# Patient Record
Sex: FEMALE | Race: WHITE | NOT HISPANIC OR LATINO | Employment: OTHER | URBAN - METROPOLITAN AREA
[De-identification: names, ages, dates, MRNs, and addresses within clinical notes are randomized per-mention and may not be internally consistent; named-entity substitution may affect disease eponyms.]

---

## 2017-01-06 ENCOUNTER — HOSPITAL ENCOUNTER (OUTPATIENT)
Dept: CARDIOLOGY | Facility: HOSPITAL | Age: 72
Discharge: HOME | End: 2017-01-06
Attending: INTERNAL MEDICINE | Admitting: INTERNAL MEDICINE

## 2017-01-06 VITALS
DIASTOLIC BLOOD PRESSURE: 65 MMHG | BODY MASS INDEX: 33.13 KG/M2 | HEIGHT: 62 IN | WEIGHT: 180 LBS | SYSTOLIC BLOOD PRESSURE: 152 MMHG

## 2017-01-06 DIAGNOSIS — R01.1 CARDIAC MURMUR: ICD-10-CM

## 2017-01-06 PROCEDURE — 93306 TTE W/DOPPLER COMPLETE: CPT | Performed by: INTERNAL MEDICINE

## 2017-01-06 PROCEDURE — 93306 TTE W/DOPPLER COMPLETE: CPT

## 2017-01-09 LAB
BH CV ECHO MEAS - AI DEC SLOPE: 332 CM/SEC^2
BH CV ECHO MEAS - AI MAX PG: 72.6 MMHG
BH CV ECHO MEAS - AI MAX VEL: 426 CM/SEC
BH CV ECHO MEAS - AI P1/2T: 375.8 MSEC
BH CV ECHO MEAS - AO MAX PG (FULL): 9.7 MMHG
BH CV ECHO MEAS - AO MAX PG: 17 MMHG
BH CV ECHO MEAS - AO MEAN PG (FULL): 5 MMHG
BH CV ECHO MEAS - AO MEAN PG: 9 MMHG
BH CV ECHO MEAS - AO ROOT AREA (BSA CORRECTED): 1.8
BH CV ECHO MEAS - AO ROOT AREA: 8.6 CM^2
BH CV ECHO MEAS - AO ROOT DIAM: 3.3 CM
BH CV ECHO MEAS - AO V2 MAX: 206 CM/SEC
BH CV ECHO MEAS - AO V2 MEAN: 137 CM/SEC
BH CV ECHO MEAS - AO V2 VTI: 51.4 CM
BH CV ECHO MEAS - BSA(HAYCOCK): 1.9 M^2
BH CV ECHO MEAS - BSA: 1.8 M^2
BH CV ECHO MEAS - BZI_BMI: 32.9 KILOGRAMS/M^2
BH CV ECHO MEAS - BZI_METRIC_HEIGHT: 157.5 CM
BH CV ECHO MEAS - BZI_METRIC_WEIGHT: 81.6 KG
BH CV ECHO MEAS - CI(CUBED): 2.2 L/MIN/M^2
BH CV ECHO MEAS - CI(MOD-SP2): 1.8 L/MIN/M^2
BH CV ECHO MEAS - CI(MOD-SP4): 1.8 L/MIN/M^2
BH CV ECHO MEAS - CI(TEICH): 2 L/MIN/M^2
BH CV ECHO MEAS - CO(CUBED): 4 L/MIN
BH CV ECHO MEAS - CO(MOD-SP2): 3.3 L/MIN
BH CV ECHO MEAS - CO(MOD-SP4): 3.3 L/MIN
BH CV ECHO MEAS - CO(TEICH): 3.6 L/MIN
BH CV ECHO MEAS - CONTRAST EF (2CH): 68.8 ML/M^2
BH CV ECHO MEAS - CONTRAST EF 4CH: 57.1 ML/M^2
BH CV ECHO MEAS - EDV(CUBED): 87.7 ML
BH CV ECHO MEAS - EDV(MOD-SP2): 77 ML
BH CV ECHO MEAS - EDV(MOD-SP4): 91 ML
BH CV ECHO MEAS - EDV(TEICH): 89.7 ML
BH CV ECHO MEAS - EF(CUBED): 71.9 %
BH CV ECHO MEAS - EF(MOD-SP2): 68.8 %
BH CV ECHO MEAS - EF(MOD-SP4): 57.1 %
BH CV ECHO MEAS - EF(TEICH): 63.8 %
BH CV ECHO MEAS - ESV(CUBED): 24.6 ML
BH CV ECHO MEAS - ESV(MOD-SP2): 24 ML
BH CV ECHO MEAS - ESV(MOD-SP4): 39 ML
BH CV ECHO MEAS - ESV(TEICH): 32.5 ML
BH CV ECHO MEAS - FS: 34.5 %
BH CV ECHO MEAS - IVS/LVPW: 1.3
BH CV ECHO MEAS - IVSD: 1.2 CM
BH CV ECHO MEAS - LA DIMENSION: 4.6 CM
BH CV ECHO MEAS - LA/AO: 1.4
BH CV ECHO MEAS - LV DIASTOLIC VOL/BSA (35-75): 49.8 ML/M^2
BH CV ECHO MEAS - LV MASS(C)D: 158.2 GRAMS
BH CV ECHO MEAS - LV MASS(C)DI: 86.5 GRAMS/M^2
BH CV ECHO MEAS - LV MAX PG: 7.3 MMHG
BH CV ECHO MEAS - LV MEAN PG: 4 MMHG
BH CV ECHO MEAS - LV SYSTOLIC VOL/BSA (12-30): 21.3 ML/M^2
BH CV ECHO MEAS - LV V1 MAX: 135 CM/SEC
BH CV ECHO MEAS - LV V1 MEAN: 93.6 CM/SEC
BH CV ECHO MEAS - LV V1 VTI: 33.1 CM
BH CV ECHO MEAS - LVIDD: 4.4 CM
BH CV ECHO MEAS - LVIDS: 2.9 CM
BH CV ECHO MEAS - LVLD AP2: 7.3 CM
BH CV ECHO MEAS - LVLD AP4: 7.8 CM
BH CV ECHO MEAS - LVLS AP2: 6.4 CM
BH CV ECHO MEAS - LVLS AP4: 6.5 CM
BH CV ECHO MEAS - LVPWD: 0.92 CM
BH CV ECHO MEAS - MM HR: 63 BPM
BH CV ECHO MEAS - MM R-R INT: 0.95 SEC
BH CV ECHO MEAS - MV A MAX VEL: 106 CM/SEC
BH CV ECHO MEAS - MV DEC TIME: 0.17 SEC
BH CV ECHO MEAS - MV E MAX VEL: 121 CM/SEC
BH CV ECHO MEAS - MV E/A: 1.1
BH CV ECHO MEAS - PA ACC SLOPE: 1038 CM/SEC^2
BH CV ECHO MEAS - PA ACC TIME: 0.1 SEC
BH CV ECHO MEAS - PA MAX PG: 4.3 MMHG
BH CV ECHO MEAS - PA PR(ACCEL): 34 MMHG
BH CV ECHO MEAS - PA V2 MAX: 104 CM/SEC
BH CV ECHO MEAS - PULM DIAS VEL: 55 CM/SEC
BH CV ECHO MEAS - PULM S/D: 1.4
BH CV ECHO MEAS - PULM SYS VEL: 79.2 CM/SEC
BH CV ECHO MEAS - RVDD: 2.8 CM
BH CV ECHO MEAS - SI(AO): 240.5 ML/M^2
BH CV ECHO MEAS - SI(CUBED): 34.5 ML/M^2
BH CV ECHO MEAS - SI(MOD-SP2): 29 ML/M^2
BH CV ECHO MEAS - SI(MOD-SP4): 28.4 ML/M^2
BH CV ECHO MEAS - SI(TEICH): 31.3 ML/M^2
BH CV ECHO MEAS - SV(AO): 439.6 ML
BH CV ECHO MEAS - SV(CUBED): 63.1 ML
BH CV ECHO MEAS - SV(MOD-SP2): 53 ML
BH CV ECHO MEAS - SV(MOD-SP4): 52 ML
BH CV ECHO MEAS - SV(TEICH): 57.2 ML
BH CV ECHO MEAS - TAPSE (>1.6): 3.2 CM2
BH CV ECHO MEAS - TR MAX VEL: 175.5 CM/SEC
BH CV XLRA - RV BASE: 4.1 CM
BH CV XLRA - RV LENGTH: 6.5 CM
BH CV XLRA - RV MID: 2.8 CM
BH CV XLRA - TDI S': 13 CM/SEC
LEFT ATRIUM VOLUME INDEX: 53 ML/M2
LV EF 2D ECHO EST: 58 %

## 2017-02-03 ENCOUNTER — CONSULT (OUTPATIENT)
Dept: CARDIOLOGY | Facility: CLINIC | Age: 72
End: 2017-02-03

## 2017-02-03 VITALS — HEIGHT: 64 IN | BODY MASS INDEX: 32.3 KG/M2 | WEIGHT: 189.2 LBS

## 2017-02-03 DIAGNOSIS — Z95.0 PACEMAKER: ICD-10-CM

## 2017-02-03 DIAGNOSIS — R06.02 SHORTNESS OF BREATH: Primary | ICD-10-CM

## 2017-02-03 DIAGNOSIS — E78.2 MIXED HYPERLIPIDEMIA: ICD-10-CM

## 2017-02-03 DIAGNOSIS — I10 ESSENTIAL HYPERTENSION: ICD-10-CM

## 2017-02-03 PROCEDURE — 99204 OFFICE O/P NEW MOD 45 MIN: CPT | Performed by: INTERNAL MEDICINE

## 2017-02-03 PROCEDURE — 93288 INTERROG EVL PM/LDLS PM IP: CPT | Performed by: INTERNAL MEDICINE

## 2017-02-03 RX ORDER — ATORVASTATIN CALCIUM 20 MG/1
20 TABLET, FILM COATED ORAL DAILY
COMMUNITY
End: 2017-05-18 | Stop reason: SDUPTHER

## 2017-02-03 RX ORDER — INSULIN GLARGINE 100 [IU]/ML
16 INJECTION, SOLUTION SUBCUTANEOUS DAILY
COMMUNITY
End: 2017-05-18 | Stop reason: SDUPTHER

## 2017-02-03 RX ORDER — FERROUS SULFATE 325(65) MG
325 TABLET ORAL
COMMUNITY
End: 2017-12-20 | Stop reason: ALTCHOICE

## 2017-02-03 RX ORDER — LEVOTHYROXINE SODIUM 112 UG/1
112 TABLET ORAL DAILY
COMMUNITY
End: 2017-05-18 | Stop reason: SDUPTHER

## 2017-02-03 RX ORDER — ASPIRIN 81 MG/1
81 TABLET ORAL DAILY
COMMUNITY

## 2017-02-03 RX ORDER — ESCITALOPRAM OXALATE 20 MG/1
20 TABLET ORAL DAILY
COMMUNITY
End: 2017-05-18 | Stop reason: SDUPTHER

## 2017-02-03 RX ORDER — MELATONIN
1000 DAILY
COMMUNITY
End: 2020-05-08

## 2017-02-03 NOTE — PROGRESS NOTES
[facility] at [facility]  Consultation H&P  Faye Rod  1945  [phone]    VISIT DATE:  02/03/2017    PCP: Harry Martinez MD  [address] [address]    IDENTIFICATION: A 71 y.o. female     CC:  Chief Complaint   Patient presents with   • Consult     HTN/MURMUR       PROBLEM LIST:  1.  Diastolic Heart failure   A.  Echocardiogram, [place], 2014.  Impaired relaxation.  Mild AR, TR with RVSP 38 mmHg.  Trace MR   B.  Echocardiogram, [facility], 2017.  EF 50-55%, mild TR, moderate MR.    2.  Abnormal Heart rhythm   A.  [employer] [facility] PPM Model # [id number]  2.  Hypertension  3.  Hyperlipidemia  4.  Diabetes Mellitus  5.  CKD Stage III; followed by Dr Casarez  6. Dementia      Allergies  No Known Allergies    Current Medications    Current Outpatient Prescriptions:   •  Acetaminophen (TYLENOL ARTHRITIS PAIN PO), Take  by mouth., Disp: , Rfl:   •  amLODIPine (NORVASC) 2.5 MG tablet, Take 2.5 mg by mouth Daily., Disp: , Rfl:   •  aspirin 81 MG EC tablet, Take 81 mg by mouth Daily., Disp: , Rfl:   •  atorvastatin (LIPITOR) 20 MG tablet, Take 20 mg by mouth Daily., Disp: , Rfl:   •  cholecalciferol (VITAMIN D3) 1000 UNITS tablet, Take 1,000 Units by mouth Daily., Disp: , Rfl:   •  escitalopram (LEXAPRO) 20 MG tablet, Take 20 mg by mouth Daily., Disp: , Rfl:   •  ferrous sulfate 325 (65 FE) MG tablet, Take 325 mg by mouth Daily With Breakfast., Disp: , Rfl:   •  insulin glargine (LANTUS) 100 UNIT/ML injection, Inject 16 Units under the skin Daily., Disp: , Rfl:   •  levothyroxine (SYNTHROID, LEVOTHROID) 112 MCG tablet, Take 112 mcg by mouth Daily., Disp: , Rfl:   •  azelastine (ASTELIN) 0.1 % nasal spray, 1 spray into each nostril 2 (Two) Times a Day. Use in each nostril as directed, Disp: 1 each, Rfl: 0     History of Present Illness   HPI  Patient is a pleasant 71 year old  female with the above noted medical history who presents today to [facility] care.  She  "recently moved her from [place] where she was under the direct supervision of Dr Bhupinder Rivera, cardiology.  She has no known coronary disease.  She has a history of a pacemaker implant for \"abnormal rhythms\"; neither she or her daughter could elaborate.  She had been admitted in [place] for renal issues and was discharged to a nursing home facility.  It was determined that she was not capable of living alone any longer due to her memory issues and has thus moved in with her daughter.  She denies having any current chest pain, edema, fatigue, syncope.  She does admit to having some shortness of breath while climbing the stairs, but not at any other time.  She does not admit to 2 pillow orthopnea, PND or sleep apnea.  Her most recent echo indicates diastolic dysfunction and mild valvular heart disease.  From the cardiac standpoint, we feel that she is doing very well.      Pt denies any chest pain, dyspnea, dyspnea on exertion, orthopnea, PND, palpitations, lower extremity edema.      ROS  Review of Systems   Constitution: Positive for malaise/fatigue.   HENT: Negative.    Eyes: Negative.    Cardiovascular: Positive for dyspnea on exertion and leg swelling (occasional). Negative for chest pain, orthopnea, palpitations and syncope.   Respiratory: Positive for shortness of breath and snoring. Negative for cough and wheezing.    Endocrine: Negative.    Hematologic/Lymphatic: Negative.    Skin: Negative.    Musculoskeletal: Negative.    Gastrointestinal: Negative.    Genitourinary: Negative.    Neurological: Positive for difficulty with concentration.   Psychiatric/Behavioral: Negative.    Allergic/Immunologic: Negative.        SOCIAL HX  Social History     Social History   • Marital status:      Spouse name: N/A   • Number of children: N/A   • Years of education: N/A     Occupational History   • Not on file.     Social History Main Topics   • Smoking status: Never Smoker   • Smokeless tobacco: Not on file " "  • Alcohol use Yes   • Drug use: Defer   • Sexual activity: Defer     Other Topics Concern   • Not on file     Social History Narrative       FAMILY HX  History reviewed. No pertinent family history.    Vitals:    02/03/17 1411   Weight: 189 lb 3.2 oz (85.8 kg)   Height: 64\" (162.6 cm)       PHYSICAL EXAMINATION:  Physical Exam   Constitutional: She appears well-developed and well-nourished.   HENT:   Head: Normocephalic and atraumatic.   Eyes: Pupils are equal, round, and reactive to light. Right eye exhibits no discharge.   Neck: Normal range of motion. Neck supple. No JVD present.   Cardiovascular: Normal rate, regular rhythm and normal heart sounds.  Exam reveals no gallop and no friction rub.    No murmur heard.  Pulmonary/Chest: Effort normal and breath sounds normal. She has no wheezes.   Abdominal: Soft. Bowel sounds are normal. There is no tenderness.   Musculoskeletal: Normal range of motion. She exhibits no edema.   Neurological: She is alert.   Skin: Skin is warm and dry.   Psychiatric: She has a normal mood and affect. Her behavior is normal.       Diagnostic Data:  DEVICE INTERROGATION:  2/3/2017, [employer] [facility] PPM [id number]: RA pacing 71%, RV pacing 6.3%. P wave is 2.0 mV with a threshold of 0.5 V at 0.4 msec and an impedance of 460 ohms. R wave is 11.2 mV with a threshold of 0.75 V at 0.4 msec and an impedance of 540 ohms. Battery voltage is 7.5 years.  Procedures  No results found for: CHLPL, TRIG, HDL, LDLDIRECT  Lab Results   Component Value Date    GLUCOSE 92 04/16/2016    BUN 46 (H) 04/16/2016    CREATININE 2.2 (H) 04/16/2016     04/16/2016    K 4.4 04/16/2016    CL 99 04/16/2016    CO2 26 04/16/2016     No results found for: HGBA1C  Lab Results   Component Value Date    WBC 8.08 04/16/2016    HGB 10.3 (L) 04/16/2016    HCT 31.8 (L) 04/16/2016     (L) 04/16/2016       ASSESSMENT:   Diagnosis Plan   1. Shortness of breath     2. Pacemaker     3. Essential hypertension     4. Mixed " hyperlipidemia           PLAN:  1.  Continue current medications  2.  Follow up in 6 months or sooner if needed    Scribed for Dr Ciera MD by SHALONDA Prather on 02/03/2017 at 2:51 PM  I, Faisal Vang MD, personally performed the services described in this documentation as scribed by the above named individual in my presence, and it is both accurate and complete.  2/3/2017  4:19 PM         Faisal Vang MD, [facility]

## 2017-02-15 ENCOUNTER — OFFICE VISIT (OUTPATIENT)
Dept: INTERNAL MEDICINE | Facility: CLINIC | Age: 72
End: 2017-02-15

## 2017-02-15 VITALS
HEIGHT: 64 IN | HEART RATE: 68 BPM | WEIGHT: 189 LBS | BODY MASS INDEX: 32.27 KG/M2 | DIASTOLIC BLOOD PRESSURE: 62 MMHG | OXYGEN SATURATION: 98 % | SYSTOLIC BLOOD PRESSURE: 148 MMHG

## 2017-02-15 DIAGNOSIS — I10 ESSENTIAL HYPERTENSION: Primary | ICD-10-CM

## 2017-02-15 DIAGNOSIS — Z79.4 TYPE 2 DIABETES MELLITUS WITHOUT COMPLICATION, WITH LONG-TERM CURRENT USE OF INSULIN (HCC): ICD-10-CM

## 2017-02-15 DIAGNOSIS — E03.8 OTHER SPECIFIED HYPOTHYROIDISM: ICD-10-CM

## 2017-02-15 DIAGNOSIS — Z95.0 PACEMAKER: ICD-10-CM

## 2017-02-15 DIAGNOSIS — E03.9 HYPOTHYROIDISM, ADULT: ICD-10-CM

## 2017-02-15 DIAGNOSIS — E53.8 B12 DEFICIENCY: ICD-10-CM

## 2017-02-15 DIAGNOSIS — D50.8 OTHER IRON DEFICIENCY ANEMIA: ICD-10-CM

## 2017-02-15 DIAGNOSIS — F09 COGNITIVE DISORDER: ICD-10-CM

## 2017-02-15 DIAGNOSIS — E78.2 MIXED HYPERLIPIDEMIA: ICD-10-CM

## 2017-02-15 DIAGNOSIS — E11.9 TYPE 2 DIABETES MELLITUS WITHOUT COMPLICATION, WITH LONG-TERM CURRENT USE OF INSULIN (HCC): ICD-10-CM

## 2017-02-15 DIAGNOSIS — M19.042 PRIMARY OSTEOARTHRITIS OF BOTH HANDS: ICD-10-CM

## 2017-02-15 DIAGNOSIS — M19.041 PRIMARY OSTEOARTHRITIS OF BOTH HANDS: ICD-10-CM

## 2017-02-15 DIAGNOSIS — I34.0 NON-RHEUMATIC MITRAL REGURGITATION: ICD-10-CM

## 2017-02-15 PROBLEM — M19.049 PRIMARY OSTEOARTHRITIS OF HAND: Status: ACTIVE | Noted: 2017-02-15

## 2017-02-15 LAB
ALBUMIN SERPL-MCNC: 4.2 G/DL (ref 3.2–4.8)
ALBUMIN/GLOB SERPL: 1.7 G/DL (ref 1.5–2.5)
ALP SERPL-CCNC: 80 U/L (ref 25–100)
ALT SERPL W P-5'-P-CCNC: 17 U/L (ref 7–40)
ANION GAP SERPL CALCULATED.3IONS-SCNC: 6 MMOL/L (ref 3–11)
ARTICHOKE IGE QN: 71 MG/DL (ref 0–130)
AST SERPL-CCNC: 21 U/L (ref 0–33)
BASOPHILS # BLD AUTO: 0.02 10*3/MM3 (ref 0–0.2)
BASOPHILS NFR BLD AUTO: 0.3 % (ref 0–1)
BILIRUB SERPL-MCNC: 0.4 MG/DL (ref 0.3–1.2)
BUN BLD-MCNC: 33 MG/DL (ref 9–23)
BUN/CREAT SERPL: 23.6 (ref 7–25)
CALCIUM SPEC-SCNC: 10.1 MG/DL (ref 8.7–10.4)
CHLORIDE SERPL-SCNC: 101 MMOL/L (ref 99–109)
CHOLEST SERPL-MCNC: 138 MG/DL (ref 0–200)
CO2 SERPL-SCNC: 32 MMOL/L (ref 20–31)
CREAT BLD-MCNC: 1.4 MG/DL (ref 0.6–1.3)
DEPRECATED RDW RBC AUTO: 49.6 FL (ref 37–54)
EOSINOPHIL # BLD AUTO: 0.21 10*3/MM3 (ref 0.1–0.3)
EOSINOPHIL NFR BLD AUTO: 3.4 % (ref 0–3)
ERYTHROCYTE [DISTWIDTH] IN BLOOD BY AUTOMATED COUNT: 14 % (ref 11.3–14.5)
GFR SERPL CREATININE-BSD FRML MDRD: 37 ML/MIN/1.73
GLOBULIN UR ELPH-MCNC: 2.5 GM/DL
GLUCOSE BLD-MCNC: 118 MG/DL (ref 70–100)
GLUCOSE BLDC GLUCOMTR-MCNC: 139 MG/DL (ref 70–130)
HBA1C MFR BLD: 7.7 %
HCT VFR BLD AUTO: 36.8 % (ref 34.5–44)
HDLC SERPL-MCNC: 47 MG/DL (ref 40–60)
HGB BLD-MCNC: 11.6 G/DL (ref 11.5–15.5)
IMM GRANULOCYTES # BLD: 0.01 10*3/MM3 (ref 0–0.03)
IMM GRANULOCYTES NFR BLD: 0.2 % (ref 0–0.6)
LYMPHOCYTES # BLD AUTO: 2.1 10*3/MM3 (ref 0.6–4.8)
LYMPHOCYTES NFR BLD AUTO: 34.5 % (ref 24–44)
MCH RBC QN AUTO: 30.6 PG (ref 27–31)
MCHC RBC AUTO-ENTMCNC: 31.5 G/DL (ref 32–36)
MCV RBC AUTO: 97.1 FL (ref 80–99)
MONOCYTES # BLD AUTO: 0.47 10*3/MM3 (ref 0–1)
MONOCYTES NFR BLD AUTO: 7.7 % (ref 0–12)
NEUTROPHILS # BLD AUTO: 3.28 10*3/MM3 (ref 1.5–8.3)
NEUTROPHILS NFR BLD AUTO: 53.9 % (ref 41–71)
PLATELET # BLD AUTO: 147 10*3/MM3 (ref 150–450)
PMV BLD AUTO: 11.3 FL (ref 6–12)
POTASSIUM BLD-SCNC: 4.5 MMOL/L (ref 3.5–5.5)
PROT SERPL-MCNC: 6.7 G/DL (ref 5.7–8.2)
RBC # BLD AUTO: 3.79 10*6/MM3 (ref 3.89–5.14)
SODIUM BLD-SCNC: 139 MMOL/L (ref 132–146)
TRIGL SERPL-MCNC: 87 MG/DL (ref 0–150)
TSH SERPL DL<=0.05 MIU/L-ACNC: 0.7 MIU/ML (ref 0.35–5.35)
WBC NRBC COR # BLD: 6.09 10*3/MM3 (ref 3.5–10.8)

## 2017-02-15 PROCEDURE — 99204 OFFICE O/P NEW MOD 45 MIN: CPT | Performed by: HOSPITALIST

## 2017-02-15 PROCEDURE — 80061 LIPID PANEL: CPT | Performed by: HOSPITALIST

## 2017-02-15 PROCEDURE — 84443 ASSAY THYROID STIM HORMONE: CPT | Performed by: HOSPITALIST

## 2017-02-15 PROCEDURE — 83036 HEMOGLOBIN GLYCOSYLATED A1C: CPT | Performed by: HOSPITALIST

## 2017-02-15 PROCEDURE — 82947 ASSAY GLUCOSE BLOOD QUANT: CPT | Performed by: HOSPITALIST

## 2017-02-15 PROCEDURE — 85025 COMPLETE CBC W/AUTO DIFF WBC: CPT | Performed by: HOSPITALIST

## 2017-02-15 PROCEDURE — 80053 COMPREHEN METABOLIC PANEL: CPT | Performed by: HOSPITALIST

## 2017-02-15 NOTE — PROGRESS NOTES
Subjective   Faye Rod is a 71 y.o. female. Establish Care (new pt); Hypertension; Hyperlipidemia; Diabetes; and Hypothyroidism         HPI Comments: Here to establish. She has some difficulty walking associated with poor balance and h/o frequent falls. She had a recent admission in [place], September 2016 and has been to rehab. She is having some cognitive problems since she was admitted that seems to be associated with being in the nursing home. Her daughter reports difficulty following commands. Her BP is usually well controlled. Her BP may have been running low at one time and her BP meds were stopped. She has been back on them about 2 weeks now. She does home monitoring of her BP. She has CKD and is followed by [facility]. She had a recent echo in December 2016 showing moderate mitral regurgitation.  Her diabetes is fairly well controlled.       The following portions of the patient's history were reviewed and updated as appropriate: allergies, current medications, past family history, past medical history, past social history, past surgical history and problem list.    Review of Systems   Constitutional: Negative for activity change and appetite change.   Respiratory: Positive for shortness of breath.         Orthopnea   Cardiovascular: Negative for leg swelling.   Gastrointestinal: Negative for abdominal distention and abdominal pain.   Genitourinary: Negative for dysuria and enuresis.   Psychiatric/Behavioral: Negative for agitation and behavioral problems.       Objective   Vitals:    02/15/17 1023   BP: 148/62   Pulse: 68   SpO2: 98%       Physical Exam   Constitutional: She is oriented to person, place, and time. She appears well-developed and well-nourished.   HENT:   Head: Normocephalic and atraumatic.   Eyes: Conjunctivae and EOM are normal. Pupils are equal, round, and reactive to light.   Neck: Normal range of motion. Neck supple.   Cardiovascular: Normal rate, regular rhythm and  normal heart sounds.    Pulmonary/Chest: Effort normal and breath sounds normal.   Abdominal: Soft. Bowel sounds are normal.   Musculoskeletal: Normal range of motion.   Neurological: She is alert and oriented to person, place, and time. She has normal reflexes.   Skin: Skin is warm and dry.   Psychiatric: She has a normal mood and affect. Her behavior is normal. Judgment and thought content normal.       Assessment/Plan   Faye was seen today for establish care, hypertension, hyperlipidemia, diabetes and hypothyroidism.    Diagnoses and all orders for this visit:    Essential hypertension  -     Comprehensive metabolic panel    Type 2 diabetes mellitus without complication, with long-term current use of insulin  -     POC Glycosylated Hemoglobin (Hb A1C)  -     POCT Glucose    Other specified hypothyroidism  -     TSH    Non-rheumatic mitral regurgitation    Mixed hyperlipidemia  -     Lipid Panel    Primary osteoarthritis of both hands    Hypothyroidism, adult    Pacemaker    Other iron deficiency anemia  -     CBC & Differential  -     CBC Auto Differential    Cognitive disorder  -     Ambulatory Referral to Neurology    B12 deficiency  -     Vitamin B12      Results for orders placed or performed in visit on 02/15/17   POC Glycosylated Hemoglobin (Hb A1C)   Result Value Ref Range    Hemoglobin A1C 7.7 %   POCT Glucose   Result Value Ref Range    Glucose 139 (A) 70 - 130 mg/dL

## 2017-02-27 RX ORDER — AMLODIPINE BESYLATE 2.5 MG/1
2.5 TABLET ORAL DAILY
Qty: 90 TABLET | Refills: 1 | Status: SHIPPED | OUTPATIENT
Start: 2017-02-27 | End: 2017-06-27 | Stop reason: SDUPTHER

## 2017-03-17 ENCOUNTER — OFFICE VISIT (OUTPATIENT)
Dept: NEUROLOGY | Facility: CLINIC | Age: 72
End: 2017-03-17

## 2017-03-17 ENCOUNTER — APPOINTMENT (OUTPATIENT)
Dept: LAB | Facility: HOSPITAL | Age: 72
End: 2017-03-17

## 2017-03-17 VITALS
HEIGHT: 63 IN | SYSTOLIC BLOOD PRESSURE: 126 MMHG | WEIGHT: 186 LBS | DIASTOLIC BLOOD PRESSURE: 75 MMHG | BODY MASS INDEX: 32.96 KG/M2

## 2017-03-17 DIAGNOSIS — F03.91 DEMENTIA WITH BEHAVIORAL DISTURBANCE, UNSPECIFIED DEMENTIA TYPE: Primary | ICD-10-CM

## 2017-03-17 PROBLEM — F03.918 DEMENTIA WITH BEHAVIORAL DISTURBANCE (HCC): Status: ACTIVE | Noted: 2017-03-17

## 2017-03-17 LAB
FOLATE SERPL-MCNC: >24 NG/ML (ref 3.2–20)
VIT B12 BLD-MCNC: >2000 PG/ML (ref 211–911)

## 2017-03-17 PROCEDURE — 82607 VITAMIN B-12: CPT | Performed by: PSYCHIATRY & NEUROLOGY

## 2017-03-17 PROCEDURE — 36415 COLL VENOUS BLD VENIPUNCTURE: CPT | Performed by: PSYCHIATRY & NEUROLOGY

## 2017-03-17 PROCEDURE — 82746 ASSAY OF FOLIC ACID SERUM: CPT | Performed by: PSYCHIATRY & NEUROLOGY

## 2017-03-17 PROCEDURE — 99205 OFFICE O/P NEW HI 60 MIN: CPT | Performed by: PSYCHIATRY & NEUROLOGY

## 2017-03-17 RX ORDER — DONEPEZIL HYDROCHLORIDE 5 MG/1
5 TABLET, FILM COATED ORAL DAILY
Qty: 30 TABLET | Refills: 11 | Status: SHIPPED | OUTPATIENT
Start: 2017-03-17 | End: 2017-04-21

## 2017-03-31 ENCOUNTER — HOSPITAL ENCOUNTER (OUTPATIENT)
Dept: CT IMAGING | Facility: HOSPITAL | Age: 72
Discharge: HOME | End: 2017-03-31
Attending: PSYCHIATRY & NEUROLOGY | Admitting: PSYCHIATRY & NEUROLOGY

## 2017-03-31 PROCEDURE — 70450 CT HEAD/BRAIN W/O DYE: CPT

## 2017-04-21 ENCOUNTER — OFFICE VISIT (OUTPATIENT)
Dept: NEUROLOGY | Facility: CLINIC | Age: 72
End: 2017-04-21

## 2017-04-21 VITALS
HEIGHT: 62 IN | SYSTOLIC BLOOD PRESSURE: 140 MMHG | BODY MASS INDEX: 34.04 KG/M2 | DIASTOLIC BLOOD PRESSURE: 72 MMHG | WEIGHT: 185 LBS

## 2017-04-21 DIAGNOSIS — F03.91 DEMENTIA WITH BEHAVIORAL DISTURBANCE, UNSPECIFIED DEMENTIA TYPE: Primary | ICD-10-CM

## 2017-04-21 PROCEDURE — 99214 OFFICE O/P EST MOD 30 MIN: CPT | Performed by: PHYSICIAN ASSISTANT

## 2017-04-21 RX ORDER — MEMANTINE HYDROCHLORIDE 5 MG-10 MG
KIT ORAL
Qty: 1 PACKAGE | Refills: 0 | Status: SHIPPED | OUTPATIENT
Start: 2017-04-21 | End: 2017-09-22

## 2017-04-21 RX ORDER — DONEPEZIL HYDROCHLORIDE 10 MG/1
10 TABLET, FILM COATED ORAL DAILY
Qty: 30 TABLET | Refills: 11 | Status: SHIPPED | OUTPATIENT
Start: 2017-04-21 | End: 2018-04-21

## 2017-04-21 RX ORDER — MEMANTINE HYDROCHLORIDE 10 MG/1
10 TABLET ORAL 2 TIMES DAILY
Qty: 60 TABLET | Refills: 11 | Status: SHIPPED | OUTPATIENT
Start: 2017-04-21 | End: 2018-04-21

## 2017-04-21 NOTE — PROGRESS NOTES
"Subjective     History of Present Illness   Faye Rod is a 71 y.o. female who returns to clinic today for evaluation of cognitive impairment. Her family  has noted symptoms since approximately 2015 marked initially by forgetfulness. This has varied over time. Additional associated symptoms have included impairments in essentially all spheres of cognition. She has had associated  symptoms of hallucinations. No modifying factors or circumstances have been identified. She is currently residing at home with her daughter in [place].      Prior evaluation has included screening blood work and a head CT within the last year which were notable for only mild increases in BUN and creatinine. She is currently taking donepezil 5mg daily.     Since her last visit in 3/17, she feels essentially unchanged and her family agrees. Her family notes that her hallucinations have resolved.      The following portions of the patient's history were reviewed and updated as appropriate: allergies, current medications, past family history, past medical history, past social history, past surgical history and problem list.    Review of Systems   Constitutional: Negative.    HENT: Negative.    Eyes: Negative.    Respiratory: Negative.    Cardiovascular: Negative.    Gastrointestinal: Negative.    Endocrine: Negative.    Genitourinary: Negative.    Musculoskeletal: Negative.    Skin: Negative.    Allergic/Immunologic: Negative.    Neurological:        As noted in HPI   Hematological: Negative.    Psychiatric/Behavioral:        As noted in HPI       Objective     /72  Ht 62\" (157.5 cm)  Wt 185 lb (83.9 kg)  BMI 33.84 kg/m2    General appearance today is normal.         Physical Exam   Neurological: She has normal strength. She has a normal Finger-Nose-Finger Test.   Psychiatric: Her speech is normal.        Neurologic Exam     Mental Status   Oriented to person.   (Oriented to state only  )  Disoriented to date. Oriented to " year, month, day and season.   Registration: recalls 3 of 3 objects. Recall of objects at 5 minutes: 0/3 recall. Follows 3 step commands.   Attention: 4/5 recall.   Speech: speech is normal   Level of consciousness: alert  Able to name object. Able to read. Able to repeat. Able to write. Normal comprehension.     Cranial Nerves   Cranial nerves II through XII intact.     Motor Exam   Muscle bulk: normal  Overall muscle tone: normal    Strength   Strength 5/5 throughout.     Sensory Exam   Light touch normal.     Gait, Coordination, and Reflexes     Coordination   Finger to nose coordination: normal    Tremor   Resting tremor: absent        Results  MMSE=20      Assessment/Plan   Faye was seen today for dementia.    Diagnoses and all orders for this visit:    Dementia with behavioral disturbance, unspecified dementia type    Other orders  -     donepezil (ARICEPT) 10 MG tablet; Take 1 tablet by mouth Daily.  -     memantine (NAMENDA TITRATION GERRI) 5 (28)-10 (21) MG tablet pack; Follow package directions.  -     memantine (NAMENDA) 10 MG tablet; Take 1 tablet by mouth 2 (Two) Times a Day.          Discussion/Summary   Faye Rod returns to clinic today for evaluation of cognitive impairment. Her history and examination, including bedside cognitive testing are most consistent with a mild-moderate dementia, though the underlying etiology is not yet clear, which was discussed. AD exacerbated by delirium during her hospitalization with renal insufficiency in 9/16 vs DLB are considerations. After discussing potential treatment options, it was elected to increase  donepezil to 10mg daily and add memantine. I also recommended home health, which she will consider in the future. She will then follow up in 3 months, or sooner if needed.       I spent 18 minutes out of 25 minutes face to face with the patient and family and discussing diagnosis, prognosis, diagnostic testing, evaluation, current status, treatment  options and management.    As part of this visit I reviewed prior lab results, reviewed radiology results and obtained additional history from the family which is incorporated in the HPI.      Sarai Mohan PA-C

## 2017-04-24 ENCOUNTER — TELEPHONE (OUTPATIENT)
Dept: NEUROLOGY | Facility: CLINIC | Age: 72
End: 2017-04-24

## 2017-04-24 NOTE — TELEPHONE ENCOUNTER
Called [facility] [phone] and filled a script for Memantine 10 mg titration with the instructions to take for the 1st week 1/2 tablet, 2nd week take one whole tablet, 3rd week take 1 1/2 tablet and the 4th week take 2 10 mg tablets and continue with the 2 10 mg tablets there after.

## 2017-05-18 ENCOUNTER — OFFICE VISIT (OUTPATIENT)
Dept: INTERNAL MEDICINE | Facility: CLINIC | Age: 72
End: 2017-05-18

## 2017-05-18 VITALS
HEART RATE: 62 BPM | BODY MASS INDEX: 35.77 KG/M2 | WEIGHT: 194.4 LBS | OXYGEN SATURATION: 97 % | SYSTOLIC BLOOD PRESSURE: 126 MMHG | DIASTOLIC BLOOD PRESSURE: 84 MMHG | HEIGHT: 62 IN

## 2017-05-18 DIAGNOSIS — N18.9 CHRONIC KIDNEY DISEASE, UNSPECIFIED STAGE: ICD-10-CM

## 2017-05-18 DIAGNOSIS — M85.80 OSTEOPENIA: ICD-10-CM

## 2017-05-18 DIAGNOSIS — N18.30 CHRONIC KIDNEY DISEASE, STAGE III (MODERATE) (HCC): ICD-10-CM

## 2017-05-18 DIAGNOSIS — Z78.0 POST-MENOPAUSAL: ICD-10-CM

## 2017-05-18 DIAGNOSIS — E11.00 TYPE 2 DIABETES MELLITUS WITH HYPEROSMOLARITY WITHOUT COMA, WITHOUT LONG-TERM CURRENT USE OF INSULIN (HCC): Primary | ICD-10-CM

## 2017-05-18 LAB
ALBUMIN SERPL-MCNC: 4.3 G/DL (ref 3.2–4.8)
ALBUMIN/GLOB SERPL: 2 G/DL (ref 1.5–2.5)
ALP SERPL-CCNC: 91 U/L (ref 25–100)
ALT SERPL W P-5'-P-CCNC: 18 U/L (ref 7–40)
ANION GAP SERPL CALCULATED.3IONS-SCNC: 9 MMOL/L (ref 3–11)
AST SERPL-CCNC: 16 U/L (ref 0–33)
BASOPHILS # BLD AUTO: 0.03 10*3/MM3 (ref 0–0.2)
BASOPHILS NFR BLD AUTO: 0.4 % (ref 0–1)
BILIRUB SERPL-MCNC: 0.3 MG/DL (ref 0.3–1.2)
BUN BLD-MCNC: 29 MG/DL (ref 9–23)
BUN/CREAT SERPL: 20.7 (ref 7–25)
CALCIUM SPEC-SCNC: 9.9 MG/DL (ref 8.7–10.4)
CHLORIDE SERPL-SCNC: 102 MMOL/L (ref 99–109)
CO2 SERPL-SCNC: 30 MMOL/L (ref 20–31)
CREAT BLD-MCNC: 1.4 MG/DL (ref 0.6–1.3)
DEPRECATED RDW RBC AUTO: 44.7 FL (ref 37–54)
EOSINOPHIL # BLD AUTO: 0.13 10*3/MM3 (ref 0.1–0.3)
EOSINOPHIL NFR BLD AUTO: 1.8 % (ref 0–3)
ERYTHROCYTE [DISTWIDTH] IN BLOOD BY AUTOMATED COUNT: 12.5 % (ref 11.3–14.5)
FERRITIN SERPL-MCNC: 69 NG/ML (ref 10–291)
GFR SERPL CREATININE-BSD FRML MDRD: 37 ML/MIN/1.73
GLOBULIN UR ELPH-MCNC: 2.2 GM/DL
GLUCOSE BLD-MCNC: 276 MG/DL (ref 70–100)
GLUCOSE BLDC GLUCOMTR-MCNC: 420 MG/DL (ref 70–130)
HBA1C MFR BLD: 11.2 %
HCT VFR BLD AUTO: 38.2 % (ref 34.5–44)
HGB BLD-MCNC: 12.1 G/DL (ref 11.5–15.5)
IMM GRANULOCYTES # BLD: 0.01 10*3/MM3 (ref 0–0.03)
IMM GRANULOCYTES NFR BLD: 0.1 % (ref 0–0.6)
IRON 24H UR-MRATE: 58 MCG/DL (ref 50–175)
IRON SATN MFR SERPL: 19 % (ref 15–50)
LYMPHOCYTES # BLD AUTO: 2.19 10*3/MM3 (ref 0.6–4.8)
LYMPHOCYTES NFR BLD AUTO: 31.1 % (ref 24–44)
MCH RBC QN AUTO: 31 PG (ref 27–31)
MCHC RBC AUTO-ENTMCNC: 31.7 G/DL (ref 32–36)
MCV RBC AUTO: 97.9 FL (ref 80–99)
MONOCYTES # BLD AUTO: 0.49 10*3/MM3 (ref 0–1)
MONOCYTES NFR BLD AUTO: 7 % (ref 0–12)
NEUTROPHILS # BLD AUTO: 4.2 10*3/MM3 (ref 1.5–8.3)
NEUTROPHILS NFR BLD AUTO: 59.6 % (ref 41–71)
PLATELET # BLD AUTO: 143 10*3/MM3 (ref 150–450)
PMV BLD AUTO: 11.4 FL (ref 6–12)
POTASSIUM BLD-SCNC: 4.7 MMOL/L (ref 3.5–5.5)
PROT SERPL-MCNC: 6.5 G/DL (ref 5.7–8.2)
RBC # BLD AUTO: 3.9 10*6/MM3 (ref 3.89–5.14)
SODIUM BLD-SCNC: 141 MMOL/L (ref 132–146)
TIBC SERPL-MCNC: 302 MCG/DL (ref 250–450)
WBC NRBC COR # BLD: 7.05 10*3/MM3 (ref 3.5–10.8)

## 2017-05-18 PROCEDURE — G0439 PPPS, SUBSEQ VISIT: HCPCS | Performed by: HOSPITALIST

## 2017-05-18 PROCEDURE — 80053 COMPREHEN METABOLIC PANEL: CPT | Performed by: HOSPITALIST

## 2017-05-18 PROCEDURE — 83036 HEMOGLOBIN GLYCOSYLATED A1C: CPT | Performed by: HOSPITALIST

## 2017-05-18 PROCEDURE — 84155 ASSAY OF PROTEIN SERUM: CPT | Performed by: HOSPITALIST

## 2017-05-18 PROCEDURE — 84156 ASSAY OF PROTEIN URINE: CPT | Performed by: HOSPITALIST

## 2017-05-18 PROCEDURE — 82947 ASSAY GLUCOSE BLOOD QUANT: CPT | Performed by: HOSPITALIST

## 2017-05-18 PROCEDURE — 84165 PROTEIN E-PHORESIS SERUM: CPT | Performed by: HOSPITALIST

## 2017-05-18 PROCEDURE — G0444 DEPRESSION SCREEN ANNUAL: HCPCS | Performed by: HOSPITALIST

## 2017-05-18 PROCEDURE — 84166 PROTEIN E-PHORESIS/URINE/CSF: CPT | Performed by: HOSPITALIST

## 2017-05-18 PROCEDURE — 83550 IRON BINDING TEST: CPT | Performed by: HOSPITALIST

## 2017-05-18 PROCEDURE — 99213 OFFICE O/P EST LOW 20 MIN: CPT | Performed by: HOSPITALIST

## 2017-05-18 PROCEDURE — 82728 ASSAY OF FERRITIN: CPT | Performed by: HOSPITALIST

## 2017-05-18 PROCEDURE — 83540 ASSAY OF IRON: CPT | Performed by: HOSPITALIST

## 2017-05-18 PROCEDURE — 85025 COMPLETE CBC W/AUTO DIFF WBC: CPT | Performed by: HOSPITALIST

## 2017-05-18 RX ORDER — SENNOSIDES 8.6 MG
650 CAPSULE ORAL 2 TIMES DAILY PRN
COMMUNITY
End: 2019-09-27

## 2017-05-18 RX ORDER — MULTIVIT-MIN/IRON/FOLIC ACID/K 18-600-40
1000 CAPSULE ORAL
COMMUNITY
End: 2017-07-21

## 2017-05-18 RX ORDER — IBANDRONATE SODIUM 150 MG/1
150 TABLET, FILM COATED ORAL
Qty: 3 TABLET | Refills: 3 | Status: SHIPPED | OUTPATIENT
Start: 2017-05-18 | End: 2018-07-20 | Stop reason: SDUPTHER

## 2017-05-18 RX ORDER — ESCITALOPRAM OXALATE 20 MG/1
20 TABLET ORAL DAILY
COMMUNITY
End: 2017-10-20 | Stop reason: SDUPTHER

## 2017-05-18 RX ORDER — ASPIRIN 81 MG/1
81 TABLET, CHEWABLE ORAL DAILY
COMMUNITY
End: 2017-09-22

## 2017-05-18 RX ORDER — INSULIN GLARGINE 100 [IU]/ML
20 INJECTION, SOLUTION SUBCUTANEOUS DAILY
Qty: 90 EACH | Refills: 3 | Status: SHIPPED | OUTPATIENT
Start: 2017-05-18 | End: 2017-09-08 | Stop reason: ALTCHOICE

## 2017-05-18 RX ORDER — ESCITALOPRAM OXALATE 20 MG/1
20 TABLET ORAL DAILY
Qty: 90 TABLET | Refills: 0 | Status: SHIPPED | OUTPATIENT
Start: 2017-05-18 | End: 2017-07-06 | Stop reason: SDUPTHER

## 2017-05-18 RX ORDER — LEVOTHYROXINE SODIUM 112 UG/1
112 TABLET ORAL DAILY
COMMUNITY
End: 2017-10-20 | Stop reason: SDUPTHER

## 2017-05-18 RX ORDER — ATORVASTATIN CALCIUM 20 MG/1
20 TABLET, FILM COATED ORAL DAILY
Qty: 90 TABLET | Refills: 0 | Status: SHIPPED | OUTPATIENT
Start: 2017-05-18 | End: 2017-07-06 | Stop reason: SDUPTHER

## 2017-05-18 RX ORDER — LEVOTHYROXINE SODIUM 112 UG/1
112 TABLET ORAL DAILY
Qty: 90 TABLET | Refills: 0 | Status: SHIPPED | OUTPATIENT
Start: 2017-05-18 | End: 2017-07-06 | Stop reason: SDUPTHER

## 2017-05-22 LAB
ALBUMIN SERPL-MCNC: 3.6 G/DL (ref 2.9–4.4)
ALBUMIN/GLOB SERPL: 1.2 {RATIO} (ref 0.7–1.7)
ALPHA1 GLOB FLD ELPH-MCNC: 0.3 G/DL (ref 0–0.4)
ALPHA2 GLOB SERPL ELPH-MCNC: 1 G/DL (ref 0.4–1)
B-GLOBULIN SERPL ELPH-MCNC: 1 G/DL (ref 0.7–1.3)
GAMMA GLOB SERPL ELPH-MCNC: 0.7 G/DL (ref 0.4–1.8)
GLOBULIN SER CALC-MCNC: 3 G/DL (ref 2.2–3.9)
Lab: NORMAL
M-SPIKE: NORMAL G/DL
PROT SERPL-MCNC: 6.6 G/DL (ref 6–8.5)

## 2017-05-23 ENCOUNTER — TRANSCRIBE ORDERS (OUTPATIENT)
Dept: ADMINISTRATIVE | Facility: HOSPITAL | Age: 72
End: 2017-05-23

## 2017-05-23 DIAGNOSIS — Z12.31 VISIT FOR SCREENING MAMMOGRAM: Primary | ICD-10-CM

## 2017-06-28 RX ORDER — AMLODIPINE BESYLATE 2.5 MG/1
TABLET ORAL
Qty: 90 TABLET | Refills: 0 | Status: SHIPPED | OUTPATIENT
Start: 2017-06-28 | End: 2017-10-20 | Stop reason: SDUPTHER

## 2017-07-06 ENCOUNTER — HOSPITAL ENCOUNTER (OUTPATIENT)
Dept: BONE DENSITY | Facility: HOSPITAL | Age: 72
Discharge: HOME | End: 2017-07-06
Attending: HOSPITALIST | Admitting: HOSPITALIST

## 2017-07-06 ENCOUNTER — HOSPITAL ENCOUNTER (OUTPATIENT)
Dept: MAMMOGRAPHY | Facility: HOSPITAL | Age: 72
Discharge: HOME | End: 2017-07-06
Attending: HOSPITALIST

## 2017-07-06 DIAGNOSIS — Z78.0 POST-MENOPAUSAL: ICD-10-CM

## 2017-07-06 DIAGNOSIS — Z12.31 VISIT FOR SCREENING MAMMOGRAM: ICD-10-CM

## 2017-07-06 DIAGNOSIS — N18.30 CHRONIC KIDNEY DISEASE, STAGE III (MODERATE) (HCC): ICD-10-CM

## 2017-07-06 DIAGNOSIS — N18.9 CHRONIC KIDNEY DISEASE, UNSPECIFIED STAGE: ICD-10-CM

## 2017-07-06 DIAGNOSIS — M85.80 OSTEOPENIA: ICD-10-CM

## 2017-07-06 DIAGNOSIS — E11.00 TYPE 2 DIABETES MELLITUS WITH HYPEROSMOLARITY WITHOUT COMA, WITHOUT LONG-TERM CURRENT USE OF INSULIN (HCC): ICD-10-CM

## 2017-07-06 PROCEDURE — 77063 BREAST TOMOSYNTHESIS BI: CPT | Performed by: RADIOLOGY

## 2017-07-06 PROCEDURE — 77080 DXA BONE DENSITY AXIAL: CPT

## 2017-07-06 PROCEDURE — 77080 DXA BONE DENSITY AXIAL: CPT | Performed by: RADIOLOGY

## 2017-07-06 PROCEDURE — G0202 SCR MAMMO BI INCL CAD: HCPCS | Performed by: RADIOLOGY

## 2017-07-06 PROCEDURE — G0202 SCR MAMMO BI INCL CAD: HCPCS

## 2017-07-06 PROCEDURE — 77063 BREAST TOMOSYNTHESIS BI: CPT

## 2017-07-06 RX ORDER — INSULIN GLARGINE 100 [IU]/ML
INJECTION, SOLUTION SUBCUTANEOUS
Qty: 30 ML | Refills: 0 | Status: SHIPPED | OUTPATIENT
Start: 2017-07-06 | End: 2017-09-08 | Stop reason: SDUPTHER

## 2017-07-06 RX ORDER — ESCITALOPRAM OXALATE 20 MG/1
TABLET ORAL
Qty: 90 TABLET | Refills: 0 | Status: SHIPPED | OUTPATIENT
Start: 2017-07-06 | End: 2017-07-21

## 2017-07-06 RX ORDER — LEVOTHYROXINE SODIUM 112 UG/1
TABLET ORAL
Qty: 90 TABLET | Refills: 0 | Status: SHIPPED | OUTPATIENT
Start: 2017-07-06 | End: 2017-07-21

## 2017-07-06 RX ORDER — ATORVASTATIN CALCIUM 20 MG/1
TABLET, FILM COATED ORAL
Qty: 90 TABLET | Refills: 0 | Status: SHIPPED | OUTPATIENT
Start: 2017-07-06 | End: 2017-10-20 | Stop reason: SDUPTHER

## 2017-07-07 ENCOUNTER — TELEPHONE (OUTPATIENT)
Dept: INTERNAL MEDICINE | Facility: CLINIC | Age: 72
End: 2017-07-07

## 2017-07-07 NOTE — TELEPHONE ENCOUNTER
PATIENT WILL NEED AN ORDER FOR TEST STRIPS FOR CONTOUR METER. PATIENT IS AWARE THAT DR BENNETT IS NO LONGER WITH T HIS OFFICE BUT WILL NEED TEST STRIPS AT THIS TIE. IF POSSIBLE, PLEASE SEND TO [facility]

## 2017-07-12 NOTE — TELEPHONE ENCOUNTER
BA,    MS BLAKE'S DAUGHTER CALLED TO FOLLOW UP ON THIS REFILL REQUEST. SHE STATES THAT SHE HAS CHECKED WITH [facility] AND THEY HAVE NOT RECEIVED THIS REQUEST. THE PATIENT IS NOW OUT OF TEST STRIPS AND HAS NOT BEEN TESTING. PLEASE CALL SHILO (DAUGHTER) BACK TO FOLLOW UP WITH HER.    CALL BACK #[phone]

## 2017-07-18 ENCOUNTER — TELEPHONE (OUTPATIENT)
Dept: ENDOCRINOLOGY | Facility: CLINIC | Age: 72
End: 2017-07-18

## 2017-07-18 NOTE — TELEPHONE ENCOUNTER
MADDY TEST STRIPS NEED TO BE SENT IN TO [facility]. PLEASE SEND ASAP. [facility] WILL NOT COVER

## 2017-07-21 ENCOUNTER — OFFICE VISIT (OUTPATIENT)
Dept: INTERNAL MEDICINE | Facility: CLINIC | Age: 72
End: 2017-07-21

## 2017-07-21 ENCOUNTER — OFFICE VISIT (OUTPATIENT)
Dept: NEUROLOGY | Facility: CLINIC | Age: 72
End: 2017-07-21

## 2017-07-21 VITALS
OXYGEN SATURATION: 90 % | SYSTOLIC BLOOD PRESSURE: 128 MMHG | BODY MASS INDEX: 36.55 KG/M2 | DIASTOLIC BLOOD PRESSURE: 74 MMHG | HEIGHT: 62 IN | HEART RATE: 60 BPM | WEIGHT: 198.6 LBS

## 2017-07-21 VITALS
SYSTOLIC BLOOD PRESSURE: 141 MMHG | BODY MASS INDEX: 36.71 KG/M2 | OXYGEN SATURATION: 95 % | HEART RATE: 60 BPM | WEIGHT: 199.5 LBS | DIASTOLIC BLOOD PRESSURE: 60 MMHG | HEIGHT: 62 IN

## 2017-07-21 DIAGNOSIS — E11.00 TYPE 2 DIABETES MELLITUS WITH HYPEROSMOLARITY WITHOUT COMA, WITHOUT LONG-TERM CURRENT USE OF INSULIN (HCC): Primary | ICD-10-CM

## 2017-07-21 DIAGNOSIS — F03.91 DEMENTIA WITH BEHAVIORAL DISTURBANCE, UNSPECIFIED DEMENTIA TYPE: Primary | ICD-10-CM

## 2017-07-21 PROCEDURE — 99214 OFFICE O/P EST MOD 30 MIN: CPT | Performed by: PSYCHIATRY & NEUROLOGY

## 2017-07-21 PROCEDURE — 99213 OFFICE O/P EST LOW 20 MIN: CPT | Performed by: NURSE PRACTITIONER

## 2017-07-21 NOTE — PROGRESS NOTES
"Subjective      CC: memory loss    History of Present Illness   Faye Rod is a 71 y.o. female who returns to clinic today with a history of Dementia . Her family  has noted symptoms since approximately 2015 marked initially by forgetfulness. This has varied over time, with significant worsening noted in 9/16 when hospitalized with renal insufficiency. Additional associated symptoms have included impairments in essentially all spheres of cognition. She has had associated  symptoms of hallucinations previously, though now only occasional delusions. No modifying factors or circumstances have been identified. She is currently residing at home with her daughter in [place].      Prior evaluation has included screening blood work and a head CT within the last year which were notable for only mild increases in BUN and creatinine. She is currently taking donepezil and memantine.    Since her last visit on 4/21/17, she feels essentially unchanged and her family has noted significant improvements in her cognition. No other changes have been noted in her interval history.      The following portions of the patient's history were reviewed and updated as appropriate: allergies, current medications, past family history, past medical history, past social history, past surgical history and problem list.    Review of Systems   Constitutional: Negative.        Objective     /60  Pulse 60  Ht 62\" (157.5 cm)  Wt 199 lb 8 oz (90.5 kg)  SpO2 95%  BMI 36.49 kg/m2    General appearance today is normal.         Physical Exam   Constitutional: She is oriented to person, place, and time.   Neurological: She is oriented to person, place, and time. She has normal strength.   Psychiatric: Her speech is normal.        Neurologic Exam     Mental Status   Oriented to person, place, and time.   Registration: recalls 3 of 3 objects. Recall at 5 minutes: recalls 1 of 3 objects. Follows 3 step commands.   Attention: normal. "   Speech: speech is normal   Level of consciousness: alert  Able to name object. Able to read. Able to repeat. Able to write. Normal comprehension.     Cranial Nerves   Cranial nerves II through XII intact.     Motor Exam   Muscle bulk: normal  Overall muscle tone: normal    Strength   Strength 5/5 throughout.     Sensory Exam   Light touch normal.         Results  MMSE=27      Assessment/Plan   Faye was seen today for follow-up.    Diagnoses and all orders for this visit:    Dementia with behavioral disturbance, unspecified dementia type        Discussion/Summary   Faye Rod returns to clinic today with a history of Dementia . Her history and examination, including bedside cognitive testing are most consistent with a mild dementia, though the underlying etiology is still not entirely clear, which was discussed. AD exacerbated by delirium during her hospitalization with renal insufficiency in 9/16 remains a consideration. After discussing potential treatment options, it was elected to continue on  donepezil and memantine unchanged. She will then follow up in 6 months, or sooner if needed.       I spent 15 minutes out of 25 minutes face to face with the patient and family and discussing diagnosis, prognosis, diagnostic testing, evaluation, current status, treatment options and management.    As part of this visit I reviewed prior lab results, reviewed radiology results and obtained additional history from the family which is incorporated in the HPI.      Joaquín Hamm MD

## 2017-07-21 NOTE — PROGRESS NOTES
"  Chief Complaint   Patient presents with   • Med Refill     ashley contour test strips [facility] on [address]       HPI  Faye Rod is a 71 y.o. female who presents for refill on test strip, Ashley Contour.  She has been out for 2 weeks, prior to that her daughter Shila reports that her fasting sugars have been pretty good.  She saw neurologist this afternoon as well for her dementia        PMSFH  The following portions of the patient's history were reviewed and updated as appropriate: allergies, current medications, past family history, past medical history, past social history, past surgical history and problem list.    Past Medical History:   Diagnosis Date   • Anemia    • Arthritis    • Colon polyp    • Diabetes mellitus    • Disease of thyroid gland    • Heart disease    • Hyperlipidemia    • Hypertension    • Hypothyroidism    • Pacemaker    • Shortness of breath 2/3/2017     Past Surgical History:   Procedure Laterality Date   • CATARACT EXTRACTION Bilateral    • CHOLECYSTECTOMY     • PACEMAKER IMPLANTATION       Patient Active Problem List    Diagnosis   • Dementia with behavioral disturbance [F03.91]   • Type 2 diabetes mellitus [E11.9]   • Primary osteoarthritis of hand [M19.049]   • Non-rheumatic mitral regurgitation [I34.0]   • Hypothyroidism, adult [E03.9]   • Shortness of breath [R06.02]   • Mixed hyperlipidemia [E78.2]   • Essential hypertension [I10]   • Pacemaker [Z95.0]         Review of Systems   Constitutional: Negative.    Respiratory: Negative.    Cardiovascular: Negative.    Endocrine: Negative.            Objective   Vitals:    07/21/17 1547   BP: 128/74   Pulse: 60   SpO2: 90%   Weight: 198 lb 9.6 oz (90.1 kg)   Height: 62\" (157.5 cm)         Physical Exam   Constitutional: She is oriented to person, place, and time. She appears well-developed and well-nourished. No distress.   Cardiovascular: Normal rate and regular rhythm.    Murmur heard.  Pulmonary/Chest: Effort normal and breath " sounds normal.   Musculoskeletal: She exhibits no edema.   Neurological: She is alert and oriented to person, place, and time.   Skin: Skin is warm and dry.   Psychiatric: She has a normal mood and affect.             ASSESSMENT/PLAN  Assessment/Plan     1. Type 2 diabetes mellitus with hyperosmolarity without coma, without long-term current use of insulin    - glucose blood (MADDY CONTOUR TEST) test strip; 1-2 times daily Use as instructed  Dispense: 50 each; Refill: 12          FOLLOW-UP 6 weeks, will check labs at that time        Plan of care reviewed with patient at the conclusion of today's visit. Education was provided in regards to diagnosis, symptom management and any prescribed or recommended OTC medications.  Patient verbalizes Understanding of and agreement with management plan.    Electronically signed by:  SHALONDA Patino  07/21/2017

## 2017-09-08 ENCOUNTER — OFFICE VISIT (OUTPATIENT)
Dept: INTERNAL MEDICINE | Facility: CLINIC | Age: 72
End: 2017-09-08

## 2017-09-08 VITALS
WEIGHT: 206.7 LBS | HEART RATE: 77 BPM | OXYGEN SATURATION: 95 % | SYSTOLIC BLOOD PRESSURE: 130 MMHG | BODY MASS INDEX: 37.81 KG/M2 | DIASTOLIC BLOOD PRESSURE: 70 MMHG

## 2017-09-08 DIAGNOSIS — IMO0001 UNCONTROLLED TYPE 2 DIABETES MELLITUS WITHOUT COMPLICATION, WITH LONG-TERM CURRENT USE OF INSULIN: Primary | ICD-10-CM

## 2017-09-08 DIAGNOSIS — Z23 FLU VACCINE NEED: ICD-10-CM

## 2017-09-08 DIAGNOSIS — N18.30 CHRONIC KIDNEY DISEASE, STAGE III (MODERATE) (HCC): ICD-10-CM

## 2017-09-08 DIAGNOSIS — M19.041 PRIMARY OSTEOARTHRITIS OF BOTH HANDS: ICD-10-CM

## 2017-09-08 DIAGNOSIS — I10 ESSENTIAL HYPERTENSION: ICD-10-CM

## 2017-09-08 DIAGNOSIS — M19.042 PRIMARY OSTEOARTHRITIS OF BOTH HANDS: ICD-10-CM

## 2017-09-08 LAB
GLUCOSE BLDC GLUCOMTR-MCNC: 142 MG/DL (ref 70–130)
HBA1C MFR BLD: 8.1 %

## 2017-09-08 PROCEDURE — 83036 HEMOGLOBIN GLYCOSYLATED A1C: CPT | Performed by: NURSE PRACTITIONER

## 2017-09-08 PROCEDURE — 90662 IIV NO PRSV INCREASED AG IM: CPT | Performed by: NURSE PRACTITIONER

## 2017-09-08 PROCEDURE — 82947 ASSAY GLUCOSE BLOOD QUANT: CPT | Performed by: NURSE PRACTITIONER

## 2017-09-08 PROCEDURE — G0008 ADMIN INFLUENZA VIRUS VAC: HCPCS | Performed by: NURSE PRACTITIONER

## 2017-09-08 PROCEDURE — 99214 OFFICE O/P EST MOD 30 MIN: CPT | Performed by: NURSE PRACTITIONER

## 2017-09-08 NOTE — PROGRESS NOTES
Chief Complaint   Patient presents with   • Diabetes   • Hypertension   • Arthritis       HPI  Faye Rod is a 71 y.o. female who presents for follow up on diabetes, hypertension, arthritis.  Her fasting blood sugars have ranged from 95-150s, does not seem to be affected by diet.  She stays alone during day while daughter works and is able to care for self, does not do any regular exercise, arthritis in feet aggravated with much walking.  No CP, or breathing problems  Has recently seen nephrologist and Creatinine was down.  Sees neurologist for management of dementia (stable)    [facility]  The following portions of the patient's history were reviewed and updated as appropriate: allergies, current medications, past family history, past medical history, past social history, past surgical history and problem list.    Past Medical History:   Diagnosis Date   • Anemia    • Arthritis    • Colon polyp    • Diabetes mellitus    • Disease of thyroid gland    • Heart disease    • Hyperlipidemia    • Hypertension    • Hypothyroidism    • Pacemaker    • Shortness of breath 2/3/2017     Past Surgical History:   Procedure Laterality Date   • CATARACT EXTRACTION Bilateral    • CHOLECYSTECTOMY     • PACEMAKER IMPLANTATION       Patient Active Problem List    Diagnosis   • Dementia with behavioral disturbance [F03.91]   • Type 2 diabetes mellitus [E11.9]   • Primary osteoarthritis of hand [M19.049]   • Non-rheumatic mitral regurgitation [I34.0]   • Hypothyroidism, adult [E03.9]   • Shortness of breath [R06.02]   • Mixed hyperlipidemia [E78.2]   • Essential hypertension [I10]   • Pacemaker [Z95.0]     Social History   Substance Use Topics   • Smoking status: Never Smoker   • Smokeless tobacco: Never Used   • Alcohol use No         Review of Systems   Constitutional: Negative.    Respiratory: Negative.    Cardiovascular: Negative.    Endocrine: Negative.    Genitourinary: Negative.    Musculoskeletal: Positive for arthralgias.    Neurological: Negative for dizziness.           Objective   Blood pressure 130/70, pulse 77, weight 206 lb 11.2 oz (93.8 kg), SpO2 95 %.  Body mass index is 37.81 kg/(m^2).      Physical Exam   Constitutional: She is oriented to person, place, and time. She appears well-developed and well-nourished. No distress.   Cardiovascular: Normal rate and regular rhythm.    Murmur heard.  Pulmonary/Chest: Effort normal and breath sounds normal.   Musculoskeletal: She exhibits deformity (dip & pip joints). She exhibits no edema.   Neurological: She is alert and oriented to person, place, and time.   Skin: Skin is warm and dry.   Psychiatric: She has a normal mood and affect.         Results for orders placed or performed in visit on 09/08/17   POC Glucose Fingerstick   Result Value Ref Range    Glucose 142 (A) 70 - 130 mg/dL   POC Glycosylated Hemoglobin (Hb A1C)   Result Value Ref Range    Hemoglobin A1C 8.1 %         ASSESSMENT/PLAN  1. Uncontrolled type 2 diabetes mellitus without complication, with long-term current use of insulin    - POC Glucose Fingerstick  - POC Glycosylated Hemoglobin (Hb A1C)  Increase lantus to 28units daily  Check post prandial glucose 3-4 times/week    2. Flu vaccine need    - Flu Vaccine High Dose PF 65YR+ (FLUZONE [id number])    3. Chronic kidney disease, stage III (moderate)  Slight improvement, managed by nephrology      4. Essential hypertension  stable    5. Primary osteoarthritis of both hands  Continue tylenol    Challenged to explore opportunities for increasing activities, has hand weights    Plan of care reviewed with patient at the conclusion of today's visit. Education was provided in regards to diagnosis, management and any prescribed or recommended OTC medications.  Patient verbalizes Understanding of and agreement with management plan.      FOLLOW-UP  Return in about 3 months (around 12/8/2017).        Future Appointments  Date Time Provider Department Center   12/15/2017 3:00 PM  SHALONDA Patino [facility] None   2/2/2018 3:00 PM MICHAEL Parada N CT [facility] None         Electronically signed by:  SHALONDA Patino  09/08/2017

## 2017-09-10 PROBLEM — B02.29 POST HERPETIC NEURALGIA: Status: ACTIVE | Noted: 2017-09-10

## 2017-09-22 ENCOUNTER — OFFICE VISIT (OUTPATIENT)
Dept: CARDIOLOGY | Facility: CLINIC | Age: 72
End: 2017-09-22

## 2017-09-22 VITALS
HEIGHT: 64 IN | OXYGEN SATURATION: 94 % | BODY MASS INDEX: 34.76 KG/M2 | DIASTOLIC BLOOD PRESSURE: 62 MMHG | WEIGHT: 203.6 LBS | HEART RATE: 60 BPM | SYSTOLIC BLOOD PRESSURE: 140 MMHG

## 2017-09-22 DIAGNOSIS — Z95.0 PACEMAKER: Primary | ICD-10-CM

## 2017-09-22 DIAGNOSIS — I34.0 NON-RHEUMATIC MITRAL REGURGITATION: ICD-10-CM

## 2017-09-22 DIAGNOSIS — E78.2 MIXED HYPERLIPIDEMIA: ICD-10-CM

## 2017-09-22 DIAGNOSIS — I10 ESSENTIAL HYPERTENSION: ICD-10-CM

## 2017-09-22 PROCEDURE — 99214 OFFICE O/P EST MOD 30 MIN: CPT | Performed by: INTERNAL MEDICINE

## 2017-09-22 RX ORDER — ASPIRIN 81 MG/1
81 TABLET ORAL DAILY
COMMUNITY
End: 2017-12-07 | Stop reason: SDUPTHER

## 2017-09-22 NOTE — PROGRESS NOTES
[facility] at [facility]  Office Progress Note  Faye Rod  1945  [phone]      Visit Date: 09/22/2017    PCP: Ely Jones, APRN  [address] [address]    IDENTIFICATION: A 72 y.o. female     Chief Complaint   Patient presents with   • Diastolic heart failure   • Hypertension       PROBLEM LIST:   1. Diastolic Heart failure  1. Echocardiogram, [place], 2014.  Impaired relaxation.  Mild AI, TR with RVSP 38 mmHg.  Trace MR  2. Echocardiogram, [facility], 2017.  EF 50-55%, mild TR, moderate MR.    2. Abnormal Heart rhythm ?  1. [facility] PPM Model # [id number]  3. A flutter  1. Documented July 21, 2017 for 21 minutes, no other episodes  4. Hypertension  5. Hyperlipidemia  6. Diabetes Mellitus  1. 9/8/17 A1c 8.1  7. CKD Stage III; followed by Dr Casarez  8. Dementia    Allergies  No Known Allergies    Current Medications    Current Outpatient Prescriptions:   •  Acetaminophen (TYLENOL ARTHRITIS PAIN PO), Take  by mouth Daily., Disp: , Rfl:   •  acetaminophen (TYLENOL) 650 MG 8 hr tablet, Take 650 mg by mouth 2 (Two) Times a Day As Needed., Disp: , Rfl:   •  amLODIPine (NORVASC) 2.5 MG tablet, Take 1 tablet by mouth  daily, Disp: 90 tablet, Rfl: 0  •  aspirin 81 MG EC tablet, Take 81 mg by mouth Daily., Disp: , Rfl:   •  aspirin 81 MG EC tablet, Take 81 mg by mouth Daily., Disp: , Rfl:   •  atorvastatin (LIPITOR) 20 MG tablet, Take 1 tablet by mouth  daily, Disp: 90 tablet, Rfl: 0  •  cholecalciferol (VITAMIN D3) 1000 UNITS tablet, Take 1,000 Units by mouth Daily., Disp: , Rfl:   •  donepezil (ARICEPT) 10 MG tablet, Take 1 tablet by mouth Daily., Disp: 30 tablet, Rfl: 11  •  escitalopram (LEXAPRO) 20 MG tablet, Take 20 mg by mouth Daily., Disp: , Rfl:   •  ferrous sulfate 325 (65 FE) MG tablet, Take 325 mg by mouth Daily With Breakfast., Disp: , Rfl:   •  glucose blood (MADDY CONTOUR TEST) test strip, 1-2 times daily Use as instructed, Disp: 50 each, Rfl: 12  •   "ibandronate (BONIVA) 150 MG tablet, Take 1 tablet by mouth Every 30 (Thirty) Days., Disp: 3 tablet, Rfl: 3  •  Insulin Glargine (LANTUS SOLOSTAR) 100 UNIT/ML injection pen, Inject 28 Units under the skin Every Night., Disp: 30 mL, Rfl: 0  •  Insulin Pen Needle (PX MINI PEN NEEDLES) 31G X 5 MM misc, Use with Lantus Insulin, Disp: 90 each, Rfl: 3  •  levothyroxine (SYNTHROID, LEVOTHROID) 112 MCG tablet, Take 112 mcg by mouth Daily., Disp: , Rfl:   •  memantine (NAMENDA) 10 MG tablet, Take 1 tablet by mouth 2 (Two) Times a Day., Disp: 60 tablet, Rfl: 11      History of Present Illness   Pt here for follow up.  15 pound weight gain from last winter.  No new complaints.  Pt denies any chest pain, dyspnea, dyspnea on exertion, orthopnea, PND, palpitations, lower extremity edema, or claudication.    ROS:  All systems have been reviewed and are negative with the exception of those mentioned in the HPI.    OBJECTIVE:  Vitals:    09/22/17 1044   BP: 140/62   BP Location: Left arm   Patient Position: Sitting   Pulse: 60   SpO2: 94%   Weight: 203 lb 9.6 oz (92.4 kg)   Height: 63.5\" (161.3 cm)     Physical Exam   Constitutional: She is oriented to person, place, and time. She appears well-developed and well-nourished. No distress.   obese   Cardiovascular: Normal rate, regular rhythm and intact distal pulses.    Murmur heard.  High-pitched blowing holosystolic murmur is present  at the apex  Pulses:       Radial pulses are 2+ on the right side, and 2+ on the left side.        Dorsalis pedis pulses are 2+ on the right side, and 2+ on the left side.        Posterior tibial pulses are 2+ on the right side, and 2+ on the left side.   Pulmonary/Chest: Effort normal and breath sounds normal. She has no wheezes. She has no rales.   Abdominal: Soft. Bowel sounds are normal. There is no tenderness. There is no guarding.   Musculoskeletal: She exhibits no edema or tenderness.   Neurological: She is alert and oriented to person, place, and " time.   Skin: Skin is warm and dry. No rash noted.   Psychiatric: She has a normal mood and affect.   Nursing note and vitals reviewed.      Diagnostic Data:  Procedures  Device Interrogation:  [facility] PPM  P-wave is 1 mV with threshold of 0.5 V at 0.4 ms and impedance at 460, R-wave is greater than 12 mV with threshold of 0.6 V at 0.4 ms and impedance 560.  Battery voltage is 2.92 volts.  One episode of a flutter for 20 minutes on July 21.    ASSESSMENT:   Diagnosis Plan   1. Pacemaker     2. Essential hypertension     3. Mixed hyperlipidemia     4. Non-rheumatic mitral regurgitation  Adult Transthoracic Echo Complete W/ Cont if Necessary Per Protocol       PLAN:  1. Acceptable device interrogation today, patient will follow-up in 6 months for recheck with [facility]   2. Slightly elevated today with better readings at home, continue antihypertensives   3. Acceptable lipid panel, on statin therapy for cardiac risk reduction in a diabetic  4. Moderate MR upon echocardiogram February 2017, we'll plan for repeat echo next year prior to office visit    SHALONDA Patino, thank you for referring Ms. Rod for evaluation.  I have forwarded my electronically generated recommendations to you for review.  Please do not hesitate to call with any questions.    Scribed for Faisal Vang MD by Marjorie Martinez PA-C. 9/22/2017  11:18 AM  I, Faisal Vang MD, personally performed the services described in this documentation as scribed by the above named individual in my presence, and it is both accurate and complete.  9/22/2017  11:27 AM    Faisal Vang MD, [facility]

## 2017-10-20 DIAGNOSIS — N18.30 CHRONIC KIDNEY DISEASE, STAGE III (MODERATE) (HCC): ICD-10-CM

## 2017-10-20 DIAGNOSIS — Z78.0 POST-MENOPAUSAL: ICD-10-CM

## 2017-10-20 DIAGNOSIS — E11.00 TYPE 2 DIABETES MELLITUS WITH HYPEROSMOLARITY WITHOUT COMA, WITHOUT LONG-TERM CURRENT USE OF INSULIN (HCC): ICD-10-CM

## 2017-10-20 RX ORDER — AMLODIPINE BESYLATE 2.5 MG/1
TABLET ORAL
Qty: 90 TABLET | Refills: 0 | Status: SHIPPED | OUTPATIENT
Start: 2017-10-20 | End: 2017-10-20 | Stop reason: SDUPTHER

## 2017-10-20 RX ORDER — ESCITALOPRAM OXALATE 20 MG/1
20 TABLET ORAL DAILY
Qty: 90 TABLET | Refills: 0 | Status: SHIPPED | OUTPATIENT
Start: 2017-10-20 | End: 2017-10-20 | Stop reason: SDUPTHER

## 2017-10-20 RX ORDER — LEVOTHYROXINE SODIUM 112 UG/1
112 TABLET ORAL DAILY
Qty: 90 TABLET | Refills: 0 | Status: SHIPPED | OUTPATIENT
Start: 2017-10-20 | End: 2017-12-19 | Stop reason: SDUPTHER

## 2017-10-20 RX ORDER — ATORVASTATIN CALCIUM 20 MG/1
TABLET, FILM COATED ORAL
Qty: 90 TABLET | Refills: 0 | Status: SHIPPED | OUTPATIENT
Start: 2017-10-20 | End: 2017-10-20 | Stop reason: SDUPTHER

## 2017-10-20 RX ORDER — AMLODIPINE BESYLATE 2.5 MG/1
TABLET ORAL
Qty: 90 TABLET | Refills: 0 | Status: SHIPPED | OUTPATIENT
Start: 2017-10-20 | End: 2017-12-19 | Stop reason: SDUPTHER

## 2017-10-20 RX ORDER — ATORVASTATIN CALCIUM 20 MG/1
TABLET, FILM COATED ORAL
Qty: 90 TABLET | Refills: 0 | Status: SHIPPED | OUTPATIENT
Start: 2017-10-20 | End: 2017-12-19 | Stop reason: SDUPTHER

## 2017-10-20 RX ORDER — LEVOTHYROXINE SODIUM 112 UG/1
112 TABLET ORAL DAILY
Qty: 90 TABLET | Refills: 0 | Status: SHIPPED | OUTPATIENT
Start: 2017-10-20 | End: 2017-10-20 | Stop reason: SDUPTHER

## 2017-10-20 RX ORDER — ESCITALOPRAM OXALATE 20 MG/1
20 TABLET ORAL DAILY
Qty: 90 TABLET | Refills: 0 | Status: SHIPPED | OUTPATIENT
Start: 2017-10-20 | End: 2017-12-30 | Stop reason: SDUPTHER

## 2017-10-20 NOTE — TELEPHONE ENCOUNTER
Per pt's request sent in med refills into [facility] mail service pharmacy, [facility]

## 2017-12-07 ENCOUNTER — OFFICE VISIT (OUTPATIENT)
Dept: INTERNAL MEDICINE | Facility: CLINIC | Age: 72
End: 2017-12-07

## 2017-12-07 VITALS
DIASTOLIC BLOOD PRESSURE: 76 MMHG | BODY MASS INDEX: 35.22 KG/M2 | SYSTOLIC BLOOD PRESSURE: 132 MMHG | WEIGHT: 202 LBS | TEMPERATURE: 98 F

## 2017-12-07 DIAGNOSIS — L03.012 CELLULITIS OF FINGER OF LEFT HAND: ICD-10-CM

## 2017-12-07 DIAGNOSIS — L03.012 PARONYCHIA OF LEFT MIDDLE FINGER: Primary | ICD-10-CM

## 2017-12-07 PROCEDURE — 99213 OFFICE O/P EST LOW 20 MIN: CPT | Performed by: NURSE PRACTITIONER

## 2017-12-07 NOTE — PROGRESS NOTES
Chief Complaint   Patient presents with   • ER follow up     Cellulitis of left middle finger        HPI  Faye Rod is a 72 y.o. female who presents for follow up of cellulitis left middle finger. Was seen at [facility] ER last night where it was incised & drained, culture obtained and she was started on keflex and Augmentin & mupirocin topically. Area of redness was outlined (extending to wrist)and seems to have receded some today.   Symptoms had begun about 4-5 days prior, thinks maybe from clipping fingernail too short.      [facility]  The following portions of the patient's history were reviewed and updated as appropriate: allergies, current medications, past family history, past medical history, past social history, past surgical history and problem list.    Past Medical History:   Diagnosis Date   • Anemia    • Arthritis    • Colon polyp    • Diabetes mellitus    • Disease of thyroid gland    • Heart disease    • Hyperlipidemia    • Hypertension    • Hypothyroidism    • Pacemaker    • Shortness of breath 2/3/2017     Past Surgical History:   Procedure Laterality Date   • CATARACT EXTRACTION Bilateral    • CHOLECYSTECTOMY     • PACEMAKER IMPLANTATION       Patient Active Problem List    Diagnosis   • Post herpetic neuralgia [B02.29]   • Dementia with behavioral disturbance [F03.91]   • Type 2 diabetes mellitus [E11.9]   • Primary osteoarthritis of hand [M19.049]   • Non-rheumatic mitral regurgitation [I34.0]   • Hypothyroidism, adult [E03.9]   • Shortness of breath [R06.02]   • Mixed hyperlipidemia [E78.2]   • Essential hypertension [I10]   • Pacemaker [Z95.0]     Social History   Substance Use Topics   • Smoking status: Never Smoker   • Smokeless tobacco: Never Used   • Alcohol use No     Current Outpatient Prescriptions on File Prior to Visit   Medication Sig Dispense Refill   • Acetaminophen (TYLENOL ARTHRITIS PAIN PO) Take  by mouth Daily.     • acetaminophen (TYLENOL) 650 MG 8 hr tablet Take  650 mg by mouth 2 (Two) Times a Day As Needed.     • amLODIPine (NORVASC) 2.5 MG tablet Take 1 tablet by mouth daily. 90 tablet 0   • aspirin 81 MG EC tablet Take 81 mg by mouth Daily.     • atorvastatin (LIPITOR) 20 MG tablet Take 1 tablet by mouth daily. 90 tablet 0   • cholecalciferol (VITAMIN D3) 1000 UNITS tablet Take 1,000 Units by mouth Daily.     • donepezil (ARICEPT) 10 MG tablet Take 1 tablet by mouth Daily. 30 tablet 11   • escitalopram (LEXAPRO) 20 MG tablet Take 1 tablet by mouth Daily. 90 tablet 0   • ferrous sulfate 325 (65 FE) MG tablet Take 325 mg by mouth Daily With Breakfast.     • glucose blood (MADDY CONTOUR TEST) test strip 1-2 times daily Use as instructed 50 each 12   • ibandronate (BONIVA) 150 MG tablet Take 1 tablet by mouth Every 30 (Thirty) Days. 3 tablet 3   • Insulin Glargine (LANTUS SOLOSTAR) 100 UNIT/ML injection pen Inject 28 Units under the skin Every Night. 30 mL 0   • Insulin Pen Needle (PX MINI PEN NEEDLES) 31G X 5 MM misc Use with Lantus Insulin 90 each 3   • levothyroxine (SYNTHROID, LEVOTHROID) 112 MCG tablet Take 1 tablet by mouth Daily. 90 tablet 0   • memantine (NAMENDA) 10 MG tablet Take 1 tablet by mouth 2 (Two) Times a Day. 60 tablet 11   • [DISCONTINUED] aspirin 81 MG EC tablet Take 81 mg by mouth Daily.       No current facility-administered medications on file prior to visit.          Review of Systems   Constitutional: Negative for fever.   Skin: Positive for wound.           Objective   Blood pressure 132/76, temperature 98 °F (36.7 °C), weight 91.6 kg (202 lb).  Body mass index is 35.22 kg/(m^2).      Physical Exam   Constitutional: She is oriented to person, place, and time. She appears well-developed and well-nourished.   Cardiovascular: Normal rate, regular rhythm and normal heart sounds.    Pulmonary/Chest: Effort normal and breath sounds normal.   Neurological: She is alert and oriented to person, place, and time.   Skin:   Left middle finger is swollen, there  is redness and some warmth. Erythema extends up dorsal surface of hand, No further drainage from wound that is just proximal of nail/cuticle     Psychiatric: She has a normal mood and affect. Her behavior is normal.             ASSESSMENT/PLAN  1. Paronychia of left middle finger      2. Cellulitis of finger of left hand    Dressing changed, Splint applied for comfort.   Continue prescribed antibiotics.  Soak in warm water with epsom salts 2-3 times daily prior to applying mupirocin.  If area of infection worsen, has fever or confusion, return to ER.        Plan of care reviewed with patient at the conclusion of today's visit. Education was provided in regards to diagnosis, management and any prescribed or recommended OTC medications.  Patient verbalizes Understanding of and agreement with management plan.      FOLLOW-UP  As scheduled or prn    Future Appointments  Date Time Provider Department Center   12/15/2017 3:00 PM SHALONDA Patino PC [facility] None   2/2/2018 3:00 PM MICHAEL Parada N CT [facility] None   3/23/2018 2:30 PM [facility] ECHO/VASC CART RM1 [facility] NON [facility]   3/23/2018 3:45 PM MD COLE Luther C [facility] None         Electronically signed by:  SHALONDA Patino  12/07/2017

## 2017-12-15 ENCOUNTER — OFFICE VISIT (OUTPATIENT)
Dept: INTERNAL MEDICINE | Facility: CLINIC | Age: 72
End: 2017-12-15

## 2017-12-15 VITALS
WEIGHT: 205 LBS | HEIGHT: 63 IN | DIASTOLIC BLOOD PRESSURE: 82 MMHG | BODY MASS INDEX: 36.32 KG/M2 | HEART RATE: 87 BPM | SYSTOLIC BLOOD PRESSURE: 128 MMHG | RESPIRATION RATE: 16 BRPM | OXYGEN SATURATION: 91 %

## 2017-12-15 DIAGNOSIS — L03.012 CELLULITIS OF FINGER OF LEFT HAND: ICD-10-CM

## 2017-12-15 DIAGNOSIS — R29.6 RECURRENT FALLS: ICD-10-CM

## 2017-12-15 DIAGNOSIS — M85.89 OSTEOPENIA OF MULTIPLE SITES: ICD-10-CM

## 2017-12-15 DIAGNOSIS — L84 CORNS/CALLOSITIES: ICD-10-CM

## 2017-12-15 DIAGNOSIS — F09 COGNITIVE DISORDER: ICD-10-CM

## 2017-12-15 DIAGNOSIS — Z79.4 TYPE 2 DIABETES MELLITUS WITH STAGE 4 CHRONIC KIDNEY DISEASE, WITH LONG-TERM CURRENT USE OF INSULIN (HCC): ICD-10-CM

## 2017-12-15 DIAGNOSIS — E78.2 MIXED HYPERLIPIDEMIA: ICD-10-CM

## 2017-12-15 DIAGNOSIS — E11.22 TYPE 2 DIABETES MELLITUS WITH STAGE 4 CHRONIC KIDNEY DISEASE, WITH LONG-TERM CURRENT USE OF INSULIN (HCC): ICD-10-CM

## 2017-12-15 DIAGNOSIS — E11.00 TYPE 2 DIABETES MELLITUS WITH HYPEROSMOLARITY WITHOUT COMA, WITHOUT LONG-TERM CURRENT USE OF INSULIN (HCC): Primary | ICD-10-CM

## 2017-12-15 DIAGNOSIS — N18.4 TYPE 2 DIABETES MELLITUS WITH STAGE 4 CHRONIC KIDNEY DISEASE, WITH LONG-TERM CURRENT USE OF INSULIN (HCC): ICD-10-CM

## 2017-12-15 LAB
25(OH)D3 SERPL-MCNC: 32.1 NG/ML
ALBUMIN SERPL-MCNC: 4.2 G/DL (ref 3.2–4.8)
ALBUMIN/GLOB SERPL: 2 G/DL (ref 1.5–2.5)
ALP SERPL-CCNC: 76 U/L (ref 25–100)
ALT SERPL W P-5'-P-CCNC: 34 U/L (ref 7–40)
ANION GAP SERPL CALCULATED.3IONS-SCNC: 6 MMOL/L (ref 3–11)
AST SERPL-CCNC: 33 U/L (ref 0–33)
BASOPHILS # BLD AUTO: 0.03 10*3/MM3 (ref 0–0.2)
BASOPHILS NFR BLD AUTO: 0.4 % (ref 0–1)
BILIRUB SERPL-MCNC: 0.2 MG/DL (ref 0.3–1.2)
BUN BLD-MCNC: 34 MG/DL (ref 9–23)
BUN/CREAT SERPL: 17 (ref 7–25)
CALCIUM SPEC-SCNC: 9.3 MG/DL (ref 8.7–10.4)
CHLORIDE SERPL-SCNC: 107 MMOL/L (ref 99–109)
CO2 SERPL-SCNC: 24 MMOL/L (ref 20–31)
CREAT BLD-MCNC: 2 MG/DL (ref 0.6–1.3)
DEPRECATED RDW RBC AUTO: 48.7 FL (ref 37–54)
EOSINOPHIL # BLD AUTO: 0.07 10*3/MM3 (ref 0–0.3)
EOSINOPHIL NFR BLD AUTO: 0.9 % (ref 0–3)
ERYTHROCYTE [DISTWIDTH] IN BLOOD BY AUTOMATED COUNT: 13.3 % (ref 11.3–14.5)
GFR SERPL CREATININE-BSD FRML MDRD: 24 ML/MIN/1.73
GLOBULIN UR ELPH-MCNC: 2.1 GM/DL
GLUCOSE BLD-MCNC: 157 MG/DL (ref 70–100)
HBA1C MFR BLD: 7.9 %
HCT VFR BLD AUTO: 36.6 % (ref 34.5–44)
HGB BLD-MCNC: 11.3 G/DL (ref 11.5–15.5)
IMM GRANULOCYTES # BLD: 0.02 10*3/MM3 (ref 0–0.03)
IMM GRANULOCYTES NFR BLD: 0.3 % (ref 0–0.6)
LYMPHOCYTES # BLD AUTO: 2.02 10*3/MM3 (ref 0.6–4.8)
LYMPHOCYTES NFR BLD AUTO: 26.4 % (ref 24–44)
MCH RBC QN AUTO: 31 PG (ref 27–31)
MCHC RBC AUTO-ENTMCNC: 30.9 G/DL (ref 32–36)
MCV RBC AUTO: 100.3 FL (ref 80–99)
MONOCYTES # BLD AUTO: 0.78 10*3/MM3 (ref 0–1)
MONOCYTES NFR BLD AUTO: 10.2 % (ref 0–12)
NEUTROPHILS # BLD AUTO: 4.74 10*3/MM3 (ref 1.5–8.3)
NEUTROPHILS NFR BLD AUTO: 61.8 % (ref 41–71)
PLATELET # BLD AUTO: 143 10*3/MM3 (ref 150–450)
PMV BLD AUTO: 10.5 FL (ref 6–12)
POTASSIUM BLD-SCNC: 5.8 MMOL/L (ref 3.5–5.5)
PROT SERPL-MCNC: 6.3 G/DL (ref 5.7–8.2)
RBC # BLD AUTO: 3.65 10*6/MM3 (ref 3.89–5.14)
SODIUM BLD-SCNC: 137 MMOL/L (ref 132–146)
WBC NRBC COR # BLD: 7.66 10*3/MM3 (ref 3.5–10.8)

## 2017-12-15 PROCEDURE — 80053 COMPREHEN METABOLIC PANEL: CPT | Performed by: NURSE PRACTITIONER

## 2017-12-15 PROCEDURE — 82306 VITAMIN D 25 HYDROXY: CPT | Performed by: NURSE PRACTITIONER

## 2017-12-15 PROCEDURE — 83036 HEMOGLOBIN GLYCOSYLATED A1C: CPT | Performed by: NURSE PRACTITIONER

## 2017-12-15 PROCEDURE — 85025 COMPLETE CBC W/AUTO DIFF WBC: CPT | Performed by: NURSE PRACTITIONER

## 2017-12-15 PROCEDURE — 99214 OFFICE O/P EST MOD 30 MIN: CPT | Performed by: NURSE PRACTITIONER

## 2017-12-15 NOTE — PROGRESS NOTES
Chief Complaint   Patient presents with   • Diabetes     3 month follow up       HPI  Faye Rod is a 72 y.o. female who presents for follow up on diabetes and paronychia.  fell this morning, went to ER, staples in scalp. She got tripped up. Did not loose consciousness, able to recall event.  Her infected finger is improving, looks as if nail is lifting from nail bed.    [facility]  The following portions of the patient's history were reviewed and updated as appropriate: allergies, current medications, past family history, past medical history, past social history, past surgical history and problem list.    Past Medical History:   Diagnosis Date   • Anemia    • Arthritis    • Colon polyp    • Diabetes mellitus    • Disease of thyroid gland    • Heart disease    • Hyperlipidemia    • Hypertension    • Hypothyroidism    • Pacemaker    • Shortness of breath 2/3/2017     Past Surgical History:   Procedure Laterality Date   • CATARACT EXTRACTION Bilateral    • CHOLECYSTECTOMY     • PACEMAKER IMPLANTATION       Patient Active Problem List    Diagnosis   • Post herpetic neuralgia [B02.29]   • Dementia with behavioral disturbance [F03.91]   • Type 2 diabetes mellitus [E11.9]   • Primary osteoarthritis of hand [M19.049]   • Non-rheumatic mitral regurgitation [I34.0]   • Hypothyroidism, adult [E03.9]   • Shortness of breath [R06.02]   • Mixed hyperlipidemia [E78.2]   • Essential hypertension [I10]   • Pacemaker [Z95.0]     Social History   Substance Use Topics   • Smoking status: Never Smoker   • Smokeless tobacco: Never Used   • Alcohol use No     Current Outpatient Prescriptions on File Prior to Visit   Medication Sig Dispense Refill   • Acetaminophen (TYLENOL ARTHRITIS PAIN PO) Take  by mouth Daily.     • acetaminophen (TYLENOL) 650 MG 8 hr tablet Take 650 mg by mouth 2 (Two) Times a Day As Needed.     • amLODIPine (NORVASC) 2.5 MG tablet Take 1 tablet by mouth daily. 90 tablet 0   • aspirin 81 MG EC tablet Take 81 mg  "by mouth Daily.     • atorvastatin (LIPITOR) 20 MG tablet Take 1 tablet by mouth daily. 90 tablet 0   • cholecalciferol (VITAMIN D3) 1000 UNITS tablet Take 1,000 Units by mouth Daily.     • donepezil (ARICEPT) 10 MG tablet Take 1 tablet by mouth Daily. 30 tablet 11   • escitalopram (LEXAPRO) 20 MG tablet Take 1 tablet by mouth Daily. 90 tablet 0   • ferrous sulfate 325 (65 FE) MG tablet Take 325 mg by mouth Daily With Breakfast.     • glucose blood (MADDY CONTOUR TEST) test strip 1-2 times daily Use as instructed 50 each 12   • ibandronate (BONIVA) 150 MG tablet Take 1 tablet by mouth Every 30 (Thirty) Days. 3 tablet 3   • Insulin Glargine (LANTUS SOLOSTAR) 100 UNIT/ML injection pen Inject 28 Units under the skin Every Night. 30 mL 0   • Insulin Pen Needle (PX MINI PEN NEEDLES) 31G X 5 MM misc Use with Lantus Insulin 90 each 3   • levothyroxine (SYNTHROID, LEVOTHROID) 112 MCG tablet Take 1 tablet by mouth Daily. 90 tablet 0   • memantine (NAMENDA) 10 MG tablet Take 1 tablet by mouth 2 (Two) Times a Day. 60 tablet 11     No current facility-administered medications on file prior to visit.          Review of Systems   Constitutional: Negative for activity change, fever and unexpected weight change.   Eyes: Negative for visual disturbance.   Respiratory: Negative.    Cardiovascular: Negative.    Genitourinary: Negative.    Musculoskeletal: Positive for arthralgias.   Skin: Positive for wound.   Neurological: Negative for headaches.   Psychiatric/Behavioral: Negative for confusion.           Objective   Blood pressure 128/82, pulse 87, resp. rate 16, height 160 cm (63\"), weight 93 kg (205 lb), SpO2 91 %.  Body mass index is 36.31 kg/(m^2).      Physical Exam   Constitutional: She is oriented to person, place, and time. She appears well-developed and well-nourished. No distress.   Cardiovascular: Normal rate and regular rhythm.    Murmur heard.  Pulmonary/Chest: Effort normal and breath sounds normal.   Musculoskeletal: " She exhibits deformity (dip & pip joints). She exhibits no edema.      During the foot exam she had a monofilament test not performed.   Skin Integrity  -  Right foot skin intact: Callous medial fore foot/great toe.     Left foot skin intact: corn on 4th toe. .  Neurological: She is alert and oriented to person, place, and time.   Skin: Skin is warm and dry.   Well approximated stapled laceration right cephalad.  Nail of left middle finger is cloudy and loose, there is still erythema around nail bed, but is greatly improved from last week.     Psychiatric: She has a normal mood and affect. Her behavior is normal.         Results for orders placed or performed in visit on 12/15/17   Comprehensive Metabolic Panel   Result Value Ref Range    Glucose 157 (H) 70 - 100 mg/dL    BUN 34 (H) 9 - 23 mg/dL    Creatinine 2.00 (H) 0.60 - 1.30 mg/dL    Sodium 137 132 - 146 mmol/L    Potassium 5.8 (H) 3.5 - 5.5 mmol/L    Chloride 107 99 - 109 mmol/L    CO2 24.0 20.0 - 31.0 mmol/L    Calcium 9.3 8.7 - 10.4 mg/dL    Total Protein 6.3 5.7 - 8.2 g/dL    Albumin 4.20 3.20 - 4.80 g/dL    ALT (SGPT) 34 7 - 40 U/L    AST (SGOT) 33 0 - 33 U/L    Alkaline Phosphatase 76 25 - 100 U/L    Total Bilirubin 0.2 (L) 0.3 - 1.2 mg/dL    eGFR Non African Amer 24 (L) >60 mL/min/1.73    Globulin 2.1 gm/dL    A/G Ratio 2.0 1.5 - 2.5 g/dL    BUN/Creatinine Ratio 17.0 7.0 - 25.0    Anion Gap 6.0 3.0 - 11.0 mmol/L   Vitamin D 25 Hydroxy   Result Value Ref Range    25 Hydroxy, Vitamin D 32.1 ng/ml   CBC Auto Differential   Result Value Ref Range    WBC 7.66 3.50 - 10.80 10*3/mm3    RBC 3.65 (L) 3.89 - 5.14 10*6/mm3    Hemoglobin 11.3 (L) 11.5 - 15.5 g/dL    Hematocrit 36.6 34.5 - 44.0 %    .3 (H) 80.0 - 99.0 fL    MCH 31.0 27.0 - 31.0 pg    MCHC 30.9 (L) 32.0 - 36.0 g/dL    RDW 13.3 11.3 - 14.5 %    RDW-SD 48.7 37.0 - 54.0 fl    MPV 10.5 6.0 - 12.0 fL    Platelets 143 (L) 150 - 450 10*3/mm3    Neutrophil % 61.8 41.0 - 71.0 %    Lymphocyte % 26.4 24.0  - 44.0 %    Monocyte % 10.2 0.0 - 12.0 %    Eosinophil % 0.9 0.0 - 3.0 %    Basophil % 0.4 0.0 - 1.0 %    Immature Grans % 0.3 0.0 - 0.6 %    Neutrophils, Absolute 4.74 1.50 - 8.30 10*3/mm3    Lymphocytes, Absolute 2.02 0.60 - 4.80 10*3/mm3    Monocytes, Absolute 0.78 0.00 - 1.00 10*3/mm3    Eosinophils, Absolute 0.07 0.00 - 0.30 10*3/mm3    Basophils, Absolute 0.03 0.00 - 0.20 10*3/mm3    Immature Grans, Absolute 0.02 0.00 - 0.03 10*3/mm3   POC Glycosylated Hemoglobin (Hb A1C)   Result Value Ref Range    Hemoglobin A1C 7.9 %           ASSESSMENT/PLAN  1. Type 2 diabetes mellitus with hyperosmolarity without coma, without long-term current use of insulin    - POC Glycosylated Hemoglobin (Hb A1C)  - Comprehensive Metabolic Panel  - CBC & Differential  - Ambulatory Referral to Podiatry  - CBC Auto Differential  - Ambulatory Referral to Home Health  Increase Lantus to 30 u q day. Okay with A1C goal <8.    2. Type 2 diabetes mellitus with stage 4 chronic kidney disease, with long-term current use of insulin  Results to Dr Howe    3. Mixed hyperlipidemia  Continue atorvastatin    4. Corns/callosities    - Ambulatory Referral to Podiatry    5. Osteopenia of multiple sites    - Vitamin D 25 Hydroxy    6. Cellulitis of finger of left hand  Improving, continue soaks and mupirocin.    7. Cognitive disorder    - Ambulatory Referral to Home Health    8. Recurrent falls    - Ambulatory Referral to Home Health  Is home alone during day and is anxious about having home health nurse in home. Encouraged to have one visit when daughter is home and see how it goes. She would benefit from home falls safety assessment and exercise recommendations as well as long range planning.        Plan of care reviewed with patient at the conclusion of today's visit. Education was provided in regards to diagnosis, management and any prescribed or recommended OTC medications.  Patient verbalizes Understanding of and agreement with management  plan.      FOLLOW-UP  Return in about 11 days (around 12/26/2017) for staple removal.      Future Appointments  Date Time Provider Department Center   12/26/2017 2:00 PM SHALONDA Patino PC [facility] None   2/2/2018 3:00 PM MICHAEL Parada N CT [facility] None   3/23/2018 2:30 PM [facility] ECHO/VASC CART RM1 [facility] NON [facility]   3/23/2018 3:45 PM MD IRAJ LutherE C [facility] None         Electronically signed by:  SHALONDA Patino  12/15/2017

## 2017-12-19 ENCOUNTER — TELEPHONE (OUTPATIENT)
Dept: INTERNAL MEDICINE | Facility: CLINIC | Age: 72
End: 2017-12-19

## 2017-12-19 DIAGNOSIS — N18.30 CHRONIC KIDNEY DISEASE, STAGE III (MODERATE) (HCC): ICD-10-CM

## 2017-12-19 DIAGNOSIS — Z78.0 POST-MENOPAUSAL: ICD-10-CM

## 2017-12-19 DIAGNOSIS — E11.00 TYPE 2 DIABETES MELLITUS WITH HYPEROSMOLARITY WITHOUT COMA, WITHOUT LONG-TERM CURRENT USE OF INSULIN (HCC): ICD-10-CM

## 2017-12-19 RX ORDER — ATORVASTATIN CALCIUM 20 MG/1
TABLET, FILM COATED ORAL
Qty: 90 TABLET | Refills: 1 | Status: SHIPPED | OUTPATIENT
Start: 2017-12-19 | End: 2018-07-20 | Stop reason: SDUPTHER

## 2017-12-19 RX ORDER — LEVOTHYROXINE SODIUM 112 UG/1
TABLET ORAL
Qty: 90 TABLET | Refills: 1 | Status: SHIPPED | OUTPATIENT
Start: 2017-12-19 | End: 2018-07-20 | Stop reason: SDUPTHER

## 2017-12-19 RX ORDER — AMLODIPINE BESYLATE 2.5 MG/1
TABLET ORAL
Qty: 90 TABLET | Refills: 1 | Status: SHIPPED | OUTPATIENT
Start: 2017-12-19 | End: 2018-07-20 | Stop reason: SDUPTHER

## 2017-12-19 NOTE — TELEPHONE ENCOUNTER
PT WAS REFERRED TO HOME HEALTH-     JASVIR CALLED TO TRY AND SET UP APPT WITH PT, PATIENTS DAUGHTER SAID SHE WAS UNAVAILABLE FOR ANOTHER FEW WEEKS AND JASVIR TOLD DAUGHTER TO HAVE PRAVEEN SEND IN NEW REFERRAL WHEN PT IS READY.     IF ANY QUESTIONS PLEASE CALL JASVIR [phone].

## 2017-12-20 ENCOUNTER — RESULTS ENCOUNTER (OUTPATIENT)
Dept: INTERNAL MEDICINE | Facility: CLINIC | Age: 72
End: 2017-12-20

## 2017-12-20 DIAGNOSIS — D63.1 ANEMIA IN STAGE 4 CHRONIC KIDNEY DISEASE (HCC): ICD-10-CM

## 2017-12-20 DIAGNOSIS — D63.1 ANEMIA IN STAGE 4 CHRONIC KIDNEY DISEASE (HCC): Primary | ICD-10-CM

## 2017-12-20 DIAGNOSIS — N18.4 ANEMIA IN STAGE 4 CHRONIC KIDNEY DISEASE (HCC): Primary | ICD-10-CM

## 2017-12-20 DIAGNOSIS — Z12.11 COLON CANCER SCREENING: ICD-10-CM

## 2017-12-20 DIAGNOSIS — N18.4 ANEMIA IN STAGE 4 CHRONIC KIDNEY DISEASE (HCC): ICD-10-CM

## 2017-12-20 RX ORDER — IRON FUM,PS CMP/VIT C/NIACIN 125-40-3MG
1 CAPSULE ORAL DAILY
Qty: 30 CAPSULE | Refills: 5 | Status: SHIPPED | OUTPATIENT
Start: 2017-12-20 | End: 2018-07-20 | Stop reason: SDUPTHER

## 2017-12-26 ENCOUNTER — OFFICE VISIT (OUTPATIENT)
Dept: INTERNAL MEDICINE | Facility: CLINIC | Age: 72
End: 2017-12-26

## 2017-12-26 VITALS
HEART RATE: 60 BPM | WEIGHT: 210.1 LBS | DIASTOLIC BLOOD PRESSURE: 60 MMHG | HEIGHT: 63 IN | TEMPERATURE: 98.1 F | OXYGEN SATURATION: 99 % | BODY MASS INDEX: 37.23 KG/M2 | SYSTOLIC BLOOD PRESSURE: 132 MMHG

## 2017-12-26 DIAGNOSIS — Z48.02 ENCOUNTER FOR STAPLE REMOVAL: Primary | ICD-10-CM

## 2017-12-26 PROCEDURE — 99212 OFFICE O/P EST SF 10 MIN: CPT | Performed by: NURSE PRACTITIONER

## 2017-12-26 NOTE — PROGRESS NOTES
Chief Complaint   Patient presents with   • Suture / Staple Removal     Top of head        HPI  Faye Rod is a 72 y.o. female who presents for staple removal from scalp.       [facility]  The following portions of the patient's history were reviewed and updated as appropriate: allergies, current medications, past family history, past medical history, past social history, past surgical history and problem list.    Past Medical History:   Diagnosis Date   • Anemia    • Arthritis    • Colon polyp    • Diabetes mellitus    • Disease of thyroid gland    • Heart disease    • Hyperlipidemia    • Hypertension    • Hypothyroidism    • Pacemaker    • Shortness of breath 2/3/2017     Past Surgical History:   Procedure Laterality Date   • CATARACT EXTRACTION Bilateral    • CHOLECYSTECTOMY     • PACEMAKER IMPLANTATION       Patient Active Problem List    Diagnosis   • Post herpetic neuralgia [B02.29]   • Dementia with behavioral disturbance [F03.91]   • Type 2 diabetes mellitus [E11.9]   • Primary osteoarthritis of hand [M19.049]   • Non-rheumatic mitral regurgitation [I34.0]   • Hypothyroidism, adult [E03.9]   • Shortness of breath [R06.02]   • Mixed hyperlipidemia [E78.2]   • Essential hypertension [I10]   • Pacemaker [Z95.0]     Social History   Substance Use Topics   • Smoking status: Never Smoker   • Smokeless tobacco: Never Used   • Alcohol use No     Current Outpatient Prescriptions on File Prior to Visit   Medication Sig Dispense Refill   • Acetaminophen (TYLENOL ARTHRITIS PAIN PO) Take  by mouth Daily.     • acetaminophen (TYLENOL) 650 MG 8 hr tablet Take 650 mg by mouth 2 (Two) Times a Day As Needed.     • amLODIPine (NORVASC) 2.5 MG tablet TAKE 1 TABLET BY MOUTH  DAILY 90 tablet 1   • aspirin 81 MG EC tablet Take 81 mg by mouth Daily.     • atorvastatin (LIPITOR) 20 MG tablet TAKE 1 TABLET BY MOUTH  DAILY 90 tablet 1   • cholecalciferol (VITAMIN D3) 1000 UNITS tablet Take 1,000 Units by mouth Daily.     •  "donepezil (ARICEPT) 10 MG tablet Take 1 tablet by mouth Daily. 30 tablet 11   • escitalopram (LEXAPRO) 20 MG tablet Take 1 tablet by mouth Daily. 90 tablet 0   • Fe Fum-FePoly-Vit C-Vit B3 (INTEGRA) 62.5-62.5-40-3 MG capsule Take 1 capsule by mouth Daily. 30 capsule 5   • glucose blood (MADDY CONTOUR TEST) test strip 1-2 times daily Use as instructed 50 each 12   • ibandronate (BONIVA) 150 MG tablet Take 1 tablet by mouth Every 30 (Thirty) Days. 3 tablet 3   • Insulin Glargine (LANTUS SOLOSTAR) 100 UNIT/ML injection pen Inject 28 Units under the skin Every Night. 30 mL 0   • Insulin Pen Needle (PX MINI PEN NEEDLES) 31G X 5 MM misc Use with Lantus Insulin 90 each 3   • levothyroxine (SYNTHROID, LEVOTHROID) 112 MCG tablet TAKE 1 TABLET BY MOUTH  DAILY 90 tablet 1   • memantine (NAMENDA) 10 MG tablet Take 1 tablet by mouth 2 (Two) Times a Day. 60 tablet 11     No current facility-administered medications on file prior to visit.          Review of Systems   Constitutional: Negative for fever.   Neurological: Negative for headaches.           Objective   Blood pressure 132/60, pulse 60, temperature 98.1 °F (36.7 °C), height 160 cm (62.99\"), weight 95.3 kg (210 lb 1.6 oz), SpO2 99 %.  Body mass index is 37.23 kg/(m^2).      Physical Exam    Suture Removal  Date/Time: 12/26/2017 7:00 PM  Performed by: DENI KAMINSKI  Authorized by: DENI KAMINSKI   Consent: Verbal consent obtained.  Consent given by: patient  Patient understanding: patient states understanding of the procedure being performed  Body area: head/neck  Location details: scalp  Wound Appearance: clean  Staples Removed: 8  Patient tolerance: Patient tolerated the procedure well with no immediate complications                ASSESSMENT/PLAN  1. Encounter for staple removal    Staples removed        Plan of care reviewed with patient at the conclusion of today's visit. Education was provided in regards to diagnosis, management and any prescribed or recommended " OTC medications.  Patient verbalizes Understanding of and agreement with management plan.      FOLLOW-UP  No Follow-up on file.      Future Appointments  Date Time Provider Department Center   2/2/2018 3:00 PM Sarai Mohan PA-C [facility] None   3/23/2018 2:30 PM [facility] ECHO/VASC CART RM1 [facility]   3/23/2018 3:45 PM Faisal Vang MD [facility] None         Electronically signed by:  SHALONDA Patino  12/26/2017

## 2017-12-30 ENCOUNTER — TELEPHONE (OUTPATIENT)
Dept: INTERNAL MEDICINE | Facility: CLINIC | Age: 72
End: 2017-12-30

## 2017-12-30 DIAGNOSIS — N18.30 CHRONIC KIDNEY DISEASE, STAGE III (MODERATE) (HCC): ICD-10-CM

## 2017-12-30 DIAGNOSIS — E11.00 TYPE 2 DIABETES MELLITUS WITH HYPEROSMOLARITY WITHOUT COMA, WITHOUT LONG-TERM CURRENT USE OF INSULIN (HCC): ICD-10-CM

## 2017-12-30 DIAGNOSIS — Z78.0 POST-MENOPAUSAL: ICD-10-CM

## 2017-12-30 DIAGNOSIS — Z12.11 SCREEN FOR COLON CANCER: ICD-10-CM

## 2017-12-30 DIAGNOSIS — D50.0 IRON DEFICIENCY ANEMIA DUE TO CHRONIC BLOOD LOSS: Primary | ICD-10-CM

## 2017-12-30 RX ORDER — ESCITALOPRAM OXALATE 20 MG/1
TABLET ORAL
Qty: 90 TABLET | Refills: 0 | Status: SHIPPED | OUTPATIENT
Start: 2017-12-30 | End: 2018-03-16 | Stop reason: SDUPTHER

## 2017-12-30 NOTE — TELEPHONE ENCOUNTER
Notified daughter that the Cologuard test is +.  Will refer to colorectal surgery for consult to determine best course of further testing or  treatment

## 2018-01-10 ENCOUNTER — CLINICAL SUPPORT NO REQUIREMENTS (OUTPATIENT)
Dept: CARDIOLOGY | Facility: CLINIC | Age: 73
End: 2018-01-10

## 2018-01-10 DIAGNOSIS — Z95.0 PACEMAKER: Primary | ICD-10-CM

## 2018-01-10 PROCEDURE — 93296 REM INTERROG EVL PM/IDS: CPT | Performed by: INTERNAL MEDICINE

## 2018-01-10 PROCEDURE — 93294 REM INTERROG EVL PM/LDLS PM: CPT | Performed by: INTERNAL MEDICINE

## 2018-02-02 ENCOUNTER — OFFICE VISIT (OUTPATIENT)
Dept: NEUROLOGY | Facility: CLINIC | Age: 73
End: 2018-02-02

## 2018-02-02 VITALS — HEIGHT: 63 IN | WEIGHT: 210 LBS | BODY MASS INDEX: 37.21 KG/M2

## 2018-02-02 DIAGNOSIS — R26.89 IMPAIRMENT OF BALANCE: ICD-10-CM

## 2018-02-02 DIAGNOSIS — F03.91 DEMENTIA WITH BEHAVIORAL DISTURBANCE, UNSPECIFIED DEMENTIA TYPE: Primary | ICD-10-CM

## 2018-02-02 PROCEDURE — 99214 OFFICE O/P EST MOD 30 MIN: CPT | Performed by: PHYSICIAN ASSISTANT

## 2018-02-02 NOTE — PROGRESS NOTES
"Subjective     Chief Complaint: memory loss      History of Present Illness   Faye Rod is a 72 y.o. female who returns to clinic today with a history of Dementia . Her family  has noted symptoms since approximately 2015 marked initially by forgetfulness. This has varied over time, with significant worsening noted in 9/16 when hospitalized with renal insufficiency. Additional associated symptoms have included impairments in essentially all spheres of cognition. She has had associated  symptoms of hallucinations previously, though now only occasional delusions. No modifying factors or circumstances have been identified. She is currently residing at home with her daughter in [place].       She has also noted a resting tremor in her left hand since 2016. This has somewhat worsened over time. She notes associated balance impairment and a shuffling gait as well as bradykinesia.     Prior evaluation has included screening blood work and a head CT within the last year which were notable for only mild increases in BUN and creatinine. She is currently taking donepezil and memantine.    Since her last visit in 7/17, she feels essentially unchanged and her family agrees.       I have reviewed and confirmed the past family, social and medical history as accurate on 7/17.     Review of Systems   Constitutional: Negative.    HENT: Negative.    Eyes: Negative.    Respiratory: Negative.    Cardiovascular: Negative.    Gastrointestinal: Negative.    Endocrine: Negative.    Genitourinary: Negative.    Musculoskeletal: Negative.    Skin: Negative.    Allergic/Immunologic: Negative.    Neurological:        As noted in HPI   Hematological: Negative.    Psychiatric/Behavioral:        As noted in HPI       Objective     Ht 160 cm (63\")  Wt 95.3 kg (210 lb)  BMI 37.2 kg/m2    General appearance today is normal.     Physical Exam   Constitutional: She is oriented to person, place, and time.   Neurological: She is oriented to " person, place, and time. She has normal strength. She has a normal Finger-Nose-Finger Test.   Psychiatric: Her speech is normal.        Neurologic Exam     Mental Status   Oriented to person, place, and time.   Disoriented to year. Oriented to month, date, day and season.   Registration: recalls 3 of 3 objects. Recall at 5 minutes: recalls 1 of 3 objects. Follows 3 step commands.   Attention: decreased (2/5 sequencing).   Speech: speech is normal   Level of consciousness: alert  Able to name object. Able to read. Able to repeat. Unable to write. Normal comprehension.     Cranial Nerves   Cranial nerves II through XII intact.        Mild hypomimia      Motor Exam   Muscle bulk: normal  Right arm tone: normal  Left arm tone: decreased    Strength   Strength 5/5 throughout.     Sensory Exam   Light touch normal.     Gait, Coordination, and Reflexes     Gait  Gait: shuffling ((mild))    Coordination   Finger to nose coordination: normal    Tremor   Resting tremor: present in left hand.        Results  MMSE=22 (27 in 7/17)       Assessment/Plan   Faye was seen today for dementia.    Diagnoses and all orders for this visit:    Dementia with behavioral disturbance, unspecified dementia type    Impairment of balance          Discussion/Summary   Faye Rod returns to clinic today for evaluation of Dementia . Her history and examination, including bedside cognitive testing are most consistent with a mild dementia, though the underlying etiology is still not entirely clear, which was discussed. I again reviewed her current status and treatment options. After discussing potential treatment options, it was elected to continue on  donepezil and memantine unchanged. We discussed potential treatment options for her tremor, including adding amantadine, though this was very reasonably declined. I have also made a referral to PT for her balance impairment. She will then follow up in 6 months , or sooner if needed.       I  spent 15 minutes out of 25 minutes face to face with the patient and family and discussing evaluation, current status, treatment options and management.    As part of this visit I obtained additional history from the family which is incorporated in the HPI.      Sarai Mohan PA-C

## 2018-02-26 ENCOUNTER — CLINICAL SUPPORT NO REQUIREMENTS (OUTPATIENT)
Dept: CARDIOLOGY | Facility: CLINIC | Age: 73
End: 2018-02-26

## 2018-02-26 DIAGNOSIS — I48.0 PAROXYSMAL ATRIAL FIBRILLATION (HCC): Primary | ICD-10-CM

## 2018-03-16 ENCOUNTER — OFFICE VISIT (OUTPATIENT)
Dept: INTERNAL MEDICINE | Facility: CLINIC | Age: 73
End: 2018-03-16

## 2018-03-16 VITALS
BODY MASS INDEX: 37.25 KG/M2 | HEART RATE: 62 BPM | WEIGHT: 210.2 LBS | SYSTOLIC BLOOD PRESSURE: 140 MMHG | TEMPERATURE: 98.8 F | OXYGEN SATURATION: 94 % | HEIGHT: 63 IN | DIASTOLIC BLOOD PRESSURE: 78 MMHG

## 2018-03-16 DIAGNOSIS — E03.9 HYPOTHYROIDISM, ADULT: ICD-10-CM

## 2018-03-16 DIAGNOSIS — Z79.4 TYPE 2 DIABETES MELLITUS WITH CHRONIC KIDNEY DISEASE, WITH LONG-TERM CURRENT USE OF INSULIN, UNSPECIFIED CKD STAGE (HCC): ICD-10-CM

## 2018-03-16 DIAGNOSIS — Z78.0 POST-MENOPAUSAL: ICD-10-CM

## 2018-03-16 DIAGNOSIS — F41.9 ANXIETY: ICD-10-CM

## 2018-03-16 DIAGNOSIS — N18.30 CHRONIC KIDNEY DISEASE, STAGE III (MODERATE) (HCC): ICD-10-CM

## 2018-03-16 DIAGNOSIS — F03.91 DEMENTIA WITH BEHAVIORAL DISTURBANCE, UNSPECIFIED DEMENTIA TYPE: ICD-10-CM

## 2018-03-16 DIAGNOSIS — I34.0 NON-RHEUMATIC MITRAL REGURGITATION: ICD-10-CM

## 2018-03-16 DIAGNOSIS — Z95.0 PACEMAKER: ICD-10-CM

## 2018-03-16 DIAGNOSIS — E11.22 TYPE 2 DIABETES MELLITUS WITH CHRONIC KIDNEY DISEASE, WITH LONG-TERM CURRENT USE OF INSULIN, UNSPECIFIED CKD STAGE (HCC): ICD-10-CM

## 2018-03-16 DIAGNOSIS — I10 ESSENTIAL HYPERTENSION: ICD-10-CM

## 2018-03-16 DIAGNOSIS — H93.90 EAR LESION: Primary | ICD-10-CM

## 2018-03-16 LAB
EXPIRATION DATE: NORMAL
HBA1C MFR BLD: 7.3 %
Lab: NORMAL

## 2018-03-16 PROCEDURE — 83036 HEMOGLOBIN GLYCOSYLATED A1C: CPT | Performed by: NURSE PRACTITIONER

## 2018-03-16 PROCEDURE — 99214 OFFICE O/P EST MOD 30 MIN: CPT | Performed by: NURSE PRACTITIONER

## 2018-03-16 RX ORDER — LORATADINE 10 MG/1
1 CAPSULE, LIQUID FILLED ORAL AS NEEDED
COMMUNITY

## 2018-03-16 RX ORDER — ESCITALOPRAM OXALATE 20 MG/1
20 TABLET ORAL DAILY
Qty: 90 TABLET | Refills: 3 | Status: SHIPPED | OUTPATIENT
Start: 2018-03-16 | End: 2019-03-07 | Stop reason: SDUPTHER

## 2018-03-16 NOTE — PROGRESS NOTES
Subjective:    Faye Rod is a 72 y.o. female.     Chief Complaint   Patient presents with   • Establish Care     Policies discussed, needs Diabetic shoes approval   • Med Refill       History of Present Illness   Patient present with her daughter.  Patient/daughter states primary care provider moved away and needs to establish new primary provider.    Patient complains of type 2 diabetes with long-term  insulin use since approximately age 39.  Glucose checked daily and ranges from 70 to 130s.  Diabetes worsened with obesity.  And inactivity.    Patient complains of hyperlipidemia years in duration.  Worsened by chronic medical conditions and obesity and inactivity.    Patient complains of hypertension years in duration.  Worsened by chronic medical conditions and obesity and inactivity.  Worsened by chronic kidney disease.    Patient complains of history of chronic kidney disease stage unknown since 2016.  Worsened by multiple medical conditions.  Patient sees nephrologist every 6 months for creatinine check.  She saw nephrologist on February 16, 2018.    Patient complains of hypothyroidism years of duration.  Controlled per daughter.    Daughter reports patient has history of dementia diagnosed in 2016.  Managed with current medications.  Dementia worsens at evening and nighttime.  Daughter reports patient had hallucinations before beginning medication with no complaints of those at this time  Daughter reports patient had positive cologuard test with no desire to follow up with gastroenterology referral and expresses understanding that this could rule out colon cancer.    Patient complains of lesion on left ear for approximately 2 years.  Associated with pain and bleeding intermittently. Patient has not had evaluation by dermatologist thus far.    Current Outpatient Prescriptions:   •  acetaminophen (TYLENOL) 650 MG 8 hr tablet, Take 650 mg by mouth 2 (Two) Times a Day As Needed., Disp: , Rfl:   •   amLODIPine (NORVASC) 2.5 MG tablet, TAKE 1 TABLET BY MOUTH  DAILY, Disp: 90 tablet, Rfl: 1  •  aspirin 81 MG EC tablet, Take 81 mg by mouth Daily., Disp: , Rfl:   •  atorvastatin (LIPITOR) 20 MG tablet, TAKE 1 TABLET BY MOUTH  DAILY, Disp: 90 tablet, Rfl: 1  •  cholecalciferol (VITAMIN D3) 1000 UNITS tablet, Take 1,000 Units by mouth Daily., Disp: , Rfl:   •  donepezil (ARICEPT) 10 MG tablet, Take 1 tablet by mouth Daily., Disp: 30 tablet, Rfl: 11  •  escitalopram (LEXAPRO) 20 MG tablet, Take 1 tablet by mouth Daily., Disp: 90 tablet, Rfl: 3  •  Fe Fum-FePoly-Vit C-Vit B3 (INTEGRA) 62.5-62.5-40-3 MG capsule, Take 1 capsule by mouth Daily., Disp: 30 capsule, Rfl: 5  •  glucose blood (MADDY CONTOUR TEST) test strip, 1-2 times daily Use as instructed, Disp: 50 each, Rfl: 12  •  ibandronate (BONIVA) 150 MG tablet, Take 1 tablet by mouth Every 30 (Thirty) Days., Disp: 3 tablet, Rfl: 3  •  Insulin Glargine (LANTUS SOLOSTAR) 100 UNIT/ML injection pen, 30 units nightly, Disp: 3 mL, Rfl: 3  •  Insulin Pen Needle (PX MINI PEN NEEDLES) 31G X 5 MM misc, Use with Lantus Insulin, Disp: 90 each, Rfl: 3  •  levothyroxine (SYNTHROID, LEVOTHROID) 112 MCG tablet, TAKE 1 TABLET BY MOUTH  DAILY, Disp: 90 tablet, Rfl: 1  •  Loratadine 10 MG capsule, Take  by mouth Daily., Disp: , Rfl:   •  memantine (NAMENDA) 10 MG tablet, Take 1 tablet by mouth 2 (Two) Times a Day., Disp: 60 tablet, Rfl: 11     The following portions of the patient's history were reviewed and updated as appropriate: allergies, current medications, past family history, past medical history, past social history, past surgical history and problem list.    Review of Systems   Constitutional: Negative for chills, fatigue and fever.   HENT: Negative for congestion, dental problem, mouth sores, nosebleeds, postnasal drip, rhinorrhea, sinus pain, sinus pressure, sneezing and sore throat.         Ear lesion.   Eyes: Negative for pain, discharge, redness and itching.  "  Respiratory: Negative for cough, shortness of breath and wheezing.    Cardiovascular: Negative for chest pain, palpitations and leg swelling.        No KRUEGER, orthopnea, PND, or claudication.   Gastrointestinal: Negative for abdominal distention, abdominal pain, blood in stool, diarrhea, nausea and vomiting.        Positive colo guard test.   Endocrine: Negative for cold intolerance, heat intolerance, polydipsia and polyuria.        T2DM with insulin   Genitourinary: Negative for difficulty urinating, dysuria, frequency, hematuria and urgency.        CKD   Musculoskeletal: Negative for arthralgias, gait problem, joint swelling and myalgias.   Skin: Positive for color change. Negative for rash and wound.        Left ear lesion.   Allergic/Immunologic: Negative.    Neurological: Negative for dizziness, syncope, weakness, light-headedness, numbness and headaches.   Hematological: Negative for adenopathy. Does not bruise/bleed easily.   Psychiatric/Behavioral: Positive for behavioral problems.        Dementia       Objective:    /78 (BP Location: Left arm, Patient Position: Sitting, Cuff Size: Adult)   Pulse 62   Temp 98.8 °F (37.1 °C) (Temporal Artery )   Ht 160 cm (62.99\")   Wt 95.3 kg (210 lb 3.2 oz)   SpO2 94%   BMI 37.24 kg/m²     Physical Exam   Constitutional: She appears well-developed and well-nourished. She is cooperative. She is easily aroused.  Non-toxic appearance. She does not have a sickly appearance. She does not appear ill. No distress.   Obese.   HENT:   Head: Normocephalic and atraumatic. Head is without abrasion. Hair is normal.   Right Ear: Hearing, tympanic membrane, external ear and ear canal normal. No foreign bodies. Tympanic membrane is not perforated and not erythematous.   Left Ear: Hearing, tympanic membrane and ear canal normal. No foreign bodies. Tympanic membrane is not perforated and not erythematous.   Ears:    Nose: Nose normal. No mucosal edema, rhinorrhea or septal " deviation. No epistaxis.  No foreign bodies.   Mouth/Throat: Uvula is midline, oropharynx is clear and moist and mucous membranes are normal. No oral lesions. Normal dentition.   Crusted lesion on left tragus with dried blood.   Eyes: Conjunctivae, EOM and lids are normal. Pupils are equal, round, and reactive to light. Right eye exhibits no discharge. Left eye exhibits no discharge. Right conjunctiva is not injected. Left conjunctiva is not injected. No scleral icterus.   Neck: Normal range of motion and full passive range of motion without pain. Neck supple. No edema and normal range of motion present. No thyroid mass and no thyromegaly present.   Cardiovascular: Normal rate and regular rhythm.  Exam reveals no gallop and no friction rub.    Murmur heard.  Pulmonary/Chest: Effort normal and breath sounds normal. No accessory muscle usage. She has no rhonchi. She has no rales. She exhibits no tenderness.   Abdominal: Soft. Bowel sounds are normal. She exhibits no distension. There is no hepatosplenomegaly or hepatomegaly. There is no tenderness. There is no CVA tenderness.   Musculoskeletal: Normal range of motion. She exhibits no edema, tenderness or deformity.   Lymphadenopathy:     She has no cervical adenopathy.   Neurological: She is alert and easily aroused. She is disoriented. No cranial nerve deficit. Coordination normal.   Oriented to person, place and disoriented to time.   Skin: Skin is warm, dry and intact. Lesion noted. No abrasion and no rash noted. She is not diaphoretic. No cyanosis or erythema. Nails show no clubbing.   Lesion left ear   Psychiatric: She has a normal mood and affect. Her speech is normal and behavior is normal. Cognition and memory are impaired. She exhibits abnormal recent memory.   Disoriented to season.   Nursing note and vitals reviewed.    Procedures  Assessment/Plan:  Diabetic foot exam:   Left: Filament test present   Pulses Dorsalis Pedis:  present   Reflexes 2+     Vibratory sensation normal   Proprioception normal   Sharp/dull discrimination normal       Right: Filament test absent at heel and sole otherwise present   Pulses Dorsalis Pedis:  present   Reflexes 2+    Vibratory sensation normal   Proprioception normal   Sharp/dull discrimination normal    Faye was seen today for establish care and med refill.    Diagnoses and all orders for this visit:    Ear lesion  -     Ambulatory Referral to Dermatology    Type 2 diabetes mellitus with chronic kidney disease, with long-term current use of insulin, unspecified CKD stage  -     POC Glycosylated Hemoglobin (Hb A1C)  -     Comprehensive Metabolic Panel; Future  -     Lipid Panel; Future  Discussed A1C results.  Chronic kidney disease, stage III (moderate)  Continue nephrology appointments.  Anxiety  -     escitalopram (LEXAPRO) 20 MG tablet; Take 1 tablet by mouth Daily.  Essential hypertension  -     Comprehensive Metabolic Panel; Future  -     Lipid Panel; Future  -     CBC & Differential; Future  Continue medications. Attempt to decrease weight.  Hypothyroidism, adult  -     TSH; Future  Maintain medication.  Dementia with behavioral disturbance, unspecified dementia type  Maintain medication and supervision from daughter.      Return in about 3 months (around 6/16/2018), or if symptoms worsen or fail to improve.

## 2018-03-23 ENCOUNTER — OFFICE VISIT (OUTPATIENT)
Dept: CARDIOLOGY | Facility: CLINIC | Age: 73
End: 2018-03-23

## 2018-03-23 ENCOUNTER — TELEPHONE (OUTPATIENT)
Dept: INTERNAL MEDICINE | Facility: CLINIC | Age: 73
End: 2018-03-23

## 2018-03-23 ENCOUNTER — HOSPITAL ENCOUNTER (OUTPATIENT)
Dept: CARDIOLOGY | Facility: HOSPITAL | Age: 73
Discharge: HOME | End: 2018-03-23
Admitting: PHYSICIAN ASSISTANT

## 2018-03-23 VITALS
BODY MASS INDEX: 35.85 KG/M2 | SYSTOLIC BLOOD PRESSURE: 114 MMHG | DIASTOLIC BLOOD PRESSURE: 54 MMHG | HEIGHT: 64 IN | WEIGHT: 210 LBS | HEART RATE: 60 BPM

## 2018-03-23 VITALS
HEIGHT: 62 IN | BODY MASS INDEX: 38.64 KG/M2 | WEIGHT: 210 LBS | DIASTOLIC BLOOD PRESSURE: 78 MMHG | SYSTOLIC BLOOD PRESSURE: 140 MMHG

## 2018-03-23 DIAGNOSIS — I10 ESSENTIAL HYPERTENSION: ICD-10-CM

## 2018-03-23 DIAGNOSIS — E78.2 MIXED HYPERLIPIDEMIA: ICD-10-CM

## 2018-03-23 DIAGNOSIS — Z95.0 PACEMAKER: Primary | ICD-10-CM

## 2018-03-23 DIAGNOSIS — I34.0 NON-RHEUMATIC MITRAL REGURGITATION: ICD-10-CM

## 2018-03-23 DIAGNOSIS — N18.30 CHRONIC KIDNEY DISEASE, STAGE III (MODERATE) (HCC): ICD-10-CM

## 2018-03-23 LAB
BH CV ECHO MEAS - AI DEC SLOPE: 268.2 CM/SEC^2
BH CV ECHO MEAS - AI MAX PG: 61.4 MMHG
BH CV ECHO MEAS - AI MAX VEL: 391.6 CM/SEC
BH CV ECHO MEAS - AI P1/2T: 427.7 MSEC
BH CV ECHO MEAS - AO MAX PG (FULL): 13.9 MMHG
BH CV ECHO MEAS - AO MAX PG: 18 MMHG
BH CV ECHO MEAS - AO MEAN PG (FULL): 7.6 MMHG
BH CV ECHO MEAS - AO MEAN PG: 10 MMHG
BH CV ECHO MEAS - AO V2 MAX: 213.4 CM/SEC
BH CV ECHO MEAS - AO V2 MEAN: 147.2 CM/SEC
BH CV ECHO MEAS - AO V2 VTI: 47.9 CM
BH CV ECHO MEAS - AVA(I,A): 1.1 CM^2
BH CV ECHO MEAS - AVA(I,D): 1.1 CM^2
BH CV ECHO MEAS - AVA(V,A): 1.2 CM^2
BH CV ECHO MEAS - AVA(V,D): 1.2 CM^2
BH CV ECHO MEAS - BSA(HAYCOCK): 2.1 M^2
BH CV ECHO MEAS - BSA: 2 M^2
BH CV ECHO MEAS - BZI_BMI: 38.4 KILOGRAMS/M^2
BH CV ECHO MEAS - BZI_METRIC_HEIGHT: 157.5 CM
BH CV ECHO MEAS - BZI_METRIC_WEIGHT: 95.3 KG
BH CV ECHO MEAS - EDV(CUBED): 82.6 ML
BH CV ECHO MEAS - EDV(TEICH): 85.6 ML
BH CV ECHO MEAS - EF(CUBED): 70.8 %
BH CV ECHO MEAS - EF(TEICH): 62.7 %
BH CV ECHO MEAS - ESV(CUBED): 24.1 ML
BH CV ECHO MEAS - ESV(TEICH): 31.9 ML
BH CV ECHO MEAS - FS: 33.6 %
BH CV ECHO MEAS - IVS/LVPW: 1.1
BH CV ECHO MEAS - IVSD: 1.4 CM
BH CV ECHO MEAS - LA DIMENSION: 4.1 CM
BH CV ECHO MEAS - LAT PEAK E' VEL: 9.2 CM/SEC
BH CV ECHO MEAS - LV MASS(C)D: 208.7 GRAMS
BH CV ECHO MEAS - LV MASS(C)DI: 106.9 GRAMS/M^2
BH CV ECHO MEAS - LV MAX PG: 4.1 MMHG
BH CV ECHO MEAS - LV MEAN PG: 2.4 MMHG
BH CV ECHO MEAS - LV V1 MAX: 101.8 CM/SEC
BH CV ECHO MEAS - LV V1 MEAN: 71 CM/SEC
BH CV ECHO MEAS - LV V1 VTI: 21.8 CM
BH CV ECHO MEAS - LVIDD: 4.4 CM
BH CV ECHO MEAS - LVIDS: 2.9 CM
BH CV ECHO MEAS - LVOT AREA (M): 2.5 CM^2
BH CV ECHO MEAS - LVOT AREA: 2.5 CM^2
BH CV ECHO MEAS - LVOT DIAM: 1.8 CM
BH CV ECHO MEAS - LVPWD: 1.2 CM
BH CV ECHO MEAS - MED PEAK E' VEL: 8.63 CM/SEC
BH CV ECHO MEAS - MV A MAX VEL: 118 CM/SEC
BH CV ECHO MEAS - MV E MAX VEL: 111.1 CM/SEC
BH CV ECHO MEAS - MV E/A: 0.94
BH CV ECHO MEAS - PA ACC SLOPE: 901.2 CM/SEC^2
BH CV ECHO MEAS - PA ACC TIME: 0.12 SEC
BH CV ECHO MEAS - PA MAX PG: 9.1 MMHG
BH CV ECHO MEAS - PA PR(ACCEL): 23.5 MMHG
BH CV ECHO MEAS - PA V2 MAX: 150.9 CM/SEC
BH CV ECHO MEAS - RAP SYSTOLE: 3 MMHG
BH CV ECHO MEAS - RVSP: 28 MMHG
BH CV ECHO MEAS - SI(CUBED): 29.9 ML/M^2
BH CV ECHO MEAS - SI(LVOT): 28.2 ML/M^2
BH CV ECHO MEAS - SI(TEICH): 27.5 ML/M^2
BH CV ECHO MEAS - SV(CUBED): 58.4 ML
BH CV ECHO MEAS - SV(LVOT): 55 ML
BH CV ECHO MEAS - SV(TEICH): 53.6 ML
BH CV ECHO MEAS - TAPSE (>1.6): 3.4 CM2
BH CV ECHO MEAS - TR MAX VEL: 247.3 CM/SEC
BH CV VAS BP RIGHT ARM: NORMAL MMHG
BH CV XLRA - RV BASE: 3.5 CM
BH CV XLRA - RV LENGTH: 7 CM
BH CV XLRA - RV MID: 3.2 CM
BH CV XLRA - TDI S': 17 CM/SEC
E/E' RATIO: 11.9
LEFT ATRIUM VOLUME INDEX: 32.8 ML/M2
LV EF 2D ECHO EST: 60 %

## 2018-03-23 PROCEDURE — 93306 TTE W/DOPPLER COMPLETE: CPT | Performed by: INTERNAL MEDICINE

## 2018-03-23 PROCEDURE — 93306 TTE W/DOPPLER COMPLETE: CPT

## 2018-03-23 PROCEDURE — 99214 OFFICE O/P EST MOD 30 MIN: CPT | Performed by: INTERNAL MEDICINE

## 2018-03-23 NOTE — TELEPHONE ENCOUNTER
----- Message from Xenia Holman V sent at 3/23/2018 10:09 AM EDT -----  TEVIN MENDEZ, DAUGHTER, CALLED REGARDING PTS Insulin Glargine (LANTUS SOLOSTAR) 100 UNIT/ML injection pen.     NEW RX WAS SUPPOSE TO BE SENT TO PHARM BUT NEVER RECEIVED. PT IS ALMOST OUT AND NEEDS REFILL     DAUGHTER CAN BE REACHED [phone]    [employer] [facility] - [address] [phone] [facility] [phone]

## 2018-03-23 NOTE — PROGRESS NOTES
[facility]  Office Progress Note  Faye Rod  1945  [phone]      Visit Date: 3/23/2018    PCP: Telly Contreras, APRN  [address] [facility] [address]  [place] [id number]    IDENTIFICATION: A 72 y.o. female lives with daughter    Chief Complaint   Patient presents with   • Diastolic Heart Failure   • Hypertension       PROBLEM LIST:   1. Diastolic Heart failure  1. Echocardiogram, [place], 2014.  Impaired relaxation.  Mild AI, TR with RVSP 38 mmHg.  Trace MR  2. Echocardiogram, [facility], 2018.  EF 50-55%, mild TR, moderate AI.    2. Arrhythmia ?  1. [facility] PPM Model # [id number]  3. A flutter  1. Documented July 21, 2017 for 21 minutes, no other episodes  4. Hypertension  5. Hyperlipidemia  6. Diabetes Mellitus  1. 9/8/17 A1c 8.1  7. CKD Stage III; followed by Dr Casarez  8. Dementia    Allergies  No Known Allergies    Current Medications    Current Outpatient Prescriptions:   •  acetaminophen (TYLENOL) 650 MG 8 hr tablet, Take 650 mg by mouth 2 (Two) Times a Day As Needed., Disp: , Rfl:   •  amLODIPine (NORVASC) 2.5 MG tablet, TAKE 1 TABLET BY MOUTH  DAILY, Disp: 90 tablet, Rfl: 1  •  aspirin 81 MG EC tablet, Take 81 mg by mouth Daily., Disp: , Rfl:   •  atorvastatin (LIPITOR) 20 MG tablet, TAKE 1 TABLET BY MOUTH  DAILY, Disp: 90 tablet, Rfl: 1  •  cholecalciferol (VITAMIN D3) 1000 UNITS tablet, Take 1,000 Units by mouth Daily., Disp: , Rfl:   •  donepezil (ARICEPT) 10 MG tablet, Take 1 tablet by mouth Daily., Disp: 30 tablet, Rfl: 11  •  escitalopram (LEXAPRO) 20 MG tablet, Take 1 tablet by mouth Daily., Disp: 90 tablet, Rfl: 3  •  Fe Fum-FePoly-Vit C-Vit B3 (INTEGRA) 62.5-62.5-40-3 MG capsule, Take 1 capsule by mouth Daily., Disp: 30 capsule, Rfl: 5  •  glucose blood (MADDY CONTOUR TEST) test strip, 1-2 times daily Use as instructed, Disp: 50 each, Rfl: 12  •  ibandronate (BONIVA) 150 MG tablet, Take 1 tablet by mouth Every 30 (Thirty) Days., Disp: 3 tablet, Rfl: 3  •   "Insulin Glargine (LANTUS SOLOSTAR) 100 UNIT/ML injection pen, 30 units nightly, Disp: 3 mL, Rfl: 3  •  Insulin Pen Needle (PX MINI PEN NEEDLES) 31G X 5 MM misc, Use with Lantus Insulin, Disp: 90 each, Rfl: 3  •  levothyroxine (SYNTHROID, LEVOTHROID) 112 MCG tablet, TAKE 1 TABLET BY MOUTH  DAILY, Disp: 90 tablet, Rfl: 1  •  Loratadine 10 MG capsule, Take  by mouth Daily., Disp: , Rfl:   •  memantine (NAMENDA) 10 MG tablet, Take 1 tablet by mouth 2 (Two) Times a Day., Disp: 60 tablet, Rfl: 11      History of Present Illness   Pt here for follow up.    No new complaints, however comments her quality of life is poor.  She has chronic pain and poor dietary compliance.    Pt denies any chest pain, dyspnea, dyspnea on exertion, orthopnea, PND, palpitations, lower extremity edema, or claudication.    ROS:  All systems have been reviewed and are negative with the exception of those mentioned in the HPI.    OBJECTIVE:  Vitals:    03/23/18 1552   BP: 114/54   BP Location: Right arm   Patient Position: Sitting   Pulse: 60   Weight: 95.3 kg (210 lb)   Height: 162.6 cm (64\")     Physical Exam   Constitutional: She is oriented to person, place, and time. She appears well-developed and well-nourished. No distress.   obese   Cardiovascular: Normal rate, regular rhythm and intact distal pulses.    Murmur heard.  High-pitched blowing holosystolic murmur is present  at the apex  Pulses:       Radial pulses are 2+ on the right side, and 2+ on the left side.        Dorsalis pedis pulses are 2+ on the right side, and 2+ on the left side.        Posterior tibial pulses are 2+ on the right side, and 2+ on the left side.   Pulmonary/Chest: Effort normal and breath sounds normal. She has no wheezes. She has no rales.   Abdominal: Soft. Bowel sounds are normal. There is no tenderness. There is no guarding.   Musculoskeletal: She exhibits no edema or tenderness.   Neurological: She is alert and oriented to person, place, and time.   Skin: Skin " is warm and dry. No rash noted.   Psychiatric: She has a normal mood and affect.   Nursing note and vitals reviewed.      Diagnostic Data:  Procedures  Device Interrogation:  [facility] PPM  P-wave is 1 mV with threshold of 0.5 V at 0.4 ms and impedance at 460, R-wave is greater than 12 mV with threshold of 0.6 V at 0.4 ms and impedance 560.  Battery voltage is 2.92 volts.    MS X 2 longest 1.5 hrs    ASSESSMENT:   Diagnosis Plan   1. Pacemaker     2. Essential hypertension     3. Mixed hyperlipidemia     4. Non-rheumatic mitral regurgitation         PLAN:  1. Acceptable device interrogation today, patient will follow-up in 6 months for recheck with [facility]   2. Acceptable today following readings at home, continue antihypertensives   3. Acceptable lipid panel, on statin therapy for cardiac risk reduction in a diabetic  4.  Moderate AI- NL LVEF .  BP control with yearly echo required.     SHALONDA Terry, thank you for referring Ms. Rod for evaluation.  I have forwarded my electronically generated recommendations to you for review.  Please do not hesitate to call with any questions.    Scribed for Faisal Vang MD by Marjorie Martinez PA-C. 3/23/2018  3:59 PM  I, Faisal Vang MD, personally performed the services described in this documentation as scribed by the above named individual in my presence, and it is both accurate and complete.  3/23/2018  3:59 PM    Faisal Vang MD, [facility]

## 2018-03-27 NOTE — TELEPHONE ENCOUNTER
Called in Lantus Solostar 3mL  30 units nightly Refills: 3    Called in to [facility] due to it not going through twice electronically.    Called daughter Shila ([phone]) and informed her.

## 2018-04-26 ENCOUNTER — TELEPHONE (OUTPATIENT)
Dept: INTERNAL MEDICINE | Facility: CLINIC | Age: 73
End: 2018-04-26

## 2018-04-26 NOTE — TELEPHONE ENCOUNTER
4-26-18  S/w patients daughter which is her power of  I informed her that her mother needed FASTING labs and lab hours were given .  She gave verbal understanding.

## 2018-05-10 ENCOUNTER — LAB (OUTPATIENT)
Dept: INTERNAL MEDICINE | Facility: CLINIC | Age: 73
End: 2018-05-10

## 2018-05-10 DIAGNOSIS — E03.9 HYPOTHYROIDISM, ADULT: ICD-10-CM

## 2018-05-10 DIAGNOSIS — I10 ESSENTIAL HYPERTENSION: ICD-10-CM

## 2018-05-10 DIAGNOSIS — Z79.4 TYPE 2 DIABETES MELLITUS WITH CHRONIC KIDNEY DISEASE, WITH LONG-TERM CURRENT USE OF INSULIN, UNSPECIFIED CKD STAGE (HCC): ICD-10-CM

## 2018-05-10 DIAGNOSIS — N18.30 CHRONIC KIDNEY DISEASE, STAGE III (MODERATE) (HCC): ICD-10-CM

## 2018-05-10 DIAGNOSIS — E11.22 TYPE 2 DIABETES MELLITUS WITH CHRONIC KIDNEY DISEASE, WITH LONG-TERM CURRENT USE OF INSULIN, UNSPECIFIED CKD STAGE (HCC): ICD-10-CM

## 2018-05-10 LAB
ALBUMIN SERPL-MCNC: 4.2 G/DL (ref 3.2–4.8)
ALBUMIN/GLOB SERPL: 1.8 G/DL (ref 1.5–2.5)
ALP SERPL-CCNC: 77 U/L (ref 25–100)
ALT SERPL W P-5'-P-CCNC: 18 U/L (ref 7–40)
ANION GAP SERPL CALCULATED.3IONS-SCNC: 8 MMOL/L (ref 3–11)
ARTICHOKE IGE QN: 56 MG/DL (ref 0–130)
AST SERPL-CCNC: 21 U/L (ref 0–33)
BASOPHILS # BLD AUTO: 0.03 10*3/MM3 (ref 0–0.2)
BASOPHILS NFR BLD AUTO: 0.4 % (ref 0–1)
BILIRUB SERPL-MCNC: 0.5 MG/DL (ref 0.3–1.2)
BUN BLD-MCNC: 25 MG/DL (ref 9–23)
BUN/CREAT SERPL: 20.8 (ref 7–25)
CALCIUM SPEC-SCNC: 9.2 MG/DL (ref 8.7–10.4)
CHLORIDE SERPL-SCNC: 108 MMOL/L (ref 99–109)
CHOLEST SERPL-MCNC: 116 MG/DL (ref 0–200)
CO2 SERPL-SCNC: 27 MMOL/L (ref 20–31)
CREAT BLD-MCNC: 1.2 MG/DL (ref 0.6–1.3)
DEPRECATED RDW RBC AUTO: 48.7 FL (ref 37–54)
EOSINOPHIL # BLD AUTO: 0.18 10*3/MM3 (ref 0–0.3)
EOSINOPHIL NFR BLD AUTO: 2.3 % (ref 0–3)
ERYTHROCYTE [DISTWIDTH] IN BLOOD BY AUTOMATED COUNT: 13.8 % (ref 11.3–14.5)
GFR SERPL CREATININE-BSD FRML MDRD: 44 ML/MIN/1.73
GLOBULIN UR ELPH-MCNC: 2.3 GM/DL
GLUCOSE BLD-MCNC: 103 MG/DL (ref 70–100)
HCT VFR BLD AUTO: 37.8 % (ref 34.5–44)
HDLC SERPL-MCNC: 40 MG/DL (ref 40–60)
HGB BLD-MCNC: 12 G/DL (ref 11.5–15.5)
IMM GRANULOCYTES # BLD: 0.02 10*3/MM3 (ref 0–0.03)
IMM GRANULOCYTES NFR BLD: 0.3 % (ref 0–0.6)
LYMPHOCYTES # BLD AUTO: 2.19 10*3/MM3 (ref 0.6–4.8)
LYMPHOCYTES NFR BLD AUTO: 27.9 % (ref 24–44)
MCH RBC QN AUTO: 30.8 PG (ref 27–31)
MCHC RBC AUTO-ENTMCNC: 31.7 G/DL (ref 32–36)
MCV RBC AUTO: 97.2 FL (ref 80–99)
MONOCYTES # BLD AUTO: 0.68 10*3/MM3 (ref 0–1)
MONOCYTES NFR BLD AUTO: 8.7 % (ref 0–12)
NEUTROPHILS # BLD AUTO: 4.77 10*3/MM3 (ref 1.5–8.3)
NEUTROPHILS NFR BLD AUTO: 60.7 % (ref 41–71)
PLATELET # BLD AUTO: 155 10*3/MM3 (ref 150–450)
PMV BLD AUTO: 11.2 FL (ref 6–12)
POTASSIUM BLD-SCNC: 4.3 MMOL/L (ref 3.5–5.5)
PROT SERPL-MCNC: 6.5 G/DL (ref 5.7–8.2)
RBC # BLD AUTO: 3.89 10*6/MM3 (ref 3.89–5.14)
SODIUM BLD-SCNC: 143 MMOL/L (ref 132–146)
TRIGL SERPL-MCNC: 107 MG/DL (ref 0–150)
TSH SERPL DL<=0.05 MIU/L-ACNC: 0.22 MIU/ML (ref 0.35–5.35)
WBC NRBC COR # BLD: 7.85 10*3/MM3 (ref 3.5–10.8)

## 2018-05-10 PROCEDURE — 80061 LIPID PANEL: CPT | Performed by: NURSE PRACTITIONER

## 2018-05-10 PROCEDURE — 36415 COLL VENOUS BLD VENIPUNCTURE: CPT | Performed by: NURSE PRACTITIONER

## 2018-05-10 PROCEDURE — 80053 COMPREHEN METABOLIC PANEL: CPT | Performed by: NURSE PRACTITIONER

## 2018-05-10 PROCEDURE — 85025 COMPLETE CBC W/AUTO DIFF WBC: CPT | Performed by: NURSE PRACTITIONER

## 2018-05-10 PROCEDURE — 84443 ASSAY THYROID STIM HORMONE: CPT | Performed by: NURSE PRACTITIONER

## 2018-05-10 PROCEDURE — 82043 UR ALBUMIN QUANTITATIVE: CPT | Performed by: NURSE PRACTITIONER

## 2018-05-11 ENCOUNTER — TELEPHONE (OUTPATIENT)
Dept: INTERNAL MEDICINE | Facility: CLINIC | Age: 73
End: 2018-05-11

## 2018-05-11 DIAGNOSIS — E11.00 TYPE 2 DIABETES MELLITUS WITH HYPEROSMOLARITY WITHOUT COMA, WITHOUT LONG-TERM CURRENT USE OF INSULIN (HCC): ICD-10-CM

## 2018-05-11 DIAGNOSIS — E03.9 HYPOTHYROIDISM, UNSPECIFIED TYPE: Primary | ICD-10-CM

## 2018-05-11 LAB — MICROALBUMIN UR-MCNC: 12.5 UG/ML

## 2018-05-11 NOTE — TELEPHONE ENCOUNTER
Pt's daughter Shila notified that test strips sent to pharmacy. She verbalized understanding, thanked our office and we ended the call.

## 2018-05-11 NOTE — TELEPHONE ENCOUNTER
----- Message from Xenia Holman V sent at 5/11/2018  9:35 AM EDT -----  TEVIN MENDEZ, DAUGHTER, CALLED NEED A REFILL ON CONTOR TEST STRIPS    SHE CAN BE REACHED [phone]    [address] [phone]  - [phone]

## 2018-05-22 ENCOUNTER — TRANSCRIBE ORDERS (OUTPATIENT)
Dept: ADMINISTRATIVE | Facility: HOSPITAL | Age: 73
End: 2018-05-22

## 2018-05-22 DIAGNOSIS — Z12.31 VISIT FOR SCREENING MAMMOGRAM: Primary | ICD-10-CM

## 2018-05-25 ENCOUNTER — TELEPHONE (OUTPATIENT)
Dept: INTERNAL MEDICINE | Facility: CLINIC | Age: 73
End: 2018-05-25

## 2018-05-25 DIAGNOSIS — Z78.0 POST-MENOPAUSAL: ICD-10-CM

## 2018-05-25 DIAGNOSIS — N18.30 CHRONIC KIDNEY DISEASE, STAGE III (MODERATE) (HCC): ICD-10-CM

## 2018-05-25 DIAGNOSIS — E11.00 TYPE 2 DIABETES MELLITUS WITH HYPEROSMOLARITY WITHOUT COMA, WITHOUT LONG-TERM CURRENT USE OF INSULIN (HCC): ICD-10-CM

## 2018-05-25 NOTE — TELEPHONE ENCOUNTER
----- Message from Meghann Arias sent at 5/25/2018 10:24 AM EDT -----  Contact: TEVIN - DAUGHTER  TEVIN WILKERSON CALLING FOR HER MOTHER JAI BLAKE WHO NEEDS A REFILL FOR HER MINI PEN NEEDLES 31 GX 5MM FOR HER LANTUS. SHE USES THE [facility]. TEVIN CAN BE REACHED [phone]

## 2018-05-29 RX ORDER — MEMANTINE HYDROCHLORIDE 10 MG/1
TABLET ORAL
Qty: 180 TABLET | Refills: 0 | Status: SHIPPED | OUTPATIENT
Start: 2018-05-29 | End: 2018-08-07 | Stop reason: SDUPTHER

## 2018-05-29 RX ORDER — DONEPEZIL HYDROCHLORIDE 10 MG/1
TABLET, FILM COATED ORAL
Qty: 90 TABLET | Refills: 0 | Status: SHIPPED | OUTPATIENT
Start: 2018-05-29 | End: 2018-07-17 | Stop reason: SDUPTHER

## 2018-06-27 ENCOUNTER — CLINICAL SUPPORT NO REQUIREMENTS (OUTPATIENT)
Dept: CARDIOLOGY | Facility: CLINIC | Age: 73
End: 2018-06-27

## 2018-06-27 DIAGNOSIS — R00.1 BRADYCARDIA: ICD-10-CM

## 2018-06-27 PROCEDURE — 93296 REM INTERROG EVL PM/IDS: CPT | Performed by: INTERNAL MEDICINE

## 2018-06-27 PROCEDURE — 93294 REM INTERROG EVL PM/LDLS PM: CPT | Performed by: INTERNAL MEDICINE

## 2018-06-29 ENCOUNTER — LAB (OUTPATIENT)
Dept: INTERNAL MEDICINE | Facility: CLINIC | Age: 73
End: 2018-06-29

## 2018-06-29 DIAGNOSIS — E03.9 HYPOTHYROIDISM, UNSPECIFIED TYPE: ICD-10-CM

## 2018-06-29 LAB
T4 FREE SERPL-MCNC: 1.16 NG/DL (ref 0.89–1.76)
TSH SERPL DL<=0.05 MIU/L-ACNC: 0.33 MIU/ML (ref 0.35–5.35)

## 2018-06-29 PROCEDURE — 84443 ASSAY THYROID STIM HORMONE: CPT | Performed by: NURSE PRACTITIONER

## 2018-06-29 PROCEDURE — 84439 ASSAY OF FREE THYROXINE: CPT | Performed by: NURSE PRACTITIONER

## 2018-06-29 PROCEDURE — 36415 COLL VENOUS BLD VENIPUNCTURE: CPT | Performed by: NURSE PRACTITIONER

## 2018-07-13 ENCOUNTER — APPOINTMENT (OUTPATIENT)
Dept: MAMMOGRAPHY | Facility: HOSPITAL | Age: 73
End: 2018-07-13

## 2018-07-16 DIAGNOSIS — E11.00 TYPE 2 DIABETES MELLITUS WITH HYPEROSMOLARITY WITHOUT COMA, WITHOUT LONG-TERM CURRENT USE OF INSULIN (HCC): ICD-10-CM

## 2018-07-17 RX ORDER — DONEPEZIL HYDROCHLORIDE 10 MG/1
TABLET, FILM COATED ORAL
Qty: 90 TABLET | Refills: 0 | Status: SHIPPED | OUTPATIENT
Start: 2018-07-17 | End: 2018-09-26 | Stop reason: SDUPTHER

## 2018-07-20 ENCOUNTER — OFFICE VISIT (OUTPATIENT)
Dept: INTERNAL MEDICINE | Facility: CLINIC | Age: 73
End: 2018-07-20

## 2018-07-20 VITALS
HEIGHT: 64 IN | OXYGEN SATURATION: 94 % | BODY MASS INDEX: 35.68 KG/M2 | WEIGHT: 209 LBS | SYSTOLIC BLOOD PRESSURE: 126 MMHG | HEART RATE: 64 BPM | DIASTOLIC BLOOD PRESSURE: 60 MMHG

## 2018-07-20 DIAGNOSIS — E03.9 HYPOTHYROIDISM, ADULT: ICD-10-CM

## 2018-07-20 DIAGNOSIS — R39.15 URINARY URGENCY: ICD-10-CM

## 2018-07-20 DIAGNOSIS — E03.9 HYPOTHYROIDISM, UNSPECIFIED TYPE: Primary | ICD-10-CM

## 2018-07-20 DIAGNOSIS — N18.4 ANEMIA IN STAGE 4 CHRONIC KIDNEY DISEASE (HCC): ICD-10-CM

## 2018-07-20 DIAGNOSIS — E78.2 MIXED HYPERLIPIDEMIA: ICD-10-CM

## 2018-07-20 DIAGNOSIS — M19.90 ARTHRITIS: ICD-10-CM

## 2018-07-20 DIAGNOSIS — R32 URINARY INCONTINENCE, UNSPECIFIED TYPE: ICD-10-CM

## 2018-07-20 DIAGNOSIS — D63.1 ANEMIA IN STAGE 4 CHRONIC KIDNEY DISEASE (HCC): ICD-10-CM

## 2018-07-20 DIAGNOSIS — F03.90 DEMENTIA WITHOUT BEHAVIORAL DISTURBANCE, UNSPECIFIED DEMENTIA TYPE: ICD-10-CM

## 2018-07-20 DIAGNOSIS — M85.80 OSTEOPENIA, UNSPECIFIED LOCATION: ICD-10-CM

## 2018-07-20 DIAGNOSIS — Z95.0 PACEMAKER: ICD-10-CM

## 2018-07-20 DIAGNOSIS — I10 ESSENTIAL HYPERTENSION: ICD-10-CM

## 2018-07-20 DIAGNOSIS — N18.30 CHRONIC KIDNEY DISEASE, STAGE III (MODERATE) (HCC): ICD-10-CM

## 2018-07-20 DIAGNOSIS — Z79.4 TYPE 2 DIABETES MELLITUS WITH HYPEROSMOLARITY WITHOUT COMA, WITH LONG-TERM CURRENT USE OF INSULIN (HCC): ICD-10-CM

## 2018-07-20 DIAGNOSIS — E11.00 TYPE 2 DIABETES MELLITUS WITH HYPEROSMOLARITY WITHOUT COMA, WITH LONG-TERM CURRENT USE OF INSULIN (HCC): ICD-10-CM

## 2018-07-20 DIAGNOSIS — Z78.0 POST-MENOPAUSAL: ICD-10-CM

## 2018-07-20 PROCEDURE — 99214 OFFICE O/P EST MOD 30 MIN: CPT | Performed by: NURSE PRACTITIONER

## 2018-07-20 RX ORDER — IRON FUM,PS CMP/VIT C/NIACIN 125-40-3MG
1 CAPSULE ORAL DAILY
Qty: 30 CAPSULE | Refills: 5 | Status: SHIPPED | OUTPATIENT
Start: 2018-07-20 | End: 2018-07-20 | Stop reason: SDUPTHER

## 2018-07-20 RX ORDER — AMLODIPINE BESYLATE 2.5 MG/1
2.5 TABLET ORAL DAILY
Qty: 90 TABLET | Refills: 1 | Status: SHIPPED | OUTPATIENT
Start: 2018-07-20 | End: 2018-10-26 | Stop reason: SDUPTHER

## 2018-07-20 RX ORDER — IRON FUM,PS CMP/VIT C/NIACIN 125-40-3MG
1 CAPSULE ORAL DAILY
Qty: 30 CAPSULE | Refills: 5 | Status: SHIPPED | OUTPATIENT
Start: 2018-07-20 | End: 2018-08-03

## 2018-07-20 RX ORDER — IBANDRONATE SODIUM 150 MG/1
150 TABLET, FILM COATED ORAL
Qty: 3 TABLET | Refills: 3 | Status: SHIPPED | OUTPATIENT
Start: 2018-07-20 | End: 2019-09-27 | Stop reason: SDUPTHER

## 2018-07-20 RX ORDER — LEVOTHYROXINE SODIUM 112 UG/1
TABLET ORAL
Qty: 90 TABLET | Refills: 1 | Status: SHIPPED | OUTPATIENT
Start: 2018-07-20 | End: 2018-11-27 | Stop reason: SDUPTHER

## 2018-07-20 RX ORDER — ATORVASTATIN CALCIUM 20 MG/1
20 TABLET, FILM COATED ORAL DAILY
Qty: 90 TABLET | Refills: 1 | Status: SHIPPED | OUTPATIENT
Start: 2018-07-20 | End: 2018-10-26 | Stop reason: SDUPTHER

## 2018-07-20 NOTE — PROGRESS NOTES
"Subjective:    Faye Rod is a 72 y.o. female.     Chief Complaint   Patient presents with   •    • Diabetes   • Hypertension   • Dementia   • Chronic Kidney Disease       History of Present Illness     Patient present with daughter. Patient has had dementia since 2016 and daughter reports stable at this time without behavior issue identified. Currently managed with Namenda and Aricept. Patient has neurology appointment scheduled 8/3/2018.     Patient has Chronic Kidney Disease-Stage 3B. Nephrology appointment in the next few weeks and sees nephrology every 6 months.     Patient complains of bilateral arm pain/arthritis. It is preventing full range of motion. Pain occurs about every day, but \"comes and goes\". Treated with Tylenol and unable to take NSAID due to CKD. Use/movement worsens pain.    Patient has chronic hypothyroidism. Most recent TSH on 6/29/2018 and 5/10/2018 low and patient taking 112 mcg 5 days/week.     Patient has chronic hypertension and history of diastolic heart failure with pacemaker and non-rheumatic mitral regurgitation.. Managed well with current medication. Cardiology appointment scheduled 10/5/2018.    Patient has had urinary urgency and incontinent episodes for about one year. Urinary issues limit her ability to leave home.    Patient has Type 2 DM. Daughter reports glucose checks have been controlled. Diabetic eye exam scheduled in August. A1C in 3/16/2018 7.3.     Patient has chronic hyperlipidemia. Managed well with current medication. Most recent lipid profile 5/10/2018 within normal limits.      Current Outpatient Prescriptions:   •  acetaminophen (TYLENOL) 650 MG 8 hr tablet, Take 650 mg by mouth 2 (Two) Times a Day As Needed., Disp: , Rfl:   •  amLODIPine (NORVASC) 2.5 MG tablet, Take 1 tablet by mouth Daily., Disp: 90 tablet, Rfl: 1  •  aspirin 81 MG EC tablet, Take 81 mg by mouth Daily., Disp: , Rfl:   •  atorvastatin (LIPITOR) 20 MG tablet, Take 1 tablet by mouth Daily., " Disp: 90 tablet, Rfl: 1  •  cholecalciferol (VITAMIN D3) 1000 UNITS tablet, Take 1,000 Units by mouth Daily., Disp: , Rfl:   •  donepezil (ARICEPT) 10 MG tablet, TAKE 1 TABLET BY MOUTH  DAILY, Disp: 90 tablet, Rfl: 0  •  escitalopram (LEXAPRO) 20 MG tablet, Take 1 tablet by mouth Daily., Disp: 90 tablet, Rfl: 3  •  Fe Fum-FePoly-Vit C-Vit B3 (INTEGRA) 62.5-62.5-40-3 MG capsule, Take 1 capsule by mouth Daily., Disp: 30 capsule, Rfl: 5  •  glucose blood (MADDY CONTOUR TEST) test strip, 1-2 times daily Use as instructed, Disp: 50 each, Rfl: 12  •  ibandronate (BONIVA) 150 MG tablet, Take 1 tablet by mouth Every 30 (Thirty) Days., Disp: 3 tablet, Rfl: 3  •  Insulin Glargine (LANTUS SOLOSTAR) 100 UNIT/ML injection pen, 30 units nightly, Disp: 3 mL, Rfl: 3  •  Insulin Pen Needle (PX MINI PEN NEEDLES) 31G X 5 MM misc, Use with Lantus Insulin, Disp: 90 each, Rfl: 3  •  levothyroxine (SYNTHROID, LEVOTHROID) 112 MCG tablet, Take one tablet 5 days per week, Disp: 90 tablet, Rfl: 1  •  Loratadine 10 MG capsule, Take  by mouth Daily., Disp: , Rfl:   •  memantine (NAMENDA) 10 MG tablet, TAKE 1 TABLET BY MOUTH TWO  TIMES DAILY, Disp: 180 tablet, Rfl: 0  •  Mirabegron ER (MYRBETRIQ) 25 MG tablet sustained-release 24 hour 24 hr tablet, Take 1 tablet by mouth Daily., Disp: 30 tablet, Rfl: 1     The following portions of the patient's history were reviewed and updated as appropriate: allergies, current medications, past family history, past medical history, past social history, past surgical history and problem list.    Review of Systems   Constitutional: Negative for chills, fatigue and fever.   HENT: Negative for congestion, dental problem, mouth sores, nosebleeds, postnasal drip, rhinorrhea, sinus pain, sinus pressure, sneezing and sore throat.    Eyes: Negative for pain, discharge, redness and itching.   Respiratory: Negative for cough, shortness of breath and wheezing.    Cardiovascular: Negative for chest pain, palpitations and  "leg swelling.        HTN, Hyperlipidemia, Pacemaker. No KRUEGER, orthopnea, PND, or claudication.   Gastrointestinal: Negative for abdominal distention, abdominal pain, blood in stool, diarrhea, nausea and vomiting.   Endocrine: Negative for cold intolerance, heat intolerance, polydipsia and polyuria.        T2DM, hypothyroidism   Genitourinary: Positive for urgency. Negative for difficulty urinating, dysuria, frequency and hematuria.        CKD, Incontinence   Musculoskeletal: Positive for arthralgias. Negative for gait problem, joint swelling and myalgias.        Bilateral arm pain   Skin: Negative for color change, rash and wound.   Neurological: Negative for dizziness, syncope, weakness, light-headedness, numbness and headaches.   Hematological: Negative for adenopathy. Does not bruise/bleed easily.   Psychiatric/Behavioral:        Dementia       Objective:    /60   Pulse 64   Ht 162.6 cm (64.02\")   Wt 94.8 kg (209 lb)   SpO2 94%   BMI 35.86 kg/m²     Physical Exam   Constitutional: She appears well-developed and well-nourished. She is cooperative. She is easily aroused.  Non-toxic appearance. She does not have a sickly appearance. She does not appear ill. No distress.   HENT:   Head: Normocephalic and atraumatic. Head is without abrasion. Hair is normal.   Right Ear: Tympanic membrane, external ear and ear canal normal. No foreign bodies. Tympanic membrane is not perforated and not erythematous.   Left Ear: Tympanic membrane, external ear and ear canal normal. No foreign bodies. Tympanic membrane is not perforated and not erythematous.   Nose: Nose normal. No mucosal edema, rhinorrhea or septal deviation. No epistaxis.  No foreign bodies.   Mouth/Throat: Oropharynx is clear and moist. No oral lesions. Normal dentition.   Eyes: Pupils are equal, round, and reactive to light. Lids are normal. Right eye exhibits no discharge. Left eye exhibits no discharge. Right conjunctiva is not injected. Left " conjunctiva is not injected. No scleral icterus.   Neck: Normal range of motion and full passive range of motion without pain. Neck supple. No edema and normal range of motion present. No thyroid mass and no thyromegaly present.   Cardiovascular: Normal rate and regular rhythm.    Murmur heard.  Pulmonary/Chest: Effort normal and breath sounds normal. No accessory muscle usage. She has no rhonchi. She has no rales. She exhibits no tenderness.   Abdominal: Soft. Bowel sounds are normal. She exhibits no distension. There is no hepatosplenomegaly or hepatomegaly. There is no tenderness. There is no CVA tenderness.   Musculoskeletal: She exhibits no edema, tenderness or deformity.   Decreased range of motion in bilateral arms   Lymphadenopathy:     She has no cervical adenopathy.   Neurological: She is alert and easily aroused. Coordination normal.   Oriented to self    Skin: Skin is warm, dry and intact. No abrasion and no rash noted. She is not diaphoretic. No cyanosis or erythema. Nails show no clubbing.   Psychiatric: She has a normal mood and affect. Her speech is normal and behavior is normal.   Conversant/pleasant today   Nursing note and vitals reviewed.      Assessment/Plan:    Faye was seen today for diabetes, hypertension, alzheimer's disease and chronic kidney disease.    Diagnoses and all orders for this visit:  Dementia  Continue Namenda and Aricept. Maintain neurology appointment. Continue management/care by daughter.  Hypothyroidism, unspecified type  -     T4, Free; Future  -     TSH; Future  Continue levothyroxine  Labs to be performed approximately 6 weeks from last labs. Daughter wishes to have labs performed externally along with nephrology labs and results be faxed to this clinic.  Chronic kidney disease, stage III (moderate)  -     Insulin Glargine (LANTUS SOLOSTAR) 100 UNIT/ML injection pen; 30 units nightly  Type 2 diabetes mellitus with hyperosmolarity without coma, with long-term current  use of insulin (CMS/[facility])  -     Insulin Glargine (LANTUS SOLOSTAR) 100 UNIT/ML injection pen; 30 units nightly  Gave diabetic eye exam form  Repeat A1C at follow up appointment  Osteopenia, unspecified location  -     ibandronate (BONIVA) 150 MG tablet; Take 1 tablet by mouth Every 30 (Thirty) Days.    Anemia in stage 3 chronic kidney disease (CMS/[facility])  -     Fe Fum-FePoly-Vit C-Vit B3 (INTEGRA) 62.5-62.5-40-3 MG capsule; Take 1 capsule by mouth Daily.    Hypertension  -     amLODIPine (NORVASC) 2.5 MG tablet; Take 1 tablet by mouth Daily.  Urinary urgency/incontinence  -     Mirabegron ER (MYRBETRIQ) 25 MG tablet sustained-release 24 hour 24 hr tablet; Take 1 tablet by mouth Daily.    Hyperlipidemia  Continue current medication    Arthritis  Continue Tylenol as needed. Attempt to maintain current range of motion and activity  Discussed to update Shingrix vaccine at pharmacy  Return in about 3 months (around 10/20/2018), or if symptoms worsen or fail to improve, schedule wellness .

## 2018-07-21 PROBLEM — F03.90 DEMENTIA WITHOUT BEHAVIORAL DISTURBANCE (HCC): Status: ACTIVE | Noted: 2017-03-17

## 2018-08-03 ENCOUNTER — OFFICE VISIT (OUTPATIENT)
Dept: NEUROLOGY | Facility: CLINIC | Age: 73
End: 2018-08-03

## 2018-08-03 VITALS
HEIGHT: 62 IN | BODY MASS INDEX: 38.64 KG/M2 | SYSTOLIC BLOOD PRESSURE: 132 MMHG | DIASTOLIC BLOOD PRESSURE: 80 MMHG | WEIGHT: 210 LBS

## 2018-08-03 DIAGNOSIS — F03.90 DEMENTIA WITHOUT BEHAVIORAL DISTURBANCE, UNSPECIFIED DEMENTIA TYPE: Primary | ICD-10-CM

## 2018-08-03 PROCEDURE — 99213 OFFICE O/P EST LOW 20 MIN: CPT | Performed by: PSYCHIATRY & NEUROLOGY

## 2018-08-03 NOTE — PROGRESS NOTES
"Subjective     Chief Complaint: memory loss      History of Present Illness   Faye Rod is a 72 y.o. female who returns to clinic today with a history of Dementia . Her family  has noted symptoms since approximately 2015 marked initially by forgetfulness. This has varied over time, with significant worsening noted in 9/16 when hospitalized with renal insufficiency. Additional associated symptoms have included impairments in essentially all spheres of cognition. She has had associated  symptoms of hallucinations previously, though now only occasional delusions. No modifying factors or circumstances have been identified. She is currently residing at home with her daughter in [place].       She has also noted a resting tremor in her left hand since 2016. This has somewhat worsened over time. She notes associated balance impairment and a shuffling gait as well as bradykinesia.     Prior evaluation has included screening blood work and a head CT within the last year which were notable for only mild increases in BUN and creatinine. She is currently taking donepezil and memantine.    Since her last visit on 2/2/18, she and her family deny any significant changes in her interval history.       I have reviewed and confirmed the past family, social and medical history as accurate on 8/3/18.     Review of Systems   Constitutional: Negative.        Objective     /80   Ht 157.5 cm (62\")   Wt 95.3 kg (210 lb)   BMI 38.41 kg/m²     General appearance today is normal.     Physical Exam   Neurological: She has normal strength.   Psychiatric: Her speech is normal.        Neurologic Exam     Mental Status   Oriented to person.   Disoriented to place.   Disoriented to time.   Registration: recalls 3 of 3 objects. Recall at 5 minutes: recalls 1 of 3 objects. Follows 3 step commands.   Attention: normal.   Speech: speech is normal   Level of consciousness: alert  Able to name object. Able to read. Able to repeat. " Able to write. Normal comprehension.     Cranial Nerves   Cranial nerves II through XII intact.     Motor Exam   Muscle bulk: normal  Overall muscle tone: normal    Strength   Strength 5/5 throughout.     Sensory Exam   Light touch normal.         Results  MMSE=25       Assessment/Plan   Faye was seen today for dementia with behavioral disturbance, unspecified dementia t.    Diagnoses and all orders for this visit:    Dementia without behavioral disturbance, unspecified dementia type          Discussion/Summary   Faye Rod returns to clinic today with a history of Dementia . Her history and examination, including bedside cognitive testing remain consistent with a mild dementia. I again reviewed her current status and treatment options. After discussing potential treatment options, it was elected to continue on  donepezil and memantine unchanged. She will then follow up in 6 months, or sooner if needed.       I spent 10 minutes out of 15 minutes face to face with the patient and family and discussing current status, treatment options and management.    As part of this visit I obtained additional history from the family which is incorporated in the HPI.      Joaquín Hamm MD

## 2018-08-07 RX ORDER — MEMANTINE HYDROCHLORIDE 10 MG/1
TABLET ORAL
Qty: 180 TABLET | Refills: 1 | Status: SHIPPED | OUTPATIENT
Start: 2018-08-07 | End: 2019-03-31 | Stop reason: SDUPTHER

## 2018-08-10 ENCOUNTER — APPOINTMENT (OUTPATIENT)
Dept: MAMMOGRAPHY | Facility: HOSPITAL | Age: 73
End: 2018-08-10

## 2018-08-11 LAB
T4 FREE SERPL-MCNC: 1.22 NG/DL (ref 0.82–1.77)
TSH SERPL DL<=0.005 MIU/L-ACNC: 1.09 UIU/ML (ref 0.45–4.5)

## 2018-08-13 ENCOUNTER — TELEPHONE (OUTPATIENT)
Dept: INTERNAL MEDICINE | Facility: CLINIC | Age: 73
End: 2018-08-13

## 2018-08-13 NOTE — TELEPHONE ENCOUNTER
----- Message from SHALONDA Terry sent at 8/13/2018 11:15 AM EDT -----  TSH and T4 normal-continue current dosage.

## 2018-09-17 PROBLEM — H33.313 RETINAL TEAR OF BOTH EYES: Status: ACTIVE | Noted: 2018-09-17

## 2018-09-17 PROBLEM — E11.3293 MILD NONPROLIFERATIVE DIABETIC RETINOPATHY OF BOTH EYES WITHOUT MACULAR EDEMA ASSOCIATED WITH TYPE 2 DIABETES MELLITUS (HCC): Status: ACTIVE | Noted: 2018-09-17

## 2018-09-26 RX ORDER — DONEPEZIL HYDROCHLORIDE 10 MG/1
TABLET, FILM COATED ORAL
Qty: 90 TABLET | Refills: 1 | Status: SHIPPED | OUTPATIENT
Start: 2018-09-26 | End: 2019-02-08 | Stop reason: SDUPTHER

## 2018-10-05 ENCOUNTER — OFFICE VISIT (OUTPATIENT)
Dept: CARDIOLOGY | Facility: CLINIC | Age: 73
End: 2018-10-05

## 2018-10-05 VITALS
BODY MASS INDEX: 36.36 KG/M2 | DIASTOLIC BLOOD PRESSURE: 78 MMHG | WEIGHT: 205.2 LBS | HEIGHT: 63 IN | SYSTOLIC BLOOD PRESSURE: 128 MMHG | HEART RATE: 68 BPM

## 2018-10-05 DIAGNOSIS — E78.2 MIXED HYPERLIPIDEMIA: ICD-10-CM

## 2018-10-05 DIAGNOSIS — I44.30 AV BLOCK: Primary | ICD-10-CM

## 2018-10-05 DIAGNOSIS — I10 ESSENTIAL HYPERTENSION: ICD-10-CM

## 2018-10-05 PROCEDURE — 99213 OFFICE O/P EST LOW 20 MIN: CPT | Performed by: INTERNAL MEDICINE

## 2018-10-05 PROCEDURE — 93280 PM DEVICE PROGR EVAL DUAL: CPT | Performed by: INTERNAL MEDICINE

## 2018-10-05 RX ORDER — FERROUS FUMARATE AND POLYSACCHRIDE IRON COMPLEX AND FOLIC ACID 191.2; 135.9; 1; 40; 3 MG/1; MG/1; MG/1; MG/1; MG/1
CAPSULE ORAL DAILY
COMMUNITY
End: 2019-08-23 | Stop reason: SDUPTHER

## 2018-10-05 NOTE — PROGRESS NOTES
[facility]  Office Progress Note  Faye Rod  1945  [phone]      Visit Date: 10/5/2018    PCP: Telly oCntreras APRN  [address] [facility] [address]  [place] [id number]    IDENTIFICATION: A 73 y.o. female lives with daughter    Chief Complaint   Patient presents with   • Mixed hyperlipidemia       PROBLEM LIST:   1. Diastolic Heart failure  1. Echocardiogram, [place], 2014.  Impaired relaxation.  Mild AI, TR with RVSP 38 mmHg.  Trace MR  2. Echocardiogram, [facility], 2018.  EF 50-55%, mild TR, moderate AI.    2. Arrhythmia ?  1. [facility] PPM Model # [id number]  3. A flutter  1. Documented July 21, 2017 for 21 minutes, no other episodes  4. Hypertension  5. Hyperlipidemia  1. 5/18 116/107/40/56  6. Diabetes Mellitus  1. 9/8/17 A1c 8.1  2. 3/18 7.3  7. CKD Stage III; followed by Dr Casarez  8. Dementia    Allergies  No Known Allergies    Current Medications    Current Outpatient Prescriptions:   •  acetaminophen (TYLENOL) 650 MG 8 hr tablet, Take 650 mg by mouth 2 (Two) Times a Day As Needed., Disp: , Rfl:   •  amLODIPine (NORVASC) 2.5 MG tablet, Take 1 tablet by mouth Daily., Disp: 90 tablet, Rfl: 1  •  aspirin 81 MG EC tablet, Take 81 mg by mouth Daily., Disp: , Rfl:   •  atorvastatin (LIPITOR) 20 MG tablet, Take 1 tablet by mouth Daily., Disp: 90 tablet, Rfl: 1  •  cholecalciferol (VITAMIN D3) 1000 UNITS tablet, Take 1,000 Units by mouth Daily., Disp: , Rfl:   •  donepezil (ARICEPT) 10 MG tablet, TAKE 1 TABLET BY MOUTH  DAILY, Disp: 90 tablet, Rfl: 1  •  escitalopram (LEXAPRO) 20 MG tablet, Take 1 tablet by mouth Daily., Disp: 90 tablet, Rfl: 3  •  Fe Fum-FePoly-FA-Vit C-Vit B3 (INTEGRA F) 125-1 MG capsule, Take  by mouth Daily., Disp: , Rfl:   •  glucose blood (MADDY CONTOUR TEST) test strip, 1-2 times daily Use as instructed, Disp: 50 each, Rfl: 12  •  ibandronate (BONIVA) 150 MG tablet, Take 1 tablet by mouth Every 30 (Thirty) Days., Disp: 3 tablet, Rfl: 3  •  Insulin  "Glargine (LANTUS SOLOSTAR) 100 UNIT/ML injection pen, 30 units nightly, Disp: 3 mL, Rfl: 3  •  Insulin Pen Needle (PX MINI PEN NEEDLES) 31G X 5 MM misc, Use with Lantus Insulin, Disp: 90 each, Rfl: 3  •  levothyroxine (SYNTHROID, LEVOTHROID) 112 MCG tablet, Take one tablet 5 days per week, Disp: 90 tablet, Rfl: 1  •  Loratadine 10 MG capsule, Take  by mouth Daily., Disp: , Rfl:   •  memantine (NAMENDA) 10 MG tablet, TAKE 1 TABLET BY MOUTH TWO  TIMES DAILY, Disp: 180 tablet, Rfl: 1  •  Mirabegron ER (MYRBETRIQ) 25 MG tablet sustained-release 24 hour 24 hr tablet, Take 1 tablet by mouth Daily., Disp: 30 tablet, Rfl: 1      History of Present Illness   Pt here for follow up.    No new complaints, however comments her quality of life is poor.  She has chronic pain and poor dietary compliance.    Pt denies any new chest pain, dyspnea, dyspnea on exertion, orthopnea, PND, palpitations, lower extremity edema, or claudication.    ROS:  All systems have been reviewed and are negative with the exception of those mentioned in the HPI.    OBJECTIVE:  Vitals:    10/05/18 1456   BP: 128/78   BP Location: Right arm   Patient Position: Sitting   Pulse: 68   Weight: 93.1 kg (205 lb 3.2 oz)   Height: 158.8 cm (62.5\")     Physical Exam   Constitutional: She is oriented to person, place, and time. She appears well-developed and well-nourished. No distress.   obese   Cardiovascular: Normal rate, regular rhythm and intact distal pulses.    Murmur heard.  High-pitched blowing holosystolic murmur is present  at the apex  Pulses:       Radial pulses are 2+ on the right side, and 2+ on the left side.        Dorsalis pedis pulses are 2+ on the right side, and 2+ on the left side.        Posterior tibial pulses are 2+ on the right side, and 2+ on the left side.   Pulmonary/Chest: Effort normal and breath sounds normal. She has no wheezes. She has no rales.   Abdominal: Soft. Bowel sounds are normal. There is no tenderness. There is no " guarding.   Musculoskeletal: She exhibits no edema or tenderness.   Neurological: She is alert and oriented to person, place, and time.   Skin: Skin is warm and dry. No rash noted.   Psychiatric: She has a normal mood and affect.   Nursing note and vitals reviewed.      Diagnostic Data:  Procedures  Device Interrogation:    [facility] PPM  P-wave is 1 mV with threshold of 0.5 V at 0.4 ms and impedance at 460, R-wave is greater than 12 mV with threshold of 0.6 V at 0.4 ms and impedance 560.  Battery voltage is 2.92 volts.    MS X 2 longest 1.5 hrs    ASSESSMENT:   Diagnosis Plan   1. AV block     2. Essential hypertension     3. Mixed hyperlipidemia         PLAN:  1. Acceptable device interrogation today, patient will follow-up in 6 months for recheck with [facility]   2. Acceptable today following readings at home, continue antihypertensives   3. Acceptable lipid panel, on statin therapy for cardiac risk reduction in a diabetic  4.  Moderate AI- NL LVEF . Not candidate for valve surgery     Telly Contreras APRN, thank you for referring Ms. Rod for evaluation.  I have forwarded my electronically generated recommendations to you for review.  Please do not hesitate to call with any questions.    Scribed for Faisal Vang MD by Marjorie Martinez PA-C. 10/5/2018  4:04 PM   I, Faisal Vang MD, personally performed the services described in this documentation as scribed by the above named individual in my presence, and it is both accurate and complete.  10/5/2018  4:04 PM    Faisal Vang MD, [facility]

## 2018-10-26 ENCOUNTER — OFFICE VISIT (OUTPATIENT)
Dept: INTERNAL MEDICINE | Facility: CLINIC | Age: 73
End: 2018-10-26

## 2018-10-26 VITALS
BODY MASS INDEX: 36.39 KG/M2 | HEIGHT: 63 IN | WEIGHT: 205.4 LBS | TEMPERATURE: 98.9 F | DIASTOLIC BLOOD PRESSURE: 68 MMHG | HEART RATE: 71 BPM | RESPIRATION RATE: 18 BRPM | SYSTOLIC BLOOD PRESSURE: 120 MMHG

## 2018-10-26 DIAGNOSIS — N18.30 CHRONIC KIDNEY DISEASE, STAGE III (MODERATE) (HCC): ICD-10-CM

## 2018-10-26 DIAGNOSIS — E66.01 MORBIDLY OBESE (HCC): ICD-10-CM

## 2018-10-26 DIAGNOSIS — E11.00 TYPE 2 DIABETES MELLITUS WITH HYPEROSMOLARITY WITHOUT COMA, WITH LONG-TERM CURRENT USE OF INSULIN (HCC): ICD-10-CM

## 2018-10-26 DIAGNOSIS — Z78.0 POST-MENOPAUSAL: ICD-10-CM

## 2018-10-26 DIAGNOSIS — Z79.4 TYPE 2 DIABETES MELLITUS WITH HYPEROSMOLARITY WITHOUT COMA, WITH LONG-TERM CURRENT USE OF INSULIN (HCC): Primary | ICD-10-CM

## 2018-10-26 DIAGNOSIS — Z79.4 TYPE 2 DIABETES MELLITUS WITH HYPEROSMOLARITY WITHOUT COMA, WITH LONG-TERM CURRENT USE OF INSULIN (HCC): ICD-10-CM

## 2018-10-26 DIAGNOSIS — Z23 IMMUNIZATION DUE: ICD-10-CM

## 2018-10-26 DIAGNOSIS — F03.90 DEMENTIA WITHOUT BEHAVIORAL DISTURBANCE, UNSPECIFIED DEMENTIA TYPE: ICD-10-CM

## 2018-10-26 DIAGNOSIS — I10 ESSENTIAL HYPERTENSION: ICD-10-CM

## 2018-10-26 DIAGNOSIS — E11.00 TYPE 2 DIABETES MELLITUS WITH HYPEROSMOLARITY WITHOUT COMA, WITH LONG-TERM CURRENT USE OF INSULIN (HCC): Primary | ICD-10-CM

## 2018-10-26 DIAGNOSIS — E03.9 HYPOTHYROIDISM, ADULT: ICD-10-CM

## 2018-10-26 DIAGNOSIS — E78.2 MIXED HYPERLIPIDEMIA: ICD-10-CM

## 2018-10-26 LAB
BILIRUB BLD-MCNC: ABNORMAL MG/DL
CLARITY, POC: CLEAR
COLOR UR: YELLOW
EXPIRATION DATE: ABNORMAL
EXPIRATION DATE: NORMAL
GLUCOSE UR STRIP-MCNC: NEGATIVE MG/DL
HBA1C MFR BLD: 7.5 %
KETONES UR QL: NEGATIVE
LEUKOCYTE EST, POC: NEGATIVE
Lab: ABNORMAL
Lab: NORMAL
NITRITE UR-MCNC: NEGATIVE MG/ML
PH UR: 5 [PH] (ref 5–8)
PROT UR STRIP-MCNC: NEGATIVE MG/DL
RBC # UR STRIP: NEGATIVE /UL
SP GR UR: 1.02 (ref 1–1.03)
T4 FREE SERPL-MCNC: 1.2 NG/DL (ref 0.89–1.76)
TSH SERPL DL<=0.05 MIU/L-ACNC: 1.46 MIU/ML (ref 0.35–5.35)
UROBILINOGEN UR QL: NORMAL

## 2018-10-26 PROCEDURE — 90662 IIV NO PRSV INCREASED AG IM: CPT | Performed by: NURSE PRACTITIONER

## 2018-10-26 PROCEDURE — 36415 COLL VENOUS BLD VENIPUNCTURE: CPT | Performed by: NURSE PRACTITIONER

## 2018-10-26 PROCEDURE — 83036 HEMOGLOBIN GLYCOSYLATED A1C: CPT | Performed by: NURSE PRACTITIONER

## 2018-10-26 PROCEDURE — 84443 ASSAY THYROID STIM HORMONE: CPT | Performed by: NURSE PRACTITIONER

## 2018-10-26 PROCEDURE — 84439 ASSAY OF FREE THYROXINE: CPT | Performed by: NURSE PRACTITIONER

## 2018-10-26 PROCEDURE — 81003 URINALYSIS AUTO W/O SCOPE: CPT | Performed by: NURSE PRACTITIONER

## 2018-10-26 PROCEDURE — 99214 OFFICE O/P EST MOD 30 MIN: CPT | Performed by: NURSE PRACTITIONER

## 2018-10-26 PROCEDURE — G0008 ADMIN INFLUENZA VIRUS VAC: HCPCS | Performed by: NURSE PRACTITIONER

## 2018-10-26 NOTE — PROGRESS NOTES
"Subjective:    Faye Rod is a 73 y.o. female.     Chief Complaint   Patient presents with   • Follow-up     3M follow up       History of Present Illness   Patient present with daughter.    Patient and daughter report patient had fall 8/2018. She slipped getting out of shower. No injury identified. Daughter placed grab bars in shower and reinforces with patient to not step on bare tile floor and use emergency call system.    Patient has had dementia since 2016 and daughter reports stable at this time without behavior issue identified. Currently managed with Namenda and Aricept. Patient has neurology follow.      Patient has Chronic Kidney Disease-Stage 3. Stable per daughter. Nephrology follow every 6 months.      Patient complains of bilateral arm pain/arthritis. It is preventing full range of motion. Pain occurs about every day, but \"comes and goes\". Treated with Tylenol and unable to take NSAID due to CKD. Use/movement worsens pain.     Patient has chronic hypothyroidism for years of duration. Patient taking 112 mcg 5 days/week. Will obtain labs today.     Patient has chronic hypertension and history of diastolic heart failure with pacemaker and non-rheumatic mitral regurgitation.. Managed well with current medication. Routine cardiology follow. No chest pain or shortness of air.     Patient has urinary urgency and incontinent episodes for over one year. Urinary issues limit her ability to leave home. Managed with Myrbetriq     Patient has Type 2 DM for years of duration. Daughter reports glucose checks every 2-3 days with ranges of . No hypoglycemia. Worsened with morbid obesity and inactivity.    Patient has chronic hyperlipidemia. Managed well with current medication-Lipitor. Tolerating without issue. Most recent lipid profile 5/10/2018 within normal limits.    Current Outpatient Prescriptions:   •  acetaminophen (TYLENOL) 650 MG 8 hr tablet, Take 650 mg by mouth 2 (Two) Times a Day As Needed., " Disp: , Rfl:   •  amLODIPine (NORVASC) 2.5 MG tablet, Take 1 tablet by mouth Daily., Disp: 90 tablet, Rfl: 1  •  aspirin 81 MG EC tablet, Take 81 mg by mouth Daily., Disp: , Rfl:   •  atorvastatin (LIPITOR) 20 MG tablet, Take 1 tablet by mouth Daily., Disp: 90 tablet, Rfl: 1  •  cholecalciferol (VITAMIN D3) 1000 UNITS tablet, Take 1,000 Units by mouth Daily., Disp: , Rfl:   •  donepezil (ARICEPT) 10 MG tablet, TAKE 1 TABLET BY MOUTH  DAILY, Disp: 90 tablet, Rfl: 1  •  escitalopram (LEXAPRO) 20 MG tablet, Take 1 tablet by mouth Daily., Disp: 90 tablet, Rfl: 3  •  Fe Fum-FePoly-FA-Vit C-Vit B3 (INTEGRA F) 125-1 MG capsule, Take  by mouth Daily., Disp: , Rfl:   •  glucose blood (MADDY CONTOUR TEST) test strip, 1-2 times daily Use as instructed, Disp: 50 each, Rfl: 12  •  ibandronate (BONIVA) 150 MG tablet, Take 1 tablet by mouth Every 30 (Thirty) Days., Disp: 3 tablet, Rfl: 3  •  Insulin Glargine (LANTUS SOLOSTAR) 100 UNIT/ML injection pen, 30 units nightly, Disp: 3 mL, Rfl: 3  •  Insulin Pen Needle (PX MINI PEN NEEDLES) 31G X 5 MM misc, Use with Lantus Insulin, Disp: 90 each, Rfl: 3  •  levothyroxine (SYNTHROID, LEVOTHROID) 112 MCG tablet, Take one tablet 5 days per week, Disp: 90 tablet, Rfl: 1  •  Loratadine 10 MG capsule, Take  by mouth Daily., Disp: , Rfl:   •  memantine (NAMENDA) 10 MG tablet, TAKE 1 TABLET BY MOUTH TWO  TIMES DAILY, Disp: 180 tablet, Rfl: 1  •  Mirabegron ER (MYRBETRIQ) 25 MG tablet sustained-release 24 hour 24 hr tablet, Take 1 tablet by mouth Daily., Disp: 30 tablet, Rfl: 1     The following portions of the patient's history were reviewed and updated as appropriate: allergies, current medications, past family history, past medical history, past social history, past surgical history and problem list.    Review of Systems   Constitutional: Negative for chills, fatigue and fever.        Morbid obesity   HENT: Negative for congestion, dental problem, mouth sores, nosebleeds, postnasal drip,  "rhinorrhea, sinus pain, sinus pressure, sneezing and sore throat.    Eyes: Negative for pain, discharge, redness and itching.   Respiratory: Negative for cough, shortness of breath and wheezing.    Cardiovascular: Negative for chest pain, palpitations and leg swelling.        HTN. Hyperlipidemia. diastolic heart failure with pacemaker and non-rheumatic mitral regurgitation..   Gastrointestinal: Negative for abdominal distention, abdominal pain, blood in stool, diarrhea, nausea and vomiting.   Endocrine: Negative for cold intolerance, heat intolerance, polydipsia and polyuria.        T2DM. Hypothyroidism.   Genitourinary: Positive for urgency. Negative for difficulty urinating, dysuria, frequency and hematuria.        Incontinence    Musculoskeletal: Positive for arthralgias. Negative for gait problem, joint swelling and myalgias.   Skin: Negative for color change, rash and wound.   Neurological: Negative for dizziness, syncope, weakness, light-headedness, numbness and headaches.   Hematological: Negative for adenopathy. Does not bruise/bleed easily.   Psychiatric/Behavioral:        Dementia       Objective:    /68 (BP Location: Right arm, Patient Position: Sitting, Cuff Size: Adult)   Pulse 71   Temp 98.9 °F (37.2 °C)   Resp 18   Ht 158.8 cm (62.5\")   Wt 93.2 kg (205 lb 6.4 oz)   BMI 36.97 kg/m²     Physical Exam   Constitutional: She appears well-developed and well-nourished. She is cooperative. She is easily aroused.  Non-toxic appearance. She does not have a sickly appearance. She does not appear ill. No distress.   Morbid obesity   HENT:   Head: Normocephalic and atraumatic. Head is without abrasion. Hair is normal.   Right Ear: Hearing, tympanic membrane, external ear and ear canal normal. No foreign bodies. Tympanic membrane is not perforated and not erythematous.   Left Ear: Hearing, tympanic membrane, external ear and ear canal normal. No foreign bodies. Tympanic membrane is not perforated and " not erythematous.   Nose: Nose normal. No mucosal edema, rhinorrhea or septal deviation. No epistaxis.  No foreign bodies.   Mouth/Throat: Oropharynx is clear and moist. No oral lesions. Normal dentition.   Eyes: Pupils are equal, round, and reactive to light. Conjunctivae and lids are normal. Right eye exhibits no discharge. Left eye exhibits no discharge. Right conjunctiva is not injected. Left conjunctiva is not injected. No scleral icterus.   Neck: Normal range of motion and full passive range of motion without pain. Neck supple. No edema and normal range of motion present. No thyroid mass and no thyromegaly present.   Cardiovascular: Normal rate and regular rhythm.    Murmur heard.  Pulmonary/Chest: Effort normal and breath sounds normal. No accessory muscle usage. She has no rhonchi. She has no rales. She exhibits no tenderness.   Abdominal: Soft. Bowel sounds are normal. She exhibits no distension. There is no hepatosplenomegaly or hepatomegaly. There is no tenderness. There is no CVA tenderness.   Musculoskeletal: She exhibits no edema, tenderness or deformity.   Generalized decreased ROM with increased effort with change from sitting to standing position.   Lymphadenopathy:     She has no cervical adenopathy.   Neurological: She is alert and easily aroused. Coordination normal.   Oriented to person and place only. Alert and talkative today.   Skin: Skin is warm, dry and intact. No abrasion and no rash noted. She is not diaphoretic. No cyanosis or erythema. Nails show no clubbing.   Psychiatric: She has a normal mood and affect.   Nursing note and vitals reviewed.      Assessment/Plan:    Faye was seen today for follow-up.    Diagnoses and all orders for this visit:    Type 2 diabetes mellitus with hyperosmolarity without coma, with long-term current use of insulin (CMS/[facility])  -     POC Glycosylated Hemoglobin (Hb A1C)  Continue current plan  Essential hypertension  Continue current plan  Mixed  hyperlipidemia  Continue current plan  Hypothyroidism, adult  -     T4, Free  -     TSH  Continue current plan  Dementia without behavioral disturbance, unspecified dementia type  -     POC Urinalysis Dipstick, Automated  Continue current plan  Immunization due  -     Flu Vaccine High Dose PF 65YR+ ([id number])        Return in about 3 months (around 1/26/2019).

## 2018-10-28 PROBLEM — E66.01 MORBIDLY OBESE (HCC): Status: ACTIVE | Noted: 2018-10-28

## 2018-10-29 DIAGNOSIS — N18.30 CHRONIC KIDNEY DISEASE, STAGE III (MODERATE) (HCC): ICD-10-CM

## 2018-10-29 RX ORDER — AMLODIPINE BESYLATE 2.5 MG/1
2.5 TABLET ORAL DAILY
Qty: 90 TABLET | Refills: 3 | Status: SHIPPED | OUTPATIENT
Start: 2018-10-29 | End: 2020-05-08 | Stop reason: SDUPTHER

## 2018-10-29 RX ORDER — MIRABEGRON 25 MG/1
25 TABLET, FILM COATED, EXTENDED RELEASE ORAL DAILY
Qty: 60 TABLET | Refills: 3 | Status: SHIPPED | OUTPATIENT
Start: 2018-10-29 | End: 2019-03-07 | Stop reason: SDUPTHER

## 2018-10-29 RX ORDER — ATORVASTATIN CALCIUM 20 MG/1
20 TABLET, FILM COATED ORAL DAILY
Qty: 90 TABLET | Refills: 3 | Status: SHIPPED | OUTPATIENT
Start: 2018-10-29 | End: 2020-03-16 | Stop reason: SDUPTHER

## 2018-11-27 DIAGNOSIS — Z78.0 POST-MENOPAUSAL: ICD-10-CM

## 2018-11-27 DIAGNOSIS — N18.30 CHRONIC KIDNEY DISEASE, STAGE III (MODERATE) (HCC): ICD-10-CM

## 2018-11-27 DIAGNOSIS — E11.00 TYPE 2 DIABETES MELLITUS WITH HYPEROSMOLARITY WITHOUT COMA, WITH LONG-TERM CURRENT USE OF INSULIN (HCC): ICD-10-CM

## 2018-11-27 DIAGNOSIS — Z79.4 TYPE 2 DIABETES MELLITUS WITH HYPEROSMOLARITY WITHOUT COMA, WITH LONG-TERM CURRENT USE OF INSULIN (HCC): ICD-10-CM

## 2018-11-27 RX ORDER — LEVOTHYROXINE SODIUM 112 UG/1
TABLET ORAL
Qty: 65 TABLET | Refills: 5 | Status: SHIPPED | OUTPATIENT
Start: 2018-11-27 | End: 2020-03-08

## 2018-12-12 ENCOUNTER — CLINICAL SUPPORT NO REQUIREMENTS (OUTPATIENT)
Dept: CARDIOLOGY | Facility: CLINIC | Age: 73
End: 2018-12-12

## 2018-12-12 DIAGNOSIS — I44.30 AV BLOCK: Primary | ICD-10-CM

## 2018-12-12 DIAGNOSIS — Z95.0 PACEMAKER: ICD-10-CM

## 2018-12-12 PROCEDURE — 93294 REM INTERROG EVL PM/LDLS PM: CPT | Performed by: INTERNAL MEDICINE

## 2018-12-12 PROCEDURE — 93296 REM INTERROG EVL PM/IDS: CPT | Performed by: INTERNAL MEDICINE

## 2019-01-24 NOTE — PROGRESS NOTES
Subjective:    Faye Rod is a 73 y.o. female.     Chief Complaint   Patient presents with   • Follow-up     3M       History of Present Illness   Patient present with daughter.  Patient has had dementia since 2016. Patient continues to live with daughter who is caregiver. Patient  is alone during the day. She had a recent fall last week in bathroom. She doesn't remember what happened and did not use alarm system. Daughter found her when she came in from work. No injuries. Daughter states she has been unsteady since fall. Daughter feels like she is declining and that dementia is worsening. Daughter states she is considering adding caregiver during day, but patient is resisting this. She sees neurology in 2 weeks. Patient has resting tremors in bilateral hands, bradykinesia, and a shuffling gait. Patient taking Namenda and Aricept.    Patient continues to follow with nephrology for Chronic Kidney Disease. She sees nephrology every 6 months- last 8/17/2018. Daughter states her urine output has gone down. She continues to have incontinence and daughter thinks she has decreased amount of intake to avoid incontinence. Patient confirms this.    Patient has history of Sick Sinus Syndrome with implanted pacemaker since 7/29/2012. History of diastolic heart failure and non-rheumatic mitral regurgitation..  She was seen by cardiology on 10/05/2018.  Pt denies any new chest pain, dyspnea, dyspnea on exertion, orthopnea, PND, palpitations, lower extremity edema, or claudication.    Patient has chronic hypothyroidism for years of duration. Patient taking 112 mcg 5 days/week. Will obtain labs today.    Patient has chronic hypertension and history of diastolic heart failure with pacemaker and non-rheumatic mitral regurgitation.. Managed well with current medication. Routine cardiology follow. No chest pain or shortness of air.    Patient has Type 2 DM for years of duration. Daughter reports glucose checks  fasting. No  hypoglycemia. Worsened with morbid obesity and inactivity. Weight has increased since last visit.     Patient has chronic hyperlipidemia. Managed well with current medication-Lipitor. Tolerating without issue. Most recent lipid profile 5/10/2018 within normal limits.    Current Outpatient Medications:   •  acetaminophen (TYLENOL) 650 MG 8 hr tablet, Take 650 mg by mouth 2 (Two) Times a Day As Needed., Disp: , Rfl:   •  amLODIPine (NORVASC) 2.5 MG tablet, TAKE 1 TABLET BY MOUTH  DAILY, Disp: 90 tablet, Rfl: 3  •  aspirin 81 MG EC tablet, Take 81 mg by mouth Daily., Disp: , Rfl:   •  atorvastatin (LIPITOR) 20 MG tablet, TAKE 1 TABLET BY MOUTH  DAILY, Disp: 90 tablet, Rfl: 3  •  cholecalciferol (VITAMIN D3) 1000 UNITS tablet, Take 1,000 Units by mouth Daily., Disp: , Rfl:   •  donepezil (ARICEPT) 10 MG tablet, TAKE 1 TABLET BY MOUTH  DAILY, Disp: 90 tablet, Rfl: 1  •  escitalopram (LEXAPRO) 20 MG tablet, Take 1 tablet by mouth Daily., Disp: 90 tablet, Rfl: 3  •  Fe Fum-FePoly-FA-Vit C-Vit B3 (INTEGRA F) 125-1 MG capsule, Take  by mouth Daily., Disp: , Rfl:   •  glucose blood (MADDY CONTOUR TEST) test strip, 1-2 times daily Use as instructed, Disp: 50 each, Rfl: 12  •  ibandronate (BONIVA) 150 MG tablet, Take 1 tablet by mouth Every 30 (Thirty) Days., Disp: 3 tablet, Rfl: 3  •  Insulin Glargine (LANTUS SOLOSTAR) 100 UNIT/ML injection pen, 32 units nightly, Disp: 3 mL, Rfl: 3  •  Insulin Pen Needle (PX MINI PEN NEEDLES) 31G X 5 MM misc, Use with Lantus Insulin, Disp: 90 each, Rfl: 3  •  levothyroxine (SYNTHROID, LEVOTHROID) 112 MCG tablet, TAKE 1 TABLET BY MOUTH 5  DAYS PER WEEK, Disp: 65 tablet, Rfl: 5  •  Loratadine 10 MG capsule, Take  by mouth Daily., Disp: , Rfl:   •  memantine (NAMENDA) 10 MG tablet, TAKE 1 TABLET BY MOUTH TWO  TIMES DAILY, Disp: 180 tablet, Rfl: 1  •  MYRBETRIQ 25 MG tablet sustained-release 24 hour 24 hr tablet, TAKE 1 TABLET BY MOUTH  DAILY, Disp: 60 tablet, Rfl: 3     The following portions of  "the patient's history were reviewed and updated as appropriate: allergies, current medications, past family history, past medical history, past social history, past surgical history and problem list.    Review of Systems   Constitutional: Negative for chills, fatigue and fever.   HENT: Negative for congestion, dental problem, mouth sores, nosebleeds, postnasal drip, rhinorrhea, sinus pressure, sinus pain, sneezing and sore throat.    Eyes: Negative for pain, discharge, redness and itching.   Respiratory: Negative for cough, shortness of breath and wheezing.    Cardiovascular: Negative for chest pain, palpitations and leg swelling.        HTN. Hyperlipidemia. Pacemaker/SSS, history of diastolic heart failure/non-rheumatic mitral regurgitation    Gastrointestinal: Negative for abdominal distention, abdominal pain, blood in stool, diarrhea, nausea and vomiting.   Endocrine: Negative for cold intolerance, heat intolerance, polydipsia and polyuria.        Hypothyroidism. T2DM   Genitourinary: Negative for difficulty urinating, dysuria, frequency, hematuria and urgency.        Incontinence. CKD.   Musculoskeletal: Positive for arthralgias and gait problem. Negative for joint swelling and myalgias.        Falls. Osteoporosis   Skin: Negative for color change, rash and wound.   Allergic/Immunologic: Positive for environmental allergies.   Neurological: Positive for tremors and weakness. Negative for dizziness, syncope, light-headedness, numbness and headaches.        Dementia   Hematological: Negative for adenopathy. Does not bruise/bleed easily.   Psychiatric/Behavioral:        Dementia       Objective:    /62   Pulse 68   Temp 98.5 °F (36.9 °C)   Resp 18   Ht 166.4 cm (65.5\")   Wt 93.8 kg (206 lb 12.8 oz)   BMI 33.89 kg/m²     Physical Exam   Constitutional: She appears well-developed and well-nourished. She is cooperative. She is easily aroused.  Non-toxic appearance. No distress.   HENT:   Head: " Normocephalic and atraumatic. Head is without abrasion. Hair is normal.   Right Ear: Tympanic membrane, external ear and ear canal normal. No foreign bodies. Tympanic membrane is not perforated and not erythematous.   Left Ear: Tympanic membrane, external ear and ear canal normal. No foreign bodies. Tympanic membrane is not perforated and not erythematous.   Nose: Nose normal. No mucosal edema, rhinorrhea or septal deviation. No epistaxis.  No foreign bodies.   Mouth/Throat: Oropharynx is clear and moist. No oral lesions. Normal dentition.   Eyes: Conjunctivae and lids are normal. Pupils are equal, round, and reactive to light. Right eye exhibits no discharge. Left eye exhibits no discharge. Right conjunctiva is not injected. Left conjunctiva is not injected. No scleral icterus.   Neck: No edema present. No thyroid mass and no thyromegaly present.   Cardiovascular: Normal rate and regular rhythm. Exam reveals no gallop and no friction rub.   Murmur heard.  Pulmonary/Chest: Effort normal and breath sounds normal. No accessory muscle usage. She has no rhonchi. She has no rales. She exhibits no tenderness.   Abdominal: Soft. Bowel sounds are normal. She exhibits no distension. There is no hepatosplenomegaly or hepatomegaly. There is no tenderness. There is no CVA tenderness.   Musculoskeletal: She exhibits no edema, tenderness or deformity.   Generalized decreased range of motion, bradykinesia   Lymphadenopathy:     She has no cervical adenopathy.   Neurological: She is alert and easily aroused. She displays tremor. She exhibits abnormal muscle tone.   Skin: Skin is warm, dry and intact. No abrasion and no rash noted. She is not diaphoretic. No cyanosis or erythema. Nails show no clubbing.   Psychiatric: Her affect is blunt.   Responds when addressed, otherwise does not initiate interaction   Nursing note and vitals reviewed.      Assessment/Plan:    Faye was seen today for follow-up.    Diagnoses and all orders for  this visit:    Altered mental status, unspecified altered mental status type  -     POC Urinalysis Dipstick, Automated    Dementia without behavioral disturbance, unspecified dementia type  -     Comprehensive Metabolic Panel  Continue Namenda and Aricept and neurology follow  Hypothyroidism, adult  -     T4, Free  -     TSH  Continue current plan and current levothyroxine dosage    Type 2 diabetes mellitus with hyperosmolarity without coma, with long-term current use of insulin (CMS/[facility])  -     Basic Metabolic Panel  -     POC Glycosylated Hemoglobin (Hb A1C)  Continue current plan  Sick sinus syndrome (CMS/[facility])  Continue cardiology follow with pacemaker check  Morbidly obese (CMS/[facility])  Counseling of diet in regards to appropriate amounts of fats, carbs, sodium and well -balanced diet.  Avoid skipping meals, appropriate amounts of h20 intake, sleep, and regular cardio activity 30 mintues 5d weekly.  The daughter v/u.  Mixed hyperlipidemia  Continue Lipitor  CKD (chronic kidney disease) stage 3, GFR 30-59 ml/min (CMS/[facility])    Continue current plan and nephrology follow.    Discussed patient supervision needs and need for change with current management.  Return in about 4 months (around 5/25/2019), or if symptoms worsen or fail to improve, schedule wellness.

## 2019-01-25 ENCOUNTER — OFFICE VISIT (OUTPATIENT)
Dept: INTERNAL MEDICINE | Facility: CLINIC | Age: 74
End: 2019-01-25

## 2019-01-25 VITALS
HEIGHT: 66 IN | DIASTOLIC BLOOD PRESSURE: 62 MMHG | RESPIRATION RATE: 18 BRPM | SYSTOLIC BLOOD PRESSURE: 115 MMHG | HEART RATE: 68 BPM | BODY MASS INDEX: 33.23 KG/M2 | TEMPERATURE: 98.5 F | WEIGHT: 206.8 LBS

## 2019-01-25 DIAGNOSIS — E11.00 TYPE 2 DIABETES MELLITUS WITH HYPEROSMOLARITY WITHOUT COMA, WITH LONG-TERM CURRENT USE OF INSULIN (HCC): ICD-10-CM

## 2019-01-25 DIAGNOSIS — I49.5 SICK SINUS SYNDROME (HCC): ICD-10-CM

## 2019-01-25 DIAGNOSIS — Z79.4 TYPE 2 DIABETES MELLITUS WITH HYPEROSMOLARITY WITHOUT COMA, WITH LONG-TERM CURRENT USE OF INSULIN (HCC): ICD-10-CM

## 2019-01-25 DIAGNOSIS — Z91.81 HISTORY OF RECENT FALL: ICD-10-CM

## 2019-01-25 DIAGNOSIS — E66.01 MORBIDLY OBESE (HCC): ICD-10-CM

## 2019-01-25 DIAGNOSIS — N18.30 CKD (CHRONIC KIDNEY DISEASE) STAGE 3, GFR 30-59 ML/MIN (HCC): ICD-10-CM

## 2019-01-25 DIAGNOSIS — E78.2 MIXED HYPERLIPIDEMIA: ICD-10-CM

## 2019-01-25 DIAGNOSIS — E03.9 HYPOTHYROIDISM, ADULT: ICD-10-CM

## 2019-01-25 DIAGNOSIS — R41.82 ALTERED MENTAL STATUS, UNSPECIFIED ALTERED MENTAL STATUS TYPE: Primary | ICD-10-CM

## 2019-01-25 DIAGNOSIS — F03.90 DEMENTIA WITHOUT BEHAVIORAL DISTURBANCE, UNSPECIFIED DEMENTIA TYPE: ICD-10-CM

## 2019-01-25 LAB
ALBUMIN SERPL-MCNC: 4.17 G/DL (ref 3.2–4.8)
ALBUMIN/GLOB SERPL: 2.3 G/DL (ref 1.5–2.5)
ALP SERPL-CCNC: 68 U/L (ref 25–100)
ALT SERPL W P-5'-P-CCNC: 23 U/L (ref 7–40)
ANION GAP SERPL CALCULATED.3IONS-SCNC: 7 MMOL/L (ref 3–11)
AST SERPL-CCNC: 25 U/L (ref 0–33)
BILIRUB BLD-MCNC: NEGATIVE MG/DL
BILIRUB SERPL-MCNC: 0.5 MG/DL (ref 0.3–1.2)
BUN BLD-MCNC: 27 MG/DL (ref 9–23)
BUN/CREAT SERPL: 19.4 (ref 7–25)
CALCIUM SPEC-SCNC: 9.3 MG/DL (ref 8.7–10.4)
CHLORIDE SERPL-SCNC: 107 MMOL/L (ref 99–109)
CLARITY, POC: CLEAR
CO2 SERPL-SCNC: 29 MMOL/L (ref 20–31)
COLOR UR: YELLOW
CREAT BLD-MCNC: 1.39 MG/DL (ref 0.6–1.3)
EXPIRATION DATE: NORMAL
EXPIRATION DATE: NORMAL
GFR SERPL CREATININE-BSD FRML MDRD: 37 ML/MIN/1.73
GLOBULIN UR ELPH-MCNC: 1.8 GM/DL
GLUCOSE BLD-MCNC: 111 MG/DL (ref 70–100)
GLUCOSE UR STRIP-MCNC: NEGATIVE MG/DL
HBA1C MFR BLD: 7.2 %
KETONES UR QL: NEGATIVE
LEUKOCYTE EST, POC: NEGATIVE
Lab: NORMAL
Lab: NORMAL
NITRITE UR-MCNC: NEGATIVE MG/ML
PH UR: 5 [PH] (ref 5–8)
POTASSIUM BLD-SCNC: 4.3 MMOL/L (ref 3.5–5.5)
PROT SERPL-MCNC: 6 G/DL (ref 5.7–8.2)
PROT UR STRIP-MCNC: NEGATIVE MG/DL
RBC # UR STRIP: NEGATIVE /UL
SODIUM BLD-SCNC: 143 MMOL/L (ref 132–146)
SP GR UR: 1.01 (ref 1–1.03)
T4 FREE SERPL-MCNC: 1.29 NG/DL (ref 0.89–1.76)
TSH SERPL DL<=0.05 MIU/L-ACNC: 1.08 MIU/ML (ref 0.35–5.35)
UROBILINOGEN UR QL: NORMAL

## 2019-01-25 PROCEDURE — 99214 OFFICE O/P EST MOD 30 MIN: CPT | Performed by: NURSE PRACTITIONER

## 2019-01-25 PROCEDURE — 81003 URINALYSIS AUTO W/O SCOPE: CPT | Performed by: NURSE PRACTITIONER

## 2019-01-25 PROCEDURE — 84443 ASSAY THYROID STIM HORMONE: CPT | Performed by: NURSE PRACTITIONER

## 2019-01-25 PROCEDURE — 84439 ASSAY OF FREE THYROXINE: CPT | Performed by: NURSE PRACTITIONER

## 2019-01-25 PROCEDURE — 83036 HEMOGLOBIN GLYCOSYLATED A1C: CPT | Performed by: NURSE PRACTITIONER

## 2019-01-25 PROCEDURE — 80053 COMPREHEN METABOLIC PANEL: CPT | Performed by: NURSE PRACTITIONER

## 2019-01-25 PROCEDURE — 36415 COLL VENOUS BLD VENIPUNCTURE: CPT | Performed by: NURSE PRACTITIONER

## 2019-01-26 PROBLEM — I49.5 SICK SINUS SYNDROME (HCC): Status: ACTIVE | Noted: 2019-01-26

## 2019-01-26 PROBLEM — N18.30 CKD (CHRONIC KIDNEY DISEASE) STAGE 3, GFR 30-59 ML/MIN (HCC): Status: ACTIVE | Noted: 2019-01-26

## 2019-01-26 PROBLEM — R06.02 SHORTNESS OF BREATH: Status: RESOLVED | Noted: 2017-02-03 | Resolved: 2019-01-26

## 2019-01-28 ENCOUNTER — TELEPHONE (OUTPATIENT)
Dept: INTERNAL MEDICINE | Facility: CLINIC | Age: 74
End: 2019-01-28

## 2019-01-28 NOTE — TELEPHONE ENCOUNTER
----- Message from SHALONDA Terry sent at 1/27/2019  3:38 PM EST -----  Please inform patient's daughter A1C improved to 7.2. Thyroid labs normal-continue current dose of levothyroxine. Glucose mildly elevated. BUN and creatinine elevated and eGFR decreased. Inquire number for nephrology office to fax labs to and remind them that wellness needs to be scheduled(try to schedule).

## 2019-01-28 NOTE — TELEPHONE ENCOUNTER
PT'S DAUGHTER CALLED IN. INFORMED OF LABS, PT'S NEPHROLOGIST IS NEPHROLOGIST [facility] [phone]  [phone]    TEVIN   [phone]

## 2019-02-08 ENCOUNTER — OFFICE VISIT (OUTPATIENT)
Dept: NEUROLOGY | Facility: CLINIC | Age: 74
End: 2019-02-08

## 2019-02-08 VITALS
WEIGHT: 206 LBS | BODY MASS INDEX: 33.11 KG/M2 | DIASTOLIC BLOOD PRESSURE: 64 MMHG | SYSTOLIC BLOOD PRESSURE: 116 MMHG | HEIGHT: 66 IN

## 2019-02-08 DIAGNOSIS — F03.90 DEMENTIA WITHOUT BEHAVIORAL DISTURBANCE, UNSPECIFIED DEMENTIA TYPE: Primary | ICD-10-CM

## 2019-02-08 PROCEDURE — 99214 OFFICE O/P EST MOD 30 MIN: CPT | Performed by: PSYCHIATRY & NEUROLOGY

## 2019-02-08 RX ORDER — DONEPEZIL HYDROCHLORIDE 10 MG/1
10 TABLET, FILM COATED ORAL 2 TIMES DAILY
Qty: 180 TABLET | Refills: 3 | Status: SHIPPED | OUTPATIENT
Start: 2019-02-08 | End: 2020-02-21 | Stop reason: SDUPTHER

## 2019-02-08 NOTE — PROGRESS NOTES
"Subjective     Chief Complaint: memory loss      History of Present Illness   Faye Rod is a 73 y.o. female who returns to clinic today with a history of Dementia . Her family  has noted symptoms since approximately 2015 marked initially by forgetfulness. This has varied over time, with significant worsening noted in 9/16 when hospitalized with renal insufficiency. Additional associated symptoms have included impairments in essentially all spheres of cognition. She has had associated  symptoms of hallucinations previously, though now only occasional delusions. No modifying factors or circumstances have been identified. She is currently residing at home with her daughter in [place].       She has also noted a resting tremor in her left hand since 2016. This has somewhat worsened over time. She notes associated balance impairment and a shuffling gait as well as bradykinesia.     Prior evaluation has included screening blood work and a head CT within the last year which were notable for only mild increases in BUN and creatinine. She is currently taking donepezil and memantine.    Since her last visit on 8/3/18, her tremor and cognition have both worsened. She notes occasional hallucinations, but this has not worsened.    I have reviewed and confirmed the past family, social and medical history as accurate on 2/8/19.     Review of Systems   Constitutional: Negative.        Objective     /64   Ht 166.4 cm (65.51\")   Wt 93.4 kg (206 lb)   BMI 33.75 kg/m²       Physical Exam   Neurological: She has normal strength.   Psychiatric: Her speech is normal.        Neurologic Exam     Mental Status   Oriented to person.   Disoriented to place.   Oriented to time.   Registration: recalls 3 of 3 objects. Recall at 5 minutes: recalls 1 of 3 objects. Follows 3 step commands.   Attention: normal.   Speech: speech is normal   Level of consciousness: alert  Able to name object. Able to read. Able to repeat. Able " to write. Normal comprehension.     Cranial Nerves   Cranial nerves II through XII intact.     Motor Exam   Muscle bulk: normal  Right arm tone: increased  Left arm tone: increased    Strength   Strength 5/5 throughout.     Gait, Coordination, and Reflexes     Tremor   Resting tremor: absent        Results  MMSE=20      Assessment/Plan   Faye was seen today for dementia.    Diagnoses and all orders for this visit:    Dementia without behavioral disturbance, unspecified dementia type          Discussion/Summary   Faye Rod returns to clinic today with a history of Dementia (possible DLB). I reviewed this diagnosis, along with treatment options including a trial of Sinemet, increasing donepezil to 10 mg bid gradually, or a trial of Seroquel for hallucinations (along with black box warning). After discussion it was elected to increase donepezil to 10 mg bid.  She will then follow up in 6 months, or sooner if needed.     I spent 25 minutes face to face with the patient and family. I spent 15 minutes counseling and discussing diagnosis, prognosis, current status, treatment options and management.    As part of this visit I obtained additional history from the family which is incorporated in the HPI.      Joaquín Hamm MD

## 2019-03-07 DIAGNOSIS — F41.9 ANXIETY: ICD-10-CM

## 2019-03-07 RX ORDER — ESCITALOPRAM OXALATE 20 MG/1
20 TABLET ORAL DAILY
Qty: 90 TABLET | Refills: 3 | Status: SHIPPED | OUTPATIENT
Start: 2019-03-07 | End: 2020-05-08 | Stop reason: SDUPTHER

## 2019-03-07 RX ORDER — MIRABEGRON 25 MG/1
25 TABLET, FILM COATED, EXTENDED RELEASE ORAL DAILY
Qty: 90 TABLET | Refills: 1 | Status: SHIPPED | OUTPATIENT
Start: 2019-03-07 | End: 2019-09-27 | Stop reason: SDUPTHER

## 2019-03-13 ENCOUNTER — CLINICAL SUPPORT NO REQUIREMENTS (OUTPATIENT)
Dept: CARDIOLOGY | Facility: CLINIC | Age: 74
End: 2019-03-13

## 2019-03-13 DIAGNOSIS — I49.5 SICK SINUS SYNDROME (HCC): ICD-10-CM

## 2019-03-13 PROCEDURE — 93296 REM INTERROG EVL PM/IDS: CPT | Performed by: INTERNAL MEDICINE

## 2019-03-13 PROCEDURE — 93294 REM INTERROG EVL PM/LDLS PM: CPT | Performed by: INTERNAL MEDICINE

## 2019-04-01 RX ORDER — MEMANTINE HYDROCHLORIDE 10 MG/1
TABLET ORAL
Qty: 180 TABLET | Refills: 1 | Status: SHIPPED | OUTPATIENT
Start: 2019-04-01 | End: 2019-11-02 | Stop reason: SDUPTHER

## 2019-04-07 DIAGNOSIS — N18.4 ANEMIA IN STAGE 4 CHRONIC KIDNEY DISEASE (HCC): ICD-10-CM

## 2019-04-07 DIAGNOSIS — D63.1 ANEMIA IN STAGE 4 CHRONIC KIDNEY DISEASE (HCC): ICD-10-CM

## 2019-04-09 RX ORDER — IRON FUM,PS CMP/VIT C/NIACIN 125-40-3MG
CAPSULE ORAL
Qty: 30 CAPSULE | Refills: 4 | OUTPATIENT
Start: 2019-04-09

## 2019-04-15 DIAGNOSIS — N18.4 ANEMIA IN STAGE 4 CHRONIC KIDNEY DISEASE (HCC): ICD-10-CM

## 2019-04-15 DIAGNOSIS — D63.1 ANEMIA IN STAGE 4 CHRONIC KIDNEY DISEASE (HCC): ICD-10-CM

## 2019-04-15 RX ORDER — IRON FUM,PS CMP/VIT C/NIACIN 125-40-3MG
CAPSULE ORAL
Qty: 30 CAPSULE | Refills: 4 | Status: SHIPPED | OUTPATIENT
Start: 2019-04-15 | End: 2019-12-20 | Stop reason: SDUPTHER

## 2019-05-24 ENCOUNTER — OFFICE VISIT (OUTPATIENT)
Dept: INTERNAL MEDICINE | Facility: CLINIC | Age: 74
End: 2019-05-24

## 2019-05-24 VITALS
DIASTOLIC BLOOD PRESSURE: 60 MMHG | BODY MASS INDEX: 32.76 KG/M2 | RESPIRATION RATE: 20 BRPM | SYSTOLIC BLOOD PRESSURE: 126 MMHG | HEART RATE: 76 BPM | WEIGHT: 200 LBS

## 2019-05-24 DIAGNOSIS — E03.9 HYPOTHYROIDISM IN ADULT: ICD-10-CM

## 2019-05-24 DIAGNOSIS — R31.9 HEMATURIA, UNSPECIFIED TYPE: ICD-10-CM

## 2019-05-24 DIAGNOSIS — N18.3 TYPE 2 DIABETES MELLITUS WITH STAGE 3 CHRONIC KIDNEY DISEASE, UNSPECIFIED WHETHER LONG TERM INSULIN USE: ICD-10-CM

## 2019-05-24 DIAGNOSIS — R82.998 LEUKOCYTES IN URINE: ICD-10-CM

## 2019-05-24 DIAGNOSIS — F03.90 DEMENTIA WITHOUT BEHAVIORAL DISTURBANCE, UNSPECIFIED DEMENTIA TYPE: ICD-10-CM
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
## 2019-05-24 DIAGNOSIS — I10 ESSENTIAL HYPERTENSION: ICD-10-CM

## 2019-05-24 DIAGNOSIS — N18.30 CKD (CHRONIC KIDNEY DISEASE) STAGE 3, GFR 30-59 ML/MIN (HCC): Primary | ICD-10-CM

## 2019-05-24 DIAGNOSIS — E11.22 TYPE 2 DIABETES MELLITUS WITH STAGE 3 CHRONIC KIDNEY DISEASE, UNSPECIFIED WHETHER LONG TERM INSULIN USE: ICD-10-CM

## 2019-05-24 DIAGNOSIS — R32 URINARY INCONTINENCE, UNSPECIFIED TYPE: ICD-10-CM

## 2019-05-24 DIAGNOSIS — M15.9 PRIMARY OSTEOARTHRITIS INVOLVING MULTIPLE JOINTS: ICD-10-CM

## 2019-05-24 PROBLEM — H33.313 RETINAL TEAR OF BOTH EYES: Status: RESOLVED | Noted: 2018-09-17 | Resolved: 2019-05-24

## 2019-05-24 LAB
BACTERIA UR QL AUTO: NORMAL /HPF
BILIRUB BLD-MCNC: ABNORMAL MG/DL
CLARITY, POC: CLEAR
COLOR UR: YELLOW
EXPIRATION DATE: ABNORMAL
EXPIRATION DATE: NORMAL
GLUCOSE UR STRIP-MCNC: NEGATIVE MG/DL
HBA1C MFR BLD: 6.5 %
HYALINE CASTS UR QL AUTO: NORMAL /LPF
KETONES UR QL: NEGATIVE
LEUKOCYTE EST, POC: ABNORMAL
Lab: ABNORMAL
Lab: NORMAL
NITRITE UR-MCNC: NEGATIVE MG/ML
PH UR: 5 [PH] (ref 5–8)
PROT UR STRIP-MCNC: NEGATIVE MG/DL
RBC # UR STRIP: ABNORMAL /UL
RBC # UR: NORMAL /HPF
REF LAB TEST METHOD: NORMAL
SP GR UR: 1.02 (ref 1–1.03)
SQUAMOUS #/AREA URNS HPF: NORMAL /HPF
T4 FREE SERPL-MCNC: 1.11 NG/DL (ref 0.93–1.7)
TSH SERPL DL<=0.05 MIU/L-ACNC: 2.21 MIU/ML (ref 0.27–4.2)
UROBILINOGEN UR QL: NORMAL
WBC UR QL AUTO: NORMAL /HPF

## 2019-05-24 PROCEDURE — 83036 HEMOGLOBIN GLYCOSYLATED A1C: CPT | Performed by: NURSE PRACTITIONER

## 2019-05-24 PROCEDURE — 36415 COLL VENOUS BLD VENIPUNCTURE: CPT | Performed by: NURSE PRACTITIONER

## 2019-05-24 PROCEDURE — 84439 ASSAY OF FREE THYROXINE: CPT | Performed by: NURSE PRACTITIONER

## 2019-05-24 PROCEDURE — 87086 URINE CULTURE/COLONY COUNT: CPT | Performed by: NURSE PRACTITIONER

## 2019-05-24 PROCEDURE — 99214 OFFICE O/P EST MOD 30 MIN: CPT | Performed by: NURSE PRACTITIONER

## 2019-05-24 PROCEDURE — 84443 ASSAY THYROID STIM HORMONE: CPT | Performed by: NURSE PRACTITIONER

## 2019-05-24 PROCEDURE — 81015 MICROSCOPIC EXAM OF URINE: CPT | Performed by: NURSE PRACTITIONER

## 2019-05-24 PROCEDURE — 81003 URINALYSIS AUTO W/O SCOPE: CPT | Performed by: NURSE PRACTITIONER

## 2019-05-24 RX ORDER — SENNOSIDES 8.6 MG
650 CAPSULE ORAL 2 TIMES DAILY PRN
COMMUNITY
End: 2021-01-01 | Stop reason: HOSPADM

## 2019-05-24 NOTE — PROGRESS NOTES
"Subjective:    Faye Rod is a 73 y.o. female.     Chief Complaint   Patient presents with   • Alzheimer's Disease       History of Present Illness   Patient and daughter report patient had fall 4/2018. Daughter was assisting her with putting pants on and she lost balance. She has a few abrasions on knees and left wrist. EMS came and assessed and assisted with lifting her back to chair. Patient is leaning to side more than before. Patient and daughter decline offer for physical therapy. She continues to have increasingly worse leaning posture.     Patient has had dementia since 2016 and daughter reports stable at this time without behavior issue identified. Currently managed with Namenda and Aricept. Patient has neurology follow.      Patient has Chronic Kidney Disease-Stage 3. Stable per daughter. Nephrology follow every 6 months. Creatinine was 1.2 on 4/5/2019 which is improved from previous labs.     Patient complains of bilateral arm pain/arthritis. It is preventing full range of motion. Pain occurs about every day, but \"comes and goes\". Treated with Tylenol and unable to take NSAID due to CKD. Use/movement worsens pain.     Patient has chronic hypothyroidism for years of duration. Patient taking 112 mcg 5 days/week. Will obtain labs today. No constipation, dry skin, new weight gain or cold sensitivity.      Patient has chronic hypertension and history of diastolic heart failure with pacemaker and non-rheumatic mitral regurgitation.. Managed well with current medication. Routine cardiology follow. No chest pain, edema  or shortness of air.     Patient has urinary urgency and incontinent episodes for over one year. Urinary issues limit her ability to leave home. Managed with Myrbetriq. Incontinence has worsened in past few weeks. Will check urine today.      Patient has Type 2 DM for years of duration. Daughter reports glucose checks every 2-3 days with average of 80 fasting. No hypoglycemia. Worsened with " morbid obesity and inactivity. A1C improved to 6.5 today from 7.2.           Current Outpatient Medications:   •  acetaminophen (TYLENOL) 650 MG 8 hr tablet, Take 650 mg by mouth 2 (Two) Times a Day As Needed., Disp: , Rfl:   •  amLODIPine (NORVASC) 2.5 MG tablet, TAKE 1 TABLET BY MOUTH  DAILY, Disp: 90 tablet, Rfl: 3  •  aspirin 81 MG EC tablet, Take 81 mg by mouth Daily., Disp: , Rfl:   •  atorvastatin (LIPITOR) 20 MG tablet, TAKE 1 TABLET BY MOUTH  DAILY, Disp: 90 tablet, Rfl: 3  •  cholecalciferol (VITAMIN D3) 1000 UNITS tablet, Take 1,000 Units by mouth Daily., Disp: , Rfl:   •  donepezil (ARICEPT) 10 MG tablet, Take 1 tablet by mouth 2 (Two) Times a Day., Disp: 180 tablet, Rfl: 3  •  escitalopram (LEXAPRO) 20 MG tablet, TAKE 1 TABLET BY MOUTH  DAILY, Disp: 90 tablet, Rfl: 3  •  Fe Fum-FePoly-FA-Vit C-Vit B3 (INTEGRA F) 125-1 MG capsule, Take  by mouth Daily., Disp: , Rfl:   •  Fe Fum-FePoly-Vit C-Vit B3 (INTEGRA) 62.5-62.5-40-3 MG capsule, TAKE ONE CAPSULE BY MOUTH DAILY, Disp: 30 capsule, Rfl: 4  •  glucose blood (MADDY CONTOUR TEST) test strip, 1-2 times daily Use as instructed, Disp: 50 each, Rfl: 12  •  ibandronate (BONIVA) 150 MG tablet, Take 1 tablet by mouth Every 30 (Thirty) Days., Disp: 3 tablet, Rfl: 3  •  Insulin Glargine (LANTUS SOLOSTAR) 100 UNIT/ML injection pen, 32 units nightly, Disp: 3 mL, Rfl: 3  •  Insulin Pen Needle (PX MINI PEN NEEDLES) 31G X 5 MM misc, Use with Lantus Insulin, Disp: 90 each, Rfl: 3  •  levothyroxine (SYNTHROID, LEVOTHROID) 112 MCG tablet, TAKE 1 TABLET BY MOUTH 5  DAYS PER WEEK, Disp: 65 tablet, Rfl: 5  •  Loratadine 10 MG capsule, Take  by mouth Daily., Disp: , Rfl:   •  memantine (NAMENDA) 10 MG tablet, TAKE 1 TABLET BY MOUTH TWO  TIMES DAILY, Disp: 180 tablet, Rfl: 1  •  MYRBETRIQ 25 MG tablet sustained-release 24 hour 24 hr tablet, TAKE 1 TABLET BY MOUTH  DAILY, Disp: 90 tablet, Rfl: 1     The following portions of the patient's history were reviewed and updated as  appropriate: allergies, current medications, past family history, past medical history, past social history, past surgical history and problem list.    Review of Systems   Constitutional: Negative for chills, fatigue and fever.   HENT: Negative for congestion, dental problem, mouth sores, nosebleeds, postnasal drip, rhinorrhea, sinus pressure, sinus pain, sneezing and sore throat.    Eyes: Negative for pain, discharge, redness and itching.   Respiratory: Negative for cough, shortness of breath and wheezing.    Cardiovascular: Negative for chest pain, palpitations and leg swelling.        No KRUEGER, orthopnea, PND, or claudication.   Gastrointestinal: Negative for abdominal distention, abdominal pain, blood in stool, diarrhea, nausea and vomiting.   Endocrine: Negative for cold intolerance, heat intolerance, polydipsia and polyuria.   Genitourinary: Negative for difficulty urinating, dysuria, frequency, hematuria and urgency.        Incontinence worsening   Musculoskeletal: Positive for arthralgias. Negative for gait problem, joint swelling and myalgias.        Fall, postural instability   Skin: Negative for color change, rash and wound.        abrasions   Neurological: Negative for dizziness, syncope, weakness, light-headedness, numbness and headaches.   Hematological: Negative for adenopathy. Does not bruise/bleed easily.       Objective:    /60   Pulse 76   Resp 20   Wt 90.7 kg (200 lb)   BMI 32.76 kg/m²     Physical Exam   Constitutional: She appears well-developed and well-nourished. She is cooperative.  Non-toxic appearance. No distress.   Conversant today   HENT:   Head: Normocephalic and atraumatic.   Right Ear: External ear normal.   Left Ear: External ear normal.   Nose: Nose normal.   Mouth/Throat: Uvula is midline, oropharynx is clear and moist and mucous membranes are normal.   Eyes: Conjunctivae and lids are normal.   Neck: Normal range of motion. Neck supple. Carotid bruit is not present. No  thyromegaly present.   Cardiovascular: Normal rate, regular rhythm and normal heart sounds. Exam reveals no gallop and no friction rub.   No murmur heard.  No peripheral edema.   Pulmonary/Chest: Effort normal and breath sounds normal.   Abdominal: Soft. Bowel sounds are normal. She exhibits no distension, no abdominal bruit and no mass. There is no hepatosplenomegaly. There is no tenderness.   Musculoskeletal:   Postural instability. Difficulty with changing position-needs assistance to change position.   Neurological: She is alert.   Oriented x 2   Skin: Skin is warm and dry. Abrasion noted.   Abrasion left wrist and right knee.   Psychiatric: She has a normal mood and affect.   Nursing note and vitals reviewed.      Assessment/Plan:    Faye was seen today for alzheimer's disease.    Diagnoses and all orders for this visit:    CKD (chronic kidney disease) stage 3, GFR 30-59 ml/min (CMS/[facility])  Continue nephrology follow  Type 2 diabetes mellitus with stage 3 chronic kidney disease, unspecified whether long term insulin use (CMS/[facility])  -     POC Glycosylated Hemoglobin (Hb A1C)-discussed improvement  Continue Lantus and glucose monitoring  Hypothyroidism in adult  -     T4, Free  -     TSH  Continue levothyroxine  Urinary incontinence, unspecified type  -     POC Urinalysis Dipstick, Automated  Continue Myrbetriq  Hematuria, unspecified type  -     Urinalysis, Microscopic Only - Urine, Clean Catch  Leukocytes in urine  -     Urine Culture - Urine, Urine, Clean Catch  Hypertension  Continue amlodipine and cardiology follow  Fall-continue to monitor closely and report any injuries or call EMS for assistance. If therapy desired, contact provider for order or tests if needed.  Dementia-continue Namenda and Aricept. Continue neurology follow.   Osteoarthritis-continue Tylenol as needed  Return in about 4 months (around 9/24/2019), or if symptoms worsen or fail to improve, schedule wellness.

## 2019-05-25 LAB — BACTERIA SPEC AEROBE CULT: NORMAL

## 2019-05-28 DIAGNOSIS — E11.00 TYPE 2 DIABETES MELLITUS WITH HYPEROSMOLARITY WITHOUT COMA, WITHOUT LONG-TERM CURRENT USE OF INSULIN (HCC): ICD-10-CM

## 2019-05-28 RX ORDER — PERPHENAZINE 16 MG/1
TABLET, FILM COATED ORAL
Qty: 100 EACH | Refills: 11 | Status: SHIPPED | OUTPATIENT
Start: 2019-05-28 | End: 2019-06-04 | Stop reason: SDUPTHER

## 2019-05-29 ENCOUNTER — TELEPHONE (OUTPATIENT)
Dept: INTERNAL MEDICINE | Facility: CLINIC | Age: 74
End: 2019-05-29

## 2019-05-29 NOTE — TELEPHONE ENCOUNTER
----- Message from Livia Balderas sent at 5/29/2019  2:12 PM EDT -----  Contact: TEVIN MENDEZ-DAUGHTER  PT'S DAUGHTER RETURNING CALL.  SHE WASN'T SURE IF IT WOULD HAVE BEEN ABOUT THE REFILL THAT SHE CALLED ABOUT YESTERDAY, OR IF IT WAS ABOUT HER RESULTS FROM THE URINALYSIS THAT SHE HAD DONE A FEW DAYS AGO.    TEVIN WORKS IN [employer], HER EXTENSION IS [phone]

## 2019-06-04 DIAGNOSIS — E11.00 TYPE 2 DIABETES MELLITUS WITH HYPEROSMOLARITY WITHOUT COMA, WITHOUT LONG-TERM CURRENT USE OF INSULIN (HCC): ICD-10-CM

## 2019-06-04 DIAGNOSIS — Z78.0 POST-MENOPAUSAL: ICD-10-CM

## 2019-06-04 DIAGNOSIS — N18.30 CHRONIC KIDNEY DISEASE, STAGE III (MODERATE) (HCC): ICD-10-CM

## 2019-06-12 ENCOUNTER — CLINICAL SUPPORT NO REQUIREMENTS (OUTPATIENT)
Dept: CARDIOLOGY | Facility: CLINIC | Age: 74
End: 2019-06-12

## 2019-06-12 DIAGNOSIS — I49.5 SICK SINUS SYNDROME (HCC): ICD-10-CM

## 2019-06-12 PROCEDURE — 93296 REM INTERROG EVL PM/IDS: CPT | Performed by: INTERNAL MEDICINE

## 2019-06-12 PROCEDURE — 93294 REM INTERROG EVL PM/LDLS PM: CPT | Performed by: INTERNAL MEDICINE

## 2019-06-14 DIAGNOSIS — N18.30 CHRONIC KIDNEY DISEASE, STAGE III (MODERATE) (HCC): ICD-10-CM

## 2019-06-28 ENCOUNTER — TELEPHONE (OUTPATIENT)
Dept: INTERNAL MEDICINE | Facility: CLINIC | Age: 74
End: 2019-06-28

## 2019-06-28 ENCOUNTER — OFFICE VISIT (OUTPATIENT)
Dept: INTERNAL MEDICINE | Facility: CLINIC | Age: 74
End: 2019-06-28

## 2019-06-28 VITALS
BODY MASS INDEX: 31.5 KG/M2 | WEIGHT: 196 LBS | HEART RATE: 68 BPM | RESPIRATION RATE: 20 BRPM | HEIGHT: 66 IN | DIASTOLIC BLOOD PRESSURE: 62 MMHG | SYSTOLIC BLOOD PRESSURE: 136 MMHG

## 2019-06-28 DIAGNOSIS — N18.3 TYPE 2 DIABETES MELLITUS WITH STAGE 3 CHRONIC KIDNEY DISEASE, UNSPECIFIED WHETHER LONG TERM INSULIN USE: ICD-10-CM

## 2019-06-28 DIAGNOSIS — E11.22 TYPE 2 DIABETES MELLITUS WITH STAGE 3 CHRONIC KIDNEY DISEASE, UNSPECIFIED WHETHER LONG TERM INSULIN USE: ICD-10-CM

## 2019-06-28 DIAGNOSIS — E78.2 MIXED HYPERLIPIDEMIA: ICD-10-CM

## 2019-06-28 DIAGNOSIS — Z00.00 MEDICARE ANNUAL WELLNESS VISIT, INITIAL: Primary | ICD-10-CM

## 2019-06-28 LAB
POC CREATININE URINE: 300
POC MICROALBUMIN URINE: 30

## 2019-06-28 PROCEDURE — G0438 PPPS, INITIAL VISIT: HCPCS | Performed by: NURSE PRACTITIONER

## 2019-06-28 PROCEDURE — 82044 UR ALBUMIN SEMIQUANTITATIVE: CPT | Performed by: NURSE PRACTITIONER

## 2019-06-28 PROCEDURE — 80061 LIPID PANEL: CPT | Performed by: NURSE PRACTITIONER

## 2019-06-28 NOTE — PROGRESS NOTES
QUICK REFERENCE INFORMATION:  The ABCs of the Annual Wellness Visit    Initial Medicare Wellness Visit  (Daughter states she does not remember this type of visit being done before.)  HEALTH RISK ASSESSMENT    : 1945    Recent Hospitalizations:  No hospitalization(s) within the last year..  ccc      Current Medical Providers:  Patient Care Team:  Telly Contreras APRN as PCP - General (Nurse Practitioner)  Telly Contreras APRN as PCP - Claims Attributed  Faisal Vang MD as Consulting Physician (Cardiology)  Neuro-Dr Hamm  Nephrology-[facility]  Podiatry-[facility]  Ophthalmology-Dr Nawaf Echols/Dr Fisher    Smoking Status:  Social History     Tobacco Use   Smoking Status Never Smoker   Smokeless Tobacco Never Used       Alcohol Consumption:  Social History     Substance and Sexual Activity   Alcohol Use No       Depression Screen:   PHQ-2/PHQ-9 Depression Screening 2019   Little interest or pleasure in doing things 0   Feeling down, depressed, or hopeless 1   Trouble falling or staying asleep, or sleeping too much -   Feeling tired or having little energy -   Poor appetite or overeating -   Feeling bad about yourself - or that you are a failure or have let yourself or your family down -   Trouble concentrating on things, such as reading the newspaper or watching television -   Moving or speaking so slowly that other people could have noticed. Or the opposite - being so fidgety or restless that you have been moving around a lot more than usual -   Thoughts that you would be better off dead, or of hurting yourself in some way -   Total Score 1   If you checked off any problems, how difficult have these problems made it for you to do your work, take care of things at home, or get along with other people? -       Health Habits and Functional and Cognitive Screening:  Functional & Cognitive Status 2019   Do you have difficulty preparing food and eating? No   Do you  have difficulty bathing yourself, getting dressed or grooming yourself? Yes   Do you have difficulty using the toilet? Yes   Do you have difficulty moving around from place to place? No   Do you have trouble with steps or getting out of a bed or a chair? Yes   In the past year have you fallen or experienced a near fall? Yes   Current Diet Unhealthy Diet   Dental Exam Up to date   Eye Exam Up to date   Exercise (times per week) 0 times per week   Current Exercise Activities Include None   Do you need help using the phone?  No   Are you deaf or do you have serious difficulty hearing?  No   Do you need help with transportation? Yes   Do you need help shopping? Yes   Do you need help preparing meals?  Yes   Do you need help with housework?  Yes   Do you need help with laundry? Yes   Do you need help taking your medications? Yes   Do you need help managing money? Yes   Do you ever drive or ride in a car without wearing a seat belt? No   Have you felt unusual stress, anger or loneliness in the last month? No   Who do you live with? Child   If you need help, do you have trouble finding someone available to you? Yes   Have you been bothered in the last four weeks by sexual problems? No   Do you have difficulty concentrating, remembering or making decisions? Yes           Does the patient have evidence of cognitive impairment? Yes-dementia being managed with neurology.    Asiprin use counseling: Taking ASA appropriately as indicated      Recent Lab Results:       Lab Results   Component Value Date    HGBA1C 6.5 05/24/2019     Lab Results   Component Value Date    CHOL 116 05/10/2018    TRIG 107 05/10/2018    HDL 40 05/10/2018           Age-appropriate Screening Schedule:  Refer to the list below for future screening recommendations based on patient's age, sex and/or medical conditions. Orders for these recommended tests are listed in the plan section. The patient has been provided with a written plan.    Health Maintenance    Topic Date Due   • DIABETIC FOOT EXAM  07/26/2019 (Originally 3/16/2019)   • ZOSTER VACCINE (1 of 2) 07/06/2020 (Originally 9/12/1994)   • MAMMOGRAM  07/06/2019   • DXA SCAN  07/06/2019   • INFLUENZA VACCINE  08/01/2019   • DIABETIC EYE EXAM  11/09/2019   • HEMOGLOBIN A1C  11/24/2019   • LIPID PANEL  06/28/2020   • URINE MICROALBUMIN  06/28/2020   • TDAP/TD VACCINES (2 - Td) 12/15/2027   • PNEUMOCOCCAL VACCINES (65+ LOW/MEDIUM RISK)  Completed   • COLONOSCOPY  Discontinued        Subjective   History of Present Illness    Faye Rod is a 73 y.o. female who presents for an Annual Wellness Visit.    The following portions of the patient's history were reviewed and updated as appropriate: allergies, current medications, past family history, past medical history, past social history, past surgical history and problem list.    Outpatient Medications Prior to Visit   Medication Sig Dispense Refill   • acetaminophen (TYLENOL) 650 MG 8 hr tablet Take 650 mg by mouth 2 (Two) Times a Day As Needed.     • acetaminophen (TYLENOL) 650 MG 8 hr tablet Take 650 mg by mouth 2 (Two) Times a Day As Needed.     • amLODIPine (NORVASC) 2.5 MG tablet TAKE 1 TABLET BY MOUTH  DAILY 90 tablet 3   • aspirin 81 MG EC tablet Take 81 mg by mouth Daily.     • atorvastatin (LIPITOR) 20 MG tablet TAKE 1 TABLET BY MOUTH  DAILY 90 tablet 3   • cholecalciferol (VITAMIN D3) 1000 UNITS tablet Take 1,000 Units by mouth Daily.     • donepezil (ARICEPT) 10 MG tablet Take 1 tablet by mouth 2 (Two) Times a Day. 180 tablet 3   • escitalopram (LEXAPRO) 20 MG tablet TAKE 1 TABLET BY MOUTH  DAILY 90 tablet 3   • Fe Fum-FePoly-FA-Vit C-Vit B3 (INTEGRA F) 125-1 MG capsule Take  by mouth Daily.     • Fe Fum-FePoly-Vit C-Vit B3 (INTEGRA) 62.5-62.5-40-3 MG capsule TAKE ONE CAPSULE BY MOUTH DAILY 30 capsule 4   • glucose blood (MADDY CONTOUR TEST) test strip 1-2 times daily Use as instructed 50 each 12   • glucose blood (CONTOUR NEXT TEST) test strip Use to  test once or twice a day as directed 100 each 11   • ibandronate (BONIVA) 150 MG tablet Take 1 tablet by mouth Every 30 (Thirty) Days. 3 tablet 3   • Insulin Glargine (LANTUS SOLOSTAR) 100 UNIT/ML injection pen 32 units nightly 3 mL 3   • Insulin Pen Needle (B-D UF III MINI PEN NEEDLES) 31G X 5 MM misc USE WITH LANTUS INSULIN 90 each 3   • levothyroxine (SYNTHROID, LEVOTHROID) 112 MCG tablet TAKE 1 TABLET BY MOUTH 5  DAYS PER WEEK 65 tablet 5   • Loratadine 10 MG capsule Take  by mouth Daily.     • memantine (NAMENDA) 10 MG tablet TAKE 1 TABLET BY MOUTH TWO  TIMES DAILY 180 tablet 1   • MYRBETRIQ 25 MG tablet sustained-release 24 hour 24 hr tablet TAKE 1 TABLET BY MOUTH  DAILY 90 tablet 1     No facility-administered medications prior to visit.        Patient Active Problem List   Diagnosis   • Mixed hyperlipidemia   • Essential hypertension   • Pacemaker   • Type 2 diabetes mellitus (CMS/HCC)   • Primary osteoarthritis involving multiple joints   • Non-rheumatic mitral regurgitation   • Hypothyroidism, adult   • Dementia without behavioral disturbance   • Post herpetic neuralgia   • Mild nonproliferative diabetic retinopathy of both eyes without macular edema associated with type 2 diabetes mellitus (CMS/HCC)   • Morbidly obese (CMS/[facility])   • Sick sinus syndrome (CMS/[facility])   • CKD (chronic kidney disease) stage 3, GFR 30-59 ml/min (CMS/[facility])       Advance Care Planning:  Patient has an advance directive - a copy has not been provided. Have asked the patient to send this to us to add to record    Identification of Risk Factors:  Risk factors include: weight , unhealthy diet, cardiovascular risk, inactivity, increased fall risk, cognitive impairment, incontinence and polypharmacy.    Review of Systems    Compared to one year ago, the patient feels her physical health is worse-overall health declining.  Compared to one year ago, the patient feels her mental health is worse-dementia worsening.    Objective     Physical  "Exam    Vitals:    06/28/19 1548   BP: 136/62   Pulse: 68   Resp: 20   Weight: 88.9 kg (196 lb)   Height: 166.4 cm (65.5\")   PainSc:   7   PainLoc: Generalized       Patient's Body mass index is 32.12 kg/m². BMI is above normal parameters. Recommendations include: educational material.      Assessment/Plan   Patient Self-Management and Personalized Health Advice  The patient has been provided with information about: diet, exercise, weight management, prevention of cardiac or vascular disease, the relationship between weight and GERD and fall prevention and preventive services including:   · Fall Risk assessment done, Urinary Incontinence assessment done, Zostavax vaccine (Herpes Zoster).    Visit Diagnoses:    ICD-10-CM ICD-9-CM   1. Medicare annual wellness visit, initial Z00.00 V70.0   2. Mixed hyperlipidemia E78.2 272.2   3. Type 2 diabetes mellitus with stage 3 chronic kidney disease, unspecified whether long term insulin use (CMS/[facility]) E11.22 250.40    N18.3 585.3     Bladder Leakage Questionnaire    Are you currently experiencing issues with bladder leakage?   Yes   Have you had children? Yes   On a scale of 1-10, with 1 being light bladder leakage and 10 being issues where you cannot control your bladder and are running desperately to find a restroom, where do you fit on the scale?       9   Are you currently using bladder incontinence products? Yes     Orders Placed This Encounter   Procedures   • Lipid Panel   • POC Microalbumin       Outpatient Encounter Medications as of 6/28/2019   Medication Sig Dispense Refill   • acetaminophen (TYLENOL) 650 MG 8 hr tablet Take 650 mg by mouth 2 (Two) Times a Day As Needed.     • acetaminophen (TYLENOL) 650 MG 8 hr tablet Take 650 mg by mouth 2 (Two) Times a Day As Needed.     • amLODIPine (NORVASC) 2.5 MG tablet TAKE 1 TABLET BY MOUTH  DAILY 90 tablet 3   • aspirin 81 MG EC tablet Take 81 mg by mouth Daily.     • atorvastatin (LIPITOR) 20 MG tablet TAKE 1 TABLET BY " MOUTH  DAILY 90 tablet 3   • cholecalciferol (VITAMIN D3) 1000 UNITS tablet Take 1,000 Units by mouth Daily.     • donepezil (ARICEPT) 10 MG tablet Take 1 tablet by mouth 2 (Two) Times a Day. 180 tablet 3   • escitalopram (LEXAPRO) 20 MG tablet TAKE 1 TABLET BY MOUTH  DAILY 90 tablet 3   • Fe Fum-FePoly-FA-Vit C-Vit B3 (INTEGRA F) 125-1 MG capsule Take  by mouth Daily.     • Fe Fum-FePoly-Vit C-Vit B3 (INTEGRA) 62.5-62.5-40-3 MG capsule TAKE ONE CAPSULE BY MOUTH DAILY 30 capsule 4   • glucose blood (MADDY CONTOUR TEST) test strip 1-2 times daily Use as instructed 50 each 12   • glucose blood (CONTOUR NEXT TEST) test strip Use to test once or twice a day as directed 100 each 11   • ibandronate (BONIVA) 150 MG tablet Take 1 tablet by mouth Every 30 (Thirty) Days. 3 tablet 3   • Insulin Glargine (LANTUS SOLOSTAR) 100 UNIT/ML injection pen 32 units nightly 3 mL 3   • Insulin Pen Needle (B-D UF III MINI PEN NEEDLES) 31G X 5 MM misc USE WITH LANTUS INSULIN 90 each 3   • levothyroxine (SYNTHROID, LEVOTHROID) 112 MCG tablet TAKE 1 TABLET BY MOUTH 5  DAYS PER WEEK 65 tablet 5   • Loratadine 10 MG capsule Take  by mouth Daily.     • memantine (NAMENDA) 10 MG tablet TAKE 1 TABLET BY MOUTH TWO  TIMES DAILY 180 tablet 1   • MYRBETRIQ 25 MG tablet sustained-release 24 hour 24 hr tablet TAKE 1 TABLET BY MOUTH  DAILY 90 tablet 1     No facility-administered encounter medications on file as of 6/28/2019.        Reviewed use of high risk medication in the elderly: yes  Reviewed for potential of harmful drug interactions in the elderly: yes    Follow Up:  Return if symptoms worsen or fail to improve, for F/U for Subsequent Wellness in one year & a day.     An After Visit Summary and PPPS with all of these plans were given to the patient.

## 2019-06-28 NOTE — PATIENT INSTRUCTIONS
Fall Prevention in the Home, Adult  Falls can cause injuries. They can happen to people of all ages. There are many things you can do to make your home safe and to help prevent falls. Ask for help when making these changes, if needed.  What actions can I take to prevent falls?  General Instructions  · Use good lighting in all rooms. Replace any light bulbs that burn out.  · Turn on the lights when you go into a dark area. Use night-lights.  · Keep items that you use often in easy-to-reach places. Lower the shelves around your home if necessary.  · Set up your furniture so you have a clear path. Avoid moving your furniture around.  · Do not have throw rugs and other things on the floor that can make you trip.  · Avoid walking on wet floors.  · If any of your floors are uneven, fix them.  · Add color or contrast paint or tape to clearly theresa and help you see:  ? Any grab bars or handrails.  ? First and last steps of stairways.  ? Where the edge of each step is.  · If you use a stepladder:  ? Make sure that it is fully opened. Do not climb a closed stepladder.  ? Make sure that both sides of the stepladder are locked into place.  ? Ask someone to hold the stepladder for you while you use it.  · If there are any pets around you, be aware of where they are.  What can I do in the bathroom?  · Keep the floor dry. Clean up any water that spills onto the floor as soon as it happens.  · Remove soap buildup in the tub or shower regularly.  · Use non-skid mats or decals on the floor of the tub or shower.  · Attach bath mats securely with double-sided, non-slip rug tape.  · If you need to sit down in the shower, use a plastic, non-slip stool.  · Install grab bars by the toilet and in the tub and shower. Do not use towel bars as grab bars.  What can I do in the bedroom?  · Make sure that you have a light by your bed that is easy to reach.  · Do not use any sheets or blankets that are too big for your bed. They should not hang  down onto the floor.  · Have a firm chair that has side arms. You can use this for support while you get dressed.  What can I do in the kitchen?  · Clean up any spills right away.  · If you need to reach something above you, use a strong step stool that has a grab bar.  · Keep electrical cords out of the way.  · Do not use floor polish or wax that makes floors slippery. If you must use wax, use non-skid floor wax.  What can I do with my stairs?  · Do not leave any items on the stairs.  · Make sure that you have a light switch at the top of the stairs and the bottom of the stairs. If you do not have them, ask someone to add them for you.  · Make sure that there are handrails on both sides of the stairs, and use them. Fix handrails that are broken or loose. Make sure that handrails are as long as the stairways.  · Install non-slip stair treads on all stairs in your home.  · Avoid having throw rugs at the top or bottom of the stairs. If you do have throw rugs, attach them to the floor with carpet tape.  · Choose a carpet that does not hide the edge of the steps on the stairway.  · Check any carpeting to make sure that it is firmly attached to the stairs. Fix any carpet that is loose or worn.  What can I do on the outside of my home?  · Use bright outdoor lighting.  · Regularly fix the edges of walkways and driveways and fix any cracks.  · Remove anything that might make you trip as you walk through a door, such as a raised step or threshold.  · Trim any bushes or trees on the path to your home.  · Regularly check to see if handrails are loose or broken. Make sure that both sides of any steps have handrails.  · Install guardrails along the edges of any raised decks and porches.  · Clear walking paths of anything that might make someone trip, such as tools or rocks.  · Have any leaves, snow, or ice cleared regularly.  · Use sand or salt on walking paths during winter.  · Clean up any spills in your garage right away.  This includes grease or oil spills.  What other actions can I take?  · Wear shoes that:  ? Have a low heel. Do not wear high heels.  ? Have rubber bottoms.  ? Are comfortable and fit you well.  ? Are closed at the toe. Do not wear open-toe sandals.  · Use tools that help you move around (mobility aids) if they are needed. These include:  ? Canes.  ? Walkers.  ? Scooters.  ? Crutches.  · Review your medicines with your doctor. Some medicines can make you feel dizzy. This can increase your chance of falling.  Ask your doctor what other things you can do to help prevent falls.  Where to find more information  · Centers for Disease Control and Prevention, STEADI: https://cdc.gov  · National [employer] Aging: https://gr6cxzy.gadiel.nih.gov  Contact a doctor if:  · You are afraid of falling at home.  · You feel weak, drowsy, or dizzy at home.  · You fall at home.  Summary  · There are many simple things that you can do to make your home safe and to help prevent falls.  · Ways to make your home safe include removing tripping hazards and installing grab bars in the bathroom.  · Ask for help when making these changes in your home.  This information is not intended to replace advice given to you by your health care provider. Make sure you discuss any questions you have with your health care provider.  Document Released: 10/14/2010 Document Revised: 08/02/2018 Document Reviewed: 08/02/2018  [employer] Interactive Patient Education © 2019 [employer]

## 2019-06-29 LAB
CHOLEST SERPL-MCNC: 117 MG/DL (ref 0–200)
HDLC SERPL-MCNC: 50 MG/DL (ref 40–60)
LDLC SERPL CALC-MCNC: 39 MG/DL (ref 0–100)
LDLC/HDLC SERPL: 0.78 {RATIO}
TRIGL SERPL-MCNC: 141 MG/DL (ref 0–150)
VLDLC SERPL-MCNC: 28.2 MG/DL (ref 5–40)

## 2019-07-09 ENCOUNTER — CLINICAL SUPPORT NO REQUIREMENTS (OUTPATIENT)
Dept: CARDIOLOGY | Facility: CLINIC | Age: 74
End: 2019-07-09

## 2019-07-09 ENCOUNTER — TELEPHONE (OUTPATIENT)
Dept: CARDIOLOGY | Facility: CLINIC | Age: 74
End: 2019-07-09

## 2019-07-09 DIAGNOSIS — I49.5 SICK SINUS SYNDROME (HCC): Primary | ICD-10-CM

## 2019-08-09 ENCOUNTER — OFFICE VISIT (OUTPATIENT)
Dept: NEUROLOGY | Facility: CLINIC | Age: 74
End: 2019-08-09

## 2019-08-09 VITALS
DIASTOLIC BLOOD PRESSURE: 64 MMHG | SYSTOLIC BLOOD PRESSURE: 124 MMHG | HEIGHT: 66 IN | BODY MASS INDEX: 31.5 KG/M2 | WEIGHT: 195.99 LBS

## 2019-08-09 DIAGNOSIS — F03.90 DEMENTIA WITHOUT BEHAVIORAL DISTURBANCE, UNSPECIFIED DEMENTIA TYPE: Primary | ICD-10-CM

## 2019-08-09 PROCEDURE — 99214 OFFICE O/P EST MOD 30 MIN: CPT | Performed by: PHYSICIAN ASSISTANT

## 2019-08-09 NOTE — PROGRESS NOTES
"Subjective     Chief Complaint: memory loss      History of Present Illness   Faye Rod is a 73 y.o. female who returns to clinic today with a history of Dementia . Her family  has noted symptoms since approximately 2015 marked initially by forgetfulness. This has varied over time, with significant worsening noted in 9/16 when hospitalized with renal insufficiency. Additional associated symptoms have included impairments in essentially all spheres of cognition. She has had associated  symptoms of hallucinations previously, though now only occasional delusions. No modifying factors or circumstances have been identified. She is currently residing at home with her daughter in [place].       She has also noted a resting tremor in her left hand since 2016. This has somewhat worsened over time. She notes associated balance impairment and a shuffling gait as well as bradykinesia.      Prior evaluation has included screening blood work and a head CT within the last year which were notable for only mild increases in BUN and creatinine. She is currently taking donepezil and memantine.    Today: Since her last visit in 2/19, she feels essentially unchanged. Her family has noted a cognitive decline. Her tremor has worsened as well. She continues to note occasional hallucinations, but this has not worsened.      I have reviewed and confirmed the past family, social and medical history as accurate on 8/9/19.     Review of Systems   Constitutional: Negative.    HENT: Negative.    Eyes: Negative.    Respiratory: Negative.    Cardiovascular: Negative.    Gastrointestinal: Negative.    Endocrine: Negative.    Genitourinary: Negative.    Musculoskeletal: Negative.    Skin: Negative.    Allergic/Immunologic: Negative.    Neurological: Positive for tremors.        Memory loss     Hematological: Negative.    Psychiatric/Behavioral: Positive for hallucinations.       Objective     /64   Ht 166.4 cm (65.51\")   Wt 88.9 " kg (195 lb 15.8 oz)   BMI 32.11 kg/m²     General appearance today is normal.     Physical Exam   Neurological: She has normal strength. She has a normal Finger-Nose-Finger Test.   Psychiatric: Her speech is normal.        Neurologic Exam     Mental Status   Oriented to person.   Oriented to place.   Disoriented to time. Oriented to month and season.   Registration: recalls 3 of 3 objects. Recall of objects at 5 minutes: 0/3 recall. Follows 3 step commands.   Attention: 2/5 recall.   Speech: speech is normal   Level of consciousness: alert  Able to name object. Able to read. Able to repeat. Unable to write. Normal comprehension.     Cranial Nerves   Cranial nerves II through XII intact.     Motor Exam   Muscle bulk: normal  Overall muscle tone: normal    Strength   Strength 5/5 throughout.     Sensory Exam   Light touch normal.     Gait, Coordination, and Reflexes     Coordination   Finger to nose coordination: normal    Tremor   Resting tremor: absent        Results  MMSE=18      Assessment/Plan   Faye was seen today for follow-up.    Diagnoses and all orders for this visit:    Dementia without behavioral disturbance, unspecified dementia type          Discussion/Summary   Faye Rod returns to clinic today with a history of Dementia (possible DLB). I again reviewed her current status and treatment options. I reviewed this diagnosis, along with treatment options including a trial of Sinemet or a trial of Seroquel for hallucinations (along with black box warning). After discussion it was elected to continue on donepezil and Namenda unchanged. I have also made a referral to home health for PT given her balance impairment. She will then follow up in 6 months, or sooner if needed.   I spent 25 minutes face to face with the patient and family with 15 minutes spent on discussing diagnosis, evaluation, current status, treatment options and management as discussed above.       As part of this visit I obtained  additional history from the family which is incorporated in the HPI.      Sarai Mohan PA-C

## 2019-08-15 ENCOUNTER — TELEPHONE (OUTPATIENT)
Dept: NEUROLOGY | Facility: CLINIC | Age: 74
End: 2019-08-15

## 2019-08-15 NOTE — TELEPHONE ENCOUNTER
----- Message from Yary Ortega CMA sent at 8/15/2019  9:01 AM EDT -----  Regarding: FW: Sarai - [facility]      ----- Message -----  From: Donte Carr  Sent: 8/12/2019   1:11 PM  To: [facility]  Subject: Sarai - [facility] called to relay a message to Sarai and her staff about this patient. [facility] spoke with patient's daughter. Patient's daughter stated that they could only do PT on Friday afternoons. Patient's daughter would like to start on 9/6/19 as they have appointments on the previous Friday afternoons.     [facility] stated they will hold this referral until 9/6/2019.

## 2019-08-23 ENCOUNTER — OFFICE VISIT (OUTPATIENT)
Dept: CARDIOLOGY | Facility: CLINIC | Age: 74
End: 2019-08-23

## 2019-08-23 VITALS
OXYGEN SATURATION: 96 % | HEIGHT: 62 IN | BODY MASS INDEX: 35.7 KG/M2 | HEART RATE: 65 BPM | WEIGHT: 194 LBS | SYSTOLIC BLOOD PRESSURE: 114 MMHG | DIASTOLIC BLOOD PRESSURE: 56 MMHG

## 2019-08-23 DIAGNOSIS — Z79.4 TYPE 2 DIABETES MELLITUS WITH HYPERGLYCEMIA, WITH LONG-TERM CURRENT USE OF INSULIN (HCC): ICD-10-CM

## 2019-08-23 DIAGNOSIS — I10 ESSENTIAL HYPERTENSION: ICD-10-CM

## 2019-08-23 DIAGNOSIS — E11.65 TYPE 2 DIABETES MELLITUS WITH HYPERGLYCEMIA, WITH LONG-TERM CURRENT USE OF INSULIN (HCC): ICD-10-CM

## 2019-08-23 DIAGNOSIS — I50.30 DIASTOLIC CONGESTIVE HEART FAILURE, UNSPECIFIED HF CHRONICITY (HCC): Primary | ICD-10-CM

## 2019-08-23 DIAGNOSIS — I44.2 AV BLOCK, COMPLETE (HCC): ICD-10-CM

## 2019-08-23 PROCEDURE — 99214 OFFICE O/P EST MOD 30 MIN: CPT | Performed by: INTERNAL MEDICINE

## 2019-08-23 NOTE — PROGRESS NOTES
[facility]  Office Progress Note  Faye Rod  1945      Visit Date: 08/23/19    PCP: Telly Contreras, SHALONDA  [address]  [place] [id number]    IDENTIFICATION: A 73 y.o. female     PROBLEM LIST:   1. Diastolic Heart failure  1. Echocardiogram, [place], 2014.  Impaired relaxation.  Mild AI, TR with RVSP 38 mmHg.  Trace MR  2. Echocardiogram, [facility], 2018.  EF 50-55%, mild TR, moderate AI.    2. Arrhythmia ?  1. ST Martín PPM Model # [id number]  3. A flutter  1. Documented July 21, 2017 for 21 minutes, no other episodes  4. Hypertension  5. Hyperlipidemia  1. 5/18 116/107/40/56  6. Diabetes Mellitus  1. 9/8/17 A1c 8.1  2. 3/18 7.3  7. CKD Stage III; followed by Dr Casarez  8. Dementia      Chief Complaint   Patient presents with   • Hypertension       Allergies  No Known Allergies    Current Medications    Current Outpatient Medications:   •  acetaminophen (TYLENOL) 650 MG 8 hr tablet, Take 650 mg by mouth 2 (Two) Times a Day As Needed., Disp: , Rfl:   •  acetaminophen (TYLENOL) 650 MG 8 hr tablet, Take 650 mg by mouth 2 (Two) Times a Day As Needed., Disp: , Rfl:   •  amLODIPine (NORVASC) 2.5 MG tablet, TAKE 1 TABLET BY MOUTH  DAILY, Disp: 90 tablet, Rfl: 3  •  aspirin 81 MG EC tablet, Take 81 mg by mouth Daily., Disp: , Rfl:   •  atorvastatin (LIPITOR) 20 MG tablet, TAKE 1 TABLET BY MOUTH  DAILY, Disp: 90 tablet, Rfl: 3  •  cholecalciferol (VITAMIN D3) 1000 UNITS tablet, Take 1,000 Units by mouth Daily., Disp: , Rfl:   •  donepezil (ARICEPT) 10 MG tablet, Take 1 tablet by mouth 2 (Two) Times a Day., Disp: 180 tablet, Rfl: 3  •  escitalopram (LEXAPRO) 20 MG tablet, TAKE 1 TABLET BY MOUTH  DAILY, Disp: 90 tablet, Rfl: 3  •  Fe Fum-FePoly-Vit C-Vit B3 (INTEGRA) 62.5-62.5-40-3 MG capsule, TAKE ONE CAPSULE BY MOUTH DAILY, Disp: 30 capsule, Rfl: 4  •  glucose blood (MADDY CONTOUR TEST) test strip, 1-2 times daily Use as instructed, Disp: 50 each, Rfl: 12  •  glucose  "blood (CONTOUR NEXT TEST) test strip, Use to test once or twice a day as directed, Disp: 100 each, Rfl: 11  •  ibandronate (BONIVA) 150 MG tablet, Take 1 tablet by mouth Every 30 (Thirty) Days., Disp: 3 tablet, Rfl: 3  •  Insulin Glargine (LANTUS SOLOSTAR) 100 UNIT/ML injection pen, 32 units nightly, Disp: 3 mL, Rfl: 3  •  Insulin Pen Needle (B-D UF III MINI PEN NEEDLES) 31G X 5 MM misc, USE WITH LANTUS INSULIN, Disp: 90 each, Rfl: 3  •  levothyroxine (SYNTHROID, LEVOTHROID) 112 MCG tablet, TAKE 1 TABLET BY MOUTH 5  DAYS PER WEEK, Disp: 65 tablet, Rfl: 5  •  Loratadine 10 MG capsule, Take  by mouth Daily., Disp: , Rfl:   •  memantine (NAMENDA) 10 MG tablet, TAKE 1 TABLET BY MOUTH TWO  TIMES DAILY, Disp: 180 tablet, Rfl: 1  •  MYRBETRIQ 25 MG tablet sustained-release 24 hour 24 hr tablet, TAKE 1 TABLET BY MOUTH  DAILY, Disp: 90 tablet, Rfl: 1      History of Present Illness   Faye Rod is a 73 y.o. year old female here for follow up.  Largely debilitated and cared for by her daughter.  Main concern is her L arm tremor.  No chest pain or issues.      OBJECTIVE:  Vitals:    08/23/19 1511   BP: 114/56   BP Location: Right arm   Patient Position: Sitting   Pulse: 65   SpO2: 96%   Weight: 88 kg (194 lb)   Height: 157.5 cm (62\")     Physical Exam   Constitutional: She appears well-developed and well-nourished.   Elderly frail L arm tremor   Neck: Normal range of motion. Neck supple. No hepatojugular reflux and no JVD present. Carotid bruit is not present. No tracheal deviation present. No thyromegaly present.   Cardiovascular: Normal rate, regular rhythm, S1 normal, S2 normal, intact distal pulses and normal pulses. PMI is not displaced. Exam reveals no gallop, no distant heart sounds, no friction rub, no midsystolic click and no opening snap.   No murmur heard.  Pulses:       Radial pulses are 2+ on the right side, and 2+ on the left side.        Dorsalis pedis pulses are 2+ on the right side, and 2+ on the left " side.        Posterior tibial pulses are 2+ on the right side, and 2+ on the left side.   Pulmonary/Chest: Effort normal and breath sounds normal. She has no wheezes. She has no rales.   Abdominal: Soft. Bowel sounds are normal. She exhibits no mass. There is no tenderness. There is no guarding.   Musculoskeletal: She exhibits edema.       Diagnostic Data:  Procedures  Device check   Acceptable threshold and impedence  1 ms > 5 hrs 7.9/19  > 7 yrs generator    ASSESSMENT:   Diagnosis Plan   1. Diastolic congestive heart failure, unspecified HF chronicity (CMS/HCC)     2. AV block, complete (CMS/HCC)     3. Essential hypertension     4. Type 2 diabetes mellitus with hyperglycemia, with long-term current use of insulin (CMS/HCC)         PLAN:  CHF diastolic continue current rx NYHA class 3     AV block acceptable pacer fxn    htn controlled amlod    IRDM followed by pcp    Telly Contreras APRN, thank you for referring Ms. Rod for evaluation.  I have forwarded my electronically generated recommendations to you for review.  Please do not hesitate to call with any questions.      Faisal Vang MD, [facility]

## 2019-09-05 ENCOUNTER — TELEPHONE (OUTPATIENT)
Dept: NEUROLOGY | Facility: CLINIC | Age: 74
End: 2019-09-05

## 2019-09-05 NOTE — TELEPHONE ENCOUNTER
Yes. Shannan said that she had been in contact with them and the sister told her yesterday that pt isn't going to let Home Health come help her.

## 2019-09-22 NOTE — PROGRESS NOTES
Adult New Patient Visit:  Patient Care Team:  Brenna Luciano MD as PCP - General (Internal Medicine)  Telly Contreras APRN as PCP - Claims Attributed  Faisal Vang MD as Consulting Physician (Cardiology)    Chief Complaint   Patient presents with   • Essential hypertension       Faye Rod is a 74 y.o. female who presents to [facility] care. Previous PCP was Telly LIZ who has since left his office.    Last MCW 6/28/19    Here with daughter    HPI Issues discussed today:    1. HTN:  On amlodipine 2.5mg daily.  Does not check BP at home.  Denies any headache, chest pain, shortness of breath, PND, lower extremity edema.    2. HLD:  On lipitor 20mg daily and asa 81mg daily.  Tolerating well.  No myalgias.    Lab Results   Component Value Date    CHOL 117 06/28/2019    TRIG 141 06/28/2019    HDL 50 06/28/2019    LDL 39 06/28/2019     3. DM2:  On Lantus 32 units qHS.  Does not check blood sugars at home frequently.    CMP:  Lab Results   Component Value Date    BUN 27 (H) 01/25/2019    CREATININE 1.39 (H) 01/25/2019    EGFRIFNONA 37 (L) 01/25/2019    BCR 19.4 01/25/2019     01/25/2019    K 4.3 01/25/2019    CO2 29.0 01/25/2019    CALCIUM 9.3 01/25/2019    PROTENTOTREF 6.6 05/18/2017    ALBUMIN 4.17 01/25/2019    LABGLOBREF 3.0 05/18/2017    LABIL2 1.2 05/18/2017    BILITOT 0.5 01/25/2019    ALKPHOS 68 01/25/2019    AST 25 01/25/2019    ALT 23 01/25/2019     HbA1c:  Lab Results   Component Value Date    HGBA1C 6.5 05/24/2019    HGBA1C 7.2 01/25/2019     Microalbumin:  Lab Results   Component Value Date    MICROALBUR 12.5 05/10/2018     Last eye exam: 11/9/18  Last foot exam: 2/1/19  Last microalbumin: 6/28/19    4. Hypothyroidism:  On levothyroxine 112mcg daily 5d per week.  Dose has been this way due to difficulties in normalizing thyroid function testing.    TSH   Date Value Ref Range Status   05/24/2019 2.210 0.270 - 4.200 mIU/mL Final     Free T4   Date Value Ref Range Status   05/24/2019  1.11 0.93 - 1.70 ng/dL Final     5. Dementia/depression:  On namenda 10mg BID, aricept 10mg BID, lexapro 20mg daily. Followed by neuro. Next appt with Dr. Hamm on 2/8/20.  Requires daughter's assistance for bathing.  Lives with daughter.    6. Osteoporosis:  On Vitamin D 1000 untis daily, Boniva 150mg every 30 days.  Has been on this for several years.  DEXA scan as below.    7. Chronic diastolic CHF/AV block s/p PPM:  Follows with Dr. Vang. Last saw 8/23. Stable. Next appt 6/5/20.    8. CKD 3:  Follows with Dr. Bernal.  Does avoid NSAIDs.  Lab Results   Component Value Date    GLUCOSE 111 (H) 01/25/2019    BUN 27 (H) 01/25/2019    CREATININE 1.39 (H) 01/25/2019    EGFRIFNONA 37 (L) 01/25/2019    BCR 19.4 01/25/2019    K 4.3 01/25/2019    CO2 29.0 01/25/2019    CALCIUM 9.3 01/25/2019    PROTENTOTREF 6.6 05/18/2017    ALBUMIN 4.17 01/25/2019    LABIL2 1.2 05/18/2017    AST 25 01/25/2019    ALT 23 01/25/2019     9.  Overactive bladder:  Managed with Myrbetriq ER 25mg daily.    Review of Systems   Constitutional: Negative for activity change, appetite change and fever.   HENT: Negative for congestion, sneezing and sore throat.    Eyes: Negative for discharge and visual disturbance.   Respiratory: Negative for cough and shortness of breath.    Cardiovascular: Negative for chest pain and palpitations.   Gastrointestinal: Negative for abdominal distention, abdominal pain, blood in stool, constipation, nausea and vomiting.   Endocrine: Negative for cold intolerance and heat intolerance.   Genitourinary: Negative for dysuria.   Musculoskeletal: Negative for neck stiffness.   Skin: Negative for rash.   Allergic/Immunologic: Negative for environmental allergies and food allergies.   Neurological: Positive for tremors (LUE, chronic) and memory problem. Negative for headache.   Hematological: Negative for adenopathy.   Psychiatric/Behavioral: Negative for depressed mood.        History  Past Medical History:   Diagnosis Date    • Anemia    • Arthritis    • Colon polyp    • Diabetes mellitus (CMS/HCC)    • Disease of thyroid gland    • Heart disease    • Hyperlipidemia    • Hypertension    • Hypothyroidism    • Pacemaker    • Shortness of breath 2/3/2017      Past Surgical History:   Procedure Laterality Date   • CATARACT EXTRACTION Bilateral    • CHOLECYSTECTOMY     • PACEMAKER IMPLANTATION        No Known Allergies   Family History   Problem Relation Age of Onset   • Mental illness Mother    • Stroke Mother    • Arthritis Father    • Diabetes Father    • Heart attack Father    • Kidney disease Brother    • Thyroid disease Brother    • Hypertension Daughter    • Obesity Daughter       Social History     Socioeconomic History   • Marital status:      Spouse name: Not on file   • Number of children: Not on file   • Years of education: Not on file   • Highest education level: Not on file   Tobacco Use   • Smoking status: Never Smoker   • Smokeless tobacco: Never Used   Substance and Sexual Activity   • Alcohol use: No   • Drug use: No   • Sexual activity: Defer        Current Outpatient Medications:   •  acetaminophen (TYLENOL) 650 MG 8 hr tablet, Take 650 mg by mouth 2 (Two) Times a Day As Needed., Disp: , Rfl:   •  amLODIPine (NORVASC) 2.5 MG tablet, TAKE 1 TABLET BY MOUTH  DAILY, Disp: 90 tablet, Rfl: 3  •  aspirin 81 MG EC tablet, Take 81 mg by mouth Daily., Disp: , Rfl:   •  atorvastatin (LIPITOR) 20 MG tablet, TAKE 1 TABLET BY MOUTH  DAILY, Disp: 90 tablet, Rfl: 3  •  cholecalciferol (VITAMIN D3) 1000 UNITS tablet, Take 1,000 Units by mouth Daily., Disp: , Rfl:   •  donepezil (ARICEPT) 10 MG tablet, Take 1 tablet by mouth 2 (Two) Times a Day., Disp: 180 tablet, Rfl: 3  •  escitalopram (LEXAPRO) 20 MG tablet, TAKE 1 TABLET BY MOUTH  DAILY, Disp: 90 tablet, Rfl: 3  •  Fe Fum-FePoly-Vit C-Vit B3 (INTEGRA) 62.5-62.5-40-3 MG capsule, TAKE ONE CAPSULE BY MOUTH DAILY, Disp: 30 capsule, Rfl: 4  •  glucose blood (MADDY CONTOUR TEST)  test strip, 1-2 times daily Use as instructed, Disp: 50 each, Rfl: 12  •  glucose blood (CONTOUR NEXT TEST) test strip, Use to test once or twice a day as directed, Disp: 100 each, Rfl: 11  •  ibandronate (BONIVA) 150 MG tablet, Take 1 tablet by mouth Every 30 (Thirty) Days., Disp: 3 tablet, Rfl: 1  •  Insulin Glargine (LANTUS SOLOSTAR) 100 UNIT/ML injection pen, 32 units nightly, Disp: 3 mL, Rfl: 3  •  Insulin Pen Needle (B-D UF III MINI PEN NEEDLES) 31G X 5 MM misc, USE WITH LANTUS INSULIN, Disp: 90 each, Rfl: 3  •  levothyroxine (SYNTHROID, LEVOTHROID) 112 MCG tablet, TAKE 1 TABLET BY MOUTH 5  DAYS PER WEEK, Disp: 65 tablet, Rfl: 5  •  Loratadine 10 MG capsule, Take  by mouth Daily., Disp: , Rfl:   •  memantine (NAMENDA) 10 MG tablet, TAKE 1 TABLET BY MOUTH TWO  TIMES DAILY, Disp: 180 tablet, Rfl: 1  •  Mirabegron ER (MYRBETRIQ) 25 MG tablet sustained-release 24 hour 24 hr tablet, Take 1 tablet by mouth Daily., Disp: 90 tablet, Rfl: 1    Health Maintenance   Topic Date Due   • MAMMOGRAM  07/06/2019   • DXA SCAN  07/06/2019   • INFLUENZA VACCINE  08/01/2019   • ZOSTER VACCINE (1 of 2) 07/06/2020 (Originally 9/[birth year]/1994)   • DIABETIC EYE EXAM  11/09/2019   • HEMOGLOBIN A1C  11/24/2019   • DIABETIC FOOT EXAM  02/01/2020   • MEDICARE ANNUAL WELLNESS  06/28/2020   • LIPID PANEL  06/28/2020   • URINE MICROALBUMIN  06/28/2020   • TDAP/TD VACCINES (2 - Td) 12/15/2027   • PNEUMOCOCCAL VACCINES (65+ LOW/MEDIUM RISK)  Completed   • COLONOSCOPY  Discontinued       Immunizations  Td/Tdap(Booster Q 10 yrs):  UTD  Flu (Yearly): To get at pharmacy and notify as per daughter/s preference  Pneumovax (1 yr after Prevnar):  UTD  [id number] (1 yr after Pneumo):  UTD  Shringrix: Advised.  To get at pharmacy and notify us.  Immunization History   Administered Date(s) Administered   • Flu Vaccine High Dose PF 65YR+ 09/08/2017, 10/26/2018   • Influenza Whole 09/20/2012, 09/25/2013, 10/08/2014, 11/19/2015   • Pneumococcal Conjugate 13-Valent  (PCV13) 2015, 2016   • Pneumococcal Polysaccharide (PPSV23) 2009   • Tdap 12/15/2017       Hemoglobin A1C   Date Value Ref Range Status   2019 6.5 % Final     Microalbumin, Urine   Date Value Ref Range Status   05/10/2018 12.5 Not Estab. ug/mL Final     Lab Results   Component Value Date    CHOL 117 2019    TRIG 141 2019    HDL 50 2019    LDL 39 2019       Colorectal Screening:  Aged out  Pap:  Aged out  Mammogram: 17; BI-RADS 2  PSA(Over age 50):  N/A  US Aorta (For male smokers, age 65):  N/A  CT for Smoker (Age 55-75, 30pk yr):  N/A  Bone Density/DEXA:  17; osteoporosis left hip, ostetopenia right hip  Hep C ( 4103-6324): N/A    Vitals:    19 1508   BP: 118/62   Pulse: 68   Resp: 22   Temp: 98.4 °F (36.9 °C)   TempSrc: Temporal   Weight: 87.1 kg (192 lb)         Physical Exam   Constitutional: She appears well-developed and well-nourished. No distress.   HENT:   Head: Normocephalic and atraumatic.   Right Ear: External ear normal.   Left Ear: External ear normal.   Eyes: Right eye exhibits no discharge. Left eye exhibits no discharge.   Neck: Normal range of motion. Neck supple.   Cardiovascular: Normal rate, regular rhythm and normal heart sounds. Exam reveals no gallop and no friction rub.   No murmur heard.  Pulmonary/Chest: Effort normal and breath sounds normal. No stridor. No respiratory distress. She has no wheezes. She has no rales.   Abdominal: Soft. Bowel sounds are normal. She exhibits no distension and no mass. There is no tenderness. There is no rebound and no guarding.   Musculoskeletal:   Trace pitting edema to b/l ankles   Lymphadenopathy:     She has no cervical adenopathy.   Neurological: She is alert.   +slight contracture to LUE with resting tremor, at baseline per pt/daughter   Skin: Skin is warm and dry. Capillary refill takes less than 2 seconds. She is not diaphoretic.   Vitals reviewed.       Assessment and Plan: 74 y.o.  female here to establish care  Mixed hyperlipidemia  Stable.  Continue Lipitor 20 mill grams daily and aspirin 81 mg daily.    Essential hypertension  Stable.  Well-controlled on amlodipine 2.5 mg daily.  Consider periodic home BP checks.    Sick sinus syndrome (CMS/[facility])  Status post PPM.  Also with history of chronic diastolic heart failure.  Keep scheduled follow-up with Dr. Vang.    Type 2 diabetes mellitus (CMS/[facility])  Diabetes is well controlled per A1c 5/24.  Patient is managed on Lantus 32 units subcu nightly.  Reviewed the risks of taking insulin without monitoring blood sugars at minimum once daily.  This includes hypoglycemia and hyperglycemia both of which could potentially be life-threatening and result in death if not monitored appropriately.  Have encouraged to check blood sugars more frequently.  Otherwise microalbumin, foot and eye exams are up-to-date.    Hypothyroidism, adult  Stable.  On levothyroxine 112 mcg 5 days/week with normal thyroid function testing 5/24.  Consider repeating at next visit.    Dementia without behavioral disturbance  Stable.  On Namenda 10 mg twice daily, Aricept 10 mg twice daily, Lexapro 20 mg daily.  Keep scheduled follow-up with neurology.  Continues to require assistance with ADLs and subsequently lives with her daughter.    CKD (chronic kidney disease) stage 3, GFR 30-59 ml/min (CMS/[facility])  Stable CKD 3, followed by Dr. Bernal [facility] every 6 months.  Continue to avoid NSAIDs and other nephrotoxic medications.  Keep diabetes and hypertension under control to minimize risk factors.    Overactive bladder  Stable. Managed with Myrbetriq ER 25mg daily.      Healthcare Maintenance:   1.  To get vaccines as discussed above and notify us when completed so we can update her chart  2.  Consider ordering DEXA scan at next visit.    Return in about 3 months (around 12/27/2019) for Follow-up.    Brenna Luciano MD  9/27/2019

## 2019-09-24 ENCOUNTER — CLINICAL SUPPORT NO REQUIREMENTS (OUTPATIENT)
Dept: CARDIOLOGY | Facility: CLINIC | Age: 74
End: 2019-09-24

## 2019-09-24 DIAGNOSIS — I49.5 SICK SINUS SYNDROME (HCC): ICD-10-CM

## 2019-09-24 PROCEDURE — 93296 REM INTERROG EVL PM/IDS: CPT | Performed by: INTERNAL MEDICINE

## 2019-09-24 PROCEDURE — 93294 REM INTERROG EVL PM/LDLS PM: CPT | Performed by: INTERNAL MEDICINE

## 2019-09-27 ENCOUNTER — OFFICE VISIT (OUTPATIENT)
Dept: INTERNAL MEDICINE | Facility: CLINIC | Age: 74
End: 2019-09-27

## 2019-09-27 VITALS
SYSTOLIC BLOOD PRESSURE: 118 MMHG | DIASTOLIC BLOOD PRESSURE: 62 MMHG | WEIGHT: 192 LBS | HEART RATE: 68 BPM | RESPIRATION RATE: 22 BRPM | BODY MASS INDEX: 35.12 KG/M2 | TEMPERATURE: 98.4 F

## 2019-09-27 DIAGNOSIS — N18.30 CKD (CHRONIC KIDNEY DISEASE) STAGE 3, GFR 30-59 ML/MIN (HCC): ICD-10-CM

## 2019-09-27 DIAGNOSIS — M85.80 OSTEOPENIA, UNSPECIFIED LOCATION: ICD-10-CM

## 2019-09-27 DIAGNOSIS — E03.9 HYPOTHYROIDISM, ADULT: ICD-10-CM

## 2019-09-27 DIAGNOSIS — N32.81 OVERACTIVE BLADDER: ICD-10-CM

## 2019-09-27 DIAGNOSIS — E11.00 TYPE 2 DIABETES MELLITUS WITH HYPEROSMOLARITY WITHOUT COMA, WITH LONG-TERM CURRENT USE OF INSULIN (HCC): ICD-10-CM

## 2019-09-27 DIAGNOSIS — I49.5 SICK SINUS SYNDROME (HCC): ICD-10-CM

## 2019-09-27 DIAGNOSIS — Z78.0 POST-MENOPAUSAL: ICD-10-CM

## 2019-09-27 DIAGNOSIS — I10 ESSENTIAL HYPERTENSION: ICD-10-CM

## 2019-09-27 DIAGNOSIS — Z79.4 TYPE 2 DIABETES MELLITUS WITH HYPEROSMOLARITY WITHOUT COMA, WITH LONG-TERM CURRENT USE OF INSULIN (HCC): ICD-10-CM

## 2019-09-27 DIAGNOSIS — F03.90 DEMENTIA WITHOUT BEHAVIORAL DISTURBANCE, UNSPECIFIED DEMENTIA TYPE: ICD-10-CM

## 2019-09-27 DIAGNOSIS — E78.2 MIXED HYPERLIPIDEMIA: Primary | ICD-10-CM

## 2019-09-27 DIAGNOSIS — N18.30 CHRONIC KIDNEY DISEASE, STAGE III (MODERATE) (HCC): ICD-10-CM

## 2019-09-27 PROBLEM — B02.29 POST HERPETIC NEURALGIA: Status: RESOLVED | Noted: 2017-09-10 | Resolved: 2019-09-27

## 2019-09-27 PROCEDURE — 99214 OFFICE O/P EST MOD 30 MIN: CPT | Performed by: INTERNAL MEDICINE

## 2019-09-27 RX ORDER — IBANDRONATE SODIUM 150 MG/1
150 TABLET, FILM COATED ORAL
Qty: 3 TABLET | Refills: 1 | Status: SHIPPED | OUTPATIENT
Start: 2019-09-27 | End: 2021-01-01 | Stop reason: SDUPTHER

## 2019-09-27 NOTE — ASSESSMENT & PLAN NOTE
Diabetes is well controlled per A1c 5/24.  Patient is managed on Lantus 32 units subcu nightly.  Reviewed the risks of taking insulin without monitoring blood sugars at minimum once daily.  This includes hypoglycemia and hyperglycemia both of which could potentially be life-threatening and result in death if not monitored appropriately.  Have encouraged to check blood sugars more frequently.  Otherwise microalbumin, foot and eye exams are up-to-date.

## 2019-09-27 NOTE — ASSESSMENT & PLAN NOTE
Status post PPM.  Also with history of chronic diastolic heart failure.  Keep scheduled follow-up with Dr. Vang.

## 2019-09-27 NOTE — ASSESSMENT & PLAN NOTE
Stable CKD 3, followed by Dr. Bernal with [facility] every 6 months.  Continue to avoid NSAIDs and other nephrotoxic medications.  Keep diabetes and hypertension under control to minimize risk factors.

## 2019-09-27 NOTE — ASSESSMENT & PLAN NOTE
Stable.  On levothyroxine 112 mcg 5 days/week with normal thyroid function testing 5/24.  Consider repeating at next visit.

## 2019-09-27 NOTE — ASSESSMENT & PLAN NOTE
Stable.  On Namenda 10 mg twice daily, Aricept 10 mg twice daily, Lexapro 20 mg daily.  Keep scheduled follow-up with neurology.  Continues to require assistance with ADLs and subsequently lives with her daughter.

## 2019-11-04 RX ORDER — MEMANTINE HYDROCHLORIDE 10 MG/1
TABLET ORAL
Qty: 180 TABLET | Refills: 1 | Status: SHIPPED | OUTPATIENT
Start: 2019-11-04 | End: 2020-04-20

## 2019-12-13 NOTE — PROGRESS NOTES
OFFICE PROGRESS NOTE    Chief Complaint   Patient presents with   • Follow-up        Last MCW 6/28/19     Here with daughter    HPI: 74 y.o. female here for:    1. HTN:  On amlodipine 2.5mg daily.  Does not check BP at home.  Denies any headache, chest pain, shortness of breath, PND, lower extremity edema.     2. HLD:  On lipitor 20mg daily and asa 81mg daily.  Tolerating well.  No myalgias.  Lab Results   Component Value Date    CHOL 117 06/28/2019    TRIG 141 06/28/2019    HDL 50 06/28/2019    LDL 39 06/28/2019     3. DM2:  On Lantus on 28 (down from 32) units qHS.  Blood sugars: 70-80's.  Checks blood sugars 3-4 times per week.    CMP:  Lab Results   Component Value Date    BUN 27 (H) 01/25/2019    CREATININE 1.39 (H) 01/25/2019    EGFRIFNONA 37 (L) 01/25/2019    BCR 19.4 01/25/2019     01/25/2019    K 4.3 01/25/2019    CO2 29.0 01/25/2019    CALCIUM 9.3 01/25/2019    PROTENTOTREF 6.6 05/18/2017    ALBUMIN 4.17 01/25/2019    LABGLOBREF 3.0 05/18/2017    LABIL2 1.2 05/18/2017    BILITOT 0.5 01/25/2019    ALKPHOS 68 01/25/2019    AST 25 01/25/2019    ALT 23 01/25/2019     HbA1c:  Lab Results   Component Value Date    HGBA1C 6.5 05/24/2019    HGBA1C 7.2 01/25/2019     Microalbumin:  Lab Results   Component Value Date    MICROALBUR 12.5 05/10/2018     Last eye exam: 11/9/18  Last foot exam: 2/1/19  Last microalbumin: 6/28/19     4. Hypothyroidism:  On levothyroxine 112mcg daily 5d per week.  Dose has been this way due to difficulties in normalizing thyroid function testing.  TSH   Date Value Ref Range Status   05/24/2019 2.210 0.270 - 4.200 mIU/mL Final     Free T4   Date Value Ref Range Status   05/24/2019 1.11 0.93 - 1.70 ng/dL Final     5. Dementia/depression:  On namenda 10mg BID, aricept 10mg BID, lexapro 20mg daily. Followed by neuro. Next appt with Dr. Hamm on 2/8/20.  Requires daughter's assistance for bathing.  Lives with daughter.     6. Osteoporosis/osteoarthritis:  On Vitamin D 1000 untis daily,  Boniva 150mg every 30 days.  Has been on this for several years.  Complains of neck and back pain at times.  Does sit with slightly slumped posture.  Takes Tylenol but not on a regular or frequent basis.  Nothing else tried.  No falls.  No injury.  No focal weakness.     7. Chronic diastolic CHF/AV block s/p PPM:  Follows with Dr. Vang. Last saw 8/23. Stable. Next appt 6/5/20.     8. CKD 3:  Follows with Dr. Bernal.  Does avoid NSAIDs. Nephrology in Feb.  Lab Results   Component Value Date    GLUCOSE 111 (H) 01/25/2019    BUN 27 (H) 01/25/2019    CREATININE 1.39 (H) 01/25/2019    EGFRIFNONA 37 (L) 01/25/2019    BCR 19.4 01/25/2019    K 4.3 01/25/2019    CO2 29.0 01/25/2019    CALCIUM 9.3 01/25/2019    PROTENTOTREF 6.6 05/18/2017    ALBUMIN 4.17 01/25/2019    LABIL2 1.2 05/18/2017    AST 25 01/25/2019    ALT 23 01/25/2019     9.  Overactive bladder:  Managed with Myrbetriq ER 25mg daily.    10.  Rash:  Red rash with whitish discharge in skin folds and groin.  Nothing tried for this.    Review of Systems  Constitutional: Negative for activity change, appetite change and fever.   HENT: Negative for congestion, sneezing and sore throat.    Eyes: Negative for discharge and visual disturbance.   Respiratory: Negative for cough and shortness of breath.    Cardiovascular: Negative for chest pain and palpitations.   Gastrointestinal: Negative for abdominal distention, abdominal pain, blood in stool, constipation, nausea and vomiting.   Endocrine: Negative for cold intolerance and heat intolerance.   Genitourinary: Negative for dysuria.   Musculoskeletal: Positive for neck pain and back pain.  Skin: Positive for rash in skin folds  Allergic/Immunologic: Negative for environmental allergies and food allergies.   Neurological: Positive for tremors (LUE, chronic) and memory problem. Negative for headache.   Hematological: Negative for adenopathy.   Psychiatric/Behavioral: Negative for depressed mood.     The following portions  "of the patient's history were reviewed and updated as appropriate: allergies, current medications, past family history, past medical history, past social history, past surgical history and problem list.      Physical Exam:  Vitals:    12/20/19 1528   BP: 120/62   BP Location: Right leg   Patient Position: Sitting   Cuff Size: Adult   Pulse: 81   Resp: 18   Temp: 97.8 °F (36.6 °C)   TempSrc: Temporal   SpO2: 96%   Weight: 84.8 kg (187 lb)   Height: 157.5 cm (62\")       Physical Exam   Constitutional: She appears well-developed and well-nourished. No distress.   HENT:   Head: Normocephalic and atraumatic.   Right Ear: External ear normal.   Left Ear: External ear normal.   Eyes: Right eye exhibits no discharge. Left eye exhibits no discharge.   Neck: Normal range of motion. Neck supple.   Cardiovascular: Normal rate, regular rhythm and normal heart sounds. Exam reveals no gallop and no friction rub.   No murmur heard.  Pulmonary/Chest: Effort normal and breath sounds normal. No stridor. No respiratory distress. She has no wheezes. She has no rales.   Abdominal: Soft. Bowel sounds are normal. She exhibits no distension and no mass. There is no tenderness. There is no rebound and no guarding.   Musculoskeletal:   Trace pitting edema to b/l ankles   Lymphadenopathy:     She has no cervical adenopathy.   Neurological: She is alert.   +slight contracture to LUE with resting tremor, at baseline per pt/daughter   Skin: Skin is warm and dry. Capillary refill takes less than 2 seconds. She is not diaphoretic.   Vitals reviewed.    Assesment and Plan: 74 y.o. female here for:  Overactive bladder  Stable.  Continue Myrbetriq.    CKD (chronic kidney disease) stage 3, GFR 30-59 ml/min (CMS/[facility])  Stable.  Continue nephrology follow-up as scheduled.  Return for CMP.    Osteoporosis  Stable on Boniva and vitamin D.    Primary osteoarthritis involving multiple joints  Some increased neck and back pain at times likely due to poor " posture.  Would recommend 2 extra strength Tylenol 3 times daily, warm moist heat 3 times daily, gentle home stretching and topical muscle relaxant agents.  If pain worsens/persist/does not improve, call for reevaluation and consider imaging at that time.    Dementia without behavioral disturbance  Stable on Namenda, Aricept, Lexapro.  Keep neurology follow-up.    Mild nonproliferative diabetic retinopathy of both eyes without macular edema associated with type 2 diabetes mellitus (CMS/HCC)  Keep regular ophthalmology follow-up.    Hypothyroidism, adult  Stable on levothyroxine.  TSH when patient returns for labs.    Type 2 diabetes mellitus (CMS/HCC)  A1c when patient returns for labs.  Up-to-date on foot, eye and microalbumin exams.  Discussed importance again of more frequent blood sugar checks especially as patient's A1c was near prediabetic range and her blood sugars have been normal/low normal.  Given patient's dementia she is not always able to express when she is feeling altered from baseline which is why frequent lab checks are important.    Sick sinus syndrome (CMS/HCC)  Status post PPM.  Known history of chronic diastolic heart failure.  Keep cardiology follow-up.    Essential hypertension  Stable.  Well-controlled on amlodipine.    Mixed hyperlipidemia  Stable.  Continue Lipitor and aspirin.    Anemia in stage 4 chronic kidney disease (CMS/HCC)  Check CBC when patient returns for labs.      Return in about 3 months (around 3/20/2020) for Follow-up.    Brenna Luciano MD  12/20/2019

## 2019-12-20 ENCOUNTER — OFFICE VISIT (OUTPATIENT)
Dept: INTERNAL MEDICINE | Facility: CLINIC | Age: 74
End: 2019-12-20

## 2019-12-20 VITALS
OXYGEN SATURATION: 96 % | SYSTOLIC BLOOD PRESSURE: 120 MMHG | WEIGHT: 187 LBS | DIASTOLIC BLOOD PRESSURE: 62 MMHG | TEMPERATURE: 97.8 F | RESPIRATION RATE: 18 BRPM | BODY MASS INDEX: 34.41 KG/M2 | HEIGHT: 62 IN | HEART RATE: 81 BPM

## 2019-12-20 DIAGNOSIS — E03.9 HYPOTHYROIDISM, ADULT: ICD-10-CM

## 2019-12-20 DIAGNOSIS — N32.81 OVERACTIVE BLADDER: ICD-10-CM

## 2019-12-20 DIAGNOSIS — M81.0 OSTEOPOROSIS, UNSPECIFIED OSTEOPOROSIS TYPE, UNSPECIFIED PATHOLOGICAL FRACTURE PRESENCE: ICD-10-CM

## 2019-12-20 DIAGNOSIS — E11.3293 MILD NONPROLIFERATIVE DIABETIC RETINOPATHY OF BOTH EYES WITHOUT MACULAR EDEMA ASSOCIATED WITH TYPE 2 DIABETES MELLITUS (HCC): ICD-10-CM

## 2019-12-20 DIAGNOSIS — L30.4 INTERTRIGO: ICD-10-CM

## 2019-12-20 DIAGNOSIS — N18.30 CHRONIC KIDNEY DISEASE, STAGE III (MODERATE) (HCC): ICD-10-CM

## 2019-12-20 DIAGNOSIS — E11.22 TYPE 2 DIABETES MELLITUS WITH STAGE 3 CHRONIC KIDNEY DISEASE, UNSPECIFIED WHETHER LONG TERM INSULIN USE: ICD-10-CM

## 2019-12-20 DIAGNOSIS — F03.90 DEMENTIA WITHOUT BEHAVIORAL DISTURBANCE, UNSPECIFIED DEMENTIA TYPE: ICD-10-CM

## 2019-12-20 DIAGNOSIS — N18.30 CKD (CHRONIC KIDNEY DISEASE) STAGE 3, GFR 30-59 ML/MIN (HCC): ICD-10-CM

## 2019-12-20 DIAGNOSIS — N18.3 TYPE 2 DIABETES MELLITUS WITH STAGE 3 CHRONIC KIDNEY DISEASE, UNSPECIFIED WHETHER LONG TERM INSULIN USE: ICD-10-CM

## 2019-12-20 DIAGNOSIS — M15.9 PRIMARY OSTEOARTHRITIS INVOLVING MULTIPLE JOINTS: ICD-10-CM

## 2019-12-20 DIAGNOSIS — E78.2 MIXED HYPERLIPIDEMIA: Primary | ICD-10-CM

## 2019-12-20 DIAGNOSIS — I49.5 SICK SINUS SYNDROME (HCC): ICD-10-CM

## 2019-12-20 DIAGNOSIS — I10 ESSENTIAL HYPERTENSION: ICD-10-CM

## 2019-12-20 DIAGNOSIS — N18.4 ANEMIA IN STAGE 4 CHRONIC KIDNEY DISEASE (HCC): ICD-10-CM

## 2019-12-20 DIAGNOSIS — D63.1 ANEMIA IN STAGE 4 CHRONIC KIDNEY DISEASE (HCC): ICD-10-CM

## 2019-12-20 PROCEDURE — 99214 OFFICE O/P EST MOD 30 MIN: CPT | Performed by: INTERNAL MEDICINE

## 2019-12-20 RX ORDER — IRON FUM,PS CMP/VIT C/NIACIN 125-40-3MG
1 CAPSULE ORAL DAILY
Qty: 90 CAPSULE | Refills: 1 | Status: SHIPPED | OUTPATIENT
Start: 2019-12-20 | End: 2020-07-08

## 2019-12-20 NOTE — ASSESSMENT & PLAN NOTE
A1c when patient returns for labs.  Up-to-date on foot, eye and microalbumin exams.  Discussed importance again of more frequent blood sugar checks especially as patient's A1c was near prediabetic range and her blood sugars have been normal/low normal.  Given patient's dementia she is not always able to express when she is feeling altered from baseline which is why frequent lab checks are important.

## 2019-12-20 NOTE — ASSESSMENT & PLAN NOTE
Some increased neck and back pain at times likely due to poor posture.  Would recommend 2 extra strength Tylenol 3 times daily, warm moist heat 3 times daily, gentle home stretching and topical muscle relaxant agents.  If pain worsens/persist/does not improve, call for reevaluation and consider imaging at that time.

## 2019-12-27 ENCOUNTER — LAB (OUTPATIENT)
Dept: INTERNAL MEDICINE | Facility: CLINIC | Age: 74
End: 2019-12-27

## 2019-12-27 DIAGNOSIS — E03.9 HYPOTHYROIDISM, ADULT: ICD-10-CM

## 2019-12-27 DIAGNOSIS — N18.4 ANEMIA IN STAGE 4 CHRONIC KIDNEY DISEASE (HCC): ICD-10-CM

## 2019-12-27 DIAGNOSIS — N18.30 CHRONIC KIDNEY DISEASE, STAGE III (MODERATE) (HCC): ICD-10-CM

## 2019-12-27 DIAGNOSIS — M81.0 OSTEOPOROSIS, UNSPECIFIED OSTEOPOROSIS TYPE, UNSPECIFIED PATHOLOGICAL FRACTURE PRESENCE: ICD-10-CM

## 2019-12-27 DIAGNOSIS — N18.3 TYPE 2 DIABETES MELLITUS WITH STAGE 3 CHRONIC KIDNEY DISEASE, UNSPECIFIED WHETHER LONG TERM INSULIN USE: ICD-10-CM

## 2019-12-27 DIAGNOSIS — E03.9 HYPOTHYROIDISM, ADULT: Primary | ICD-10-CM

## 2019-12-27 DIAGNOSIS — D63.1 ANEMIA IN STAGE 4 CHRONIC KIDNEY DISEASE (HCC): ICD-10-CM

## 2019-12-27 DIAGNOSIS — E78.2 MIXED HYPERLIPIDEMIA: ICD-10-CM

## 2019-12-27 DIAGNOSIS — E11.22 TYPE 2 DIABETES MELLITUS WITH STAGE 3 CHRONIC KIDNEY DISEASE, UNSPECIFIED WHETHER LONG TERM INSULIN USE: ICD-10-CM

## 2019-12-27 LAB
25(OH)D3 SERPL-MCNC: 37.8 NG/ML (ref 30–100)
ALBUMIN SERPL-MCNC: 4.1 G/DL (ref 3.5–5.2)
ALBUMIN/GLOB SERPL: 1.6 G/DL
ALP SERPL-CCNC: 76 U/L (ref 39–117)
ALT SERPL W P-5'-P-CCNC: 17 U/L (ref 1–33)
ANION GAP SERPL CALCULATED.3IONS-SCNC: 14.1 MMOL/L (ref 5–15)
AST SERPL-CCNC: 23 U/L (ref 1–32)
BASOPHILS # BLD AUTO: 0.03 10*3/MM3 (ref 0–0.2)
BASOPHILS NFR BLD AUTO: 0.4 % (ref 0–1.5)
BILIRUB SERPL-MCNC: 0.3 MG/DL (ref 0.2–1.2)
BUN BLD-MCNC: 28 MG/DL (ref 8–23)
BUN/CREAT SERPL: 22.4 (ref 7–25)
CALCIUM SPEC-SCNC: 9.7 MG/DL (ref 8.6–10.5)
CHLORIDE SERPL-SCNC: 101 MMOL/L (ref 98–107)
CHOLEST SERPL-MCNC: 139 MG/DL (ref 0–200)
CO2 SERPL-SCNC: 25.9 MMOL/L (ref 22–29)
CREAT BLD-MCNC: 1.25 MG/DL (ref 0.57–1)
DEPRECATED RDW RBC AUTO: 42.2 FL (ref 37–54)
EOSINOPHIL # BLD AUTO: 0.11 10*3/MM3 (ref 0–0.4)
EOSINOPHIL NFR BLD AUTO: 1.5 % (ref 0.3–6.2)
ERYTHROCYTE [DISTWIDTH] IN BLOOD BY AUTOMATED COUNT: 12.2 % (ref 12.3–15.4)
GFR SERPL CREATININE-BSD FRML MDRD: 42 ML/MIN/1.73
GLOBULIN UR ELPH-MCNC: 2.6 GM/DL
GLUCOSE BLD-MCNC: 102 MG/DL (ref 65–99)
HBA1C MFR BLD: 7.07 % (ref 4.8–5.6)
HCT VFR BLD AUTO: 36.1 % (ref 34–46.6)
HDLC SERPL-MCNC: 57 MG/DL (ref 40–60)
HGB BLD-MCNC: 12.1 G/DL (ref 12–15.9)
IMM GRANULOCYTES # BLD AUTO: 0.02 10*3/MM3 (ref 0–0.05)
IMM GRANULOCYTES NFR BLD AUTO: 0.3 % (ref 0–0.5)
LDLC SERPL CALC-MCNC: 63 MG/DL (ref 0–100)
LDLC/HDLC SERPL: 1.11 {RATIO}
LYMPHOCYTES # BLD AUTO: 2.04 10*3/MM3 (ref 0.7–3.1)
LYMPHOCYTES NFR BLD AUTO: 27.5 % (ref 19.6–45.3)
MCH RBC QN AUTO: 32.2 PG (ref 26.6–33)
MCHC RBC AUTO-ENTMCNC: 33.5 G/DL (ref 31.5–35.7)
MCV RBC AUTO: 96 FL (ref 79–97)
MONOCYTES # BLD AUTO: 0.4 10*3/MM3 (ref 0.1–0.9)
MONOCYTES NFR BLD AUTO: 5.4 % (ref 5–12)
NEUTROPHILS # BLD AUTO: 4.81 10*3/MM3 (ref 1.7–7)
NEUTROPHILS NFR BLD AUTO: 64.9 % (ref 42.7–76)
NRBC BLD AUTO-RTO: 0 /100 WBC (ref 0–0.2)
PLATELET # BLD AUTO: 151 10*3/MM3 (ref 140–450)
PMV BLD AUTO: 11.1 FL (ref 6–12)
POTASSIUM BLD-SCNC: 4.5 MMOL/L (ref 3.5–5.2)
PROT SERPL-MCNC: 6.7 G/DL (ref 6–8.5)
RBC # BLD AUTO: 3.76 10*6/MM3 (ref 3.77–5.28)
SODIUM BLD-SCNC: 141 MMOL/L (ref 136–145)
T4 FREE SERPL-MCNC: 1 NG/DL (ref 0.93–1.7)
TRIGL SERPL-MCNC: 94 MG/DL (ref 0–150)
TSH SERPL DL<=0.05 MIU/L-ACNC: 6.38 UIU/ML (ref 0.27–4.2)
VLDLC SERPL-MCNC: 18.8 MG/DL (ref 5–40)
WBC NRBC COR # BLD: 7.41 10*3/MM3 (ref 3.4–10.8)

## 2019-12-27 PROCEDURE — 82306 VITAMIN D 25 HYDROXY: CPT | Performed by: INTERNAL MEDICINE

## 2019-12-27 PROCEDURE — 84439 ASSAY OF FREE THYROXINE: CPT | Performed by: INTERNAL MEDICINE

## 2019-12-27 PROCEDURE — 36415 COLL VENOUS BLD VENIPUNCTURE: CPT | Performed by: INTERNAL MEDICINE

## 2019-12-27 PROCEDURE — 84443 ASSAY THYROID STIM HORMONE: CPT | Performed by: INTERNAL MEDICINE

## 2019-12-27 PROCEDURE — 85025 COMPLETE CBC W/AUTO DIFF WBC: CPT | Performed by: INTERNAL MEDICINE

## 2019-12-27 PROCEDURE — 80053 COMPREHEN METABOLIC PANEL: CPT | Performed by: INTERNAL MEDICINE

## 2019-12-27 PROCEDURE — 83036 HEMOGLOBIN GLYCOSYLATED A1C: CPT | Performed by: INTERNAL MEDICINE

## 2019-12-27 PROCEDURE — 80061 LIPID PANEL: CPT | Performed by: INTERNAL MEDICINE

## 2020-01-21 ENCOUNTER — CLINICAL SUPPORT NO REQUIREMENTS (OUTPATIENT)
Dept: CARDIOLOGY | Facility: CLINIC | Age: 75
End: 2020-01-21

## 2020-01-21 DIAGNOSIS — I49.5 SICK SINUS SYNDROME (HCC): ICD-10-CM

## 2020-01-21 PROCEDURE — 93296 REM INTERROG EVL PM/IDS: CPT | Performed by: INTERNAL MEDICINE

## 2020-01-21 PROCEDURE — 93294 REM INTERROG EVL PM/LDLS PM: CPT | Performed by: INTERNAL MEDICINE

## 2020-02-21 ENCOUNTER — OFFICE VISIT (OUTPATIENT)
Dept: NEUROLOGY | Facility: CLINIC | Age: 75
End: 2020-02-21

## 2020-02-21 VITALS — HEIGHT: 62 IN | HEART RATE: 65 BPM | BODY MASS INDEX: 34.41 KG/M2 | WEIGHT: 187 LBS | OXYGEN SATURATION: 95 %

## 2020-02-21 DIAGNOSIS — F02.818 LEWY BODY DEMENTIA WITH BEHAVIORAL DISTURBANCE (HCC): Primary | ICD-10-CM

## 2020-02-21 DIAGNOSIS — G31.83 LEWY BODY DEMENTIA WITH BEHAVIORAL DISTURBANCE (HCC): Primary | ICD-10-CM

## 2020-02-21 PROCEDURE — 99214 OFFICE O/P EST MOD 30 MIN: CPT | Performed by: PSYCHIATRY & NEUROLOGY

## 2020-02-21 RX ORDER — DONEPEZIL HYDROCHLORIDE 10 MG/1
10 TABLET, FILM COATED ORAL 2 TIMES DAILY
Qty: 180 TABLET | Refills: 3 | Status: SHIPPED | OUTPATIENT
Start: 2020-02-21

## 2020-02-21 NOTE — PROGRESS NOTES
"Subjective     Chief Complaint: memory loss      History of Present Illness   Faye Rod is a 74 y.o. female who returns to clinic today with a history of possible DLB. Her family  has noted symptoms since approximately 2015 marked initially by forgetfulness. This has varied over time, with significant worsening noted in 9/16 when hospitalized with renal insufficiency. Additional associated symptoms have included impairments in essentially all spheres of cognition. She has had associated symptoms of hallucinations and delusions previously.       She has also had a resting tremor of her left hand since 2016. This has somewhat worsened over time. She has had associated balance impairment, shuffling gait as well as bradykinesia.      Prior evaluation has included screening blood work and a head CT within the last year which were notable for only mild increases in BUN and creatinine. She is currently taking donepezil and memantine.    Since her last visit on 8/9/19 she notes an ongoing predominantly left sided tremor. Her family continues to note ongoing cognitive impairment across all spheres of cognition, as well as continued visual hallucinations, though these are largely non-problematic.She is relatively unchanged motorically.      I have reviewed and confirmed the past family, social and medical history as accurate on 2/21/20.     Review of Systems   Constitutional: Negative.        Objective     Pulse 65   Ht 157.5 cm (62\")   Wt 84.8 kg (187 lb)   SpO2 95%   BMI 34.20 kg/m²       Physical Exam   Neurological: She has normal strength. She has a normal Finger-Nose-Finger Test.   Psychiatric: Her speech is normal.        Neurologic Exam     Mental Status   Oriented to person.   Disoriented to place.   Disoriented to time.   Registration: recalls 3 of 3 objects. Recall at 5 minutes: recalls 1 of 3 objects. Follows 3 step commands.   Attention: normal.   Speech: speech is normal   Level of consciousness: " alert  Able to name object. Able to read. Able to repeat. Able to write. Normal comprehension.     Cranial Nerves   Cranial nerves II through XII intact.     Motor Exam   Muscle bulk: normal    Strength   Strength 5/5 throughout. Mildly increased tone bilaterally and moderate bradykinesia     Gait, Coordination, and Reflexes     Coordination   Finger to nose coordination: normal    Tremor   Resting tremor: present (predominantly left-sided)        Results  MMSE=20      Assessment/Plan   Faye was seen today for follow-up and dementia.    Diagnoses and all orders for this visit:    Lewy body dementia with behavioral disturbance (CMS/HCC)    Other orders  -     carbidopa-levodopa (SINEMET)  MG per tablet; Take 1 tablet by mouth 3 (Three) Times a Day.  -     donepezil (ARICEPT) 10 MG tablet; Take 1 tablet by mouth 2 (Two) Times a Day.          Discussion/Summary   Faye Rod returns to clinic today with a history of Dementia (possible DLB). I again reviewed various treatment options including a trial of Sinemet for tremor and motoric symptoms, a trial of Seroquel for hallucinations (along with black box warning) and referral to PT. After discussion it was elected to continue on donepezil and Namenda unchanged and add Sinemet. She will then follow up in 6 months, or sooner if needed.     I spent 25 minutes face to face with the patient and family with 15 minutes spent on discussing evaluation, current status, treatment options and management as discussed above.       As part of this visit I obtained additional history from the family which is incorporated in the HPI.      Joaquín Hamm MD

## 2020-03-06 PROCEDURE — 84443 ASSAY THYROID STIM HORMONE: CPT | Performed by: INTERNAL MEDICINE

## 2020-03-06 PROCEDURE — 84439 ASSAY OF FREE THYROXINE: CPT | Performed by: INTERNAL MEDICINE

## 2020-03-08 RX ORDER — LEVOTHYROXINE SODIUM 0.12 MG/1
125 TABLET ORAL DAILY
Qty: 90 TABLET | Refills: 0 | Status: SHIPPED | OUTPATIENT
Start: 2020-03-08 | End: 2020-05-11

## 2020-03-16 ENCOUNTER — PATIENT MESSAGE (OUTPATIENT)
Dept: INTERNAL MEDICINE | Facility: CLINIC | Age: 75
End: 2020-03-16

## 2020-03-16 DIAGNOSIS — Z78.0 POST-MENOPAUSAL: ICD-10-CM

## 2020-03-16 DIAGNOSIS — N18.30 CHRONIC KIDNEY DISEASE, STAGE III (MODERATE) (HCC): ICD-10-CM

## 2020-03-16 DIAGNOSIS — E11.00 TYPE 2 DIABETES MELLITUS WITH HYPEROSMOLARITY WITHOUT COMA, WITH LONG-TERM CURRENT USE OF INSULIN (HCC): ICD-10-CM

## 2020-03-16 DIAGNOSIS — Z79.4 TYPE 2 DIABETES MELLITUS WITH HYPEROSMOLARITY WITHOUT COMA, WITH LONG-TERM CURRENT USE OF INSULIN (HCC): ICD-10-CM

## 2020-03-16 RX ORDER — ATORVASTATIN CALCIUM 20 MG/1
20 TABLET, FILM COATED ORAL DAILY
Qty: 90 TABLET | Refills: 1 | Status: SHIPPED | OUTPATIENT
Start: 2020-03-16 | End: 2020-07-28

## 2020-03-16 NOTE — TELEPHONE ENCOUNTER
From: Faye Rod  To: Brenna Luciano MD  Sent: 3/16/2020 10:48 AM EDT  Subject: Prescription Question    I am in need of a new prescription for my Atorvastatin 20mg. I requested this through my pharmacy but they cancelled the order because they did not receive a renewal from your office. I think it is probably because the last prescription was written by Telly Contreras so it could not be renewed and probably why it did not give me the option to just [facility] a [facility]. Please provide me a new prescription for the Atorvastatin through [facility]. I am completely out of the medication. Thank you.

## 2020-04-20 RX ORDER — MEMANTINE HYDROCHLORIDE 10 MG/1
TABLET ORAL
Qty: 180 TABLET | Refills: 1 | Status: SHIPPED | OUTPATIENT
Start: 2020-04-20 | End: 2020-11-16

## 2020-04-20 RX ORDER — MIRABEGRON 25 MG/1
TABLET, FILM COATED, EXTENDED RELEASE ORAL
Qty: 90 TABLET | Refills: 1 | Status: SHIPPED | OUTPATIENT
Start: 2020-04-20 | End: 2021-01-01

## 2020-05-01 NOTE — PROGRESS NOTES
OFFICE VIDEO VISIT NOTE    Chief Complaint   Patient presents with   • Follow-up     You have chosen to receive care through a telehealth visit.  Do you consent to use a video/audio connection for your medical care today? Yes.    The patient understands that a full physical exam cannot be completed via video visit, and chooses to proceed with video visit. He/She was instructed to RTC with new or worsening symptoms for face to face office visit if needed.    Last MCW 6/28/19    Here with daughter who provides most hx 2/2 pt's dementia    HPI: 74 y.o. female here for:    1. HTN:  On amlodipine 2.5mg daily.  Does not check BP at home.  Denies any headache, chest pain, shortness of breath, PND, lower extremity edema.     2. HLD:  On lipitor 20mg daily and asa 81mg daily.  Tolerating well.  No myalgias.  Lab Results   Component Value Date    CHOL 139 12/27/2019    TRIG 94 12/27/2019    HDL 57 12/27/2019    LDL 63 12/27/2019        3. DM2:  On Lantus on 26 units qHS.  Blood sugars: 70-80's.  Checks blood sugars 3-4 times per week.  CMP:  Lab Results   Component Value Date    BUN 28 (H) 12/27/2019    CREATININE 1.25 (H) 12/27/2019    EGFRIFNONA 42 (L) 12/27/2019    BCR 22.4 12/27/2019     12/27/2019    K 4.5 12/27/2019    CO2 25.9 12/27/2019    CALCIUM 9.7 12/27/2019    PROTENTOTREF 6.6 05/18/2017    ALBUMIN 4.10 12/27/2019    LABGLOBREF 3.0 05/18/2017    LABIL2 1.2 05/18/2017    BILITOT 0.3 12/27/2019    ALKPHOS 76 12/27/2019    AST 23 12/27/2019    ALT 17 12/27/2019     HbA1c:  Lab Results   Component Value Date    HGBA1C 7.07 (H) 12/27/2019    HGBA1C 6.5 05/24/2019     Microalbumin:  Lab Results   Component Value Date    MICROALBUR 12.5 05/10/2018     Last eye exam: 11/9/18--overdue, needs to schedule after pandemic concerns improve  Last foot exam: 2/1/19--needs w/ next office visit  Last microalbumin: 6/28/19     4. Hypothyroidism:  On levothyroxine 125 mcg daily 5d per week (increased based on 3/6/20 labs  below).  Has had difficulty in titrating meds. Due for re-check.  TSH   Date Value Ref Range Status   03/06/2020 22.100 (H) 0.270 - 4.200 uIU/mL Final     Free T4   Date Value Ref Range Status   03/06/2020 0.52 (L) 0.93 - 1.70 ng/dL Final     5. Dementia/depression:  On [id number] BID, sinemet TID, aricept 10mg BID, lexapro 20mg daily. Followed by neuro. Next appt with 8/21/20.  Requires daughter's assistance for bathing.  Lives with daughter.     6. Osteoporosis/osteoarthritis:  On Vitamin D 50k units weekly, Boniva 150mg every 30 days.  Has been on this for several years.  Complains of neck and back pain at times.  Does sit with slightly slumped posture.  Takes 2 extra strength tylenol BID PRN.  No falls.  No injury.  No focal weakness.     7. Chronic diastolic CHF/AV block s/p PPM:  Follows with Dr. Vang.  Stable. Next appt 6/5/20.     8. CKD 3:  Follows with Dr. Bernal.  Does avoid NSAIDs. Nephrology appt 6 mo, last 1/31/20.    Lab Results   Component Value Date    GLUCOSE 102 (H) 12/27/2019    BUN 28 (H) 12/27/2019    CREATININE 1.25 (H) 12/27/2019    EGFRIFNONA 42 (L) 12/27/2019    BCR 22.4 12/27/2019    K 4.5 12/27/2019    CO2 25.9 12/27/2019    CALCIUM 9.7 12/27/2019    PROTENTOTREF 6.6 05/18/2017    ALBUMIN 4.10 12/27/2019    LABIL2 1.2 05/18/2017    AST 23 12/27/2019    ALT 17 12/27/2019     9.  Overactive bladder:  Managed with Myrbetriq ER 25mg daily.    10. UTI sx:  Last few days reports of dysuria, increased urinary frequency than baseline 2/2 overactive bladder. Also worse b/l LBP than baseline arthritis pain. No suprapubic pain, F/C, hematuria, vomiting.    Review of Systems  Constitutional: Negative for activity change, appetite change and fever.   HENT: Negative for congestion, sneezing and sore throat.    Eyes: Negative for discharge and visual disturbance.   Respiratory: Negative for cough and shortness of breath.    Cardiovascular: Negative for chest pain and palpitations.   Gastrointestinal:  Negative for abdominal distention, abdominal pain, blood in stool, constipation, nausea and vomiting.   Endocrine: Negative for cold intolerance and heat intolerance.   Genitourinary: Positive for dysuria and increased urinary frequency.  Musculoskeletal: Positive for neck pain and back pain (back pain worse than baseline).  Skin: Negative for color change.  Allergic/Immunologic: Negative for environmental allergies and food allergies.   Neurological: Positive for tremors (LUE, chronic) and memory problem (Chronic, dementia). Negative for headache.   Hematological: Negative for adenopathy.   Psychiatric/Behavioral: Negative for depressed mood.     The following portions of the patient's history were reviewed and updated as appropriate: allergies, current medications, past family history, past medical history, past social history, past surgical history and problem list.      Physical Exam:  No vitals 2/2 video visit.    Physical Exam   Alert, slightly confused at baseline. NAD. Speaks in full sentences with directed questions re: sx.  NC/AT  Conjunctivae normal.  Non labored respirations.  Normal mood/affect.    Assesment and Plan: 74 y.o. female here for:  Mixed hyperlipidemia  Stable. C/w lipitor and ASA.    Essential hypertension  Stable. C/w Amlodipine; refilled today.    Sick sinus syndrome (CMS/HCC)  S/p PPM. Chronic diastolic CHF, stable. Cardiology f/u as scheduled.    Type 2 diabetes mellitus (CMS/HCC)  Stable. A1c when pt stops by for labs. Foot exam w/ next OV. Eye exam when able. Microalbumin w/ next OV. C/w Lantus and close blood sugar monitoring.    Hypothyroidism, adult  Needs TSH/Free T4; daughter to bring pt by. If still w/o lab normalization, may need to see Endo or consider branded synthroid. C/w current levothyroxine dose for now.    Lewy body dementia with behavioral disturbance (CMS/HCC)  C/w Namenda, Aricept, Sinimet, Lexapro. F/u w/ neuro as scheduled. Lexapro refilled.    Primary  osteoarthritis involving multiple joints  Recent LBP flare; possibly 2/2 UTI as below. Also chronic presumed arthritis. Can increased extra strength Tylenol to 2 tabs TID PRN along w/ topical muscle relaxant creams w/ lidocaine, warm/moist heat PRN.To call if sx persist, worsen or other concerns.    Osteoporosis  Stable on Boniva and Vit D.    CKD (chronic kidney disease) stage 3, GFR 30-59 ml/min (CMS/[facility])  Stable. C/w nephro f/u as scheduled. Avoid nephrotoxic meds. BMP when pt returns for labs.    Overactive bladder  Stable on Mybetriq. Continue.    Acute UTI  By hx above, c/w acute UTI so will start empiric abx treatment, marty in light of current pandemic to avoid unnecessary office visits. Reviewed potential risks of empiric abx w/o UA/UCx (may not be UTI, bacteria may not be sensitive to empiric abx chosen etc). Daughter agrees to this plan. Rx Bactrim DS 1 tab PO BID x 3d. If sx don't improve in the next 48-72h, daughter agrees to stop by to  urine specimen collection kit to have pt provide sample at home and drop back off. If new sx like F/C, abd pain, worsening confusion from baseline etc will also let me know.      Return in about 4 months (around 9/8/2020) for Medicare Wellness Visit.    Brenna Luciano MD  5/8/2020

## 2020-05-08 ENCOUNTER — TELEMEDICINE (OUTPATIENT)
Dept: INTERNAL MEDICINE | Facility: CLINIC | Age: 75
End: 2020-05-08

## 2020-05-08 DIAGNOSIS — E78.2 MIXED HYPERLIPIDEMIA: ICD-10-CM

## 2020-05-08 DIAGNOSIS — M15.9 PRIMARY OSTEOARTHRITIS INVOLVING MULTIPLE JOINTS: ICD-10-CM

## 2020-05-08 DIAGNOSIS — G31.83 LEWY BODY DEMENTIA WITH BEHAVIORAL DISTURBANCE (HCC): ICD-10-CM

## 2020-05-08 DIAGNOSIS — I10 ESSENTIAL HYPERTENSION: ICD-10-CM

## 2020-05-08 DIAGNOSIS — N32.81 OVERACTIVE BLADDER: ICD-10-CM

## 2020-05-08 DIAGNOSIS — N18.30 CHRONIC KIDNEY DISEASE, STAGE III (MODERATE) (HCC): ICD-10-CM

## 2020-05-08 DIAGNOSIS — E11.00 TYPE 2 DIABETES MELLITUS WITH HYPEROSMOLARITY WITHOUT COMA, WITHOUT LONG-TERM CURRENT USE OF INSULIN (HCC): ICD-10-CM

## 2020-05-08 DIAGNOSIS — I49.5 SICK SINUS SYNDROME (HCC): ICD-10-CM

## 2020-05-08 DIAGNOSIS — E03.9 HYPOTHYROIDISM, ADULT: ICD-10-CM

## 2020-05-08 DIAGNOSIS — N18.30 CKD (CHRONIC KIDNEY DISEASE) STAGE 3, GFR 30-59 ML/MIN (HCC): ICD-10-CM

## 2020-05-08 DIAGNOSIS — Z78.0 POST-MENOPAUSAL: ICD-10-CM

## 2020-05-08 DIAGNOSIS — F41.9 ANXIETY: ICD-10-CM

## 2020-05-08 DIAGNOSIS — N39.0 ACUTE UTI: Primary | ICD-10-CM

## 2020-05-08 DIAGNOSIS — F02.818 LEWY BODY DEMENTIA WITH BEHAVIORAL DISTURBANCE (HCC): ICD-10-CM

## 2020-05-08 DIAGNOSIS — M81.0 OSTEOPOROSIS, UNSPECIFIED OSTEOPOROSIS TYPE, UNSPECIFIED PATHOLOGICAL FRACTURE PRESENCE: ICD-10-CM

## 2020-05-08 PROCEDURE — 99214 OFFICE O/P EST MOD 30 MIN: CPT | Performed by: INTERNAL MEDICINE

## 2020-05-08 RX ORDER — AMLODIPINE BESYLATE 2.5 MG/1
2.5 TABLET ORAL DAILY
Qty: 90 TABLET | Refills: 3 | Status: SHIPPED | OUTPATIENT
Start: 2020-05-08 | End: 2021-01-01

## 2020-05-08 RX ORDER — ESCITALOPRAM OXALATE 20 MG/1
20 TABLET ORAL DAILY
Qty: 90 TABLET | Refills: 3 | Status: SHIPPED | OUTPATIENT
Start: 2020-05-08 | End: 2021-01-01

## 2020-05-08 RX ORDER — SULFAMETHOXAZOLE AND TRIMETHOPRIM 800; 160 MG/1; MG/1
1 TABLET ORAL 2 TIMES DAILY
Qty: 6 TABLET | Refills: 0 | Status: SHIPPED | OUTPATIENT
Start: 2020-05-08 | End: 2020-06-12

## 2020-05-08 NOTE — ASSESSMENT & PLAN NOTE
Stable. A1c when pt stops by for labs. Foot exam w/ next OV. Eye exam when able. Microalbumin w/ next OV. C/w Lantus and close blood sugar monitoring.

## 2020-05-08 NOTE — ASSESSMENT & PLAN NOTE
Recent LBP flare; possibly 2/2 UTI as below. Also chronic presumed arthritis. Can increased extra strength Tylenol to 2 tabs TID PRN along w/ topical muscle relaxant creams w/ lidocaine, warm/moist heat PRN.To call if sx persist, worsen or other concerns.

## 2020-05-08 NOTE — ASSESSMENT & PLAN NOTE
Needs TSH/Free T4; daughter to bring pt by. If still w/o lab normalization, may need to see Endo or consider branded synthroid. C/w current levothyroxine dose for now.

## 2020-05-08 NOTE — ASSESSMENT & PLAN NOTE
By hx above, c/w acute UTI so will start empiric abx treatment, marty in light of current pandemic to avoid unnecessary office visits. Reviewed potential risks of empiric abx w/o UA/UCx (may not be UTI, bacteria may not be sensitive to empiric abx chosen etc). Daughter agrees to this plan. Rx Bactrim DS 1 tab PO BID x 3d. If sx don't improve in the next 48-72h, daughter agrees to stop by to  urine specimen collection kit to have pt provide sample at home and drop back off. If new sx like F/C, abd pain, worsening confusion from baseline etc will also let me know.

## 2020-05-11 RX ORDER — LEVOTHYROXINE SODIUM 0.12 MG/1
125 TABLET ORAL DAILY
Qty: 90 TABLET | Refills: 0 | Status: SHIPPED | OUTPATIENT
Start: 2020-05-11 | End: 2020-07-28

## 2020-05-22 ENCOUNTER — LAB (OUTPATIENT)
Dept: INTERNAL MEDICINE | Facility: CLINIC | Age: 75
End: 2020-05-22

## 2020-05-22 DIAGNOSIS — E03.9 HYPOTHYROIDISM, ADULT: ICD-10-CM

## 2020-05-22 DIAGNOSIS — N18.30 CKD (CHRONIC KIDNEY DISEASE) STAGE 3, GFR 30-59 ML/MIN (HCC): ICD-10-CM

## 2020-05-22 DIAGNOSIS — R30.0 DYSURIA: Primary | ICD-10-CM

## 2020-05-22 DIAGNOSIS — N39.0 ACUTE UTI: Primary | ICD-10-CM

## 2020-05-22 DIAGNOSIS — N39.0 ACUTE UTI: ICD-10-CM

## 2020-05-22 DIAGNOSIS — E11.00 TYPE 2 DIABETES MELLITUS WITH HYPEROSMOLARITY WITHOUT COMA, WITHOUT LONG-TERM CURRENT USE OF INSULIN (HCC): ICD-10-CM

## 2020-05-22 LAB
BILIRUB BLD-MCNC: NEGATIVE MG/DL
CLARITY, POC: CLEAR
COLOR UR: YELLOW
EXPIRATION DATE: ABNORMAL
GLUCOSE UR STRIP-MCNC: ABNORMAL MG/DL
KETONES UR QL: NEGATIVE
LEUKOCYTE EST, POC: NEGATIVE
Lab: ABNORMAL
NITRITE UR-MCNC: NEGATIVE MG/ML
PH UR: 5 [PH] (ref 5–8)
PROT UR STRIP-MCNC: NEGATIVE MG/DL
RBC # UR STRIP: NEGATIVE /UL
SP GR UR: 1.02 (ref 1–1.03)
UROBILINOGEN UR QL: NORMAL

## 2020-05-22 PROCEDURE — 87086 URINE CULTURE/COLONY COUNT: CPT | Performed by: INTERNAL MEDICINE

## 2020-05-22 PROCEDURE — 83036 HEMOGLOBIN GLYCOSYLATED A1C: CPT | Performed by: INTERNAL MEDICINE

## 2020-05-22 PROCEDURE — 84443 ASSAY THYROID STIM HORMONE: CPT | Performed by: INTERNAL MEDICINE

## 2020-05-22 PROCEDURE — 81003 URINALYSIS AUTO W/O SCOPE: CPT | Performed by: INTERNAL MEDICINE

## 2020-05-22 PROCEDURE — 84439 ASSAY OF FREE THYROXINE: CPT | Performed by: INTERNAL MEDICINE

## 2020-05-22 PROCEDURE — 80048 BASIC METABOLIC PNL TOTAL CA: CPT | Performed by: INTERNAL MEDICINE

## 2020-05-22 PROCEDURE — 36415 COLL VENOUS BLD VENIPUNCTURE: CPT | Performed by: INTERNAL MEDICINE

## 2020-05-23 LAB
ANION GAP SERPL CALCULATED.3IONS-SCNC: 11.7 MMOL/L (ref 5–15)
BACTERIA SPEC AEROBE CULT: NORMAL
BUN BLD-MCNC: 25 MG/DL (ref 8–23)
BUN/CREAT SERPL: 22.9 (ref 7–25)
CALCIUM SPEC-SCNC: 9.2 MG/DL (ref 8.6–10.5)
CHLORIDE SERPL-SCNC: 102 MMOL/L (ref 98–107)
CO2 SERPL-SCNC: 25.3 MMOL/L (ref 22–29)
CREAT BLD-MCNC: 1.09 MG/DL (ref 0.57–1)
GFR SERPL CREATININE-BSD FRML MDRD: 49 ML/MIN/1.73
GLUCOSE BLD-MCNC: 269 MG/DL (ref 65–99)
HBA1C MFR BLD: 7.09 % (ref 4.8–5.6)
POTASSIUM BLD-SCNC: 4.8 MMOL/L (ref 3.5–5.2)
SODIUM BLD-SCNC: 139 MMOL/L (ref 136–145)
T4 FREE SERPL-MCNC: 1.36 NG/DL (ref 0.93–1.7)
TSH SERPL DL<=0.05 MIU/L-ACNC: 1.12 UIU/ML (ref 0.27–4.2)

## 2020-06-12 ENCOUNTER — OFFICE VISIT (OUTPATIENT)
Dept: CARDIOLOGY | Facility: CLINIC | Age: 75
End: 2020-06-12

## 2020-06-12 VITALS
OXYGEN SATURATION: 91 % | SYSTOLIC BLOOD PRESSURE: 98 MMHG | DIASTOLIC BLOOD PRESSURE: 48 MMHG | HEIGHT: 62 IN | HEART RATE: 62 BPM | BODY MASS INDEX: 33.31 KG/M2 | WEIGHT: 181 LBS | TEMPERATURE: 97.2 F

## 2020-06-12 DIAGNOSIS — I10 ESSENTIAL HYPERTENSION: ICD-10-CM

## 2020-06-12 DIAGNOSIS — I50.22 CHRONIC SYSTOLIC CONGESTIVE HEART FAILURE (HCC): Primary | ICD-10-CM

## 2020-06-12 DIAGNOSIS — E11.65 TYPE 2 DIABETES MELLITUS WITH HYPERGLYCEMIA, WITHOUT LONG-TERM CURRENT USE OF INSULIN (HCC): ICD-10-CM

## 2020-06-12 DIAGNOSIS — I44.2 AV BLOCK, COMPLETE (HCC): ICD-10-CM

## 2020-06-12 PROCEDURE — 93280 PM DEVICE PROGR EVAL DUAL: CPT | Performed by: INTERNAL MEDICINE

## 2020-06-12 PROCEDURE — 99214 OFFICE O/P EST MOD 30 MIN: CPT | Performed by: INTERNAL MEDICINE

## 2020-06-12 NOTE — PROGRESS NOTES
[facility]  Office Progress Note  Faye Rod  1945  [address] [place] [address]       Visit Date: 06/12/20    PCP: Brenna Luciano MD  [address]  [place] [address]    IDENTIFICATION: A 74 y.o. female resident of [place], [place].    PROBLEM LIST:   1. Diastolic Heart failure  1. Echocardiogram, [place], 2014.  Impaired relaxation.  Mild AI, TR with RVSP 38 mmHg.  Trace MR  2. Echocardiogram, [facility], 2018.  EF 50-55%, mild TR, moderate AI.    2. Arrhythmia ?  1. ST Martín PPM Model # [id number]  3. A flutter  1. Documented July 21, 2017 for 21 minutes, no other episodes  2.  6/2020 10 hours on TTM  4. Hypertension  5. Hyperlipidemia  1. 5/18 116/107/40/56  6. Diabetes Mellitus  1. 9/8/17 A1c 8.1  2. 3/18 7.3  7. CKD Stage III; followed by Dr Casarez  8. Dementia    Chief Complaint   Patient presents with   • Diastolic congestive heart failure, unspecified HF chronicit       Allergies  No Known Allergies    Current Medications    Current Outpatient Medications:   •  acetaminophen (TYLENOL) 650 MG 8 hr tablet, Take 650 mg by mouth 2 (Two) Times a Day As Needed., Disp: , Rfl:   •  amLODIPine (NORVASC) 2.5 MG tablet, Take 1 tablet by mouth Daily., Disp: 90 tablet, Rfl: 3  •  aspirin 81 MG EC tablet, Take 81 mg by mouth Daily., Disp: , Rfl:   •  atorvastatin (LIPITOR) 20 MG tablet, Take 1 tablet by mouth Daily., Disp: 90 tablet, Rfl: 1  •  carbidopa-levodopa (SINEMET)  MG per tablet, Take 1 tablet by mouth 3 (Three) Times a Day., Disp: 270 tablet, Rfl: 3  •  Cholecalciferol (VITAMIN D3) 1.25 MG (64922 UT) tablet, Take 1 tablet by mouth 1 (One) Time Per Week., Disp: 4 tablet, Rfl: 1  •  donepezil (ARICEPT) 10 MG tablet, Take 1 tablet by mouth 2 (Two) Times a Day., Disp: 180 tablet, Rfl: 3  •  escitalopram (LEXAPRO) 20 MG tablet, Take 1 tablet by mouth Daily., Disp: 90 tablet, Rfl: 3  •  Fe Fum-FePoly-Vit C-Vit B3 (INTEGRA)  "62.5-62.5-40-3 MG capsule, Take 1 capsule by mouth Daily., Disp: 90 capsule, Rfl: 1  •  glucose blood (MADDY CONTOUR TEST) test strip, 1-2 times daily Use as instructed, Disp: 50 each, Rfl: 12  •  glucose blood (CONTOUR NEXT TEST) test strip, Use to test once or twice a day as directed, Disp: 100 each, Rfl: 11  •  ibandronate (BONIVA) 150 MG tablet, Take 1 tablet by mouth Every 30 (Thirty) Days., Disp: 3 tablet, Rfl: 1  •  Insulin Glargine (LANTUS SOLOSTAR) 100 UNIT/ML injection pen, 28 units nightly, Disp: 3 mL, Rfl: 3  •  Insulin Pen Needle (B-D UF III MINI PEN NEEDLES) 31G X 5 MM misc, USE WITH LANTUS INSULIN, Disp: 90 each, Rfl: 3  •  levothyroxine (SYNTHROID, LEVOTHROID) 125 MCG tablet, TAKE 1 TABLET BY MOUTH  DAILY, Disp: 90 tablet, Rfl: 0  •  Loratadine 10 MG capsule, Take 1 capsule by mouth As Needed (allergy)., Disp: , Rfl:   •  memantine (NAMENDA) 10 MG tablet, TAKE 1 TABLET BY MOUTH TWO  TIMES DAILY, Disp: 180 tablet, Rfl: 1  •  MYRBETRIQ 25 MG tablet sustained-release 24 hour 24 hr tablet, TAKE 1 TABLET BY MOUTH  DAILY, Disp: 90 tablet, Rfl: 1      History of Present Illness   Faye Rod is a 74 y.o. year old female here for follow up.    Pt denies any chest pain, dyspnea, dyspnea on exertion, orthopnea, PND, palpitations, lower extremity edema, or claudication.  Accompanied by daughter.  Dementia worsening      OBJECTIVE:  Vitals:    06/12/20 1515   BP: 98/48   BP Location: Left arm   Patient Position: Sitting   Pulse: 62   Temp: 97.2 °F (36.2 °C)   SpO2: 91%   Weight: 82.1 kg (181 lb)   Height: 157.5 cm (62\")     Physical Exam   Constitutional: She appears well-developed and well-nourished.   Debilitated   Neck: Normal range of motion. Neck supple. No hepatojugular reflux and no JVD present. Carotid bruit is not present. No tracheal deviation present. No thyromegaly present.   Cardiovascular: Normal rate, regular rhythm, S1 normal, S2 normal, intact distal pulses and normal pulses. PMI is not " displaced. Exam reveals no gallop, no distant heart sounds, no friction rub, no midsystolic click and no opening snap.   Murmur heard.  Pulses:       Radial pulses are 2+ on the right side, and 2+ on the left side.        Dorsalis pedis pulses are 2+ on the right side, and 2+ on the left side.        Posterior tibial pulses are 2+ on the right side, and 2+ on the left side.   Pulmonary/Chest: Effort normal and breath sounds normal. She has no wheezes. She has no rales.   Abdominal: Soft. Bowel sounds are normal. She exhibits no mass. There is no tenderness. There is no guarding.       Diagnostic Data:    ECG 12 Lead  Date/Time: 6/12/2020 9:51 AM  Performed by: Faisal Vang MD  Authorized by: Faisal Vang MD   Comparison: not compared with previous ECG   Rhythm: paced  Other findings: non-specific ST-T wave changes    Clinical impression: abnormal EKG          Device check  Acceptable threshold and impedence  No MS  > 5 yrs generator  ASSESSMENT:   Diagnosis Plan   1. Chronic systolic congestive heart failure (CMS/HCC)     2. AV block, complete (CMS/HCC)     3. Type 2 diabetes mellitus with hyperglycemia, without long-term current use of insulin (CMS/HCC)     4. Essential hypertension         PLAN:  CHF diastolic continue current rx    AV block acceptable pacer fxn    DM not ideal control.  Target A1c <7    HTN controlled off agents with low nl bp    Brenna Luciano MD, thank you for referring Ms. Rod for evaluation.  I have forwarded my electronically generated recommendations to you for review.  Please do not hesitate to call with any questions.      Faisal Vang MD, [facility]

## 2020-07-08 DIAGNOSIS — N18.4 ANEMIA IN STAGE 4 CHRONIC KIDNEY DISEASE (HCC): ICD-10-CM

## 2020-07-08 DIAGNOSIS — D63.1 ANEMIA IN STAGE 4 CHRONIC KIDNEY DISEASE (HCC): ICD-10-CM

## 2020-07-08 RX ORDER — IRON FUM,PS CMP/VIT C/NIACIN 125-40-3MG
CAPSULE ORAL
Qty: 30 CAPSULE | Refills: 0 | Status: SHIPPED | OUTPATIENT
Start: 2020-07-08

## 2020-07-25 DIAGNOSIS — N18.30 CHRONIC KIDNEY DISEASE, STAGE III (MODERATE) (HCC): ICD-10-CM

## 2020-07-25 DIAGNOSIS — Z78.0 POST-MENOPAUSAL: ICD-10-CM

## 2020-07-25 DIAGNOSIS — Z79.4 TYPE 2 DIABETES MELLITUS WITH HYPEROSMOLARITY WITHOUT COMA, WITH LONG-TERM CURRENT USE OF INSULIN (HCC): ICD-10-CM

## 2020-07-25 DIAGNOSIS — E11.00 TYPE 2 DIABETES MELLITUS WITH HYPEROSMOLARITY WITHOUT COMA, WITH LONG-TERM CURRENT USE OF INSULIN (HCC): ICD-10-CM

## 2020-07-28 RX ORDER — LEVOTHYROXINE SODIUM 0.12 MG/1
125 TABLET ORAL DAILY
Qty: 90 TABLET | Refills: 3 | Status: SHIPPED | OUTPATIENT
Start: 2020-07-28

## 2020-07-28 RX ORDER — ATORVASTATIN CALCIUM 20 MG/1
20 TABLET, FILM COATED ORAL DAILY
Qty: 90 TABLET | Refills: 3 | Status: SHIPPED | OUTPATIENT
Start: 2020-07-28 | End: 2021-01-01 | Stop reason: HOSPADM

## 2020-08-17 ENCOUNTER — APPOINTMENT (OUTPATIENT)
Dept: CT IMAGING | Facility: HOSPITAL | Age: 75
End: 2020-08-17

## 2020-08-17 ENCOUNTER — HOSPITAL ENCOUNTER (EMERGENCY)
Facility: HOSPITAL | Age: 75
Discharge: HOME | End: 2020-08-18
Attending: EMERGENCY MEDICINE | Admitting: EMERGENCY MEDICINE

## 2020-08-17 DIAGNOSIS — M19.90 ARTHRITIS: ICD-10-CM

## 2020-08-17 DIAGNOSIS — G89.29 CHRONIC BILATERAL LOW BACK PAIN WITHOUT SCIATICA: ICD-10-CM

## 2020-08-17 DIAGNOSIS — R10.9 BILATERAL FLANK PAIN: Primary | ICD-10-CM

## 2020-08-17 DIAGNOSIS — M54.50 CHRONIC BILATERAL LOW BACK PAIN WITHOUT SCIATICA: ICD-10-CM

## 2020-08-17 DIAGNOSIS — N18.9 CHRONIC RENAL IMPAIRMENT, UNSPECIFIED CKD STAGE: ICD-10-CM

## 2020-08-17 LAB
ALBUMIN SERPL-MCNC: 4.3 G/DL (ref 3.5–5.2)
ALBUMIN/GLOB SERPL: 1.7 G/DL
ALP SERPL-CCNC: 71 U/L (ref 39–117)
ALT SERPL W P-5'-P-CCNC: 10 U/L (ref 1–33)
ANION GAP SERPL CALCULATED.3IONS-SCNC: 11 MMOL/L (ref 5–15)
AST SERPL-CCNC: 20 U/L (ref 1–32)
BASOPHILS # BLD AUTO: 0.04 10*3/MM3 (ref 0–0.2)
BASOPHILS NFR BLD AUTO: 0.4 % (ref 0–1.5)
BILIRUB SERPL-MCNC: 0.3 MG/DL (ref 0–1.2)
BILIRUB UR QL STRIP: NEGATIVE
BUN SERPL-MCNC: 30 MG/DL (ref 8–23)
BUN/CREAT SERPL: 23.4 (ref 7–25)
CALCIUM SPEC-SCNC: 9.7 MG/DL (ref 8.6–10.5)
CHLORIDE SERPL-SCNC: 104 MMOL/L (ref 98–107)
CLARITY UR: CLEAR
CO2 SERPL-SCNC: 27 MMOL/L (ref 22–29)
COLOR UR: YELLOW
CREAT SERPL-MCNC: 1.28 MG/DL (ref 0.57–1)
D-LACTATE SERPL-SCNC: 1.5 MMOL/L (ref 0.5–2)
DEPRECATED RDW RBC AUTO: 48.9 FL (ref 37–54)
EOSINOPHIL # BLD AUTO: 0.11 10*3/MM3 (ref 0–0.4)
EOSINOPHIL NFR BLD AUTO: 1.2 % (ref 0.3–6.2)
ERYTHROCYTE [DISTWIDTH] IN BLOOD BY AUTOMATED COUNT: 12.9 % (ref 12.3–15.4)
GFR SERPL CREATININE-BSD FRML MDRD: 41 ML/MIN/1.73
GLOBULIN UR ELPH-MCNC: 2.5 GM/DL
GLUCOSE SERPL-MCNC: 154 MG/DL (ref 65–99)
GLUCOSE UR STRIP-MCNC: NEGATIVE MG/DL
HCT VFR BLD AUTO: 40.1 % (ref 34–46.6)
HGB BLD-MCNC: 12.4 G/DL (ref 12–15.9)
HGB UR QL STRIP.AUTO: NEGATIVE
IMM GRANULOCYTES # BLD AUTO: 0.03 10*3/MM3 (ref 0–0.05)
IMM GRANULOCYTES NFR BLD AUTO: 0.3 % (ref 0–0.5)
KETONES UR QL STRIP: NEGATIVE
LEUKOCYTE ESTERASE UR QL STRIP.AUTO: NEGATIVE
LIPASE SERPL-CCNC: 28 U/L (ref 13–60)
LYMPHOCYTES # BLD AUTO: 3.1 10*3/MM3 (ref 0.7–3.1)
LYMPHOCYTES NFR BLD AUTO: 34.5 % (ref 19.6–45.3)
MCH RBC QN AUTO: 31.5 PG (ref 26.6–33)
MCHC RBC AUTO-ENTMCNC: 30.9 G/DL (ref 31.5–35.7)
MCV RBC AUTO: 101.8 FL (ref 79–97)
MONOCYTES # BLD AUTO: 0.64 10*3/MM3 (ref 0.1–0.9)
MONOCYTES NFR BLD AUTO: 7.1 % (ref 5–12)
NEUTROPHILS NFR BLD AUTO: 5.06 10*3/MM3 (ref 1.7–7)
NEUTROPHILS NFR BLD AUTO: 56.5 % (ref 42.7–76)
NITRITE UR QL STRIP: NEGATIVE
NRBC BLD AUTO-RTO: 0 /100 WBC (ref 0–0.2)
PH UR STRIP.AUTO: <=5 [PH] (ref 5–8)
PLATELET # BLD AUTO: 171 10*3/MM3 (ref 140–450)
PMV BLD AUTO: 10.5 FL (ref 6–12)
POTASSIUM SERPL-SCNC: 4.1 MMOL/L (ref 3.5–5.2)
PROT SERPL-MCNC: 6.8 G/DL (ref 6–8.5)
PROT UR QL STRIP: NEGATIVE
RBC # BLD AUTO: 3.94 10*6/MM3 (ref 3.77–5.28)
SODIUM SERPL-SCNC: 142 MMOL/L (ref 136–145)
SP GR UR STRIP: >=1.03 (ref 1–1.03)
UROBILINOGEN UR QL STRIP: NORMAL
WBC # BLD AUTO: 8.98 10*3/MM3 (ref 3.4–10.8)

## 2020-08-17 PROCEDURE — 80053 COMPREHEN METABOLIC PANEL: CPT | Performed by: EMERGENCY MEDICINE

## 2020-08-17 PROCEDURE — 81003 URINALYSIS AUTO W/O SCOPE: CPT | Performed by: EMERGENCY MEDICINE

## 2020-08-17 PROCEDURE — 83690 ASSAY OF LIPASE: CPT | Performed by: EMERGENCY MEDICINE

## 2020-08-17 PROCEDURE — 74176 CT ABD & PELVIS W/O CONTRAST: CPT

## 2020-08-17 PROCEDURE — 85025 COMPLETE CBC W/AUTO DIFF WBC: CPT | Performed by: EMERGENCY MEDICINE

## 2020-08-17 PROCEDURE — 83605 ASSAY OF LACTIC ACID: CPT | Performed by: EMERGENCY MEDICINE

## 2020-08-17 PROCEDURE — 99283 EMERGENCY DEPT VISIT LOW MDM: CPT

## 2020-08-17 RX ORDER — SODIUM CHLORIDE 0.9 % (FLUSH) 0.9 %
10 SYRINGE (ML) INJECTION AS NEEDED
Status: DISCONTINUED | OUTPATIENT
Start: 2020-08-17 | End: 2020-08-18 | Stop reason: HOSPADM

## 2020-08-18 ENCOUNTER — EPISODE CHANGES (OUTPATIENT)
Dept: CASE MANAGEMENT | Facility: OTHER | Age: 75
End: 2020-08-18

## 2020-08-18 VITALS
TEMPERATURE: 98.4 F | WEIGHT: 165 LBS | HEART RATE: 70 BPM | BODY MASS INDEX: 29.23 KG/M2 | HEIGHT: 63 IN | DIASTOLIC BLOOD PRESSURE: 82 MMHG | RESPIRATION RATE: 16 BRPM | SYSTOLIC BLOOD PRESSURE: 140 MMHG | OXYGEN SATURATION: 99 %

## 2020-08-18 RX ORDER — ACETAMINOPHEN AND CODEINE PHOSPHATE 300; 30 MG/1; MG/1
1 TABLET ORAL EVERY 4 HOURS PRN
Qty: 15 TABLET | Refills: 0 | Status: SHIPPED | OUTPATIENT
Start: 2020-08-18 | End: 2021-01-01 | Stop reason: HOSPADM

## 2020-08-18 RX ORDER — ONDANSETRON 4 MG/1
4 TABLET, ORALLY DISINTEGRATING ORAL 4 TIMES DAILY PRN
Qty: 15 TABLET | Refills: 0 | Status: SHIPPED | OUTPATIENT
Start: 2020-08-18

## 2020-08-18 NOTE — ED PROVIDER NOTES
EMERGENCY DEPARTMENT ENCOUNTER      Pt Name: Faye Rod  MRN: [id number]  YOB: 1945  Date of evaluation: 8/17/2020  Provider: Danny Leija DO    CHIEF COMPLAINT       Chief Complaint   Patient presents with   • Back Pain         HISTORY OF PRESENT ILLNESS  (Location/Symptom, Timing/Onset, Context/Setting, Quality, Duration, Modifying Factors, Severity.)   Faye Rod is a 74 y.o. female who presents to the emergency department for evaluation of acute on chronic lower back pain without any fall, injury or trauma.  Been ongoing over the last 1 month.  Does have a history of recurrent kidney disease, possible urinary tract infections in the past.  The patient denies any chest pain, fevers or chills, shortness of breath.  She denies any loss of bowel bladder function, no unilateral weakness, numbness or tingling.  Does have a history of underlying arthritis and feels this may have been flaring up.  She takes Tylenol, no anti-inflammatories secondary to her underlying history of kidney disease.  She denies any other acute systemic complaints at this time.      Nursing notes were reviewed.    REVIEW OF SYSTEMS    (2-9 systems for level 4, 10 or more for level 5)   ROS:  General:  No fevers, no chills, no weakness  Cardiovascular:  No chest pain, no palpitations  Respiratory:  No shortness of breath, no cough, no wheezing  Gastrointestinal:  No pain, no nausea, no vomiting, no diarrhea  Musculoskeletal:  No muscle pain, positive lower back pain acute on chronic, positive arthritis general  Skin:  No rash, no easy bruising  Neurologic:  No speech problems, no headache, no extremity numbness, no extremity tingling, no extremity weakness  Psychiatric:  No anxiety  Genitourinary:  No dysuria, no hematuria    Except as noted above the remainder of the review of systems was reviewed and negative.       PAST MEDICAL HISTORY     Past Medical History:   Diagnosis Date   • Anemia    • Arthritis     • Colon polyp    • Diabetes mellitus (CMS/HCC)    • Disease of thyroid gland    • Heart disease    • Heart valve disease    • Hyperlipidemia    • Hypertension    • Hypothyroidism    • Pacemaker    • Shortness of breath 2/3/2017   • Sick sinus syndrome (CMS/HCC)          SURGICAL HISTORY       Past Surgical History:   Procedure Laterality Date   • CATARACT EXTRACTION Bilateral    • CHOLECYSTECTOMY     • PACEMAKER IMPLANTATION           CURRENT MEDICATIONS     No current facility-administered medications for this encounter.     Current Outpatient Medications:   •  acetaminophen (TYLENOL) 650 MG 8 hr tablet, Take 650 mg by mouth 2 (Two) Times a Day As Needed., Disp: , Rfl:   •  acetaminophen-codeine (TYLENOL #3) 300-30 MG per tablet, Take 1 tablet by mouth Every 4 (Four) Hours As Needed for Moderate Pain ., Disp: 15 tablet, Rfl: 0  •  amLODIPine (NORVASC) 2.5 MG tablet, Take 1 tablet by mouth Daily., Disp: 90 tablet, Rfl: 3  •  aspirin 81 MG EC tablet, Take 81 mg by mouth Daily., Disp: , Rfl:   •  atorvastatin (LIPITOR) 20 MG tablet, TAKE 1 TABLET BY MOUTH  DAILY, Disp: 90 tablet, Rfl: 3  •  carbidopa-levodopa (SINEMET)  MG per tablet, Take 1 tablet by mouth 3 (Three) Times a Day., Disp: 270 tablet, Rfl: 3  •  Cholecalciferol (VITAMIN D3) 1.25 MG (44167 UT) tablet, Take 1 tablet by mouth 1 (One) Time Per Week., Disp: 4 tablet, Rfl: 1  •  donepezil (ARICEPT) 10 MG tablet, Take 1 tablet by mouth 2 (Two) Times a Day., Disp: 180 tablet, Rfl: 3  •  escitalopram (LEXAPRO) 20 MG tablet, Take 1 tablet by mouth Daily., Disp: 90 tablet, Rfl: 3  •  Fe Fum-FePoly-Vit C-Vit B3 (INTEGRA) 62.5-62.5-40-3 MG capsule, TAKE ONE CAPSULE BY MOUTH DAILY, Disp: 30 capsule, Rfl: 0  •  glucose blood (MADDY CONTOUR TEST) test strip, 1-2 times daily Use as instructed, Disp: 50 each, Rfl: 12  •  glucose blood (CONTOUR NEXT TEST) test strip, Use to test once or twice a day as directed, Disp: 100 each, Rfl: 11  •  ibandronate (BONIVA) 150  MG tablet, Take 1 tablet by mouth Every 30 (Thirty) Days., Disp: 3 tablet, Rfl: 1  •  Insulin Glargine (LANTUS SOLOSTAR) 100 UNIT/ML injection pen, 28 units nightly, Disp: 3 mL, Rfl: 3  •  Insulin Pen Needle (B-D UF III MINI PEN NEEDLES) 31G X 5 MM misc, USE WITH LANTUS INSULIN, Disp: 90 each, Rfl: 3  •  levothyroxine (SYNTHROID, LEVOTHROID) 125 MCG tablet, TAKE 1 TABLET BY MOUTH  DAILY, Disp: 90 tablet, Rfl: 3  •  Loratadine 10 MG capsule, Take 1 capsule by mouth As Needed (allergy)., Disp: , Rfl:   •  memantine (NAMENDA) 10 MG tablet, TAKE 1 TABLET BY MOUTH TWO  TIMES DAILY, Disp: 180 tablet, Rfl: 1  •  MYRBETRIQ 25 MG tablet sustained-release 24 hour 24 hr tablet, TAKE 1 TABLET BY MOUTH  DAILY, Disp: 90 tablet, Rfl: 1  •  ondansetron ODT (ZOFRAN-ODT) 4 MG disintegrating tablet, Place 1 tablet on the tongue 4 (Four) Times a Day As Needed for Nausea or Vomiting., Disp: 15 tablet, Rfl: 0    ALLERGIES     Patient has no known allergies.    FAMILY HISTORY       Family History   Problem Relation Age of Onset   • Mental illness Mother    • Stroke Mother    • Arthritis Father    • Diabetes Father    • Heart attack Father    • Kidney disease Brother    • Thyroid disease Brother    • Hypertension Daughter    • Obesity Daughter           SOCIAL HISTORY       Social History     Socioeconomic History   • Marital status:      Spouse name: Not on file   • Number of children: Not on file   • Years of education: Not on file   • Highest education level: Not on file   Tobacco Use   • Smoking status: Never Smoker   • Smokeless tobacco: Never Used   Substance and Sexual Activity   • Alcohol use: No   • Drug use: No   • Sexual activity: Defer         PHYSICAL EXAM    (up to 7 for level 4, 8 or more for level 5)     Vitals:    08/17/20 1859 08/17/20 2250 08/18/20 0219   BP: 127/58 146/63 140/82   BP Location:   Right arm   Patient Position:   Lying   Pulse: 102 66 70   Resp: 18 18 16   Temp: 98.4 °F (36.9 °C)     TempSrc:  "Infrared     SpO2: 94% 95% 99%   Weight: 74.8 kg (165 lb)     Height: 160 cm (63\")         Physical Exam  General : Patient is awake, alert, oriented, in no acute distress, nontoxic appearing  HEENT: Pupils are equally round and reactive to light, EOMI, conjunctivae clear, sclerae white, there is no injection no icterus.  Oral mucosa is moist, no exudate. Uvula is midline  Neck: Neck is supple, full range of motion, trachea midline  Cardiac: Heart regular rate, rhythm, no murmurs, rubs, or gallops  Lungs: Lungs are clear to auscultation, there is no wheezing, rhonchi, or rales. There is no use of accessory muscles  Chest wall: There is no tenderness to palpation over the chest wall or over ribs  Abdomen: Abdomen is soft, nontender, nondistended. There is no firm or pulsatile masses, no rebound rigidity or guarding.   Musculoskeletal: Mild tenderness to palpation of the paraspinal lumbar musculature without any midline step-off or deformity.  Mild spine scoliosis appreciated.  5 out of 5 strength in all 4 extremities.  No focal muscle deficits are appreciated  Neuro: Motor intact, sensory intact, level of consciousness is normal, cerebellar function is normal, reflexes are grossly normal. No evidence of incontinence or loss of bowel or bladder function, no saddle anesthesia noted   Dermatology: Skin is warm and dry  Psych: Mentation is grossly normal, cognition is grossly normal. Affect is appropriate.      DIAGNOSTIC RESULTS     EKG: All EKG's are interpreted by the Emergency Department Physician who either signs or Co-signs this chart in the absence of a cardiologist.    No orders to display       RADIOLOGY:   Non-plain film images such as CT, Ultrasound and MRI are read by the radiologist. Plain radiographic images are visualized and preliminarily interpreted by the emergency physician with the below findings:      [] Radiologist's Report Reviewed:  CT Abdomen Pelvis Without Contrast   Final Result   No urinary " stones.      Normal appendix. No acute findings               Signer Name: Mike Sanchez MD    Signed: 8/17/2020 11:58 PM    Workstation Name: [id number]     [facility]            ED BEDSIDE ULTRASOUND:   Performed by ED Physician - none    LABS:    I have reviewed and interpreted all of the currently available lab results from this visit (if applicable):  Results for orders placed or performed during the hospital encounter of 08/17/20   Comprehensive Metabolic Panel   Result Value Ref Range    Glucose 154 (H) 65 - 99 mg/dL    BUN 30 (H) 8 - 23 mg/dL    Creatinine 1.28 (H) 0.57 - 1.00 mg/dL    Sodium 142 136 - 145 mmol/L    Potassium 4.1 3.5 - 5.2 mmol/L    Chloride 104 98 - 107 mmol/L    CO2 27.0 22.0 - 29.0 mmol/L    Calcium 9.7 8.6 - 10.5 mg/dL    Total Protein 6.8 6.0 - 8.5 g/dL    Albumin 4.30 3.50 - 5.20 g/dL    ALT (SGPT) 10 1 - 33 U/L    AST (SGOT) 20 1 - 32 U/L    Alkaline Phosphatase 71 39 - 117 U/L    Total Bilirubin 0.3 0.0 - 1.2 mg/dL    eGFR Non African Amer 41 (L) >60 mL/min/1.73    Globulin 2.5 gm/dL    A/G Ratio 1.7 g/dL    BUN/Creatinine Ratio 23.4 7.0 - 25.0    Anion Gap 11.0 5.0 - 15.0 mmol/L   Lactic Acid, Plasma   Result Value Ref Range    Lactate 1.5 0.5 - 2.0 mmol/L   Lipase   Result Value Ref Range    Lipase 28 13 - 60 U/L   Urinalysis With Microscopic If Indicated (No Culture) - Urine, Clean Catch   Result Value Ref Range    Color, UA Yellow Yellow, Straw    Appearance, UA Clear Clear    pH, UA <=5.0 5.0 - 8.0    Specific Gravity, UA >=1.030 1.001 - 1.030    Glucose, UA Negative Negative    Ketones, UA Negative Negative    Bilirubin, UA Negative Negative    Blood, UA Negative Negative    Protein, UA Negative Negative    Leuk Esterase, UA Negative Negative    Nitrite, UA Negative Negative    Urobilinogen, UA 1.0 E.U./dL 0.2 - 1.0 E.U./dL   CBC Auto Differential   Result Value Ref Range    WBC 8.98 3.40 - 10.80 10*3/mm3    RBC 3.94 3.77 - 5.28 10*6/mm3    Hemoglobin  "12.4 12.0 - 15.9 g/dL    Hematocrit 40.1 34.0 - 46.6 %    .8 (H) 79.0 - 97.0 fL    MCH 31.5 26.6 - 33.0 pg    MCHC 30.9 (L) 31.5 - 35.7 g/dL    RDW 12.9 12.3 - 15.4 %    RDW-SD 48.9 37.0 - 54.0 fl    MPV 10.5 6.0 - 12.0 fL    Platelets 171 140 - 450 10*3/mm3    Neutrophil % 56.5 42.7 - 76.0 %    Lymphocyte % 34.5 19.6 - 45.3 %    Monocyte % 7.1 5.0 - 12.0 %    Eosinophil % 1.2 0.3 - 6.2 %    Basophil % 0.4 0.0 - 1.5 %    Immature Grans % 0.3 0.0 - 0.5 %    Neutrophils, Absolute 5.06 1.70 - 7.00 10*3/mm3    Lymphocytes, Absolute 3.10 0.70 - 3.10 10*3/mm3    Monocytes, Absolute 0.64 0.10 - 0.90 10*3/mm3    Eosinophils, Absolute 0.11 0.00 - 0.40 10*3/mm3    Basophils, Absolute 0.04 0.00 - 0.20 10*3/mm3    Immature Grans, Absolute 0.03 0.00 - 0.05 10*3/mm3    nRBC 0.0 0.0 - 0.2 /100 WBC        All other labs were within normal range or not returned as of this dictation.      EMERGENCY DEPARTMENT COURSE and DIFFERENTIAL DIAGNOSIS/MDM:   Vitals:    Vitals:    08/17/20 1859 08/17/20 2250 08/18/20 0219   BP: 127/58 146/63 140/82   BP Location:   Right arm   Patient Position:   Lying   Pulse: 102 66 70   Resp: 18 18 16   Temp: 98.4 °F (36.9 °C)     TempSrc: Infrared     SpO2: 94% 95% 99%   Weight: 74.8 kg (165 lb)     Height: 160 cm (63\")         ED Course as of Aug 18 0722   Mon Aug 17, 2020   2230 [id number] John:  JOHN query complete. Treatment plan may include limited course of prescribed  controlled substance. Risks including addiction, benefits, and alternatives presented to patient.       [AP]      ED Course User Index  [AP] Danny Leija,        Patient with generalized back pain with a history of underlying arthritis.  No fall, injury or trauma.  She does not have any signs of acute discitis or cauda equina syndrome.  Vital signs are stable upon arrival, she is nontoxic-appearing.  Family concerned secondary to her history of kidney disease with urinary tract infections.  We did obtain IV, blood " work, urinalysis and advanced imaging of the abdomen/pelvis with results as above.  No acute or significant abnormalities were appreciated, patient does have underlying arthritis, likely causing some of her chronic back and joint pains.  We discussed continuing with symptomatic therapies, feel she would benefit from evaluation by her PCP, possible referral over to rheumatology for further evaluation.  Patient and family are agreeable to this, understands return precautions discussed. I had a discussion with the patient/family regarding diagnosis, diagnostic results, treatment plan, and medications.  The patient/family indicated understanding of these instructions.  I spent adequate time at the bedside proceeding discharge necessary to personally discuss the aftercare instructions, giving patient education, providing explanations of the results of our evaluations/findings, and my decision making to assure that the patient/family understand the plan of care.  Time was allotted to answer questions at that time and throughout the ED course.  Emphasis was placed on timely follow-up after discharge.  I also discussed the potential for the development of an acute emergent condition requiring further evaluation, admission, or even surgical intervention. I discussed that we found nothing during the visit today indicating the need for further workup, admission, or the presence of an unstable medical condition.  I encouraged the patient to return to the emergency department immediately for ANY concerns, worsening, new complaints, or if symptoms persist and unable to seek follow-up in a timely fashion.  The patient/family expressed understanding and agreement with this plan.  The patient will follow-up with their PCP in 1-2 days for reevaluation.       MEDICATIONS ADMINISTERED IN ED:  Medications - No data to display    PROCEDURES:  Procedures    CRITICAL CARE TIME    Total Critical Care time was 0 minutes, excluding separately  reportable procedures.   There was a high probability of clinically significant/life threatening deterioration in the patient's condition which required my urgent intervention.      FINAL IMPRESSION      1. Bilateral flank pain    2. Chronic bilateral low back pain without sciatica    3. Arthritis    4. Chronic renal impairment, unspecified CKD stage          DISPOSITION/PLAN     ED Disposition     ED Disposition Condition Comment    Discharge Stable           PATIENT REFERRED TO:  Brenna Luciano MD  [address]  [place] [address]  [phone]    In 2 days  For reevaluation and referral to arthritis specialist, rheumatologist    [facility] Emergency Department  [address] [address] [phone]    If symptoms worsen      DISCHARGE MEDICATIONS:     Medication List      START taking these medications    acetaminophen-codeine 300-30 MG per tablet  Commonly known as:  TYLENOL #3  Take 1 tablet by mouth Every 4 (Four) Hours As Needed for Moderate Pain .     ondansetron ODT 4 MG disintegrating tablet  Commonly known as:  ZOFRAN-ODT  Place 1 tablet on the tongue 4 (Four) Times a Day As Needed for Nausea or   Vomiting.        CONTINUE taking these medications    acetaminophen 650 MG 8 hr tablet  Commonly known as:  TYLENOL     amLODIPine 2.5 MG tablet  Commonly known as:  NORVASC  Take 1 tablet by mouth Daily.     aspirin 81 MG EC tablet     atorvastatin 20 MG tablet  Commonly known as:  LIPITOR  TAKE 1 TABLET BY MOUTH  DAILY     carbidopa-levodopa  MG per tablet  Commonly known as:  SINEMET  Take 1 tablet by mouth 3 (Three) Times a Day.     donepezil 10 MG tablet  Commonly known as:  ARICEPT  Take 1 tablet by mouth 2 (Two) Times a Day.     escitalopram 20 MG tablet  Commonly known as:  LEXAPRO  Take 1 tablet by mouth Daily.     * glucose blood test strip  Commonly known as:  Ashley Contour Test  1-2 times daily Use as instructed     * glucose  blood test strip  Commonly known as:  Contour Next Test  Use to test once or twice a day as directed     ibandronate 150 MG tablet  Commonly known as:  Boniva  Take 1 tablet by mouth Every 30 (Thirty) Days.     Insulin Glargine 100 UNIT/ML injection pen  Commonly known as:  Lantus SoloStar  28 units nightly     Insulin Pen Needle 31G X 5 MM misc  Commonly known as:  B-D UF III MINI PEN NEEDLES  USE WITH LANTUS INSULIN     Integra 62.5-62.5-40-3 MG capsule  TAKE ONE CAPSULE BY MOUTH DAILY     levothyroxine 125 MCG tablet  Commonly known as:  SYNTHROID, LEVOTHROID  TAKE 1 TABLET BY MOUTH  DAILY     Loratadine 10 MG capsule     memantine 10 MG tablet  Commonly known as:  NAMENDA  TAKE 1 TABLET BY MOUTH TWO  TIMES DAILY     Myrbetriq 25 MG tablet sustained-release 24 hour 24 hr tablet  Generic drug:  Mirabegron ER  TAKE 1 TABLET BY MOUTH  DAILY     Vitamin D3 1.25 MG (49928 UT) tablet  Take 1 tablet by mouth 1 (One) Time Per Week.         * This list has 2 medication(s) that are the same as other medications   prescribed for you. Read the directions carefully, and ask your doctor or   other care provider to review them with you.                  Comment: Please note this report has been produced using speech recognition software.      Danny Leija DO  Attending Emergency Physician               Danny Leija DO  08/18/20 0289

## 2020-08-21 ENCOUNTER — OFFICE VISIT (OUTPATIENT)
Dept: NEUROLOGY | Facility: CLINIC | Age: 75
End: 2020-08-21

## 2020-08-21 ENCOUNTER — TELEPHONE (OUTPATIENT)
Dept: NEUROLOGY | Facility: CLINIC | Age: 75
End: 2020-08-21

## 2020-08-21 VITALS
OXYGEN SATURATION: 92 % | BODY MASS INDEX: 31.36 KG/M2 | SYSTOLIC BLOOD PRESSURE: 138 MMHG | WEIGHT: 177 LBS | HEART RATE: 93 BPM | HEIGHT: 63 IN | TEMPERATURE: 98 F | DIASTOLIC BLOOD PRESSURE: 62 MMHG

## 2020-08-21 DIAGNOSIS — F02.818 LEWY BODY DEMENTIA WITH BEHAVIORAL DISTURBANCE (HCC): Primary | ICD-10-CM

## 2020-08-21 DIAGNOSIS — G31.83 LEWY BODY DEMENTIA WITH BEHAVIORAL DISTURBANCE (HCC): Primary | ICD-10-CM

## 2020-08-21 PROCEDURE — 99214 OFFICE O/P EST MOD 30 MIN: CPT | Performed by: PHYSICIAN ASSISTANT

## 2020-08-21 RX ORDER — QUETIAPINE FUMARATE 25 MG/1
25 TABLET, FILM COATED ORAL NIGHTLY
Qty: 30 TABLET | Refills: 11 | Status: SHIPPED | OUTPATIENT
Start: 2020-08-21 | End: 2021-01-01 | Stop reason: SDUPTHER

## 2020-08-21 RX ORDER — QUETIAPINE FUMARATE 25 MG/1
25 TABLET, FILM COATED ORAL NIGHTLY
Qty: 30 TABLET | Refills: 11 | Status: SHIPPED | OUTPATIENT
Start: 2020-08-21 | End: 2020-08-21

## 2020-08-21 NOTE — PROGRESS NOTES
"Subjective       Chief Complaint: memory loss      History of Present Illness   Faye Rod is a 74 y.o. female who returns to clinic today with a history of possible DLB. Her family  has noted symptoms since approximately 2015 marked initially by forgetfulness. This has varied over time, with significant worsening noted in 9/16 when hospitalized with renal insufficiency. Additional associated symptoms have included impairments in essentially all spheres of cognition. She has had associated symptoms of hallucinations and delusions previously.       She has also had a resting tremor of her left hand since 2016. This has somewhat worsened over time. She has had associated balance impairment, shuffling gait as well as bradykinesia.      Prior evaluation has included screening blood work and a head CT within the last year which were notable for only mild increases in BUN and creatinine. She is currently taking donepezil and memantine    Today: Since her last visit in 2/20, she and her family feel that her cognition has worsened. She continues to have vivid visual which are becoming more distressing. She also notes increased anxiety. She feels unchanged motorically.       I have reviewed and confirmed the past family, social and medical history as accurate on 8/21/2020.     Review of Systems   Constitutional: Negative.    HENT: Negative.    Eyes: Negative.    Respiratory: Negative.    Cardiovascular: Negative.    Gastrointestinal: Negative.    Endocrine: Negative.    Genitourinary: Negative.    Musculoskeletal: Negative.    Skin: Negative.    Allergic/Immunologic: Negative.    Neurological: Positive for tremors.        Memory loss    Hematological: Negative.    Psychiatric/Behavioral: Positive for hallucinations. The patient is nervous/anxious.        Objective     Temp 98 °F (36.7 °C)   Ht 160 cm (63\")   BMI 29.23 kg/m²     General appearance today is normal.       Physical Exam   Neurological: She has normal " strength. She has a normal Finger-Nose-Finger Test.   Psychiatric: Her speech is normal.        Neurologic Exam     Mental Status   Oriented to person.   Disoriented to place.   Disoriented to time.   Registration: recalls 3 of 3 objects. Recall of objects at 5 minutes: 0/3 objects. Follows 3 step commands.   Attention: decreased.   Speech: speech is normal   Level of consciousness: alert  Able to name object. Able to read. Able to repeat. Unable to write. Normal comprehension.     Cranial Nerves   Cranial nerves II through XII intact.     Motor Exam   Muscle bulk: normal  Overall muscle tone: normal    Strength   Strength 5/5 throughout.     Gait, Coordination, and Reflexes     Gait  Gait: shuffling    Coordination   Finger to nose coordination: normal    Tremor   Resting tremor: present        Results  MMSE=11      Assessment/Plan   Faye was seen today for dementia and follow-up.    Diagnoses and all orders for this visit:    Lewy body dementia with behavioral disturbance (CMS/HCC)    Other orders  -     Discontinue: QUEtiapine (SEROquel) 25 MG tablet; Take 1 tablet by mouth Every Night.  -     QUEtiapine (SEROquel) 25 MG tablet; Take 1 tablet by mouth Every Night.          Discussion/Summary   Faye Rod returns to clinic today for evaluation of Dementia with Lewy Bodies. I again reviewed her current status and treatment options. After discussing potential treatment options, it was elected to add  Seroquel (I discussed the associated black box warning) and continue on donepezil, memantine and Sinemet unchanged. She will then follow up in 6 months , or sooner if needed.   I spent 25 minutes face to face with the patient and family with 15 minutes spent on discussing diagnosis, evaluation, current status, treatment options and management as discussed above.       As part of this visit I obtained additional history from the family which is incorporated in the HPI.      Sarai Mohan PA-C

## 2020-09-08 ENCOUNTER — PATIENT MESSAGE (OUTPATIENT)
Dept: INTERNAL MEDICINE | Facility: CLINIC | Age: 75
End: 2020-09-08

## 2020-09-08 NOTE — TELEPHONE ENCOUNTER
From: Faye Rod  To: Brenna Luciano MD  Sent: 9/8/2020 2:38 PM EDT  Subject: Non-Urgent Medical Question    I have an appointment scheduled for 09/11/20. I also need to have lab work done that day for an upcoming nephrology appointment. May I bring my order from my nephrologist with me and just have my lab work done there at your office during my appointment? Thank you.

## 2020-09-11 ENCOUNTER — LAB (OUTPATIENT)
Dept: LAB | Facility: HOSPITAL | Age: 75
End: 2020-09-11

## 2020-09-11 ENCOUNTER — TRANSCRIBE ORDERS (OUTPATIENT)
Dept: LAB | Facility: HOSPITAL | Age: 75
End: 2020-09-11

## 2020-09-11 DIAGNOSIS — N18.30 CHRONIC KIDNEY DISEASE, STAGE III (MODERATE) (HCC): Primary | ICD-10-CM

## 2020-09-11 DIAGNOSIS — N18.30 CHRONIC KIDNEY DISEASE, STAGE III (MODERATE) (HCC): ICD-10-CM

## 2020-09-11 PROCEDURE — 82570 ASSAY OF URINE CREATININE: CPT

## 2020-09-11 PROCEDURE — 36415 COLL VENOUS BLD VENIPUNCTURE: CPT

## 2020-09-11 PROCEDURE — 84156 ASSAY OF PROTEIN URINE: CPT

## 2020-09-11 PROCEDURE — 81001 URINALYSIS AUTO W/SCOPE: CPT

## 2020-09-11 PROCEDURE — 82306 VITAMIN D 25 HYDROXY: CPT

## 2020-09-11 PROCEDURE — 83970 ASSAY OF PARATHORMONE: CPT

## 2020-09-11 PROCEDURE — 80069 RENAL FUNCTION PANEL: CPT

## 2020-09-12 LAB
25(OH)D3 SERPL-MCNC: 55.1 NG/ML (ref 30–100)
ALBUMIN SERPL-MCNC: 4.3 G/DL (ref 3.5–5.2)
ANION GAP SERPL CALCULATED.3IONS-SCNC: 9 MMOL/L (ref 5–15)
BACTERIA UR QL AUTO: ABNORMAL /HPF
BILIRUB UR QL STRIP: NEGATIVE
BUN SERPL-MCNC: 33 MG/DL (ref 8–23)
BUN/CREAT SERPL: 26.2 (ref 7–25)
CALCIUM SPEC-SCNC: 9.3 MG/DL (ref 8.6–10.5)
CHLORIDE SERPL-SCNC: 105 MMOL/L (ref 98–107)
CLARITY UR: CLEAR
CO2 SERPL-SCNC: 26 MMOL/L (ref 22–29)
COLOR UR: YELLOW
CREAT SERPL-MCNC: 1.26 MG/DL (ref 0.57–1)
CREAT UR-MCNC: 173.9 MG/DL
GFR SERPL CREATININE-BSD FRML MDRD: 41 ML/MIN/1.73
GLUCOSE SERPL-MCNC: 198 MG/DL (ref 65–99)
GLUCOSE UR STRIP-MCNC: NEGATIVE MG/DL
HGB UR QL STRIP.AUTO: NEGATIVE
HYALINE CASTS UR QL AUTO: ABNORMAL /LPF
KETONES UR QL STRIP: ABNORMAL
LEUKOCYTE ESTERASE UR QL STRIP.AUTO: ABNORMAL
NITRITE UR QL STRIP: NEGATIVE
PH UR STRIP.AUTO: <=5 [PH] (ref 5–8)
PHOSPHATE SERPL-MCNC: 3.9 MG/DL (ref 2.5–4.5)
POTASSIUM SERPL-SCNC: 4.8 MMOL/L (ref 3.5–5.2)
PROT UR QL STRIP: NEGATIVE
PROT UR-MCNC: 16.3 MG/DL
PTH-INTACT SERPL-MCNC: 55.2 PG/ML (ref 15–65)
RBC # UR: ABNORMAL /HPF
REF LAB TEST METHOD: ABNORMAL
SODIUM SERPL-SCNC: 140 MMOL/L (ref 136–145)
SP GR UR STRIP: 1.03 (ref 1–1.03)
SQUAMOUS #/AREA URNS HPF: ABNORMAL /HPF
UROBILINOGEN UR QL STRIP: ABNORMAL
WBC UR QL AUTO: ABNORMAL /HPF

## 2020-09-21 ENCOUNTER — PATIENT MESSAGE (OUTPATIENT)
Dept: INTERNAL MEDICINE | Facility: CLINIC | Age: 75
End: 2020-09-21

## 2020-09-21 DIAGNOSIS — N18.30 CHRONIC KIDNEY DISEASE, STAGE III (MODERATE) (HCC): ICD-10-CM

## 2020-09-21 RX ORDER — INSULIN GLARGINE 100 [IU]/ML
INJECTION, SOLUTION SUBCUTANEOUS
Qty: 3 ML | Refills: 3
Start: 2020-09-21 | End: 2020-09-29

## 2020-09-22 NOTE — TELEPHONE ENCOUNTER
From: Faye Rod  To: Brenna Luciano MD  Sent: 9/21/2020 4:10 PM EDT  Subject: Visit Follow-Up Question    I just wanted to apologize for missing Faye's appointment on 09/11/20. We had every intention of coming to the appointment and I am truly sorry for any inconvenience we caused. She needed lab work done that day for an upcoming nephrology appointment. We were in the building next door at the [facility] lab. We arrived at the lab at 2:30, which I thought would give us plenty of time to make our 3:30 appointment at your office. We did not get called back for her testing until 3:35 and it was 4:00 before we finished. I did call about 3:20 to let your office know that we were at the lab next door and that we were next to be called so I hoped we would still be able to make the appointment a few minutes late, but it took much longer.

## 2020-09-28 DIAGNOSIS — N18.30 CHRONIC KIDNEY DISEASE, STAGE III (MODERATE) (HCC): ICD-10-CM

## 2020-09-29 RX ORDER — INSULIN GLARGINE 100 [IU]/ML
INJECTION, SOLUTION SUBCUTANEOUS
Qty: 30 ML | Refills: 6 | Status: SHIPPED | OUTPATIENT
Start: 2020-09-29 | End: 2021-01-01

## 2020-09-29 NOTE — TELEPHONE ENCOUNTER
Refilled insulin.  Please call daughter to schedule Medicare wellness visit (no showed 9/11/2020 appointment).  Is overdue for diabetes labs and follow-up.  Try to schedule in office wellness visit since patient will need lab work and an exam.

## 2020-11-01 ENCOUNTER — EPISODE CHANGES (OUTPATIENT)
Dept: CASE MANAGEMENT | Facility: OTHER | Age: 75
End: 2020-11-01

## 2020-11-16 RX ORDER — MEMANTINE HYDROCHLORIDE 10 MG/1
TABLET ORAL
Qty: 180 TABLET | Refills: 3 | Status: SHIPPED | OUTPATIENT
Start: 2020-11-16

## 2020-11-25 ENCOUNTER — TELEPHONE (OUTPATIENT)
Dept: CARDIOLOGY | Facility: CLINIC | Age: 75
End: 2020-11-25

## 2020-11-25 NOTE — TELEPHONE ENCOUNTER
Called and left message for daughter that her moms monitor did not transmit on scheduled date. I ask her to check the phone line and call me for assistance.

## 2021-01-01 ENCOUNTER — HOSPITAL ENCOUNTER (OUTPATIENT)
Dept: BONE DENSITY | Facility: HOSPITAL | Age: 76
Discharge: HOME | End: 2021-02-26
Admitting: INTERNAL MEDICINE

## 2021-01-01 ENCOUNTER — TRANSCRIBE ORDERS (OUTPATIENT)
Dept: LAB | Facility: HOSPITAL | Age: 76
End: 2021-01-01

## 2021-01-01 ENCOUNTER — TELEPHONE (OUTPATIENT)
Dept: INTERNAL MEDICINE | Facility: CLINIC | Age: 76
End: 2021-01-01

## 2021-01-01 ENCOUNTER — OFFICE VISIT (OUTPATIENT)
Dept: CARDIOLOGY | Facility: CLINIC | Age: 76
End: 2021-01-01

## 2021-01-01 ENCOUNTER — LAB (OUTPATIENT)
Dept: INTERNAL MEDICINE | Facility: CLINIC | Age: 76
End: 2021-01-01

## 2021-01-01 ENCOUNTER — APPOINTMENT (OUTPATIENT)
Dept: GENERAL RADIOLOGY | Facility: HOSPITAL | Age: 76
End: 2021-01-01

## 2021-01-01 ENCOUNTER — LAB (OUTPATIENT)
Dept: LAB | Facility: HOSPITAL | Age: 76
End: 2021-01-01

## 2021-01-01 ENCOUNTER — TELEMEDICINE (OUTPATIENT)
Dept: NEUROLOGY | Facility: CLINIC | Age: 76
End: 2021-01-01

## 2021-01-01 ENCOUNTER — OFFICE VISIT (OUTPATIENT)
Dept: INTERNAL MEDICINE | Facility: CLINIC | Age: 76
End: 2021-01-01

## 2021-01-01 ENCOUNTER — IMMUNIZATION (OUTPATIENT)
Dept: VACCINE CLINIC | Facility: HOSPITAL | Age: 76
End: 2021-01-01

## 2021-01-01 ENCOUNTER — TELEPHONE (OUTPATIENT)
Dept: CARDIOLOGY | Facility: CLINIC | Age: 76
End: 2021-01-01

## 2021-01-01 ENCOUNTER — APPOINTMENT (OUTPATIENT)
Dept: VACCINE CLINIC | Facility: HOSPITAL | Age: 76
End: 2021-01-01

## 2021-01-01 ENCOUNTER — OUTSIDE FACILITY SERVICE (OUTPATIENT)
Dept: INTERNAL MEDICINE | Facility: CLINIC | Age: 76
End: 2021-01-01

## 2021-01-01 ENCOUNTER — APPOINTMENT (OUTPATIENT)
Dept: CARDIOLOGY | Facility: HOSPITAL | Age: 76
End: 2021-01-01

## 2021-01-01 ENCOUNTER — TELEPHONE (OUTPATIENT)
Dept: NEUROLOGY | Facility: CLINIC | Age: 76
End: 2021-01-01

## 2021-01-01 ENCOUNTER — HOSPITAL ENCOUNTER (OUTPATIENT)
Facility: HOSPITAL | Age: 76
Setting detail: OBSERVATION
Discharge: NURSING, REHAB OR LONG-TERM CARE | End: 2021-08-27
Attending: EMERGENCY MEDICINE | Admitting: INTERNAL MEDICINE

## 2021-01-01 ENCOUNTER — APPOINTMENT (OUTPATIENT)
Dept: CT IMAGING | Facility: HOSPITAL | Age: 76
End: 2021-01-01

## 2021-01-01 VITALS
WEIGHT: 165 LBS | SYSTOLIC BLOOD PRESSURE: 109 MMHG | DIASTOLIC BLOOD PRESSURE: 72 MMHG | BODY MASS INDEX: 32.39 KG/M2 | HEIGHT: 60 IN | HEART RATE: 61 BPM | OXYGEN SATURATION: 99 %

## 2021-01-01 VITALS
DIASTOLIC BLOOD PRESSURE: 58 MMHG | OXYGEN SATURATION: 97 % | BODY MASS INDEX: 29.23 KG/M2 | HEIGHT: 63 IN | TEMPERATURE: 97.7 F | RESPIRATION RATE: 18 BRPM | WEIGHT: 165 LBS | HEART RATE: 66 BPM | SYSTOLIC BLOOD PRESSURE: 104 MMHG

## 2021-01-01 VITALS
HEART RATE: 60 BPM | DIASTOLIC BLOOD PRESSURE: 52 MMHG | RESPIRATION RATE: 16 BRPM | BODY MASS INDEX: 31.15 KG/M2 | TEMPERATURE: 98.5 F | WEIGHT: 165 LBS | HEIGHT: 61 IN | SYSTOLIC BLOOD PRESSURE: 112 MMHG | OXYGEN SATURATION: 98 %

## 2021-01-01 VITALS
RESPIRATION RATE: 15 BRPM | DIASTOLIC BLOOD PRESSURE: 60 MMHG | SYSTOLIC BLOOD PRESSURE: 108 MMHG | HEART RATE: 66 BPM | HEIGHT: 60 IN | OXYGEN SATURATION: 97 % | TEMPERATURE: 97.8 F | BODY MASS INDEX: 32.22 KG/M2

## 2021-01-01 DIAGNOSIS — F02.818 LEWY BODY DEMENTIA WITH BEHAVIORAL DISTURBANCE (HCC): ICD-10-CM

## 2021-01-01 DIAGNOSIS — G31.83 LEWY BODY DEMENTIA WITH BEHAVIORAL DISTURBANCE (HCC): ICD-10-CM

## 2021-01-01 DIAGNOSIS — Z79.899 HIGH RISK MEDICATION USE: ICD-10-CM

## 2021-01-01 DIAGNOSIS — I10 ESSENTIAL HYPERTENSION: ICD-10-CM

## 2021-01-01 DIAGNOSIS — M15.9 PRIMARY OSTEOARTHRITIS INVOLVING MULTIPLE JOINTS: ICD-10-CM

## 2021-01-01 DIAGNOSIS — I49.5 SICK SINUS SYNDROME (HCC): ICD-10-CM

## 2021-01-01 DIAGNOSIS — E03.9 HYPOTHYROIDISM, ADULT: ICD-10-CM

## 2021-01-01 DIAGNOSIS — G89.29 CHRONIC BILATERAL LOW BACK PAIN WITHOUT SCIATICA: ICD-10-CM

## 2021-01-01 DIAGNOSIS — N18.4 ANEMIA IN STAGE 4 CHRONIC KIDNEY DISEASE (HCC): ICD-10-CM

## 2021-01-01 DIAGNOSIS — M81.0 OSTEOPOROSIS, UNSPECIFIED OSTEOPOROSIS TYPE, UNSPECIFIED PATHOLOGICAL FRACTURE PRESENCE: ICD-10-CM

## 2021-01-01 DIAGNOSIS — E55.9 VITAMIN D DEFICIENCY: ICD-10-CM

## 2021-01-01 DIAGNOSIS — I44.1 AV BLOCK, MOBITZ II: ICD-10-CM

## 2021-01-01 DIAGNOSIS — L89.302 PRESSURE INJURY OF BUTTOCK, STAGE 2, UNSPECIFIED LATERALITY (HCC): ICD-10-CM

## 2021-01-01 DIAGNOSIS — F41.9 ANXIETY: ICD-10-CM

## 2021-01-01 DIAGNOSIS — N18.30 STAGE 3 CHRONIC KIDNEY DISEASE, UNSPECIFIED WHETHER STAGE 3A OR 3B CKD (HCC): Primary | ICD-10-CM

## 2021-01-01 DIAGNOSIS — N18.32 TYPE 2 DIABETES MELLITUS WITH STAGE 3B CHRONIC KIDNEY DISEASE, UNSPECIFIED WHETHER LONG TERM INSULIN USE (HCC): ICD-10-CM

## 2021-01-01 DIAGNOSIS — D63.1 ANEMIA IN STAGE 4 CHRONIC KIDNEY DISEASE (HCC): ICD-10-CM

## 2021-01-01 DIAGNOSIS — R32 URINARY INCONTINENCE, UNSPECIFIED TYPE: ICD-10-CM

## 2021-01-01 DIAGNOSIS — N18.30 CHRONIC KIDNEY DISEASE, STAGE III (MODERATE) (HCC): ICD-10-CM

## 2021-01-01 DIAGNOSIS — N18.30 STAGE 3 CHRONIC KIDNEY DISEASE, UNSPECIFIED WHETHER STAGE 3A OR 3B CKD (HCC): ICD-10-CM

## 2021-01-01 DIAGNOSIS — Z79.4 TYPE 2 DIABETES MELLITUS WITH HYPEROSMOLARITY WITHOUT COMA, WITH LONG-TERM CURRENT USE OF INSULIN (HCC): ICD-10-CM

## 2021-01-01 DIAGNOSIS — E11.22 TYPE 2 DIABETES MELLITUS WITH STAGE 3B CHRONIC KIDNEY DISEASE, UNSPECIFIED WHETHER LONG TERM INSULIN USE (HCC): Primary | ICD-10-CM

## 2021-01-01 DIAGNOSIS — R13.10 DYSPHAGIA, UNSPECIFIED TYPE: ICD-10-CM

## 2021-01-01 DIAGNOSIS — E03.9 HYPOTHYROIDISM, ADULT: Primary | ICD-10-CM

## 2021-01-01 DIAGNOSIS — M85.80 OSTEOPENIA, UNSPECIFIED LOCATION: ICD-10-CM

## 2021-01-01 DIAGNOSIS — R82.998 LEUKOCYTES IN URINE: ICD-10-CM

## 2021-01-01 DIAGNOSIS — I50.22 CHRONIC SYSTOLIC CONGESTIVE HEART FAILURE (HCC): Primary | ICD-10-CM

## 2021-01-01 DIAGNOSIS — E78.2 MIXED HYPERLIPIDEMIA: ICD-10-CM

## 2021-01-01 DIAGNOSIS — G31.83 LEWY BODY DEMENTIA WITH BEHAVIORAL DISTURBANCE (HCC): Primary | ICD-10-CM

## 2021-01-01 DIAGNOSIS — R62.7 FAILURE TO THRIVE IN ADULT: ICD-10-CM

## 2021-01-01 DIAGNOSIS — E11.65 TYPE 2 DIABETES MELLITUS WITH HYPERGLYCEMIA, WITHOUT LONG-TERM CURRENT USE OF INSULIN (HCC): ICD-10-CM

## 2021-01-01 DIAGNOSIS — N32.81 OVERACTIVE BLADDER: ICD-10-CM

## 2021-01-01 DIAGNOSIS — F03.90 DEMENTIA WITHOUT BEHAVIORAL DISTURBANCE, UNSPECIFIED DEMENTIA TYPE: ICD-10-CM

## 2021-01-01 DIAGNOSIS — E11.00 TYPE 2 DIABETES MELLITUS WITH HYPEROSMOLARITY WITHOUT COMA, WITHOUT LONG-TERM CURRENT USE OF INSULIN (HCC): ICD-10-CM

## 2021-01-01 DIAGNOSIS — N18.9 CHRONIC RENAL IMPAIRMENT, UNSPECIFIED CKD STAGE: ICD-10-CM

## 2021-01-01 DIAGNOSIS — R29.6 MULTIPLE FALLS: Primary | ICD-10-CM

## 2021-01-01 DIAGNOSIS — S22.41XA CLOSED FRACTURE OF MULTIPLE RIBS OF RIGHT SIDE, INITIAL ENCOUNTER: ICD-10-CM

## 2021-01-01 DIAGNOSIS — N18.32 TYPE 2 DIABETES MELLITUS WITH STAGE 3B CHRONIC KIDNEY DISEASE, UNSPECIFIED WHETHER LONG TERM INSULIN USE (HCC): Primary | ICD-10-CM

## 2021-01-01 DIAGNOSIS — F02.818 LEWY BODY DEMENTIA WITH BEHAVIORAL DISTURBANCE (HCC): Primary | ICD-10-CM

## 2021-01-01 DIAGNOSIS — E11.22 TYPE 2 DIABETES MELLITUS WITH STAGE 3B CHRONIC KIDNEY DISEASE, UNSPECIFIED WHETHER LONG TERM INSULIN USE (HCC): ICD-10-CM

## 2021-01-01 DIAGNOSIS — Z78.0 POST-MENOPAUSAL: ICD-10-CM

## 2021-01-01 DIAGNOSIS — R53.1 GENERALIZED WEAKNESS: ICD-10-CM

## 2021-01-01 DIAGNOSIS — R41.0 CONFUSION: Primary | ICD-10-CM

## 2021-01-01 DIAGNOSIS — L30.4 INTERTRIGO: ICD-10-CM

## 2021-01-01 DIAGNOSIS — Z00.00 MEDICARE ANNUAL WELLNESS VISIT, SUBSEQUENT: Primary | ICD-10-CM

## 2021-01-01 DIAGNOSIS — R26.81 GAIT INSTABILITY: ICD-10-CM

## 2021-01-01 DIAGNOSIS — M54.50 CHRONIC BILATERAL LOW BACK PAIN WITHOUT SCIATICA: ICD-10-CM

## 2021-01-01 DIAGNOSIS — E11.00 TYPE 2 DIABETES MELLITUS WITH HYPEROSMOLARITY WITHOUT COMA, WITH LONG-TERM CURRENT USE OF INSULIN (HCC): ICD-10-CM

## 2021-01-01 LAB
1,25(OH)2D SERPL-MCNC: 34.7 PG/ML (ref 19.9–79.3)
25(OH)D3 SERPL-MCNC: 41.3 NG/ML (ref 30–100)
ALBUMIN SERPL-MCNC: 4.1 G/DL (ref 3.5–5.2)
ALBUMIN SERPL-MCNC: 4.2 G/DL (ref 3.5–5.2)
ALBUMIN SERPL-MCNC: 4.3 G/DL (ref 3.5–5.2)
ALBUMIN/GLOB SERPL: 1.7 G/DL
ALBUMIN/GLOB SERPL: 1.8 G/DL
ALP SERPL-CCNC: 69 U/L (ref 39–117)
ALP SERPL-CCNC: 75 U/L (ref 39–117)
ALT SERPL W P-5'-P-CCNC: 10 U/L (ref 1–33)
ALT SERPL W P-5'-P-CCNC: 18 U/L (ref 1–33)
ANION GAP SERPL CALCULATED.3IONS-SCNC: 11 MMOL/L (ref 5–15)
ANION GAP SERPL CALCULATED.3IONS-SCNC: 13.2 MMOL/L (ref 5–15)
ANION GAP SERPL CALCULATED.3IONS-SCNC: 6 MMOL/L (ref 5–15)
ANION GAP SERPL CALCULATED.3IONS-SCNC: 9 MMOL/L (ref 5–15)
ANION GAP SERPL CALCULATED.3IONS-SCNC: 9.4 MMOL/L (ref 5–15)
ANION GAP SERPL CALCULATED.3IONS-SCNC: 9.9 MMOL/L (ref 5–15)
AST SERPL-CCNC: 18 U/L (ref 1–32)
AST SERPL-CCNC: 20 U/L (ref 1–32)
BACTERIA SPEC AEROBE CULT: NORMAL
BACTERIA UR QL AUTO: ABNORMAL /HPF
BASOPHILS # BLD AUTO: 0.01 10*3/MM3 (ref 0–0.2)
BASOPHILS # BLD AUTO: 0.02 10*3/MM3 (ref 0–0.2)
BASOPHILS # BLD AUTO: 0.03 10*3/MM3 (ref 0–0.2)
BASOPHILS # BLD AUTO: 0.04 10*3/MM3 (ref 0–0.2)
BASOPHILS NFR BLD AUTO: 0.2 % (ref 0–1.5)
BASOPHILS NFR BLD AUTO: 0.3 % (ref 0–1.5)
BASOPHILS NFR BLD AUTO: 0.4 % (ref 0–1.5)
BASOPHILS NFR BLD AUTO: 0.6 % (ref 0–1.5)
BH CV ECHO MEAS - AO MAX PG (FULL): 6.3 MMHG
BH CV ECHO MEAS - AO MAX PG: 9 MMHG
BH CV ECHO MEAS - AO MEAN PG (FULL): 2.4 MMHG
BH CV ECHO MEAS - AO MEAN PG: 4 MMHG
BH CV ECHO MEAS - AO ROOT AREA: 8.2 CM^2
BH CV ECHO MEAS - AO ROOT DIAM: 3.2 CM
BH CV ECHO MEAS - AO V2 MAX: 153.4 CM/SEC
BH CV ECHO MEAS - AO V2 MEAN: 87.9 CM/SEC
BH CV ECHO MEAS - AO V2 VTI: 35 CM
BH CV ECHO MEAS - AVA(I,A): 1.7 CM^2
BH CV ECHO MEAS - AVA(I,D): 1.7 CM^2
BH CV ECHO MEAS - AVA(V,A): 1.6 CM^2
BH CV ECHO MEAS - AVA(V,D): 1.6 CM^2
BH CV ECHO MEAS - EDV(CUBED): 71.6 ML
BH CV ECHO MEAS - EDV(TEICH): 76.5 ML
BH CV ECHO MEAS - EF(CUBED): 83.2 %
BH CV ECHO MEAS - EF(TEICH): 76.5 %
BH CV ECHO MEAS - ESV(CUBED): 12 ML
BH CV ECHO MEAS - ESV(TEICH): 17.9 ML
BH CV ECHO MEAS - FS: 44.8 %
BH CV ECHO MEAS - IVS/LVPW: 0.45
BH CV ECHO MEAS - IVSD: 0.96 CM
BH CV ECHO MEAS - LA DIMENSION: 4.2 CM
BH CV ECHO MEAS - LA/AO: 1.3
BH CV ECHO MEAS - LAT PEAK E' VEL: 6.4 CM/SEC
BH CV ECHO MEAS - LATERAL E/E' RATIO: 13.2
BH CV ECHO MEAS - LV MASS(C)D: 258.7 GRAMS
BH CV ECHO MEAS - LV MAX PG: 2.7 MMHG
BH CV ECHO MEAS - LV MEAN PG: 1.6 MMHG
BH CV ECHO MEAS - LV V1 MAX: 81.7 CM/SEC
BH CV ECHO MEAS - LV V1 MEAN: 59.4 CM/SEC
BH CV ECHO MEAS - LV V1 VTI: 20.5 CM
BH CV ECHO MEAS - LVIDD: 4.2 CM
BH CV ECHO MEAS - LVIDS: 2.3 CM
BH CV ECHO MEAS - LVOT AREA (M): 2.8 CM^2
BH CV ECHO MEAS - LVOT AREA: 2.9 CM^2
BH CV ECHO MEAS - LVOT DIAM: 1.9 CM
BH CV ECHO MEAS - LVPWD: 2.1 CM
BH CV ECHO MEAS - MED PEAK E' VEL: 3.7 CM/SEC
BH CV ECHO MEAS - MEDIAL E/E' RATIO: 22.9
BH CV ECHO MEAS - MV A MAX VEL: 46.7 CM/SEC
BH CV ECHO MEAS - MV DEC SLOPE: 370.7 CM/SEC^2
BH CV ECHO MEAS - MV DEC TIME: 0.23 SEC
BH CV ECHO MEAS - MV E MAX VEL: 84.8 CM/SEC
BH CV ECHO MEAS - MV E/A: 1.8
BH CV ECHO MEAS - PA ACC TIME: 0.13 SEC
BH CV ECHO MEAS - PA MAX PG: 8 MMHG
BH CV ECHO MEAS - PA PR(ACCEL): 21.2 MMHG
BH CV ECHO MEAS - PA V2 MAX: 141.6 CM/SEC
BH CV ECHO MEAS - RAP SYSTOLE: 8 MMHG
BH CV ECHO MEAS - RVSP: 30 MMHG
BH CV ECHO MEAS - SV(AO): 287.6 ML
BH CV ECHO MEAS - SV(CUBED): 59.6 ML
BH CV ECHO MEAS - SV(LVOT): 59.9 ML
BH CV ECHO MEAS - SV(TEICH): 58.5 ML
BH CV ECHO MEAS - TR MAX PG: 22 MMHG
BH CV ECHO MEAS - TR MAX VEL: 232.6 CM/SEC
BH CV ECHO MEAS - TV E MAX VEL: 49.9 CM/SEC
BH CV ECHO MEASUREMENTS AVERAGE E/E' RATIO: 16.79
BH CV VAS BP RIGHT ARM: NORMAL MMHG
BH CV XLRA - TDI S': 11.4 CM/SEC
BILIRUB BLD-MCNC: ABNORMAL MG/DL
BILIRUB SERPL-MCNC: 0.4 MG/DL (ref 0–1.2)
BILIRUB SERPL-MCNC: 0.4 MG/DL (ref 0–1.2)
BILIRUB UR QL STRIP: NEGATIVE
BILIRUB UR QL STRIP: NEGATIVE
BUN SERPL-MCNC: 22 MG/DL (ref 8–23)
BUN SERPL-MCNC: 22 MG/DL (ref 8–23)
BUN SERPL-MCNC: 25 MG/DL (ref 8–23)
BUN SERPL-MCNC: 28 MG/DL (ref 8–23)
BUN SERPL-MCNC: 31 MG/DL (ref 8–23)
BUN SERPL-MCNC: 32 MG/DL (ref 8–23)
BUN/CREAT SERPL: 18.5 (ref 7–25)
BUN/CREAT SERPL: 19.6 (ref 7–25)
BUN/CREAT SERPL: 19.6 (ref 7–25)
BUN/CREAT SERPL: 21.2 (ref 7–25)
BUN/CREAT SERPL: 21.9 (ref 7–25)
BUN/CREAT SERPL: 23 (ref 7–25)
CALCIUM SPEC-SCNC: 9.1 MG/DL (ref 8.6–10.5)
CALCIUM SPEC-SCNC: 9.1 MG/DL (ref 8.6–10.5)
CALCIUM SPEC-SCNC: 9.2 MG/DL (ref 8.6–10.5)
CALCIUM SPEC-SCNC: 9.2 MG/DL (ref 8.6–10.5)
CALCIUM SPEC-SCNC: 9.4 MG/DL (ref 8.6–10.5)
CALCIUM SPEC-SCNC: 9.9 MG/DL (ref 8.6–10.5)
CHLORIDE SERPL-SCNC: 101 MMOL/L (ref 98–107)
CHLORIDE SERPL-SCNC: 102 MMOL/L (ref 98–107)
CHLORIDE SERPL-SCNC: 102 MMOL/L (ref 98–107)
CHLORIDE SERPL-SCNC: 103 MMOL/L (ref 98–107)
CHOLEST SERPL-MCNC: 118 MG/DL (ref 0–200)
CHOLEST SERPL-MCNC: 120 MG/DL (ref 0–200)
CLARITY UR: CLEAR
CLARITY UR: CLEAR
CLARITY, POC: CLEAR
CO2 SERPL-SCNC: 22.8 MMOL/L (ref 22–29)
CO2 SERPL-SCNC: 23 MMOL/L (ref 22–29)
CO2 SERPL-SCNC: 27.6 MMOL/L (ref 22–29)
CO2 SERPL-SCNC: 28 MMOL/L (ref 22–29)
CO2 SERPL-SCNC: 29.1 MMOL/L (ref 22–29)
CO2 SERPL-SCNC: 30 MMOL/L (ref 22–29)
COLOR UR: YELLOW
CREAT SERPL-MCNC: 1.12 MG/DL (ref 0.57–1)
CREAT SERPL-MCNC: 1.18 MG/DL (ref 0.57–1)
CREAT SERPL-MCNC: 1.19 MG/DL (ref 0.57–1)
CREAT SERPL-MCNC: 1.28 MG/DL (ref 0.57–1)
CREAT SERPL-MCNC: 1.35 MG/DL (ref 0.57–1)
CREAT SERPL-MCNC: 1.63 MG/DL (ref 0.57–1)
CREAT UR-MCNC: 226.3 MG/DL
DEPRECATED RDW RBC AUTO: 44.2 FL (ref 37–54)
DEPRECATED RDW RBC AUTO: 45.2 FL (ref 37–54)
DEPRECATED RDW RBC AUTO: 46.5 FL (ref 37–54)
DEPRECATED RDW RBC AUTO: 47.1 FL (ref 37–54)
DEPRECATED RDW RBC AUTO: 48.4 FL (ref 37–54)
EOSINOPHIL # BLD AUTO: 0.01 10*3/MM3 (ref 0–0.4)
EOSINOPHIL # BLD AUTO: 0.04 10*3/MM3 (ref 0–0.4)
EOSINOPHIL # BLD AUTO: 0.1 10*3/MM3 (ref 0–0.4)
EOSINOPHIL # BLD AUTO: 0.14 10*3/MM3 (ref 0–0.4)
EOSINOPHIL NFR BLD AUTO: 0.1 % (ref 0.3–6.2)
EOSINOPHIL NFR BLD AUTO: 0.7 % (ref 0.3–6.2)
EOSINOPHIL NFR BLD AUTO: 1.5 % (ref 0.3–6.2)
EOSINOPHIL NFR BLD AUTO: 2 % (ref 0.3–6.2)
ERYTHROCYTE [DISTWIDTH] IN BLOOD BY AUTOMATED COUNT: 12.3 % (ref 12.3–15.4)
ERYTHROCYTE [DISTWIDTH] IN BLOOD BY AUTOMATED COUNT: 12.5 % (ref 12.3–15.4)
ERYTHROCYTE [DISTWIDTH] IN BLOOD BY AUTOMATED COUNT: 12.5 % (ref 12.3–15.4)
ERYTHROCYTE [DISTWIDTH] IN BLOOD BY AUTOMATED COUNT: 13 % (ref 12.3–15.4)
ERYTHROCYTE [DISTWIDTH] IN BLOOD BY AUTOMATED COUNT: 13.2 % (ref 12.3–15.4)
EXPIRATION DATE: ABNORMAL
EXPIRATION DATE: NORMAL
FERRITIN SERPL-MCNC: 26.98 NG/ML (ref 13–150)
FLUAV SUBTYP SPEC NAA+PROBE: NOT DETECTED
FLUBV RNA ISLT QL NAA+PROBE: NOT DETECTED
FOLATE SERPL-MCNC: 19.2 NG/ML (ref 4.78–24.2)
GFR SERPL CREATININE-BSD FRML MDRD: 31 ML/MIN/1.73
GFR SERPL CREATININE-BSD FRML MDRD: 38 ML/MIN/1.73
GFR SERPL CREATININE-BSD FRML MDRD: 41 ML/MIN/1.73
GFR SERPL CREATININE-BSD FRML MDRD: 44 ML/MIN/1.73
GFR SERPL CREATININE-BSD FRML MDRD: 45 ML/MIN/1.73
GFR SERPL CREATININE-BSD FRML MDRD: 47 ML/MIN/1.73
GLOBULIN UR ELPH-MCNC: 2.4 GM/DL
GLOBULIN UR ELPH-MCNC: 2.4 GM/DL
GLUCOSE BLDC GLUCOMTR-MCNC: 108 MG/DL (ref 70–130)
GLUCOSE BLDC GLUCOMTR-MCNC: 108 MG/DL (ref 70–130)
GLUCOSE BLDC GLUCOMTR-MCNC: 110 MG/DL (ref 70–130)
GLUCOSE BLDC GLUCOMTR-MCNC: 113 MG/DL (ref 70–130)
GLUCOSE BLDC GLUCOMTR-MCNC: 114 MG/DL (ref 70–130)
GLUCOSE BLDC GLUCOMTR-MCNC: 117 MG/DL (ref 70–130)
GLUCOSE BLDC GLUCOMTR-MCNC: 120 MG/DL (ref 70–130)
GLUCOSE BLDC GLUCOMTR-MCNC: 124 MG/DL (ref 70–130)
GLUCOSE BLDC GLUCOMTR-MCNC: 125 MG/DL (ref 70–130)
GLUCOSE BLDC GLUCOMTR-MCNC: 132 MG/DL (ref 70–130)
GLUCOSE BLDC GLUCOMTR-MCNC: 134 MG/DL (ref 70–130)
GLUCOSE BLDC GLUCOMTR-MCNC: 137 MG/DL (ref 70–130)
GLUCOSE BLDC GLUCOMTR-MCNC: 137 MG/DL (ref 70–130)
GLUCOSE BLDC GLUCOMTR-MCNC: 140 MG/DL (ref 70–130)
GLUCOSE BLDC GLUCOMTR-MCNC: 143 MG/DL (ref 70–130)
GLUCOSE BLDC GLUCOMTR-MCNC: 144 MG/DL (ref 70–130)
GLUCOSE BLDC GLUCOMTR-MCNC: 145 MG/DL (ref 70–130)
GLUCOSE BLDC GLUCOMTR-MCNC: 153 MG/DL (ref 70–130)
GLUCOSE BLDC GLUCOMTR-MCNC: 159 MG/DL (ref 70–130)
GLUCOSE BLDC GLUCOMTR-MCNC: 159 MG/DL (ref 70–130)
GLUCOSE BLDC GLUCOMTR-MCNC: 164 MG/DL (ref 70–130)
GLUCOSE BLDC GLUCOMTR-MCNC: 177 MG/DL (ref 70–130)
GLUCOSE BLDC GLUCOMTR-MCNC: 179 MG/DL (ref 70–130)
GLUCOSE BLDC GLUCOMTR-MCNC: 179 MG/DL (ref 70–130)
GLUCOSE BLDC GLUCOMTR-MCNC: 180 MG/DL (ref 70–130)
GLUCOSE BLDC GLUCOMTR-MCNC: 184 MG/DL (ref 70–130)
GLUCOSE BLDC GLUCOMTR-MCNC: 192 MG/DL (ref 70–130)
GLUCOSE BLDC GLUCOMTR-MCNC: 197 MG/DL (ref 70–130)
GLUCOSE BLDC GLUCOMTR-MCNC: 202 MG/DL (ref 70–130)
GLUCOSE BLDC GLUCOMTR-MCNC: 207 MG/DL (ref 70–130)
GLUCOSE BLDC GLUCOMTR-MCNC: 211 MG/DL (ref 70–130)
GLUCOSE BLDC GLUCOMTR-MCNC: 266 MG/DL (ref 70–130)
GLUCOSE BLDC GLUCOMTR-MCNC: 83 MG/DL (ref 70–130)
GLUCOSE BLDC GLUCOMTR-MCNC: 97 MG/DL (ref 70–130)
GLUCOSE SERPL-MCNC: 103 MG/DL (ref 65–99)
GLUCOSE SERPL-MCNC: 104 MG/DL (ref 65–99)
GLUCOSE SERPL-MCNC: 122 MG/DL (ref 65–99)
GLUCOSE SERPL-MCNC: 152 MG/DL (ref 65–99)
GLUCOSE SERPL-MCNC: 153 MG/DL (ref 65–99)
GLUCOSE SERPL-MCNC: 196 MG/DL (ref 65–99)
GLUCOSE UR STRIP-MCNC: NEGATIVE MG/DL
HBA1C MFR BLD: 6.4 % (ref 4.8–5.6)
HBA1C MFR BLD: 6.7 %
HBA1C MFR BLD: 7.28 % (ref 4.8–5.6)
HCT VFR BLD AUTO: 31 % (ref 34–46.6)
HCT VFR BLD AUTO: 33.1 % (ref 34–46.6)
HCT VFR BLD AUTO: 34 % (ref 34–46.6)
HCT VFR BLD AUTO: 36.2 % (ref 34–46.6)
HCT VFR BLD AUTO: 38.9 % (ref 34–46.6)
HDLC SERPL-MCNC: 46 MG/DL (ref 40–60)
HDLC SERPL-MCNC: 48 MG/DL (ref 40–60)
HGB BLD-MCNC: 10 G/DL (ref 12–15.9)
HGB BLD-MCNC: 10.4 G/DL (ref 12–15.9)
HGB BLD-MCNC: 10.8 G/DL (ref 12–15.9)
HGB BLD-MCNC: 11 G/DL (ref 12–15.9)
HGB BLD-MCNC: 12.7 G/DL (ref 12–15.9)
HGB UR QL STRIP.AUTO: ABNORMAL
HGB UR QL STRIP.AUTO: NEGATIVE
HOLD SPECIMEN: NORMAL
HYALINE CASTS UR QL AUTO: ABNORMAL /LPF
IMM GRANULOCYTES # BLD AUTO: 0.02 10*3/MM3 (ref 0–0.05)
IMM GRANULOCYTES NFR BLD AUTO: 0.3 % (ref 0–0.5)
IMM GRANULOCYTES NFR BLD AUTO: 0.4 % (ref 0–0.5)
IRON 24H UR-MRATE: 27 MCG/DL (ref 37–145)
IRON SATN MFR SERPL: 7 % (ref 20–50)
KETONES UR QL STRIP: ABNORMAL
KETONES UR QL STRIP: NEGATIVE
KETONES UR QL: ABNORMAL
LDLC SERPL CALC-MCNC: 49 MG/DL (ref 0–100)
LDLC SERPL CALC-MCNC: 51 MG/DL (ref 0–100)
LDLC/HDLC SERPL: 0.97 {RATIO}
LDLC/HDLC SERPL: 1.03 {RATIO}
LEUKOCYTE EST, POC: ABNORMAL
LEUKOCYTE ESTERASE UR QL STRIP.AUTO: NEGATIVE
LEUKOCYTE ESTERASE UR QL STRIP.AUTO: NEGATIVE
LV EF 2D ECHO EST: 60 %
LYMPHOCYTES # BLD AUTO: 1.5 10*3/MM3 (ref 0.7–3.1)
LYMPHOCYTES # BLD AUTO: 1.66 10*3/MM3 (ref 0.7–3.1)
LYMPHOCYTES # BLD AUTO: 2.1 10*3/MM3 (ref 0.7–3.1)
LYMPHOCYTES # BLD AUTO: 2.79 10*3/MM3 (ref 0.7–3.1)
LYMPHOCYTES NFR BLD AUTO: 24.8 % (ref 19.6–45.3)
LYMPHOCYTES NFR BLD AUTO: 26.5 % (ref 19.6–45.3)
LYMPHOCYTES NFR BLD AUTO: 32.4 % (ref 19.6–45.3)
LYMPHOCYTES NFR BLD AUTO: 39.9 % (ref 19.6–45.3)
Lab: ABNORMAL
Lab: NORMAL
MAGNESIUM SERPL-MCNC: 1.8 MG/DL (ref 1.6–2.4)
MAGNESIUM SERPL-MCNC: 2 MG/DL (ref 1.6–2.4)
MAGNESIUM SERPL-MCNC: 2.2 MG/DL (ref 1.6–2.4)
MAXIMAL PREDICTED HEART RATE: 144 BPM
MCH RBC QN AUTO: 30.7 PG (ref 26.6–33)
MCH RBC QN AUTO: 31.3 PG (ref 26.6–33)
MCH RBC QN AUTO: 31.4 PG (ref 26.6–33)
MCH RBC QN AUTO: 31.8 PG (ref 26.6–33)
MCH RBC QN AUTO: 32.1 PG (ref 26.6–33)
MCHC RBC AUTO-ENTMCNC: 29.8 G/DL (ref 31.5–35.7)
MCHC RBC AUTO-ENTMCNC: 31.4 G/DL (ref 31.5–35.7)
MCHC RBC AUTO-ENTMCNC: 32.3 G/DL (ref 31.5–35.7)
MCHC RBC AUTO-ENTMCNC: 32.4 G/DL (ref 31.5–35.7)
MCHC RBC AUTO-ENTMCNC: 32.6 G/DL (ref 31.5–35.7)
MCV RBC AUTO: 100 FL (ref 79–97)
MCV RBC AUTO: 102.8 FL (ref 79–97)
MCV RBC AUTO: 97.2 FL (ref 79–97)
MCV RBC AUTO: 97.5 FL (ref 79–97)
MCV RBC AUTO: 99.1 FL (ref 79–97)
MONOCYTES # BLD AUTO: 0.47 10*3/MM3 (ref 0.1–0.9)
MONOCYTES # BLD AUTO: 0.49 10*3/MM3 (ref 0.1–0.9)
MONOCYTES # BLD AUTO: 0.57 10*3/MM3 (ref 0.1–0.9)
MONOCYTES # BLD AUTO: 0.63 10*3/MM3 (ref 0.1–0.9)
MONOCYTES NFR BLD AUTO: 10.1 % (ref 5–12)
MONOCYTES NFR BLD AUTO: 6.7 % (ref 5–12)
MONOCYTES NFR BLD AUTO: 7.6 % (ref 5–12)
MONOCYTES NFR BLD AUTO: 9.4 % (ref 5–12)
NEUTROPHILS NFR BLD AUTO: 3.52 10*3/MM3 (ref 1.7–7)
NEUTROPHILS NFR BLD AUTO: 3.54 10*3/MM3 (ref 1.7–7)
NEUTROPHILS NFR BLD AUTO: 3.73 10*3/MM3 (ref 1.7–7)
NEUTROPHILS NFR BLD AUTO: 4.35 10*3/MM3 (ref 1.7–7)
NEUTROPHILS NFR BLD AUTO: 50.7 % (ref 42.7–76)
NEUTROPHILS NFR BLD AUTO: 57.6 % (ref 42.7–76)
NEUTROPHILS NFR BLD AUTO: 62.1 % (ref 42.7–76)
NEUTROPHILS NFR BLD AUTO: 65.1 % (ref 42.7–76)
NITRITE UR QL STRIP: NEGATIVE
NITRITE UR QL STRIP: NEGATIVE
NITRITE UR-MCNC: NEGATIVE MG/ML
NRBC BLD AUTO-RTO: 0 /100 WBC (ref 0–0.2)
NRBC BLD AUTO-RTO: 0.1 /100 WBC (ref 0–0.2)
PH UR STRIP.AUTO: 5.5 [PH] (ref 5–8)
PH UR STRIP.AUTO: <=5 [PH] (ref 5–8)
PH UR: 5 [PH] (ref 5–8)
PHOSPHATE SERPL-MCNC: 4 MG/DL (ref 2.5–4.5)
PLAT MORPH BLD: NORMAL
PLATELET # BLD AUTO: 142 10*3/MM3 (ref 140–450)
PLATELET # BLD AUTO: 146 10*3/MM3 (ref 140–450)
PLATELET # BLD AUTO: 151 10*3/MM3 (ref 140–450)
PLATELET # BLD AUTO: 152 10*3/MM3 (ref 140–450)
PLATELET # BLD AUTO: 159 10*3/MM3 (ref 140–450)
PMV BLD AUTO: 10.1 FL (ref 6–12)
PMV BLD AUTO: 10.2 FL (ref 6–12)
PMV BLD AUTO: 10.6 FL (ref 6–12)
PMV BLD AUTO: 11 FL (ref 6–12)
PMV BLD AUTO: 9.9 FL (ref 6–12)
POTASSIUM SERPL-SCNC: 4.2 MMOL/L (ref 3.5–5.2)
POTASSIUM SERPL-SCNC: 4.4 MMOL/L (ref 3.5–5.2)
POTASSIUM SERPL-SCNC: 4.5 MMOL/L (ref 3.5–5.2)
POTASSIUM SERPL-SCNC: 4.5 MMOL/L (ref 3.5–5.2)
POTASSIUM SERPL-SCNC: 4.7 MMOL/L (ref 3.5–5.2)
POTASSIUM SERPL-SCNC: 5.2 MMOL/L (ref 3.5–5.2)
PROT SERPL-MCNC: 6.5 G/DL (ref 6–8.5)
PROT SERPL-MCNC: 6.6 G/DL (ref 6–8.5)
PROT UR QL STRIP: ABNORMAL
PROT UR QL STRIP: ABNORMAL
PROT UR STRIP-MCNC: ABNORMAL MG/DL
PROT UR-MCNC: 36 MG/DL
QT INTERVAL: 408 MS
QTC INTERVAL: 408 MS
RBC # BLD AUTO: 3.19 10*6/MM3 (ref 3.77–5.28)
RBC # BLD AUTO: 3.31 10*6/MM3 (ref 3.77–5.28)
RBC # BLD AUTO: 3.43 10*6/MM3 (ref 3.77–5.28)
RBC # BLD AUTO: 3.52 10*6/MM3 (ref 3.77–5.28)
RBC # BLD AUTO: 3.99 10*6/MM3 (ref 3.77–5.28)
RBC # UR STRIP: NEGATIVE /UL
RBC # UR: ABNORMAL /HPF
RBC MORPH BLD: NORMAL
REF LAB TEST METHOD: ABNORMAL
RETICS # AUTO: 0.05 10*6/MM3 (ref 0.02–0.13)
RETICS/RBC NFR AUTO: 1.4 % (ref 0.7–1.9)
SARS-COV-2 RDRP RESP QL NAA+PROBE: NORMAL
SARS-COV-2 RNA PNL SPEC NAA+PROBE: NOT DETECTED
SODIUM SERPL-SCNC: 135 MMOL/L (ref 136–145)
SODIUM SERPL-SCNC: 138 MMOL/L (ref 136–145)
SODIUM SERPL-SCNC: 139 MMOL/L (ref 136–145)
SODIUM SERPL-SCNC: 139 MMOL/L (ref 136–145)
SODIUM SERPL-SCNC: 140 MMOL/L (ref 136–145)
SODIUM SERPL-SCNC: 142 MMOL/L (ref 136–145)
SP GR UR STRIP: 1.02 (ref 1–1.03)
SP GR UR STRIP: >=1.03 (ref 1–1.03)
SP GR UR: 1.02 (ref 1–1.03)
SQUAMOUS #/AREA URNS HPF: ABNORMAL /HPF
STRESS TARGET HR: 122 BPM
T4 FREE SERPL-MCNC: 1.06 NG/DL (ref 0.93–1.7)
T4 FREE SERPL-MCNC: 1.54 NG/DL (ref 0.93–1.7)
TIBC SERPL-MCNC: 374 MCG/DL (ref 298–536)
TRANSFERRIN SERPL-MCNC: 251 MG/DL (ref 200–360)
TRIGL SERPL-MCNC: 104 MG/DL (ref 0–150)
TRIGL SERPL-MCNC: 148 MG/DL (ref 0–150)
TROPONIN T SERPL-MCNC: 0.02 NG/ML (ref 0–0.03)
TSH SERPL DL<=0.05 MIU/L-ACNC: 0.04 UIU/ML (ref 0.27–4.2)
TSH SERPL DL<=0.05 MIU/L-ACNC: 0.51 UIU/ML (ref 0.27–4.2)
TSH SERPL DL<=0.05 MIU/L-ACNC: 0.83 UIU/ML (ref 0.27–4.2)
UROBILINOGEN UR QL STRIP: ABNORMAL
UROBILINOGEN UR QL STRIP: ABNORMAL
UROBILINOGEN UR QL: ABNORMAL
VIT B12 BLD-MCNC: 153 PG/ML (ref 211–946)
VLDLC SERPL-MCNC: 19 MG/DL (ref 5–40)
VLDLC SERPL-MCNC: 25 MG/DL (ref 5–40)
WBC # BLD AUTO: 5.2 10*3/MM3 (ref 3.4–10.8)
WBC # BLD AUTO: 5.66 10*3/MM3 (ref 3.4–10.8)
WBC # BLD AUTO: 6.48 10*3/MM3 (ref 3.4–10.8)
WBC # BLD AUTO: 6.69 10*3/MM3 (ref 3.4–10.8)
WBC # BLD AUTO: 6.99 10*3/MM3 (ref 3.4–10.8)
WBC MORPH BLD: NORMAL
WBC UR QL AUTO: ABNORMAL /HPF
WHOLE BLOOD HOLD SPECIMEN: NORMAL

## 2021-01-01 PROCEDURE — 96372 THER/PROPH/DIAG INJ SC/IM: CPT

## 2021-01-01 PROCEDURE — 87636 SARSCOV2 & INF A&B AMP PRB: CPT | Performed by: EMERGENCY MEDICINE

## 2021-01-01 PROCEDURE — 83735 ASSAY OF MAGNESIUM: CPT | Performed by: EMERGENCY MEDICINE

## 2021-01-01 PROCEDURE — 93296 REM INTERROG EVL PM/IDS: CPT | Performed by: INTERNAL MEDICINE

## 2021-01-01 PROCEDURE — 92526 ORAL FUNCTION THERAPY: CPT

## 2021-01-01 PROCEDURE — 0001A: CPT | Performed by: INTERNAL MEDICINE

## 2021-01-01 PROCEDURE — G0378 HOSPITAL OBSERVATION PER HR: HCPCS

## 2021-01-01 PROCEDURE — C9803 HOPD COVID-19 SPEC COLLECT: HCPCS

## 2021-01-01 PROCEDURE — 82306 VITAMIN D 25 HYDROXY: CPT | Performed by: NURSE PRACTITIONER

## 2021-01-01 PROCEDURE — 80048 BASIC METABOLIC PNL TOTAL CA: CPT | Performed by: NURSE PRACTITIONER

## 2021-01-01 PROCEDURE — 82962 GLUCOSE BLOOD TEST: CPT

## 2021-01-01 PROCEDURE — 99225 PR SBSQ OBSERVATION CARE/DAY 25 MINUTES: CPT | Performed by: NURSE PRACTITIONER

## 2021-01-01 PROCEDURE — 82607 VITAMIN B-12: CPT | Performed by: NURSE PRACTITIONER

## 2021-01-01 PROCEDURE — 85045 AUTOMATED RETICULOCYTE COUNT: CPT | Performed by: NURSE PRACTITIONER

## 2021-01-01 PROCEDURE — 63710000001 INSULIN LISPRO (HUMAN) PER 5 UNITS: Performed by: NURSE PRACTITIONER

## 2021-01-01 PROCEDURE — 99226 PR SBSQ OBSERVATION CARE/DAY 35 MINUTES: CPT | Performed by: HOSPITALIST

## 2021-01-01 PROCEDURE — 91300 HC SARSCOV02 VAC 30MCG/0.3ML IM: CPT | Performed by: INTERNAL MEDICINE

## 2021-01-01 PROCEDURE — 99224 PR SBSQ OBSERVATION CARE/DAY 15 MINUTES: CPT | Performed by: HOSPITALIST

## 2021-01-01 PROCEDURE — 99214 OFFICE O/P EST MOD 30 MIN: CPT | Performed by: PHYSICIAN ASSISTANT

## 2021-01-01 PROCEDURE — 84443 ASSAY THYROID STIM HORMONE: CPT | Performed by: PHYSICIAN ASSISTANT

## 2021-01-01 PROCEDURE — 97530 THERAPEUTIC ACTIVITIES: CPT

## 2021-01-01 PROCEDURE — 97116 GAIT TRAINING THERAPY: CPT

## 2021-01-01 PROCEDURE — 94799 UNLISTED PULMONARY SVC/PX: CPT

## 2021-01-01 PROCEDURE — 84443 ASSAY THYROID STIM HORMONE: CPT | Performed by: NURSE PRACTITIONER

## 2021-01-01 PROCEDURE — 99285 EMERGENCY DEPT VISIT HI MDM: CPT

## 2021-01-01 PROCEDURE — 80053 COMPREHEN METABOLIC PANEL: CPT | Performed by: EMERGENCY MEDICINE

## 2021-01-01 PROCEDURE — 99214 OFFICE O/P EST MOD 30 MIN: CPT | Performed by: INTERNAL MEDICINE

## 2021-01-01 PROCEDURE — 80069 RENAL FUNCTION PANEL: CPT

## 2021-01-01 PROCEDURE — 84439 ASSAY OF FREE THYROXINE: CPT | Performed by: PHYSICIAN ASSISTANT

## 2021-01-01 PROCEDURE — 93294 REM INTERROG EVL PM/LDLS PM: CPT | Performed by: INTERNAL MEDICINE

## 2021-01-01 PROCEDURE — 85027 COMPLETE CBC AUTOMATED: CPT | Performed by: NURSE PRACTITIONER

## 2021-01-01 PROCEDURE — 83036 HEMOGLOBIN GLYCOSYLATED A1C: CPT | Performed by: NURSE PRACTITIONER

## 2021-01-01 PROCEDURE — 71250 CT THORAX DX C-: CPT

## 2021-01-01 PROCEDURE — 25010000002 CYANOCOBALAMIN PER 1000 MCG: Performed by: FAMILY MEDICINE

## 2021-01-01 PROCEDURE — 99212 OFFICE O/P EST SF 10 MIN: CPT | Performed by: PSYCHIATRY & NEUROLOGY

## 2021-01-01 PROCEDURE — 77080 DXA BONE DENSITY AXIAL: CPT

## 2021-01-01 PROCEDURE — 97110 THERAPEUTIC EXERCISES: CPT

## 2021-01-01 PROCEDURE — 81003 URINALYSIS AUTO W/O SCOPE: CPT | Performed by: PHYSICIAN ASSISTANT

## 2021-01-01 PROCEDURE — 82728 ASSAY OF FERRITIN: CPT | Performed by: NURSE PRACTITIONER

## 2021-01-01 PROCEDURE — 93306 TTE W/DOPPLER COMPLETE: CPT | Performed by: INTERNAL MEDICINE

## 2021-01-01 PROCEDURE — 36415 COLL VENOUS BLD VENIPUNCTURE: CPT | Performed by: PHYSICIAN ASSISTANT

## 2021-01-01 PROCEDURE — 99214 OFFICE O/P EST MOD 30 MIN: CPT | Performed by: NURSE PRACTITIONER

## 2021-01-01 PROCEDURE — G0108 DIAB MANAGE TRN  PER INDIV: HCPCS

## 2021-01-01 PROCEDURE — 84484 ASSAY OF TROPONIN QUANT: CPT | Performed by: EMERGENCY MEDICINE

## 2021-01-01 PROCEDURE — 73110 X-RAY EXAM OF WRIST: CPT

## 2021-01-01 PROCEDURE — 97535 SELF CARE MNGMENT TRAINING: CPT

## 2021-01-01 PROCEDURE — 82746 ASSAY OF FOLIC ACID SERUM: CPT | Performed by: NURSE PRACTITIONER

## 2021-01-01 PROCEDURE — 93280 PM DEVICE PROGR EVAL DUAL: CPT | Performed by: INTERNAL MEDICINE

## 2021-01-01 PROCEDURE — 85025 COMPLETE CBC W/AUTO DIFF WBC: CPT | Performed by: EMERGENCY MEDICINE

## 2021-01-01 PROCEDURE — 99220 PR INITIAL OBSERVATION CARE/DAY 70 MINUTES: CPT | Performed by: INTERNAL MEDICINE

## 2021-01-01 PROCEDURE — 71045 X-RAY EXAM CHEST 1 VIEW: CPT

## 2021-01-01 PROCEDURE — 84156 ASSAY OF PROTEIN URINE: CPT

## 2021-01-01 PROCEDURE — 83735 ASSAY OF MAGNESIUM: CPT | Performed by: NURSE PRACTITIONER

## 2021-01-01 PROCEDURE — 82652 VIT D 1 25-DIHYDROXY: CPT

## 2021-01-01 PROCEDURE — 92610 EVALUATE SWALLOWING FUNCTION: CPT

## 2021-01-01 PROCEDURE — 70450 CT HEAD/BRAIN W/O DYE: CPT

## 2021-01-01 PROCEDURE — 85025 COMPLETE CBC W/AUTO DIFF WBC: CPT

## 2021-01-01 PROCEDURE — 99217 PR OBSERVATION CARE DISCHARGE MANAGEMENT: CPT | Performed by: NURSE PRACTITIONER

## 2021-01-01 PROCEDURE — 87086 URINE CULTURE/COLONY COUNT: CPT | Performed by: PHYSICIAN ASSISTANT

## 2021-01-01 PROCEDURE — 73060 X-RAY EXAM OF HUMERUS: CPT

## 2021-01-01 PROCEDURE — 81003 URINALYSIS AUTO W/O SCOPE: CPT | Performed by: EMERGENCY MEDICINE

## 2021-01-01 PROCEDURE — 81001 URINALYSIS AUTO W/SCOPE: CPT

## 2021-01-01 PROCEDURE — 83036 HEMOGLOBIN GLYCOSYLATED A1C: CPT | Performed by: PHYSICIAN ASSISTANT

## 2021-01-01 PROCEDURE — 80048 BASIC METABOLIC PNL TOTAL CA: CPT | Performed by: PHYSICIAN ASSISTANT

## 2021-01-01 PROCEDURE — 85025 COMPLETE CBC W/AUTO DIFF WBC: CPT | Performed by: NURSE PRACTITIONER

## 2021-01-01 PROCEDURE — 82570 ASSAY OF URINE CREATININE: CPT

## 2021-01-01 PROCEDURE — 84443 ASSAY THYROID STIM HORMONE: CPT | Performed by: EMERGENCY MEDICINE

## 2021-01-01 PROCEDURE — 99225 PR SBSQ OBSERVATION CARE/DAY 25 MINUTES: CPT | Performed by: HOSPITALIST

## 2021-01-01 PROCEDURE — 93005 ELECTROCARDIOGRAM TRACING: CPT

## 2021-01-01 PROCEDURE — 99214 OFFICE O/P EST MOD 30 MIN: CPT | Performed by: PSYCHIATRY & NEUROLOGY

## 2021-01-01 PROCEDURE — 99224 PR SBSQ OBSERVATION CARE/DAY 15 MINUTES: CPT | Performed by: NURSE PRACTITIONER

## 2021-01-01 PROCEDURE — 80053 COMPREHEN METABOLIC PANEL: CPT | Performed by: NURSE PRACTITIONER

## 2021-01-01 PROCEDURE — 87635 SARS-COV-2 COVID-19 AMP PRB: CPT | Performed by: HOSPITALIST

## 2021-01-01 PROCEDURE — 74176 CT ABD & PELVIS W/O CONTRAST: CPT

## 2021-01-01 PROCEDURE — 97161 PT EVAL LOW COMPLEX 20 MIN: CPT

## 2021-01-01 PROCEDURE — 0002A: CPT | Performed by: INTERNAL MEDICINE

## 2021-01-01 PROCEDURE — 93306 TTE W/DOPPLER COMPLETE: CPT

## 2021-01-01 PROCEDURE — 84466 ASSAY OF TRANSFERRIN: CPT | Performed by: NURSE PRACTITIONER

## 2021-01-01 PROCEDURE — 36415 COLL VENOUS BLD VENIPUNCTURE: CPT

## 2021-01-01 PROCEDURE — 80061 LIPID PANEL: CPT | Performed by: NURSE PRACTITIONER

## 2021-01-01 PROCEDURE — 85007 BL SMEAR W/DIFF WBC COUNT: CPT | Performed by: NURSE PRACTITIONER

## 2021-01-01 PROCEDURE — 99226 PR SBSQ OBSERVATION CARE/DAY 35 MINUTES: CPT | Performed by: FAMILY MEDICINE

## 2021-01-01 PROCEDURE — G0439 PPPS, SUBSEQ VISIT: HCPCS | Performed by: INTERNAL MEDICINE

## 2021-01-01 PROCEDURE — 83540 ASSAY OF IRON: CPT | Performed by: NURSE PRACTITIONER

## 2021-01-01 PROCEDURE — G0180 MD CERTIFICATION HHA PATIENT: HCPCS | Performed by: PHYSICIAN ASSISTANT

## 2021-01-01 PROCEDURE — P9612 CATHETERIZE FOR URINE SPEC: HCPCS

## 2021-01-01 PROCEDURE — 84439 ASSAY OF FREE THYROXINE: CPT | Performed by: NURSE PRACTITIONER

## 2021-01-01 PROCEDURE — 93005 ELECTROCARDIOGRAM TRACING: CPT | Performed by: EMERGENCY MEDICINE

## 2021-01-01 PROCEDURE — 99224 PR SBSQ OBSERVATION CARE/DAY 15 MINUTES: CPT | Performed by: PHYSICIAN ASSISTANT

## 2021-01-01 PROCEDURE — 97165 OT EVAL LOW COMPLEX 30 MIN: CPT

## 2021-01-01 PROCEDURE — 36415 COLL VENOUS BLD VENIPUNCTURE: CPT | Performed by: INTERNAL MEDICINE

## 2021-01-01 RX ORDER — DEXTROSE MONOHYDRATE 25 G/50ML
25 INJECTION, SOLUTION INTRAVENOUS
Status: DISCONTINUED | OUTPATIENT
Start: 2021-01-01 | End: 2021-01-01 | Stop reason: HOSPADM

## 2021-01-01 RX ORDER — ESCITALOPRAM OXALATE 20 MG/1
20 TABLET ORAL DAILY
Qty: 90 TABLET | Refills: 3 | Status: SHIPPED | OUTPATIENT
Start: 2021-01-01

## 2021-01-01 RX ORDER — ONDANSETRON 2 MG/ML
4 INJECTION INTRAMUSCULAR; INTRAVENOUS EVERY 6 HOURS PRN
Status: DISCONTINUED | OUTPATIENT
Start: 2021-01-01 | End: 2021-01-01

## 2021-01-01 RX ORDER — OXYBUTYNIN CHLORIDE 5 MG/1
5 TABLET, EXTENDED RELEASE ORAL DAILY
Status: DISCONTINUED | OUTPATIENT
Start: 2021-01-01 | End: 2021-01-01 | Stop reason: HOSPADM

## 2021-01-01 RX ORDER — LIDOCAINE 50 MG/G
1 PATCH TOPICAL
Status: DISCONTINUED | OUTPATIENT
Start: 2021-01-01 | End: 2021-01-01 | Stop reason: HOSPADM

## 2021-01-01 RX ORDER — ASPIRIN 81 MG/1
81 TABLET, CHEWABLE ORAL DAILY
Status: DISCONTINUED | OUTPATIENT
Start: 2021-01-01 | End: 2021-01-01 | Stop reason: HOSPADM

## 2021-01-01 RX ORDER — NICOTINE POLACRILEX 4 MG
15 LOZENGE BUCCAL
Status: DISCONTINUED | OUTPATIENT
Start: 2021-01-01 | End: 2021-01-01 | Stop reason: HOSPADM

## 2021-01-01 RX ORDER — NYSTATIN 100000 [USP'U]/G
POWDER TOPICAL 3 TIMES DAILY
Qty: 60 G | Refills: 2 | Status: SHIPPED | OUTPATIENT
Start: 2021-01-01 | End: 2021-01-01 | Stop reason: SDUPTHER

## 2021-01-01 RX ORDER — HALOPERIDOL 5 MG/ML
0.5 INJECTION INTRAMUSCULAR ONCE
Status: DISCONTINUED | OUTPATIENT
Start: 2021-01-01 | End: 2021-01-01

## 2021-01-01 RX ORDER — FLURBIPROFEN SODIUM 0.3 MG/ML
SOLUTION/ DROPS OPHTHALMIC
Qty: 90 EACH | Refills: 3 | Status: SHIPPED | OUTPATIENT
Start: 2021-01-01

## 2021-01-01 RX ORDER — LEVOTHYROXINE SODIUM 0.12 MG/1
125 TABLET ORAL
Status: DISCONTINUED | OUTPATIENT
Start: 2021-01-01 | End: 2021-01-01 | Stop reason: HOSPADM

## 2021-01-01 RX ORDER — IBANDRONATE SODIUM 150 MG/1
150 TABLET, FILM COATED ORAL
Qty: 3 TABLET | Refills: 1 | Status: SHIPPED | OUTPATIENT
Start: 2021-01-01

## 2021-01-01 RX ORDER — SODIUM CHLORIDE 0.9 % (FLUSH) 0.9 %
10 SYRINGE (ML) INJECTION AS NEEDED
Status: DISCONTINUED | OUTPATIENT
Start: 2021-01-01 | End: 2021-01-01 | Stop reason: HOSPADM

## 2021-01-01 RX ORDER — NYSTATIN 100000 [USP'U]/G
POWDER TOPICAL 3 TIMES DAILY
Qty: 60 G | Refills: 2 | Status: SHIPPED | OUTPATIENT
Start: 2021-01-01

## 2021-01-01 RX ORDER — QUETIAPINE FUMARATE 25 MG/1
TABLET, FILM COATED ORAL
Qty: 180 TABLET | Refills: 3 | Status: SHIPPED | OUTPATIENT
Start: 2021-01-01

## 2021-01-01 RX ORDER — AMOXICILLIN 250 MG
2 CAPSULE ORAL NIGHTLY
Status: DISCONTINUED | OUTPATIENT
Start: 2021-01-01 | End: 2021-01-01 | Stop reason: HOSPADM

## 2021-01-01 RX ORDER — HYDROCODONE BITARTRATE AND ACETAMINOPHEN 5; 325 MG/1; MG/1
0.5 TABLET ORAL 3 TIMES DAILY
Status: DISCONTINUED | OUTPATIENT
Start: 2021-01-01 | End: 2021-01-01

## 2021-01-01 RX ORDER — AMOXICILLIN 250 MG
2 CAPSULE ORAL NIGHTLY
Qty: 60 TABLET | Refills: 0 | Status: SHIPPED | OUTPATIENT
Start: 2021-01-01

## 2021-01-01 RX ORDER — QUETIAPINE FUMARATE 25 MG/1
50 TABLET, FILM COATED ORAL NIGHTLY
Qty: 180 TABLET | Refills: 0 | Status: SHIPPED | OUTPATIENT
Start: 2021-01-01 | End: 2021-01-01

## 2021-01-01 RX ORDER — INSULIN GLARGINE 100 [IU]/ML
INJECTION, SOLUTION SUBCUTANEOUS
Qty: 30 ML | Refills: 6
Start: 2021-01-01

## 2021-01-01 RX ORDER — ONDANSETRON 4 MG/1
4 TABLET, FILM COATED ORAL EVERY 6 HOURS PRN
Status: DISCONTINUED | OUTPATIENT
Start: 2021-01-01 | End: 2021-01-01 | Stop reason: HOSPADM

## 2021-01-01 RX ORDER — ATORVASTATIN CALCIUM 20 MG/1
20 TABLET, FILM COATED ORAL DAILY
Status: DISCONTINUED | OUTPATIENT
Start: 2021-01-01 | End: 2021-01-01 | Stop reason: ALTCHOICE

## 2021-01-01 RX ORDER — HYDROCODONE BITARTRATE AND ACETAMINOPHEN 5; 325 MG/1; MG/1
0.5 TABLET ORAL EVERY 4 HOURS PRN
Status: DISCONTINUED | OUTPATIENT
Start: 2021-01-01 | End: 2021-01-01

## 2021-01-01 RX ORDER — MIRABEGRON 25 MG/1
TABLET, FILM COATED, EXTENDED RELEASE ORAL
Qty: 90 TABLET | Refills: 3 | Status: SHIPPED | OUTPATIENT
Start: 2021-01-01

## 2021-01-01 RX ORDER — ACETAMINOPHEN 160 MG/5ML
650 SOLUTION ORAL EVERY 4 HOURS PRN
Status: DISCONTINUED | OUTPATIENT
Start: 2021-01-01 | End: 2021-01-01

## 2021-01-01 RX ORDER — MEMANTINE HYDROCHLORIDE 10 MG/1
10 TABLET ORAL 2 TIMES DAILY
Status: DISCONTINUED | OUTPATIENT
Start: 2021-01-01 | End: 2021-01-01 | Stop reason: HOSPADM

## 2021-01-01 RX ORDER — ASPIRIN 300 MG/1
300 SUPPOSITORY RECTAL DAILY
Status: DISCONTINUED | OUTPATIENT
Start: 2021-01-01 | End: 2021-01-01

## 2021-01-01 RX ORDER — DONEPEZIL HYDROCHLORIDE 10 MG/1
10 TABLET, FILM COATED ORAL 2 TIMES DAILY
Status: DISCONTINUED | OUTPATIENT
Start: 2021-01-01 | End: 2021-01-01 | Stop reason: HOSPADM

## 2021-01-01 RX ORDER — FERROUS SULFATE 325(65) MG
325 TABLET ORAL
Status: DISCONTINUED | OUTPATIENT
Start: 2021-01-01 | End: 2021-01-01 | Stop reason: HOSPADM

## 2021-01-01 RX ORDER — ATORVASTATIN CALCIUM 40 MG/1
80 TABLET, FILM COATED ORAL NIGHTLY
Status: DISCONTINUED | OUTPATIENT
Start: 2021-01-01 | End: 2021-01-01 | Stop reason: HOSPADM

## 2021-01-01 RX ORDER — ACETAMINOPHEN 325 MG/1
650 TABLET ORAL EVERY 4 HOURS PRN
Status: DISCONTINUED | OUTPATIENT
Start: 2021-01-01 | End: 2021-01-01

## 2021-01-01 RX ORDER — AMLODIPINE BESYLATE 2.5 MG/1
2.5 TABLET ORAL DAILY
Qty: 90 TABLET | Refills: 3 | Status: SHIPPED | OUTPATIENT
Start: 2021-01-01

## 2021-01-01 RX ORDER — ACETAMINOPHEN 650 MG/1
650 SUPPOSITORY RECTAL EVERY 4 HOURS PRN
Status: DISCONTINUED | OUTPATIENT
Start: 2021-01-01 | End: 2021-01-01

## 2021-01-01 RX ORDER — TRAMADOL HYDROCHLORIDE 50 MG/1
50 TABLET ORAL EVERY 6 HOURS PRN
Status: DISCONTINUED | OUTPATIENT
Start: 2021-01-01 | End: 2021-01-01 | Stop reason: HOSPADM

## 2021-01-01 RX ORDER — SODIUM CHLORIDE 0.9 % (FLUSH) 0.9 %
10 SYRINGE (ML) INJECTION EVERY 12 HOURS SCHEDULED
Status: DISCONTINUED | OUTPATIENT
Start: 2021-01-01 | End: 2021-01-01 | Stop reason: SDUPTHER

## 2021-01-01 RX ORDER — GABAPENTIN 100 MG/1
100 CAPSULE ORAL NIGHTLY
Status: DISCONTINUED | OUTPATIENT
Start: 2021-01-01 | End: 2021-01-01 | Stop reason: HOSPADM

## 2021-01-01 RX ORDER — ASPIRIN 81 MG/1
81 TABLET ORAL DAILY
Status: DISCONTINUED | OUTPATIENT
Start: 2021-01-01 | End: 2021-01-01 | Stop reason: ALTCHOICE

## 2021-01-01 RX ORDER — TRAMADOL HYDROCHLORIDE 50 MG/1
25 TABLET ORAL EVERY 6 HOURS PRN
Status: DISCONTINUED | OUTPATIENT
Start: 2021-01-01 | End: 2021-01-01

## 2021-01-01 RX ORDER — AMLODIPINE BESYLATE 2.5 MG/1
2.5 TABLET ORAL DAILY
Status: DISCONTINUED | OUTPATIENT
Start: 2021-01-01 | End: 2021-01-01 | Stop reason: HOSPADM

## 2021-01-01 RX ORDER — TRAMADOL HYDROCHLORIDE 50 MG/1
TABLET ORAL
Qty: 60 TABLET | Refills: 0 | Status: SHIPPED | OUTPATIENT
Start: 2021-01-01 | End: 2021-01-01 | Stop reason: HOSPADM

## 2021-01-01 RX ORDER — LIDOCAINE 50 MG/G
1 PATCH TOPICAL
Qty: 30 EACH | Refills: 0 | Status: SHIPPED | OUTPATIENT
Start: 2021-01-01

## 2021-01-01 RX ORDER — SODIUM CHLORIDE 0.9 % (FLUSH) 0.9 %
10 SYRINGE (ML) INJECTION AS NEEDED
Status: DISCONTINUED | OUTPATIENT
Start: 2021-01-01 | End: 2021-01-01 | Stop reason: SDUPTHER

## 2021-01-01 RX ORDER — LANOLIN ALCOHOL/MO/W.PET/CERES
500 CREAM (GRAM) TOPICAL DAILY
Status: DISCONTINUED | OUTPATIENT
Start: 2021-01-01 | End: 2021-01-01 | Stop reason: HOSPADM

## 2021-01-01 RX ORDER — ACETAMINOPHEN 500 MG
500 TABLET ORAL 4 TIMES DAILY
Qty: 120 TABLET | Refills: 0 | Status: SHIPPED | OUTPATIENT
Start: 2021-01-01

## 2021-01-01 RX ORDER — SODIUM CHLORIDE 0.9 % (FLUSH) 0.9 %
10 SYRINGE (ML) INJECTION EVERY 12 HOURS SCHEDULED
Status: DISCONTINUED | OUTPATIENT
Start: 2021-01-01 | End: 2021-01-01 | Stop reason: HOSPADM

## 2021-01-01 RX ORDER — QUETIAPINE FUMARATE 25 MG/1
TABLET, FILM COATED ORAL
Qty: 180 TABLET | Refills: 1 | Status: SHIPPED | OUTPATIENT
Start: 2021-01-01 | End: 2021-01-01

## 2021-01-01 RX ORDER — GABAPENTIN 100 MG/1
100 CAPSULE ORAL NIGHTLY
Qty: 30 CAPSULE | Refills: 0 | Status: SHIPPED | OUTPATIENT
Start: 2021-01-01

## 2021-01-01 RX ORDER — CYANOCOBALAMIN 1000 UG/ML
1000 INJECTION, SOLUTION INTRAMUSCULAR; SUBCUTANEOUS DAILY
Status: COMPLETED | OUTPATIENT
Start: 2021-01-01 | End: 2021-01-01

## 2021-01-01 RX ORDER — ESCITALOPRAM OXALATE 20 MG/1
20 TABLET ORAL DAILY
Status: DISCONTINUED | OUTPATIENT
Start: 2021-01-01 | End: 2021-01-01 | Stop reason: HOSPADM

## 2021-01-01 RX ORDER — QUETIAPINE FUMARATE 25 MG/1
50 TABLET, FILM COATED ORAL NIGHTLY
Status: DISCONTINUED | OUTPATIENT
Start: 2021-01-01 | End: 2021-01-01 | Stop reason: HOSPADM

## 2021-01-01 RX ORDER — HYDROCODONE BITARTRATE AND ACETAMINOPHEN 5; 325 MG/1; MG/1
1 TABLET ORAL EVERY 4 HOURS PRN
Status: DISCONTINUED | OUTPATIENT
Start: 2021-01-01 | End: 2021-01-01

## 2021-01-01 RX ORDER — ACETAMINOPHEN 500 MG
500 TABLET ORAL 4 TIMES DAILY
Status: DISCONTINUED | OUTPATIENT
Start: 2021-01-01 | End: 2021-01-01 | Stop reason: HOSPADM

## 2021-01-01 RX ORDER — ATORVASTATIN CALCIUM 80 MG/1
80 TABLET, FILM COATED ORAL NIGHTLY
Qty: 30 TABLET | Refills: 0 | Status: SHIPPED | OUTPATIENT
Start: 2021-01-01

## 2021-01-01 RX ADMIN — CARBIDOPA AND LEVODOPA 1 TABLET: 25; 100 TABLET ORAL at 12:47

## 2021-01-01 RX ADMIN — HYDROCODONE BITARTRATE AND ACETAMINOPHEN 0.5 TABLET: 5; 325 TABLET ORAL at 21:09

## 2021-01-01 RX ADMIN — DICLOFENAC 4 G: 10 GEL TOPICAL at 12:04

## 2021-01-01 RX ADMIN — MEMANTINE 10 MG: 10 TABLET ORAL at 20:00

## 2021-01-01 RX ADMIN — Medication 325 MG: at 08:15

## 2021-01-01 RX ADMIN — LIDOCAINE 1 PATCH: 50 PATCH CUTANEOUS at 08:19

## 2021-01-01 RX ADMIN — CARBIDOPA AND LEVODOPA 1 TABLET: 25; 100 TABLET ORAL at 12:00

## 2021-01-01 RX ADMIN — CYANOCOBALAMIN 1000 MCG: 1000 INJECTION, SOLUTION INTRAMUSCULAR; SUBCUTANEOUS at 15:33

## 2021-01-01 RX ADMIN — HYDROCODONE BITARTRATE AND ACETAMINOPHEN 0.5 TABLET: 5; 325 TABLET ORAL at 09:54

## 2021-01-01 RX ADMIN — DICLOFENAC 4 G: 10 GEL TOPICAL at 20:08

## 2021-01-01 RX ADMIN — LEVOTHYROXINE SODIUM 125 MCG: 125 TABLET ORAL at 05:55

## 2021-01-01 RX ADMIN — CARBIDOPA AND LEVODOPA 1 TABLET: 25; 100 TABLET ORAL at 21:21

## 2021-01-01 RX ADMIN — ASPIRIN 81 MG: 81 TABLET, CHEWABLE ORAL at 08:50

## 2021-01-01 RX ADMIN — LEVOTHYROXINE SODIUM 125 MCG: 125 TABLET ORAL at 05:28

## 2021-01-01 RX ADMIN — LIDOCAINE 1 PATCH: 50 PATCH CUTANEOUS at 08:09

## 2021-01-01 RX ADMIN — CARBIDOPA AND LEVODOPA 1 TABLET: 25; 100 TABLET ORAL at 09:54

## 2021-01-01 RX ADMIN — ACETAMINOPHEN 500 MG: 500 TABLET, FILM COATED ORAL at 09:00

## 2021-01-01 RX ADMIN — CARBIDOPA AND LEVODOPA 1 TABLET: 25; 100 TABLET ORAL at 18:11

## 2021-01-01 RX ADMIN — ESCITALOPRAM OXALATE 20 MG: 20 TABLET ORAL at 11:45

## 2021-01-01 RX ADMIN — ASPIRIN 81 MG: 81 TABLET, CHEWABLE ORAL at 08:08

## 2021-01-01 RX ADMIN — CARBIDOPA AND LEVODOPA 1 TABLET: 25; 100 TABLET ORAL at 08:14

## 2021-01-01 RX ADMIN — LEVOTHYROXINE SODIUM 125 MCG: 125 TABLET ORAL at 05:43

## 2021-01-01 RX ADMIN — ESCITALOPRAM OXALATE 20 MG: 20 TABLET ORAL at 09:55

## 2021-01-01 RX ADMIN — CARBIDOPA AND LEVODOPA 1 TABLET: 25; 100 TABLET ORAL at 17:38

## 2021-01-01 RX ADMIN — ASPIRIN 81 MG: 81 TABLET, CHEWABLE ORAL at 08:20

## 2021-01-01 RX ADMIN — DICLOFENAC 4 G: 10 GEL TOPICAL at 08:37

## 2021-01-01 RX ADMIN — CYANOCOBALAMIN 1000 MCG: 1000 INJECTION, SOLUTION INTRAMUSCULAR; SUBCUTANEOUS at 08:48

## 2021-01-01 RX ADMIN — QUETIAPINE FUMARATE 50 MG: 25 TABLET ORAL at 20:06

## 2021-01-01 RX ADMIN — DICLOFENAC 4 G: 10 GEL TOPICAL at 12:25

## 2021-01-01 RX ADMIN — ATORVASTATIN CALCIUM 80 MG: 40 TABLET, FILM COATED ORAL at 20:06

## 2021-01-01 RX ADMIN — LIDOCAINE 1 PATCH: 50 PATCH CUTANEOUS at 11:46

## 2021-01-01 RX ADMIN — DICLOFENAC 4 G: 10 GEL TOPICAL at 18:37

## 2021-01-01 RX ADMIN — MEMANTINE 10 MG: 10 TABLET ORAL at 08:15

## 2021-01-01 RX ADMIN — CARBIDOPA AND LEVODOPA 1 TABLET: 25; 100 TABLET ORAL at 21:54

## 2021-01-01 RX ADMIN — ACETAMINOPHEN 500 MG: 500 TABLET, FILM COATED ORAL at 20:59

## 2021-01-01 RX ADMIN — GABAPENTIN 100 MG: 100 CAPSULE ORAL at 21:54

## 2021-01-01 RX ADMIN — CARBIDOPA AND LEVODOPA 1 TABLET: 25; 100 TABLET ORAL at 08:39

## 2021-01-01 RX ADMIN — MEMANTINE 10 MG: 10 TABLET ORAL at 21:16

## 2021-01-01 RX ADMIN — CARBIDOPA AND LEVODOPA 1 TABLET: 25; 100 TABLET ORAL at 12:04

## 2021-01-01 RX ADMIN — DONEPEZIL HYDROCHLORIDE 10 MG: 10 TABLET, FILM COATED ORAL at 20:20

## 2021-01-01 RX ADMIN — ACETAMINOPHEN 500 MG: 500 TABLET, FILM COATED ORAL at 08:10

## 2021-01-01 RX ADMIN — CARBIDOPA AND LEVODOPA 1 TABLET: 25; 100 TABLET ORAL at 20:21

## 2021-01-01 RX ADMIN — ACETAMINOPHEN 500 MG: 500 TABLET, FILM COATED ORAL at 17:38

## 2021-01-01 RX ADMIN — CARBIDOPA AND LEVODOPA 1 TABLET: 25; 100 TABLET ORAL at 18:25

## 2021-01-01 RX ADMIN — MEMANTINE 10 MG: 10 TABLET ORAL at 08:47

## 2021-01-01 RX ADMIN — HYDROCODONE BITARTRATE AND ACETAMINOPHEN 0.5 TABLET: 5; 325 TABLET ORAL at 17:57

## 2021-01-01 RX ADMIN — ESCITALOPRAM OXALATE 20 MG: 20 TABLET ORAL at 08:16

## 2021-01-01 RX ADMIN — QUETIAPINE FUMARATE 50 MG: 25 TABLET ORAL at 20:59

## 2021-01-01 RX ADMIN — ACETAMINOPHEN 500 MG: 500 TABLET, FILM COATED ORAL at 18:50

## 2021-01-01 RX ADMIN — ACETAMINOPHEN 500 MG: 500 TABLET, FILM COATED ORAL at 09:55

## 2021-01-01 RX ADMIN — GABAPENTIN 100 MG: 100 CAPSULE ORAL at 20:21

## 2021-01-01 RX ADMIN — MEMANTINE 10 MG: 10 TABLET ORAL at 21:20

## 2021-01-01 RX ADMIN — OXYBUTYNIN CHLORIDE 5 MG: 5 TABLET, EXTENDED RELEASE ORAL at 08:20

## 2021-01-01 RX ADMIN — ATORVASTATIN CALCIUM 80 MG: 40 TABLET, FILM COATED ORAL at 20:21

## 2021-01-01 RX ADMIN — LEVOTHYROXINE SODIUM 125 MCG: 125 TABLET ORAL at 05:35

## 2021-01-01 RX ADMIN — QUETIAPINE FUMARATE 50 MG: 25 TABLET ORAL at 21:09

## 2021-01-01 RX ADMIN — LEVOTHYROXINE SODIUM 125 MCG: 125 TABLET ORAL at 06:59

## 2021-01-01 RX ADMIN — ASPIRIN 81 MG: 81 TABLET, CHEWABLE ORAL at 08:15

## 2021-01-01 RX ADMIN — ACETAMINOPHEN 500 MG: 500 TABLET, FILM COATED ORAL at 08:46

## 2021-01-01 RX ADMIN — OXYBUTYNIN CHLORIDE 5 MG: 5 TABLET, EXTENDED RELEASE ORAL at 09:06

## 2021-01-01 RX ADMIN — ACETAMINOPHEN 500 MG: 500 TABLET, FILM COATED ORAL at 13:35

## 2021-01-01 RX ADMIN — DICLOFENAC 4 G: 10 GEL TOPICAL at 12:01

## 2021-01-01 RX ADMIN — MEMANTINE 10 MG: 10 TABLET ORAL at 08:20

## 2021-01-01 RX ADMIN — CARBIDOPA AND LEVODOPA 1 TABLET: 25; 100 TABLET ORAL at 13:36

## 2021-01-01 RX ADMIN — CARBIDOPA AND LEVODOPA 1 TABLET: 25; 100 TABLET ORAL at 12:07

## 2021-01-01 RX ADMIN — CYANOCOBALAMIN 1000 MCG: 1000 INJECTION, SOLUTION INTRAMUSCULAR; SUBCUTANEOUS at 09:07

## 2021-01-01 RX ADMIN — CYANOCOBALAMIN 1000 MCG: 1000 INJECTION, SOLUTION INTRAMUSCULAR; SUBCUTANEOUS at 09:56

## 2021-01-01 RX ADMIN — AMLODIPINE BESYLATE 2.5 MG: 2.5 TABLET ORAL at 08:20

## 2021-01-01 RX ADMIN — INSULIN LISPRO 3 UNITS: 100 INJECTION, SOLUTION INTRAVENOUS; SUBCUTANEOUS at 18:09

## 2021-01-01 RX ADMIN — CARBIDOPA AND LEVODOPA 1 TABLET: 25; 100 TABLET ORAL at 20:08

## 2021-01-01 RX ADMIN — DONEPEZIL HYDROCHLORIDE 10 MG: 10 TABLET, FILM COATED ORAL at 08:08

## 2021-01-01 RX ADMIN — DICLOFENAC 4 G: 10 GEL TOPICAL at 08:50

## 2021-01-01 RX ADMIN — AMLODIPINE BESYLATE 2.5 MG: 2.5 TABLET ORAL at 08:16

## 2021-01-01 RX ADMIN — ESCITALOPRAM OXALATE 20 MG: 20 TABLET ORAL at 08:47

## 2021-01-01 RX ADMIN — QUETIAPINE FUMARATE 50 MG: 25 TABLET ORAL at 21:54

## 2021-01-01 RX ADMIN — DICLOFENAC 4 G: 10 GEL TOPICAL at 20:07

## 2021-01-01 RX ADMIN — CARBIDOPA AND LEVODOPA 1 TABLET: 25; 100 TABLET ORAL at 20:06

## 2021-01-01 RX ADMIN — LEVOTHYROXINE SODIUM 125 MCG: 125 TABLET ORAL at 05:23

## 2021-01-01 RX ADMIN — DICLOFENAC 4 G: 10 GEL TOPICAL at 09:56

## 2021-01-01 RX ADMIN — AMLODIPINE BESYLATE 2.5 MG: 2.5 TABLET ORAL at 08:50

## 2021-01-01 RX ADMIN — CARBIDOPA AND LEVODOPA 1 TABLET: 25; 100 TABLET ORAL at 08:08

## 2021-01-01 RX ADMIN — ACETAMINOPHEN 500 MG: 500 TABLET, FILM COATED ORAL at 08:47

## 2021-01-01 RX ADMIN — DICLOFENAC 4 G: 10 GEL TOPICAL at 17:38

## 2021-01-01 RX ADMIN — DONEPEZIL HYDROCHLORIDE 10 MG: 10 TABLET, FILM COATED ORAL at 08:20

## 2021-01-01 RX ADMIN — MEMANTINE 10 MG: 10 TABLET ORAL at 20:21

## 2021-01-01 RX ADMIN — CYANOCOBALAMIN 1000 MCG: 1000 INJECTION, SOLUTION INTRAMUSCULAR; SUBCUTANEOUS at 08:08

## 2021-01-01 RX ADMIN — HYDROCODONE BITARTRATE AND ACETAMINOPHEN 0.5 TABLET: 5; 325 TABLET ORAL at 17:38

## 2021-01-01 RX ADMIN — ASPIRIN 81 MG: 81 TABLET, CHEWABLE ORAL at 11:45

## 2021-01-01 RX ADMIN — Medication 325 MG: at 08:20

## 2021-01-01 RX ADMIN — LIDOCAINE 1 PATCH: 50 PATCH CUTANEOUS at 08:49

## 2021-01-01 RX ADMIN — LIDOCAINE 1 PATCH: 50 PATCH CUTANEOUS at 09:55

## 2021-01-01 RX ADMIN — OXYBUTYNIN CHLORIDE 5 MG: 5 TABLET, EXTENDED RELEASE ORAL at 08:39

## 2021-01-01 RX ADMIN — ACETAMINOPHEN 500 MG: 500 TABLET, FILM COATED ORAL at 17:57

## 2021-01-01 RX ADMIN — DICLOFENAC 4 G: 10 GEL TOPICAL at 20:59

## 2021-01-01 RX ADMIN — ATORVASTATIN CALCIUM 80 MG: 40 TABLET, FILM COATED ORAL at 21:35

## 2021-01-01 RX ADMIN — DICLOFENAC 4 G: 10 GEL TOPICAL at 08:10

## 2021-01-01 RX ADMIN — INSULIN LISPRO 2 UNITS: 100 INJECTION, SOLUTION INTRAVENOUS; SUBCUTANEOUS at 12:47

## 2021-01-01 RX ADMIN — ATORVASTATIN CALCIUM 80 MG: 40 TABLET, FILM COATED ORAL at 21:16

## 2021-01-01 RX ADMIN — Medication 325 MG: at 08:36

## 2021-01-01 RX ADMIN — DONEPEZIL HYDROCHLORIDE 10 MG: 10 TABLET, FILM COATED ORAL at 08:37

## 2021-01-01 RX ADMIN — ACETAMINOPHEN 500 MG: 500 TABLET, FILM COATED ORAL at 08:19

## 2021-01-01 RX ADMIN — CARBIDOPA AND LEVODOPA 1 TABLET: 25; 100 TABLET ORAL at 20:59

## 2021-01-01 RX ADMIN — DONEPEZIL HYDROCHLORIDE 10 MG: 10 TABLET, FILM COATED ORAL at 21:16

## 2021-01-01 RX ADMIN — LIDOCAINE 1 PATCH: 50 PATCH CUTANEOUS at 09:06

## 2021-01-01 RX ADMIN — CARBIDOPA AND LEVODOPA 1 TABLET: 25; 100 TABLET ORAL at 18:30

## 2021-01-01 RX ADMIN — DONEPEZIL HYDROCHLORIDE 10 MG: 10 TABLET, FILM COATED ORAL at 21:20

## 2021-01-01 RX ADMIN — GABAPENTIN 100 MG: 100 CAPSULE ORAL at 20:06

## 2021-01-01 RX ADMIN — OXYBUTYNIN CHLORIDE 5 MG: 5 TABLET, EXTENDED RELEASE ORAL at 08:08

## 2021-01-01 RX ADMIN — CARBIDOPA AND LEVODOPA 1 TABLET: 25; 100 TABLET ORAL at 21:09

## 2021-01-01 RX ADMIN — GABAPENTIN 100 MG: 100 CAPSULE ORAL at 20:00

## 2021-01-01 RX ADMIN — SODIUM CHLORIDE, PRESERVATIVE FREE 10 ML: 5 INJECTION INTRAVENOUS at 11:47

## 2021-01-01 RX ADMIN — MEMANTINE 10 MG: 10 TABLET ORAL at 21:09

## 2021-01-01 RX ADMIN — DICLOFENAC 4 G: 10 GEL TOPICAL at 11:46

## 2021-01-01 RX ADMIN — MEMANTINE 10 MG: 10 TABLET ORAL at 21:35

## 2021-01-01 RX ADMIN — DOCUSATE SODIUM 50 MG AND SENNOSIDES 8.6 MG 2 TABLET: 8.6; 5 TABLET, FILM COATED ORAL at 20:21

## 2021-01-01 RX ADMIN — MEMANTINE 10 MG: 10 TABLET ORAL at 21:54

## 2021-01-01 RX ADMIN — SODIUM CHLORIDE, PRESERVATIVE FREE 10 ML: 5 INJECTION INTRAVENOUS at 08:17

## 2021-01-01 RX ADMIN — DONEPEZIL HYDROCHLORIDE 10 MG: 10 TABLET, FILM COATED ORAL at 08:38

## 2021-01-01 RX ADMIN — ESCITALOPRAM OXALATE 20 MG: 20 TABLET ORAL at 09:06

## 2021-01-01 RX ADMIN — AMLODIPINE BESYLATE 2.5 MG: 2.5 TABLET ORAL at 08:39

## 2021-01-01 RX ADMIN — DONEPEZIL HYDROCHLORIDE 10 MG: 10 TABLET, FILM COATED ORAL at 21:36

## 2021-01-01 RX ADMIN — CARBIDOPA AND LEVODOPA 1 TABLET: 25; 100 TABLET ORAL at 08:16

## 2021-01-01 RX ADMIN — MEMANTINE 10 MG: 10 TABLET ORAL at 20:59

## 2021-01-01 RX ADMIN — DONEPEZIL HYDROCHLORIDE 10 MG: 10 TABLET, FILM COATED ORAL at 20:59

## 2021-01-01 RX ADMIN — ATORVASTATIN CALCIUM 80 MG: 40 TABLET, FILM COATED ORAL at 20:08

## 2021-01-01 RX ADMIN — LIDOCAINE 1 PATCH: 50 PATCH CUTANEOUS at 08:10

## 2021-01-01 RX ADMIN — QUETIAPINE FUMARATE 50 MG: 25 TABLET ORAL at 21:23

## 2021-01-01 RX ADMIN — ACETAMINOPHEN 500 MG: 500 TABLET, FILM COATED ORAL at 13:41

## 2021-01-01 RX ADMIN — DONEPEZIL HYDROCHLORIDE 10 MG: 10 TABLET, FILM COATED ORAL at 08:14

## 2021-01-01 RX ADMIN — Medication 325 MG: at 08:47

## 2021-01-01 RX ADMIN — DOCUSATE SODIUM 50 MG AND SENNOSIDES 8.6 MG 2 TABLET: 8.6; 5 TABLET, FILM COATED ORAL at 21:35

## 2021-01-01 RX ADMIN — DICLOFENAC 4 G: 10 GEL TOPICAL at 21:10

## 2021-01-01 RX ADMIN — DICLOFENAC 4 G: 10 GEL TOPICAL at 21:16

## 2021-01-01 RX ADMIN — CARBIDOPA AND LEVODOPA 1 TABLET: 25; 100 TABLET ORAL at 09:00

## 2021-01-01 RX ADMIN — ACETAMINOPHEN 500 MG: 500 TABLET, FILM COATED ORAL at 18:37

## 2021-01-01 RX ADMIN — AMLODIPINE BESYLATE 2.5 MG: 2.5 TABLET ORAL at 08:47

## 2021-01-01 RX ADMIN — HYDROCODONE BITARTRATE AND ACETAMINOPHEN 0.5 TABLET: 5; 325 TABLET ORAL at 08:50

## 2021-01-01 RX ADMIN — DICLOFENAC 4 G: 10 GEL TOPICAL at 08:47

## 2021-01-01 RX ADMIN — CARBIDOPA AND LEVODOPA 1 TABLET: 25; 100 TABLET ORAL at 08:47

## 2021-01-01 RX ADMIN — DONEPEZIL HYDROCHLORIDE 10 MG: 10 TABLET, FILM COATED ORAL at 20:08

## 2021-01-01 RX ADMIN — QUETIAPINE FUMARATE 50 MG: 25 TABLET ORAL at 20:00

## 2021-01-01 RX ADMIN — ACETAMINOPHEN 500 MG: 500 TABLET, FILM COATED ORAL at 08:36

## 2021-01-01 RX ADMIN — ESCITALOPRAM OXALATE 20 MG: 20 TABLET ORAL at 08:39

## 2021-01-01 RX ADMIN — MEMANTINE 10 MG: 10 TABLET ORAL at 08:16

## 2021-01-01 RX ADMIN — DONEPEZIL HYDROCHLORIDE 10 MG: 10 TABLET, FILM COATED ORAL at 08:46

## 2021-01-01 RX ADMIN — ACETAMINOPHEN 500 MG: 500 TABLET, FILM COATED ORAL at 21:16

## 2021-01-01 RX ADMIN — DONEPEZIL HYDROCHLORIDE 10 MG: 10 TABLET, FILM COATED ORAL at 08:51

## 2021-01-01 RX ADMIN — OXYBUTYNIN CHLORIDE 5 MG: 5 TABLET, EXTENDED RELEASE ORAL at 08:50

## 2021-01-01 RX ADMIN — LIDOCAINE 1 PATCH: 50 PATCH CUTANEOUS at 08:39

## 2021-01-01 RX ADMIN — ACETAMINOPHEN 500 MG: 500 TABLET, FILM COATED ORAL at 12:47

## 2021-01-01 RX ADMIN — ACETAMINOPHEN 500 MG: 500 TABLET, FILM COATED ORAL at 08:16

## 2021-01-01 RX ADMIN — HYDROCODONE BITARTRATE AND ACETAMINOPHEN 0.5 TABLET: 5; 325 TABLET ORAL at 20:06

## 2021-01-01 RX ADMIN — DICLOFENAC 4 G: 10 GEL TOPICAL at 09:00

## 2021-01-01 RX ADMIN — INSULIN LISPRO 2 UNITS: 100 INJECTION, SOLUTION INTRAVENOUS; SUBCUTANEOUS at 08:09

## 2021-01-01 RX ADMIN — MEMANTINE 10 MG: 10 TABLET ORAL at 20:08

## 2021-01-01 RX ADMIN — CARBIDOPA AND LEVODOPA 1 TABLET: 25; 100 TABLET ORAL at 18:09

## 2021-01-01 RX ADMIN — ACETAMINOPHEN 500 MG: 500 TABLET, FILM COATED ORAL at 12:04

## 2021-01-01 RX ADMIN — ACETAMINOPHEN 500 MG: 500 TABLET, FILM COATED ORAL at 18:30

## 2021-01-01 RX ADMIN — AMLODIPINE BESYLATE 2.5 MG: 2.5 TABLET ORAL at 11:45

## 2021-01-01 RX ADMIN — LIDOCAINE 1 PATCH: 50 PATCH CUTANEOUS at 08:48

## 2021-01-01 RX ADMIN — OXYBUTYNIN CHLORIDE 5 MG: 5 TABLET, EXTENDED RELEASE ORAL at 08:36

## 2021-01-01 RX ADMIN — Medication 325 MG: at 08:08

## 2021-01-01 RX ADMIN — CARBIDOPA AND LEVODOPA 1 TABLET: 25; 100 TABLET ORAL at 12:25

## 2021-01-01 RX ADMIN — HYDROCODONE BITARTRATE AND ACETAMINOPHEN 0.5 TABLET: 5; 325 TABLET ORAL at 20:00

## 2021-01-01 RX ADMIN — DICLOFENAC 4 G: 10 GEL TOPICAL at 18:09

## 2021-01-01 RX ADMIN — DICLOFENAC 4 G: 10 GEL TOPICAL at 17:58

## 2021-01-01 RX ADMIN — DICLOFENAC 4 G: 10 GEL TOPICAL at 18:11

## 2021-01-01 RX ADMIN — OXYBUTYNIN CHLORIDE 5 MG: 5 TABLET, EXTENDED RELEASE ORAL at 08:46

## 2021-01-01 RX ADMIN — QUETIAPINE FUMARATE 50 MG: 25 TABLET ORAL at 21:20

## 2021-01-01 RX ADMIN — Medication 325 MG: at 08:51

## 2021-01-01 RX ADMIN — DICLOFENAC 4 G: 10 GEL TOPICAL at 20:22

## 2021-01-01 RX ADMIN — TRAMADOL HYDROCHLORIDE 50 MG: 50 TABLET, FILM COATED ORAL at 15:52

## 2021-01-01 RX ADMIN — DICLOFENAC 4 G: 10 GEL TOPICAL at 13:15

## 2021-01-01 RX ADMIN — QUETIAPINE FUMARATE 50 MG: 25 TABLET ORAL at 21:36

## 2021-01-01 RX ADMIN — DICLOFENAC 4 G: 10 GEL TOPICAL at 12:00

## 2021-01-01 RX ADMIN — INSULIN LISPRO 3 UNITS: 100 INJECTION, SOLUTION INTRAVENOUS; SUBCUTANEOUS at 12:04

## 2021-01-01 RX ADMIN — Medication 325 MG: at 09:00

## 2021-01-01 RX ADMIN — DOCUSATE SODIUM 50 MG AND SENNOSIDES 8.6 MG 2 TABLET: 8.6; 5 TABLET, FILM COATED ORAL at 21:10

## 2021-01-01 RX ADMIN — MEMANTINE 10 MG: 10 TABLET ORAL at 08:50

## 2021-01-01 RX ADMIN — OXYBUTYNIN CHLORIDE 5 MG: 5 TABLET, EXTENDED RELEASE ORAL at 09:55

## 2021-01-01 RX ADMIN — ASPIRIN 81 MG: 81 TABLET, CHEWABLE ORAL at 08:38

## 2021-01-01 RX ADMIN — DONEPEZIL HYDROCHLORIDE 10 MG: 10 TABLET, FILM COATED ORAL at 20:06

## 2021-01-01 RX ADMIN — ATORVASTATIN CALCIUM 80 MG: 40 TABLET, FILM COATED ORAL at 21:21

## 2021-01-01 RX ADMIN — DICLOFENAC 4 G: 10 GEL TOPICAL at 21:20

## 2021-01-01 RX ADMIN — MEMANTINE 10 MG: 10 TABLET ORAL at 11:45

## 2021-01-01 RX ADMIN — ACETAMINOPHEN 500 MG: 500 TABLET, FILM COATED ORAL at 18:04

## 2021-01-01 RX ADMIN — GABAPENTIN 100 MG: 100 CAPSULE ORAL at 21:35

## 2021-01-01 RX ADMIN — MEMANTINE 10 MG: 10 TABLET ORAL at 20:06

## 2021-01-01 RX ADMIN — SODIUM CHLORIDE, PRESERVATIVE FREE 10 ML: 5 INJECTION INTRAVENOUS at 08:49

## 2021-01-01 RX ADMIN — CARBIDOPA AND LEVODOPA 1 TABLET: 25; 100 TABLET ORAL at 18:37

## 2021-01-01 RX ADMIN — DICLOFENAC 4 G: 10 GEL TOPICAL at 08:08

## 2021-01-01 RX ADMIN — CARBIDOPA AND LEVODOPA 1 TABLET: 25; 100 TABLET ORAL at 20:00

## 2021-01-01 RX ADMIN — CARBIDOPA AND LEVODOPA 1 TABLET: 25; 100 TABLET ORAL at 13:15

## 2021-01-01 RX ADMIN — DONEPEZIL HYDROCHLORIDE 10 MG: 10 TABLET, FILM COATED ORAL at 08:47

## 2021-01-01 RX ADMIN — HYDROCODONE BITARTRATE AND ACETAMINOPHEN 0.5 TABLET: 5; 325 TABLET ORAL at 08:07

## 2021-01-01 RX ADMIN — LIDOCAINE 1 PATCH: 50 PATCH CUTANEOUS at 08:36

## 2021-01-01 RX ADMIN — DICLOFENAC 4 G: 10 GEL TOPICAL at 08:21

## 2021-01-01 RX ADMIN — MEMANTINE 10 MG: 10 TABLET ORAL at 09:05

## 2021-01-01 RX ADMIN — DONEPEZIL HYDROCHLORIDE 10 MG: 10 TABLET, FILM COATED ORAL at 08:16

## 2021-01-01 RX ADMIN — ATORVASTATIN CALCIUM 80 MG: 40 TABLET, FILM COATED ORAL at 20:59

## 2021-01-01 RX ADMIN — DICLOFENAC 4 G: 10 GEL TOPICAL at 21:54

## 2021-01-01 RX ADMIN — HYDROCODONE BITARTRATE AND ACETAMINOPHEN 0.5 TABLET: 5; 325 TABLET ORAL at 21:34

## 2021-01-01 RX ADMIN — ACETAMINOPHEN 500 MG: 500 TABLET, FILM COATED ORAL at 18:25

## 2021-01-01 RX ADMIN — HYDROCODONE BITARTRATE AND ACETAMINOPHEN 0.5 TABLET: 5; 325 TABLET ORAL at 08:18

## 2021-01-01 RX ADMIN — CARBIDOPA AND LEVODOPA 1 TABLET: 25; 100 TABLET ORAL at 08:46

## 2021-01-01 RX ADMIN — Medication 325 MG: at 09:55

## 2021-01-01 RX ADMIN — DONEPEZIL HYDROCHLORIDE 10 MG: 10 TABLET, FILM COATED ORAL at 11:45

## 2021-01-01 RX ADMIN — GABAPENTIN 100 MG: 100 CAPSULE ORAL at 21:09

## 2021-01-01 RX ADMIN — DICLOFENAC 4 G: 10 GEL TOPICAL at 18:04

## 2021-01-01 RX ADMIN — ASPIRIN 81 MG: 81 TABLET, CHEWABLE ORAL at 08:36

## 2021-01-01 RX ADMIN — CARBIDOPA AND LEVODOPA 1 TABLET: 25; 100 TABLET ORAL at 11:44

## 2021-01-01 RX ADMIN — AMLODIPINE BESYLATE 2.5 MG: 2.5 TABLET ORAL at 09:55

## 2021-01-01 RX ADMIN — ACETAMINOPHEN 500 MG: 500 TABLET, FILM COATED ORAL at 21:35

## 2021-01-01 RX ADMIN — SODIUM CHLORIDE, PRESERVATIVE FREE 10 ML: 5 INJECTION INTRAVENOUS at 21:16

## 2021-01-01 RX ADMIN — ATORVASTATIN CALCIUM 80 MG: 40 TABLET, FILM COATED ORAL at 21:54

## 2021-01-01 RX ADMIN — ASPIRIN 81 MG: 81 TABLET, CHEWABLE ORAL at 09:05

## 2021-01-01 RX ADMIN — DICLOFENAC 4 G: 10 GEL TOPICAL at 08:16

## 2021-01-01 RX ADMIN — ESCITALOPRAM OXALATE 20 MG: 20 TABLET ORAL at 08:15

## 2021-01-01 RX ADMIN — ACETAMINOPHEN 500 MG: 500 TABLET, FILM COATED ORAL at 18:09

## 2021-01-01 RX ADMIN — DICLOFENAC 4 G: 10 GEL TOPICAL at 21:35

## 2021-01-01 RX ADMIN — ACETAMINOPHEN 500 MG: 500 TABLET, FILM COATED ORAL at 11:46

## 2021-01-01 RX ADMIN — CARBIDOPA AND LEVODOPA 1 TABLET: 25; 100 TABLET ORAL at 18:50

## 2021-01-01 RX ADMIN — DICLOFENAC 4 G: 10 GEL TOPICAL at 20:06

## 2021-01-01 RX ADMIN — ACETAMINOPHEN 500 MG: 500 TABLET, FILM COATED ORAL at 20:06

## 2021-01-01 RX ADMIN — ATORVASTATIN CALCIUM 80 MG: 40 TABLET, FILM COATED ORAL at 21:09

## 2021-01-01 RX ADMIN — LIDOCAINE 1 PATCH: 50 PATCH CUTANEOUS at 08:16

## 2021-01-01 RX ADMIN — DOCUSATE SODIUM 50 MG AND SENNOSIDES 8.6 MG 2 TABLET: 8.6; 5 TABLET, FILM COATED ORAL at 20:08

## 2021-01-01 RX ADMIN — ACETAMINOPHEN 500 MG: 500 TABLET, FILM COATED ORAL at 18:11

## 2021-01-01 RX ADMIN — ACETAMINOPHEN 500 MG: 500 TABLET, FILM COATED ORAL at 21:21

## 2021-01-01 RX ADMIN — SODIUM CHLORIDE, PRESERVATIVE FREE 10 ML: 5 INJECTION INTRAVENOUS at 02:17

## 2021-01-01 RX ADMIN — CARBIDOPA AND LEVODOPA 1 TABLET: 25; 100 TABLET ORAL at 08:37

## 2021-01-01 RX ADMIN — MEMANTINE 10 MG: 10 TABLET ORAL at 08:46

## 2021-01-01 RX ADMIN — ACETAMINOPHEN 500 MG: 500 TABLET, FILM COATED ORAL at 20:20

## 2021-01-01 RX ADMIN — ACETAMINOPHEN 500 MG: 500 TABLET, FILM COATED ORAL at 18:35

## 2021-01-01 RX ADMIN — CARBIDOPA AND LEVODOPA 1 TABLET: 25; 100 TABLET ORAL at 18:35

## 2021-01-01 RX ADMIN — LEVOTHYROXINE SODIUM 125 MCG: 125 TABLET ORAL at 05:27

## 2021-01-01 RX ADMIN — DICLOFENAC 4 G: 10 GEL TOPICAL at 13:36

## 2021-01-01 RX ADMIN — ESCITALOPRAM OXALATE 20 MG: 20 TABLET ORAL at 08:37

## 2021-01-01 RX ADMIN — DONEPEZIL HYDROCHLORIDE 10 MG: 10 TABLET, FILM COATED ORAL at 21:54

## 2021-01-01 RX ADMIN — GABAPENTIN 100 MG: 100 CAPSULE ORAL at 20:59

## 2021-01-01 RX ADMIN — DONEPEZIL HYDROCHLORIDE 10 MG: 10 TABLET, FILM COATED ORAL at 09:05

## 2021-01-01 RX ADMIN — CYANOCOBALAMIN TAB 1000 MCG 500 MCG: 1000 TAB at 08:14

## 2021-01-01 RX ADMIN — CARBIDOPA AND LEVODOPA 1 TABLET: 25; 100 TABLET ORAL at 21:16

## 2021-01-01 RX ADMIN — DICLOFENAC 4 G: 10 GEL TOPICAL at 08:39

## 2021-01-01 RX ADMIN — DONEPEZIL HYDROCHLORIDE 10 MG: 10 TABLET, FILM COATED ORAL at 20:00

## 2021-01-01 RX ADMIN — INSULIN LISPRO 4 UNITS: 100 INJECTION, SOLUTION INTRAVENOUS; SUBCUTANEOUS at 12:01

## 2021-01-01 RX ADMIN — CYANOCOBALAMIN 1000 MCG: 1000 INJECTION, SOLUTION INTRAMUSCULAR; SUBCUTANEOUS at 08:20

## 2021-01-01 RX ADMIN — ASPIRIN 81 MG: 81 TABLET, CHEWABLE ORAL at 08:46

## 2021-01-01 RX ADMIN — CYANOCOBALAMIN TAB 1000 MCG 500 MCG: 1000 TAB at 08:46

## 2021-01-01 RX ADMIN — ASPIRIN 81 MG: 81 TABLET, CHEWABLE ORAL at 08:47

## 2021-01-01 RX ADMIN — DICLOFENAC 4 G: 10 GEL TOPICAL at 15:32

## 2021-01-01 RX ADMIN — HYDROCODONE BITARTRATE AND ACETAMINOPHEN 0.5 TABLET: 5; 325 TABLET ORAL at 20:21

## 2021-01-01 RX ADMIN — ACETAMINOPHEN 500 MG: 500 TABLET, FILM COATED ORAL at 20:07

## 2021-01-01 RX ADMIN — MEMANTINE 10 MG: 10 TABLET ORAL at 08:08

## 2021-01-01 RX ADMIN — QUETIAPINE FUMARATE 50 MG: 25 TABLET ORAL at 20:08

## 2021-01-01 RX ADMIN — ACETAMINOPHEN 500 MG: 500 TABLET, FILM COATED ORAL at 12:00

## 2021-01-01 RX ADMIN — DOCUSATE SODIUM 50 MG AND SENNOSIDES 8.6 MG 2 TABLET: 8.6; 5 TABLET, FILM COATED ORAL at 20:59

## 2021-01-01 RX ADMIN — HYDROCODONE BITARTRATE AND ACETAMINOPHEN 0.5 TABLET: 5; 325 TABLET ORAL at 09:03

## 2021-01-01 RX ADMIN — Medication 325 MG: at 15:33

## 2021-01-01 RX ADMIN — ACETAMINOPHEN 500 MG: 500 TABLET, FILM COATED ORAL at 20:08

## 2021-01-01 RX ADMIN — CARBIDOPA AND LEVODOPA 1 TABLET: 25; 100 TABLET ORAL at 13:41

## 2021-01-01 RX ADMIN — MEMANTINE 10 MG: 10 TABLET ORAL at 08:38

## 2021-01-01 RX ADMIN — ESCITALOPRAM OXALATE 20 MG: 20 TABLET ORAL at 08:08

## 2021-01-01 RX ADMIN — GABAPENTIN 100 MG: 100 CAPSULE ORAL at 20:08

## 2021-01-01 RX ADMIN — SODIUM CHLORIDE, PRESERVATIVE FREE 10 ML: 5 INJECTION INTRAVENOUS at 20:07

## 2021-01-01 RX ADMIN — AMLODIPINE BESYLATE 2.5 MG: 2.5 TABLET ORAL at 09:06

## 2021-01-01 RX ADMIN — DONEPEZIL HYDROCHLORIDE 10 MG: 10 TABLET, FILM COATED ORAL at 09:54

## 2021-01-01 RX ADMIN — ACETAMINOPHEN 500 MG: 500 TABLET, FILM COATED ORAL at 21:54

## 2021-01-01 RX ADMIN — LEVOTHYROXINE SODIUM 125 MCG: 125 TABLET ORAL at 05:47

## 2021-01-01 RX ADMIN — HYDROCODONE BITARTRATE AND ACETAMINOPHEN 1 TABLET: 5; 325 TABLET ORAL at 05:35

## 2021-01-01 RX ADMIN — CARBIDOPA AND LEVODOPA 1 TABLET: 25; 100 TABLET ORAL at 08:20

## 2021-01-01 RX ADMIN — Medication 325 MG: at 08:38

## 2021-01-01 RX ADMIN — LEVOTHYROXINE SODIUM 125 MCG: 125 TABLET ORAL at 06:09

## 2021-01-01 RX ADMIN — CARBIDOPA AND LEVODOPA 1 TABLET: 25; 100 TABLET ORAL at 21:35

## 2021-01-01 RX ADMIN — DICLOFENAC 4 G: 10 GEL TOPICAL at 18:26

## 2021-01-01 RX ADMIN — ACETAMINOPHEN 500 MG: 500 TABLET, FILM COATED ORAL at 08:50

## 2021-01-01 RX ADMIN — ACETAMINOPHEN 500 MG: 500 TABLET, FILM COATED ORAL at 08:08

## 2021-01-01 RX ADMIN — DOCUSATE SODIUM 50 MG AND SENNOSIDES 8.6 MG 2 TABLET: 8.6; 5 TABLET, FILM COATED ORAL at 21:54

## 2021-01-01 RX ADMIN — ACETAMINOPHEN 500 MG: 500 TABLET, FILM COATED ORAL at 12:01

## 2021-01-01 RX ADMIN — ESCITALOPRAM OXALATE 20 MG: 20 TABLET ORAL at 08:50

## 2021-01-01 RX ADMIN — ESCITALOPRAM OXALATE 20 MG: 20 TABLET ORAL at 08:19

## 2021-01-01 RX ADMIN — DICLOFENAC 4 G: 10 GEL TOPICAL at 18:35

## 2021-01-01 RX ADMIN — ACETAMINOPHEN 500 MG: 500 TABLET, FILM COATED ORAL at 20:00

## 2021-01-01 RX ADMIN — CARBIDOPA AND LEVODOPA 1 TABLET: 25; 100 TABLET ORAL at 08:50

## 2021-01-01 RX ADMIN — CARBIDOPA AND LEVODOPA 1 TABLET: 25; 100 TABLET ORAL at 11:46

## 2021-01-01 RX ADMIN — ACETAMINOPHEN 500 MG: 500 TABLET, FILM COATED ORAL at 18:10

## 2021-01-01 RX ADMIN — ACETAMINOPHEN 500 MG: 500 TABLET, FILM COATED ORAL at 08:39

## 2021-01-01 RX ADMIN — ASPIRIN 81 MG: 81 TABLET, CHEWABLE ORAL at 09:54

## 2021-01-01 RX ADMIN — DICLOFENAC 4 G: 10 GEL TOPICAL at 18:31

## 2021-01-01 RX ADMIN — Medication 325 MG: at 08:46

## 2021-01-01 RX ADMIN — HYDROCODONE BITARTRATE AND ACETAMINOPHEN 0.5 TABLET: 5; 325 TABLET ORAL at 15:07

## 2021-01-01 RX ADMIN — MEMANTINE 10 MG: 10 TABLET ORAL at 09:55

## 2021-01-01 RX ADMIN — MEMANTINE 10 MG: 10 TABLET ORAL at 08:37

## 2021-01-01 RX ADMIN — AMLODIPINE BESYLATE 2.5 MG: 2.5 TABLET ORAL at 08:14

## 2021-01-01 RX ADMIN — CYANOCOBALAMIN 1000 MCG: 1000 INJECTION, SOLUTION INTRAMUSCULAR; SUBCUTANEOUS at 08:49

## 2021-01-01 RX ADMIN — DOCUSATE SODIUM 50 MG AND SENNOSIDES 8.6 MG 2 TABLET: 8.6; 5 TABLET, FILM COATED ORAL at 21:20

## 2021-01-01 RX ADMIN — OXYBUTYNIN CHLORIDE 5 MG: 5 TABLET, EXTENDED RELEASE ORAL at 08:16

## 2021-01-01 RX ADMIN — DICLOFENAC 4 G: 10 GEL TOPICAL at 12:47

## 2021-01-01 RX ADMIN — GABAPENTIN 100 MG: 100 CAPSULE ORAL at 21:20

## 2021-01-01 RX ADMIN — CARBIDOPA AND LEVODOPA 1 TABLET: 25; 100 TABLET ORAL at 17:57

## 2021-01-01 RX ADMIN — ACETAMINOPHEN 500 MG: 500 TABLET, FILM COATED ORAL at 13:14

## 2021-01-01 RX ADMIN — DICLOFENAC 4 G: 10 GEL TOPICAL at 17:00

## 2021-01-01 RX ADMIN — OXYBUTYNIN CHLORIDE 5 MG: 5 TABLET, EXTENDED RELEASE ORAL at 08:47

## 2021-01-01 RX ADMIN — LEVOTHYROXINE SODIUM 125 MCG: 125 TABLET ORAL at 05:18

## 2021-01-01 RX ADMIN — INSULIN LISPRO 2 UNITS: 100 INJECTION, SOLUTION INTRAVENOUS; SUBCUTANEOUS at 12:25

## 2021-01-01 RX ADMIN — AMLODIPINE BESYLATE 2.5 MG: 2.5 TABLET ORAL at 08:46

## 2021-01-01 RX ADMIN — ACETAMINOPHEN 500 MG: 500 TABLET, FILM COATED ORAL at 12:29

## 2021-01-01 RX ADMIN — HYDROCODONE BITARTRATE AND ACETAMINOPHEN 0.5 TABLET: 5; 325 TABLET ORAL at 16:59

## 2021-01-01 RX ADMIN — DICLOFENAC 4 G: 10 GEL TOPICAL at 20:00

## 2021-01-01 RX ADMIN — AMLODIPINE BESYLATE 2.5 MG: 2.5 TABLET ORAL at 08:37

## 2021-01-01 RX ADMIN — OXYBUTYNIN CHLORIDE 5 MG: 5 TABLET, EXTENDED RELEASE ORAL at 08:14

## 2021-01-01 RX ADMIN — DONEPEZIL HYDROCHLORIDE 10 MG: 10 TABLET, FILM COATED ORAL at 21:09

## 2021-01-01 RX ADMIN — ACETAMINOPHEN 500 MG: 500 TABLET, FILM COATED ORAL at 12:07

## 2021-01-01 RX ADMIN — ACETAMINOPHEN 500 MG: 500 TABLET, FILM COATED ORAL at 21:09

## 2021-01-01 RX ADMIN — ESCITALOPRAM OXALATE 20 MG: 20 TABLET ORAL at 08:46

## 2021-01-01 RX ADMIN — ATORVASTATIN CALCIUM 80 MG: 40 TABLET, FILM COATED ORAL at 20:00

## 2021-01-01 RX ADMIN — QUETIAPINE FUMARATE 50 MG: 25 TABLET ORAL at 20:20

## 2021-01-01 RX ADMIN — OXYBUTYNIN CHLORIDE 5 MG: 5 TABLET, EXTENDED RELEASE ORAL at 11:45

## 2021-01-01 RX ADMIN — CYANOCOBALAMIN TAB 1000 MCG 500 MCG: 1000 TAB at 08:20

## 2021-01-01 RX ADMIN — CARBIDOPA AND LEVODOPA 1 TABLET: 25; 100 TABLET ORAL at 18:04

## 2021-01-04 NOTE — TELEPHONE ENCOUNTER
Good Morning, My name is SHALONDA Carson and I am going to be helping Sarai Mohan PA-C in the memory clinic moving forward. I have been working and training with Dr. Hamm just before he left. I have reviewed the most recent visit. I am happy to increase the quetiapine (seroquel)-I will send this to [facility]. My recommendation would be to start with 25mg 1.5 tablets by mouth every night for 2 weeks and if this does not improve the agitation and sleep issues, then I would increase this to 2 tablets by mouth at night. Please let us know if you have any additional concerns before your follow up. Thanks, SHALONDA Carson

## 2021-01-04 NOTE — TELEPHONE ENCOUNTER
----- Message from Faye Rod sent at 1/4/2021  7:30 AM EST -----  Regarding: Prescription Question  Contact: [phone]  Good morning,       I wanted to see if we might be able to increase the dosage on Faye's Quetiapine prescription? It did seem to be helping her, but within the past week or two, it seems to have become less effective. She is having more episodes of daytime anxiousness and is having more trouble sleeping again. It would need to be sent to her mail order [facility].  I believe it was Sarai Mohan that initially prescribed it, but I see that she won't be back for several weeks and I really don't think it can wait that long. Faye's next appointment isn't until April.     Thank you,  Shila Floyd

## 2021-01-14 NOTE — TELEPHONE ENCOUNTER
Pts daugher wanted verification if Pt was still needing to come in fasting for blood work tomorrow morning since her appointment got rescheduled and that was originally what  had initiated. Please advise

## 2021-01-15 NOTE — TELEPHONE ENCOUNTER
Faye Rod [phone]  Spoke to pt's daughter advised of clinical message. Daughter is requesting if the labs can be ordered today, so the pt can come in to have the labs drawn prior to her visit since it had to be rescheduled, and pt's daughter is concerned that she wouldn't be able to fast that long since the appointment is going to be at 3:15. Advised I can send a message to the covering provider, but there's no guarantee that those labs will be ordered since the covering provider's never seen the pt. Advised I will send the message and return call to confirm if those labs can be ordered. Good verbal understanding.     Please advise?

## 2021-01-15 NOTE — TELEPHONE ENCOUNTER
I do not mind to order labs, but am unsure which labs, other than lipid panel and A1C.  Dr. Luciano mentioned in her last message that she needed other labs as well.

## 2021-01-22 NOTE — PROGRESS NOTES
QUICK REFERENCE INFORMATION:  The ABCs of the Annual Wellness Visit    Medicare Subsequent Wellness Visit    Chief Complaint   Patient presents with   • Medicare Wellness-subsequent      Here with daughter who provides most of history due to patient's underlying dementia.    HPI     Faye Rod is a 75 y.o. female presenting for [facility] [place], [place] and:    1. HTN:  On amlodipine 2.5mg daily.  Does not check BP at home.  Denies any headache, chest pain, shortness of breath, PND, lower extremity edema.     2. HLD:  On lipitor 20mg daily and asa 81mg daily.  Fasting labs pending from this morning.  Lab Results   Component Value Date    CHOL 139 12/27/2019    TRIG 94 12/27/2019    HDL 57 12/27/2019    LDL 63 12/27/2019     3. DM2:  On Lantus on 12 units qHS.  Blood sugars: 70-80's.  Checks blood sugars 3-4 times per week.  CMP:  Lab Results   Component Value Date    BUN 33 (H) 09/11/2020    CREATININE 1.26 (H) 09/11/2020    EGFRIFNONA 41 (L) 09/11/2020    BCR 26.2 (H) 09/11/2020     09/11/2020    K 4.8 09/11/2020    CO2 26.0 09/11/2020    CALCIUM 9.3 09/11/2020    PROTENTOTREF 6.6 05/18/2017    ALBUMIN 4.30 09/11/2020    LABGLOBREF 3.0 05/18/2017    LABIL2 1.2 05/18/2017    BILITOT 0.3 08/17/2020    ALKPHOS 71 08/17/2020    AST 20 08/17/2020    ALT 10 08/17/2020     HbA1c:  Lab Results   Component Value Date    HGBA1C 7.09 (H) 05/22/2020    HGBA1C 7.07 (H) 12/27/2019     Microalbumin:  Lab Results   Component Value Date    MICROALBUR 12.5 05/10/2018     Last eye exam: 11/9/18--overdue.  Requests referral today.  Last foot exam: Performed today 1/29/2021; difficult to perform monofilament test secondary to patient's dementia.  See scanned form.  Last microalbumin: 6/28/19; unable to void today.  Will need with next visit.     4. Hypothyroidism:  On levothyroxine 125 mcg daily 7d per week .  Labs pending from this morning.  TSH   Date Value Ref Range Status   05/22/2020 1.120 0.270 - 4.200 uIU/mL Final     Comment:     TSH  "results may be falsely decreased if patient taking Biotin.     Free T4   Date Value Ref Range Status   05/22/2020 1.36 0.93 - 1.70 ng/dL Final     5. Dementia/depression:  On [id number] BID, sinemet TID, aricept 10mg BID, lexapro 20mg daily, seroquel 25mg qHS. Followed by neuro, April 2021 appointment with Dr. Hamm canceled and has not been rescheduled with another provider.  Requires daughter's assistance for bathing.  Lives with daughter.     6. Osteoporosis/osteoarthritis:  On Vitamin D 50k units weekly, Boniva 150mg every 30 days.  Has been on this for several years.  Complains of worsening neck and back pain, daughter reports pt c/o \"dying 2/2 pain.\"  Does sit with slightly slumped posture at baseline.  Takes 2 extra strength tylenol BID and not helping. Can't take NSAIDs 2/2 CKD 3-4.   Last DEXA 2017,  Overdue.  Nephrology had suggested tramadol for additional pain control.  Has tried and failed gabapentin in the past.    PDMP reviewed 1/29/2021   UDS 1/29/21  CSA 1/29/21    7. Chronic diastolic CHF/AV block s/p PPM:  Follows with Dr. Vang.  Stable. Next appt 3/26/21     8. CKD 3:  Follows with nephrology.  Does avoid NSAIDs. Nephrology appt 6 mo, 6/18/21     9.  Overactive bladder:  Managed with Myrbetriq ER 25mg daily.    Past Medical History:   Diagnosis Date   • Anemia    • Arthritis    • Colon polyp    • Diabetes mellitus (CMS/HCC)    • Disease of thyroid gland    • Heart disease    • Heart valve disease    • Hyperlipidemia    • Hypertension    • Hypothyroidism    • Memory loss    • Pacemaker    • Shortness of breath 2/3/2017   • Sick sinus syndrome (CMS/HCC)       Past Surgical History:   Procedure Laterality Date   • CATARACT EXTRACTION Bilateral    • CHOLECYSTECTOMY     • PACEMAKER IMPLANTATION       Family History   Problem Relation Age of Onset   • Mental illness Mother    • Stroke Mother    • Arthritis Father    • Diabetes Father    • Heart attack Father    • Kidney disease Brother    • Thyroid " disease Brother    • Hypertension Daughter    • Obesity Daughter       Social History     Socioeconomic History   • Marital status:      Spouse name: Not on file   • Number of children: Not on file   • Years of education: Not on file   • Highest education level: Not on file   Tobacco Use   • Smoking status: Never Smoker   • Smokeless tobacco: Never Used   Substance and Sexual Activity   • Alcohol use: No   • Drug use: No   • Sexual activity: Defer      No Known Allergies   Outpatient Medications Prior to Visit   Medication Sig Dispense Refill   • acetaminophen (TYLENOL) 650 MG 8 hr tablet Take 650 mg by mouth 2 (Two) Times a Day As Needed.     • acetaminophen-codeine (TYLENOL #3) 300-30 MG per tablet Take 1 tablet by mouth Every 4 (Four) Hours As Needed for Moderate Pain . 15 tablet 0   • amLODIPine (NORVASC) 2.5 MG tablet Take 1 tablet by mouth Daily. 90 tablet 3   • aspirin 81 MG EC tablet Take 81 mg by mouth Daily.     • atorvastatin (LIPITOR) 20 MG tablet TAKE 1 TABLET BY MOUTH  DAILY 90 tablet 3   • carbidopa-levodopa (SINEMET)  MG per tablet Take 1 tablet by mouth 3 (Three) Times a Day. 270 tablet 3   • Cholecalciferol (VITAMIN D3) 1.25 MG (38805 UT) tablet Take 1 tablet by mouth 1 (One) Time Per Week. 4 tablet 1   • donepezil (ARICEPT) 10 MG tablet Take 1 tablet by mouth 2 (Two) Times a Day. 180 tablet 3   • escitalopram (LEXAPRO) 20 MG tablet Take 1 tablet by mouth Daily. 90 tablet 3   • Fe Fum-FePoly-Vit C-Vit B3 (INTEGRA) 62.5-62.5-40-3 MG capsule TAKE ONE CAPSULE BY MOUTH DAILY 30 capsule 0   • glucose blood (MADDY CONTOUR TEST) test strip 1-2 times daily Use as instructed 50 each 12   • glucose blood (CONTOUR NEXT TEST) test strip Use to test once or twice a day as directed 100 each 11   • Insulin Pen Needle (B-D UF III MINI PEN NEEDLES) 31G X 5 MM misc USE WITH LANTUS INSULIN 90 each 3   • levothyroxine (SYNTHROID, LEVOTHROID) 125 MCG tablet TAKE 1 TABLET BY MOUTH  DAILY 90 tablet 3   •  Loratadine 10 MG capsule Take 1 capsule by mouth As Needed (allergy).     • memantine (NAMENDA) 10 MG tablet TAKE 1 TABLET BY MOUTH  TWICE DAILY 180 tablet 3   • Myrbetriq 25 MG tablet sustained-release 24 hour 24 hr tablet TAKE 1 TABLET BY MOUTH  DAILY 90 tablet 3   • ondansetron ODT (ZOFRAN-ODT) 4 MG disintegrating tablet Place 1 tablet on the tongue 4 (Four) Times a Day As Needed for Nausea or Vomiting. 15 tablet 0   • QUEtiapine (SEROquel) 25 MG tablet Take 1.5 tabs PO qhs x 2 wks then may increase to 2 tabs PO QHS and continue 180 tablet 1   • ibandronate (BONIVA) 150 MG tablet Take 1 tablet by mouth Every 30 (Thirty) Days. 3 tablet 1   • Insulin Glargine (Lantus SoloStar) 100 UNIT/ML injection pen INJECT SUBCUTANEOUSLY 32  UNITS NIGHTLY 30 mL 6     No facility-administered medications prior to visit.        Reviewed use of high risk medication in the elderly: yes  Reviewed for potential of harmful drug interactions in the elderly: yes    The following portions of the patient's history were reviewed and updated as appropriate: allergies, current medications, past family history, past medical history, past social history, past surgical history and problem list.    Review of Systems   Constitutional: Negative for activity change, appetite change and fever.   HENT: Negative for congestion, sneezing and sore throat.    Eyes: Negative for discharge and visual disturbance.   Respiratory: Negative for cough and shortness of breath.    Cardiovascular: Negative for chest pain and palpitations.   Gastrointestinal: Negative for abdominal distention, abdominal pain, blood in stool, constipation, nausea and vomiting.   Endocrine: Negative for cold intolerance and heat intolerance.   Genitourinary: Negative for dysuria  Musculoskeletal: Positive for neck pain and back pain (worsening and disruptive).  Skin: Negative for color change.  Allergic/Immunologic: Negative for environmental allergies and food allergies.  "  Neurological: Positive for tremors (LUE, chronic) and memory problem (Chronic, dementia). Negative for headache.   Hematological: Negative for adenopathy.   Psychiatric/Behavioral: Negative for depressed mood.     Vitals:    01/29/21 1507   BP: 104/58   BP Location: Right arm   Patient Position: Sitting   Cuff Size: Adult   Pulse: 66   Resp: 18   Temp: 97.7 °F (36.5 °C)   TempSrc: Temporal   SpO2: 97%   Weight: 74.8 kg (165 lb)   Height: 160 cm (63\")   PainSc:   8   PainLoc: Head       Objective    Physical Exam  Feet:      Right foot:      Skin integrity: Callus and dry skin present. No skin breakdown or warmth.      Left foot:      Skin integrity: Callus and dry skin present. No skin breakdown or warmth.        Diabetic foot exam:   Left: Filament test Borderline exam, difficult to perform secondary to dementia   Pulses Dorsalis Pedis:  present       Right: Filament test Borderline exam, difficult to perform secondary to dementia   Pulses Dorsalis Pedis:  present      Constitutional: She appears well-developed and well-nourished. No distress.   HENT:   Head: Normocephalic and atraumatic.   Right Ear: External ear normal.   Left Ear: External ear normal.   Eyes: Right eye exhibits no discharge. Left eye exhibits no discharge.   Neck: Normal range of motion. Neck supple.   Cardiovascular: Normal rate, regular rhythm and normal heart sounds. Exam reveals no gallop and no friction rub.   No murmur heard.  Pulmonary/Chest: Effort normal and breath sounds normal. No stridor. No respiratory distress. She has no wheezes. She has no rales.   Abdominal: Soft. Bowel sounds are normal. She exhibits no distension and no mass. There is no tenderness. There is no rebound and no guarding.   Musculoskeletal:   Trace pitting edema to b/l ankles   Lymphadenopathy:     She has no cervical adenopathy.   Neurological: She is alert.   +slight contracture to LUE with resting tremor, at baseline per pt/daughter   Skin: Skin is warm and " dry. Capillary refill takes less than 2 seconds. She is not diaphoretic.   Vitals reviewed.    Finger Rub Hearing{Test (right ear):borderline  Finger Rub Hearing{Test (left ear):borderline      HEALTH RISK ASSESSMENT    1945    Recent Hospitalizations:  No hospitalization(s) within the last year..      Current Medical Providers:  Patient Care Team:  Brenna Luciano MD as PCP - General (Internal Medicine)  Faisal Vang MD as Consulting Physician (Cardiology)      Smoking Status:  Social History     Tobacco Use   Smoking Status Never Smoker   Smokeless Tobacco Never Used       Alcohol Consumption:  Social History     Substance and Sexual Activity   Alcohol Use No       Depression Screen:   PHQ-2/PHQ-9 Depression Screening 1/29/2021   Little interest or pleasure in doing things 0   Feeling down, depressed, or hopeless 0   Trouble falling or staying asleep, or sleeping too much -   Feeling tired or having little energy -   Poor appetite or overeating -   Feeling bad about yourself - or that you are a failure or have let yourself or your family down -   Trouble concentrating on things, such as reading the newspaper or watching television -   Moving or speaking so slowly that other people could have noticed. Or the opposite - being so fidgety or restless that you have been moving around a lot more than usual -   Thoughts that you would be better off dead, or of hurting yourself in some way -   Total Score 0   If you checked off any problems, how difficult have these problems made it for you to do your work, take care of things at home, or get along with other people? -       Health Habits and Functional and Cognitive Screening:  Functional & Cognitive Status 1/29/2021   Do you have difficulty preparing food and eating? Yes   Do you have difficulty bathing yourself, getting dressed or grooming yourself? Yes   Do you have difficulty using the toilet? Yes   Do you have difficulty moving around from place to  place? Yes   Do you have trouble with steps or getting out of a bed or a chair? Yes   Current Diet Well Balanced Diet   Dental Exam Up to date   Eye Exam Not up to date   Exercise (times per week) 0 times per week   Current Exercise Activities Include None   Do you need help using the phone?  Yes   Are you deaf or do you have serious difficulty hearing?  No   Do you need help with transportation? Yes   Do you need help shopping? Yes   Do you need help preparing meals?  Yes   Do you need help with housework?  Yes   Do you need help with laundry? Yes   Do you need help taking your medications? Yes   Do you need help managing money? Yes   Do you ever drive or ride in a car without wearing a seat belt? Yes   Have you felt unusual stress, anger or loneliness in the last month? No   Who do you live with? Child   If you need help, do you have trouble finding someone available to you? No   Have you been bothered in the last four weeks by sexual problems? No   Do you have difficulty concentrating, remembering or making decisions? Yes           Does the patient have evidence of cognitive impairment? No    Aspirin use counseling? Taking ASA appropriately as indicated      Recent Lab Results:  CMP:  Lab Results   Component Value Date    BUN 33 (H) 09/11/2020    CREATININE 1.26 (H) 09/11/2020    EGFRIFNONA 41 (L) 09/11/2020    BCR 26.2 (H) 09/11/2020     09/11/2020    K 4.8 09/11/2020    CO2 26.0 09/11/2020    CALCIUM 9.3 09/11/2020    PROTENTOTREF 6.6 05/18/2017    ALBUMIN 4.30 09/11/2020    LABGLOBREF 3.0 05/18/2017    LABIL2 1.2 05/18/2017    BILITOT 0.3 08/17/2020    ALKPHOS 71 08/17/2020    AST 20 08/17/2020    ALT 10 08/17/2020     Lipid Panel:  Lab Results   Component Value Date    CHOL 139 12/27/2019    TRIG 94 12/27/2019    HDL 57 12/27/2019    VLDL 18.8 12/27/2019    LDLHDL 1.11 12/27/2019     HbA1c:  Lab Results   Component Value Date    HGBA1C 7.09 (H) 05/22/2020       Visual Acuity:  No exam data  present    Age-appropriate Screening Schedule:  Refer to the list below for future screening recommendations based on patient's age, sex and/or medical conditions. Orders for these recommended tests are listed in the plan section. The patient has been provided with a written plan.    Health Maintenance   Topic Date Due   • DXA SCAN  07/06/2019   • DIABETIC EYE EXAM  11/09/2019   • HEMOGLOBIN A1C  11/22/2020   • LIPID PANEL  12/27/2020   • URINE MICROALBUMIN  02/16/2021 (Originally 6/28/2020)   • ZOSTER VACCINE (1 of 2) 02/07/2022 (Originally 9/12/1994)   • TDAP/TD VACCINES (2 - Td) 12/15/2027   • INFLUENZA VACCINE  Completed   • MAMMOGRAM  Discontinued   • COLONOSCOPY  Discontinued          Advance Care Planning:  ACP discussion was held with the patient during this visit. Patient has an advance directive (not in EMR), copy requested.    Identification of Risk Factors:  Risk factors include: Advance Directive Discussion  Breast Cancer/Mammogram Screening  Cardiovascular risk  Chronic Pain   Colon Cancer Screening  Dementia/Memory   Depression/Dysphoria  Diabetic Lab Screening   Fall Risk  Glaucoma Risk  Hearing Problem  Immunizations Discussed/Encouraged (specific immunizations; adacel Tdap, Influenza, Pneumococcal 23 and Shingrix )  Inadequate Social Support, Isolation, Loneliness, Lack of Transportation, Financial Difficulties, or Caregiver Stress   Inactivity/Sedentary  Lung Cancer Risk  Obesity/Overweight   Osteoprorosis Risk  Polypharmacy  Urinary Incontinence.    Compared to one year ago, the patient feels her physical health is worse.  Compared to one year ago, the patient feels her mental health is the same.      Essential hypertension  BP is low normal today.  Have recommended home BP log over the next 1 to 2 weeks and if BP is staying persistently less than 110/80, would discontinue amlodipine altogether due to risk of hypotension causing falls, lightheadedness, hypoperfusion etc.  CMP pending from  today.    Mixed hyperlipidemia  Stable on Lipitor and aspirin.  FLP pending from today.    Sick sinus syndrome (CMS/HCC)  Status post PPM.  Chronic diastolic CHF, stable.  Cardiology follow-up as scheduled.    Hypothyroidism, adult  TSH and free T4 pending from today and will adjust levothyroxine if needed.    Type 2 diabetes mellitus (CMS/HCC)  Stable.  Continue Lantus as above.  A1c pending from today.  Needs microalbumin with next visit (unable to void today at time of visit).  Foot exam performed today but limited due to patient's dementia.  Referral placed for diabetic eye exam.    CKD (chronic kidney disease) stage 3, GFR 30-59 ml/min (CMS/HCC)  Stable.  Follow-up with nephrology as scheduled.  Avoid nephrotoxic meds.  CMP pending from today.    Overactive bladder  Stable on Mybetriq. Continue.    Osteoporosis  Stable on Boniva and vitamin D.  Continue for now pending updated DEXA scan which was ordered.  Has been on Boniva for several years so may consider discontinuing temporarily at least due to risk of atypical long bone fracture with prolonged use greater than 5 years.    Primary osteoarthritis involving multiple joints  Worsening and disruptive to quality of life.  Has tried and failed Tylenol, topical agents, heat.  Due to CKD cannot take NSAIDs.  Has tried and failed gabapentin before.  Discussed low-dose tramadol and reviewed potential side effects including dizziness, respiratory depression, inadvertent overdose, increased fall risk etc.  Daughter who is healthcare proxy aware and would like to try this.  Cheek swab drug screen performed today, CSA completed and PDMP reviewed.  Tramadol 50 mg tablets; take 0.5-1 tabs p.o. twice daily as needed sent via [facility].  Daughter will call if not improving so we can discuss further dose adjustments provided no excess sedation.    The patient is taking the following controlled substance(s) on a long term (greater than three months) basis: Tramadol.  She is taking  controlled substances for chronic pain.  A history was obtained from the patient and the following were reviewed: family history, social history, psychiatric history, and substance abuse history.  A baseline assessment was performed and includes a controlled substance agreement, urine drug screen, JOHN (within 12 months prior to today's encounter), and a physical exam, if appropriate, was performed within the past year.  It is medically appropriate to prescribe her the medication(s) above.  If applicable, prior treatment records were obtained and reviewed to justify long term prescribing of controlled substances.  The patient was educated on the following: Limited use of the medication, need to discontinue the medication when her condition resolves, proper storage and disposal of medication(s), and risks and dangers associated with controlled substances including tolerance, dependence, and addiction.  A written informed consent was obtained and includes objectives of treatment, further diagnostic testing if appropriate, and an exit strategy for discontinuing the medication on the condition resolves, if appropriate.  In addition, if appropriate, the patient will be screened for other medical conditions that may impact the prescribing of controlled substances.  Previous treatments have been tried including trials of noncontrolled substances or lower doses of controlled substances.  The controlled medication(s) have been titrated to the level appropriate and necessary and the medication(s) are not causing unacceptable side effects.        Lewy body dementia with behavioral disturbance (CMS/HCC)  Stable on Namenda, Aricept, Sinemet, Lexapro per neurology.  Needs to call for follow-up and will let us know if they need assistance with this.  Good family support.            Patient Self-Management and Personalized Health Advice  The patient has been provided with information about: diet, exercise, weight management,  prevention of cardiac or vascular disease, the relationship between weight and GERD, tobacco cessation, alcohol use, fall prevention, designing advance directives, supplements and mental health concerns and preventive services including:     Tdap: Up-to-date, not covered further unless secondary to trauma  Flu: UTD  Shingrix: Needs at pharmacy but would try to get Covid vaccine first given current pandemic.    Immunization History   Administered Date(s) Administered   • Fluzone High Dose =>65 Years ([employer] ONLY) 09/08/2017, 10/26/2018   • Influenza Whole 09/20/2012, 09/25/2013, 10/08/2014, 11/19/2015   • Pneumococcal Conjugate 13-Valent (PCV13) 11/19/2015, 02/02/2016   • Pneumococcal Polysaccharide (PPSV23) 11/04/2009   • Tdap 12/15/2017       Colorectal Screening:  Aged out  Pap:  Aged out  Mammogram: Declines further screening after discussion of risks/benefits (I.e. missed cancer).  PSA(Over age 50):  N/A  US Aorta (For male smokers, age 65):  N/A  CT for Smoker (Age 55-75, 30pk yr):  N/A  Bone Density/DEXA:  7/6/17; osteoporosis left hip, ostetopenia right hip.  Agrees to order today.  Hep C:  Needs with next lab draw    Follow Up:  Return for 3-4 months 30 min re-establish, As needed if no improvement or new symptoms.     An After Visit Summary and PPPS with all of these plans were given to the patient.      Brenna Luciano MD  1/29/2021

## 2021-01-29 PROBLEM — D63.1 ANEMIA IN STAGE 4 CHRONIC KIDNEY DISEASE (HCC): Status: RESOLVED | Noted: 2019-12-20 | Resolved: 2021-01-01

## 2021-01-29 PROBLEM — N39.0 ACUTE UTI: Status: RESOLVED | Noted: 2020-05-08 | Resolved: 2021-01-01

## 2021-01-29 PROBLEM — Z95.0 PACEMAKER: Status: RESOLVED | Noted: 2017-02-03 | Resolved: 2021-01-01

## 2021-01-29 PROBLEM — N18.4 ANEMIA IN STAGE 4 CHRONIC KIDNEY DISEASE (HCC): Status: RESOLVED | Noted: 2019-12-20 | Resolved: 2021-01-01

## 2021-01-29 NOTE — ASSESSMENT & PLAN NOTE
Stable.  Follow-up with nephrology as scheduled.  Avoid nephrotoxic meds.  CMP pending from today.

## 2021-01-29 NOTE — PATIENT INSTRUCTIONS
Medicare Wellness  Personal Prevention Plan of Service     Date of Office Visit:  2021  Encounter Provider:  Brenna Luciano MD  Place of Service:  [facility]  Patient Name: Faye Rod  :  1945    As part of the Medicare Wellness portion of your visit today, we are providing you with this personalized preventive plan of services (PPPS). This plan is based upon recommendations of the [place] Preventive Services Task Force (USPSTF) and the Advisory Committee on Immunization Practices (ACIP).    This lists the preventive care services that should be considered, and provides dates of when you are due. Items listed as completed are up-to-date and do not require any further intervention.    Health Maintenance   Topic Date Due   • DXA SCAN  2019   • DIABETIC EYE EXAM  2019   • ANNUAL WELLNESS VISIT  2020   • HEMOGLOBIN A1C  2020   • LIPID PANEL  2020   • HEPATITIS C SCREENING  2021 (Originally 2/15/2017)   • URINE MICROALBUMIN  2021 (Originally 2020)   • ZOSTER VACCINE (1 of 2) 2022 (Originally 1994)   • TDAP/TD VACCINES (2 - Td) 12/15/2027   • INFLUENZA VACCINE  Completed   • Pneumococcal Vaccine 65+  Completed   • MENINGOCOCCAL VACCINE  Aged Out   • MAMMOGRAM  Discontinued   • COLONOSCOPY  Discontinued       Orders Placed This Encounter   Procedures   • DEXA Bone Density Axial     Standing Status:   Future     Standing Expiration Date:   2022     Order Specific Question:   Reason for Exam:     Answer:   osteoporosis/osteopenia   • Urine Drug Screen - Urine, Clean Catch     Standing Status:   Future   • Ambulatory Referral to Ophthalmology     Referral Priority:   Routine     Referral Type:   Consultation     Referral Reason:   Specialty Services Required     Requested Specialty:   Ophthalmology     Number of Visits Requested:   1       No follow-ups on file.      Preventive  Care 65 Years and Older, Female  Preventive care refers to lifestyle choices and visits with your health care provider that can promote health and wellness. This includes:  · A yearly physical exam. This is also called an annual well check.  · Regular dental and eye exams.  · Immunizations.  · Screening for certain conditions.  · Healthy lifestyle choices, such as diet and exercise.  What can I expect for my preventive care visit?  Physical exam  Your health care provider will check:  · Height and weight. These may be used to calculate body mass index (BMI), which is a measurement that tells if you are at a healthy weight.  · Heart rate and blood pressure.  · Your skin for abnormal spots.  Counseling  Your health care provider may ask you questions about:  · Alcohol, tobacco, and drug use.  · Emotional well-being.  · Home and relationship well-being.  · Sexual activity.  · Eating habits.  · History of falls.  · Memory and ability to understand (cognition).  · Work and work environment.  · Pregnancy and menstrual history.  What immunizations do I need?    Influenza (flu) vaccine  · This is recommended every year.  Tetanus, diphtheria, and pertussis (Tdap) vaccine  · You may need a Td booster every 10 years.  Varicella (chickenpox) vaccine  · You may need this vaccine if you have not already been vaccinated.  Zoster (shingles) vaccine  · You may need this after age 60.  Pneumococcal conjugate (PCV13) vaccine  · One dose is recommended after age 65.  Pneumococcal polysaccharide (PPSV23) vaccine  · One dose is recommended after age 65.  Measles, mumps, and rubella (MMR) vaccine  · You may need at least one dose of MMR if you were born in 1957 or later. You may also need a second dose.  Meningococcal conjugate (MenACWY) vaccine  · You may need this if you have certain conditions.  Hepatitis A vaccine  · You may need this if you have certain conditions or if you travel or work in places where you may be exposed to  hepatitis A.  Hepatitis B vaccine  · You may need this if you have certain conditions or if you travel or work in places where you may be exposed to hepatitis B.  Haemophilus influenzae type b (Hib) vaccine  · You may need this if you have certain conditions.  You may receive vaccines as individual doses or as more than one vaccine together in one shot (combination vaccines). Talk with your health care provider about the risks and benefits of combination vaccines.  What tests do I need?  Blood tests  · Lipid and cholesterol levels. These may be checked every 5 years, or more frequently depending on your overall health.  · Hepatitis C test.  · Hepatitis B test.  Screening  · Lung cancer screening. You may have this screening every year starting at age 55 if you have a 30-pack-year history of smoking and currently smoke or have quit within the past 15 years.  · Colorectal cancer screening. All adults should have this screening starting at age 50 and continuing until age 75. Your health care provider may recommend screening at age 45 if you are at increased risk. You will have tests every 1-10 years, depending on your results and the type of screening test.  · Diabetes screening. This is done by checking your blood sugar (glucose) after you have not eaten for a while (fasting). You may have this done every 1-3 years.  · Mammogram. This may be done every 1-2 years. Talk with your health care provider about how often you should have regular mammograms.  · BRCA-related cancer screening. This may be done if you have a family history of breast, ovarian, tubal, or peritoneal cancers.  Other tests  · Sexually transmitted disease (STD) testing.  · Bone density scan. This is done to screen for osteoporosis. You may have this done starting at age 65.  Follow these instructions at home:  Eating and drinking  · Eat a diet that includes fresh fruits and vegetables, whole grains, lean protein, and low-fat dairy products. Limit your  intake of foods with high amounts of sugar, saturated fats, and salt.  · Take vitamin and mineral supplements as recommended by your health care provider.  · Do not drink alcohol if your health care provider tells you not to drink.  · If you drink alcohol:  ? Limit how much you have to 0-1 drink a day.  ? Be aware of how much alcohol is in your drink. In the [place] one drink equals one 12 oz bottle of beer (355 mL), one 5 oz glass of wine (148 mL), or one 1½ oz glass of hard liquor (44 mL).  Lifestyle  · Take daily care of your teeth and gums.  · Stay active. Exercise for at least 30 minutes on 5 or more days each week.  · Do not use any products that contain nicotine or tobacco, such as cigarettes, e-cigarettes, and chewing tobacco. If you need help quitting, ask your health care provider.  · If you are sexually active, practice safe sex. Use a condom or other form of protection in order to prevent STIs (sexually transmitted infections).  · Talk with your health care provider about taking a low-dose aspirin or statin.  What's next?  · Go to your health care provider once a year for a well check visit.  · Ask your health care provider how often you should have your eyes and teeth checked.  · Stay up to date on all vaccines.  This information is not intended to replace advice given to you by your health care provider. Make sure you discuss any questions you have with your health care provider.  Document Revised: 12/12/2019 Document Reviewed: 12/12/2019  Elsevier Patient Education © 2020 Elsevier Inc.

## 2021-01-29 NOTE — ASSESSMENT & PLAN NOTE
Worsening and disruptive to quality of life.  Has tried and failed Tylenol, topical agents, heat.  Due to CKD cannot take NSAIDs.  Has tried and failed gabapentin before.  Discussed low-dose tramadol and reviewed potential side effects including dizziness, respiratory depression, inadvertent overdose, increased fall risk etc.  Daughter who is healthcare proxy aware and would like to try this.  Cheek swab drug screen performed today, CSA completed and PDMP reviewed.  Tramadol 50 mg tablets; take 0.5-1 tabs p.o. twice daily as needed sent via [facility].  Daughter will call if not improving so we can discuss further dose adjustments provided no excess sedation.    The patient is taking the following controlled substance(s) on a long term (greater than three months) basis: Tramadol.  She is taking controlled substances for chronic pain.  A history was obtained from the patient and the following were reviewed: family history, social history, psychiatric history, and substance abuse history.  A baseline assessment was performed and includes a controlled substance agreement, urine drug screen, JOHN (within 12 months prior to today's encounter), and a physical exam, if appropriate, was performed within the past year.  It is medically appropriate to prescribe her the medication(s) above.  If applicable, prior treatment records were obtained and reviewed to justify long term prescribing of controlled substances.  The patient was educated on the following: Limited use of the medication, need to discontinue the medication when her condition resolves, proper storage and disposal of medication(s), and risks and dangers associated with controlled substances including tolerance, dependence, and addiction.  A written informed consent was obtained and includes objectives of treatment, further diagnostic testing if appropriate, and an exit strategy for discontinuing the medication on the condition resolves, if appropriate.  In addition,  if appropriate, the patient will be screened for other medical conditions that may impact the prescribing of controlled substances.  Previous treatments have been tried including trials of noncontrolled substances or lower doses of controlled substances.  The controlled medication(s) have been titrated to the level appropriate and necessary and the medication(s) are not causing unacceptable side effects.

## 2021-01-29 NOTE — ASSESSMENT & PLAN NOTE
Stable on Boniva and vitamin D.  Continue for now pending updated DEXA scan which was ordered.  Has been on Boniva for several years so may consider discontinuing temporarily at least due to risk of atypical long bone fracture with prolonged use greater than 5 years.

## 2021-01-29 NOTE — ASSESSMENT & PLAN NOTE
Stable.  Continue Lantus as above.  A1c pending from today.  Needs microalbumin with next visit (unable to void today at time of visit).  Foot exam performed today but limited due to patient's dementia.  Referral placed for diabetic eye exam.

## 2021-01-29 NOTE — ASSESSMENT & PLAN NOTE
BP is low normal today.  Have recommended home BP log over the next 1 to 2 weeks and if BP is staying persistently less than 110/80, would discontinue amlodipine altogether due to risk of hypotension causing falls, lightheadedness, hypoperfusion etc.  CMP pending from today.

## 2021-01-29 NOTE — ASSESSMENT & PLAN NOTE
Stable on Namenda, Aricept, Sinemet, Lexapro per neurology.  Needs to call for follow-up and will let us know if they need assistance with this.  Good family support.

## 2021-02-01 NOTE — TELEPHONE ENCOUNTER
JOSSELIN; received a phone call from Giuliana the main lab that the urine cx on patient was canceled due to QNS .  If this is still needed patient will have to be called back in .  Tatianary .

## 2021-02-24 NOTE — TELEPHONE ENCOUNTER
Called Mrs Rod due to Merlin home monitor isn't reading.  Left message for a return call.    Daughter returned my call and going to send cell adapter.  She will call after plugging it up to make sure it is connecting.

## 2021-03-26 NOTE — PROGRESS NOTES
[facility]  Office Progress Note  Faye Rod  1945  [address] [place] [address]       Visit Date: 03/26/21    PCP: Brenna Luciano MD  [address]  [place] [id number]    IDENTIFICATION: A 75 y.o. female resident of [place], [place].    PROBLEM LIST:   1. Diastolic Heart failure  1. Echocardiogram, [place], 2014.  Impaired relaxation.  Mild AI, TR with RVSP 38 mmHg.  Trace MR  2. Echocardiogram, [facility], 2018.  EF 50-55%, mild TR, moderate AI.    2. Arrhythmia ?  1. ST Martín PPM Model # [id number]  3. A flutter  1. Documented July 21, 2017 for 21 minutes, no other episodes  2.  6/2020 10 hours on TTM  4. Hypertension  5. Hyperlipidemia  1. 5/18 116/107/40/56  6. Diabetes Mellitus  1. 9/8/17 A1c 8.1  2. 3/18 7.3  7. CKD Stage III; followed by Dr Casarez  8. Dementia    Chief Complaint   Patient presents with   • Chronic systolic congestive heart failure       Allergies  No Known Allergies    Current Medications    Current Outpatient Medications:   •  acetaminophen (TYLENOL) 650 MG 8 hr tablet, Take 650 mg by mouth 2 (Two) Times a Day As Needed., Disp: , Rfl:   •  acetaminophen-codeine (TYLENOL #3) 300-30 MG per tablet, Take 1 tablet by mouth Every 4 (Four) Hours As Needed for Moderate Pain ., Disp: 15 tablet, Rfl: 0  •  amLODIPine (NORVASC) 2.5 MG tablet, Take 1 tablet by mouth Daily., Disp: 90 tablet, Rfl: 3  •  aspirin 81 MG EC tablet, Take 81 mg by mouth Daily., Disp: , Rfl:   •  atorvastatin (LIPITOR) 20 MG tablet, TAKE 1 TABLET BY MOUTH  DAILY, Disp: 90 tablet, Rfl: 3  •  carbidopa-levodopa (SINEMET)  MG per tablet, Take 1 tablet by mouth 3 (Three) Times a Day., Disp: 270 tablet, Rfl: 3  •  Cholecalciferol (VITAMIN D3) 1.25 MG (67182 UT) tablet, Take 1 tablet by mouth 1 (One) Time Per Week., Disp: 4 tablet, Rfl: 1  •  donepezil (ARICEPT) 10 MG tablet, Take 1 tablet by mouth 2 (Two) Times a Day., Disp: 180 tablet, Rfl:  3  •  escitalopram (LEXAPRO) 20 MG tablet, Take 1 tablet by mouth Daily., Disp: 90 tablet, Rfl: 3  •  Fe Fum-FePoly-Vit C-Vit B3 (INTEGRA) 62.5-62.5-40-3 MG capsule, TAKE ONE CAPSULE BY MOUTH DAILY, Disp: 30 capsule, Rfl: 0  •  glucose blood (MADDY CONTOUR TEST) test strip, 1-2 times daily Use as instructed, Disp: 50 each, Rfl: 12  •  glucose blood (CONTOUR NEXT TEST) test strip, Use to test once or twice a day as directed, Disp: 100 each, Rfl: 11  •  ibandronate (Boniva) 150 MG tablet, Take 1 tablet by mouth Every 30 (Thirty) Days., Disp: 3 tablet, Rfl: 1  •  Insulin Glargine (Lantus SoloStar) 100 UNIT/ML injection pen, Inject 12 units subcu nightly., Disp: 30 mL, Rfl: 6  •  Insulin Pen Needle (B-D UF III MINI PEN NEEDLES) 31G X 5 MM misc, USE WITH LANTUS INSULIN, Disp: 90 each, Rfl: 3  •  levothyroxine (SYNTHROID, LEVOTHROID) 125 MCG tablet, TAKE 1 TABLET BY MOUTH  DAILY, Disp: 90 tablet, Rfl: 3  •  Loratadine 10 MG capsule, Take 1 capsule by mouth As Needed (allergy)., Disp: , Rfl:   •  memantine (NAMENDA) 10 MG tablet, TAKE 1 TABLET BY MOUTH  TWICE DAILY, Disp: 180 tablet, Rfl: 3  •  mupirocin (Bactroban) 2 % ointment, Apply  topically to the appropriate area as directed 3 (Three) Times a Day., Disp: 30 g, Rfl: 3  •  Myrbetriq 25 MG tablet sustained-release 24 hour 24 hr tablet, TAKE 1 TABLET BY MOUTH  DAILY, Disp: 90 tablet, Rfl: 3  •  ondansetron ODT (ZOFRAN-ODT) 4 MG disintegrating tablet, Place 1 tablet on the tongue 4 (Four) Times a Day As Needed for Nausea or Vomiting., Disp: 15 tablet, Rfl: 0  •  QUEtiapine (SEROquel) 25 MG tablet, Take 1.5 tabs PO qhs x 2 wks then may increase to 2 tabs PO QHS and continue, Disp: 180 tablet, Rfl: 1  •  traMADol (ULTRAM) 50 MG tablet, Take 0.5-1 tab PO BID PRN moderate-severe pain, Disp: 60 tablet, Rfl: 0      History of Present Illness   Faye Rod is a 75 y.o. year old female here for follow up.    Pt denies any chest pain, dyspnea, dyspnea on exertion,  "orthopnea, PND, palpitations, lower extremity edema, or claudication.  Accompanied by daughter.  Dementia at baseline      OBJECTIVE:  Vitals:    03/26/21 1448   BP: 109/72   BP Location: Right arm   Patient Position: Sitting   Pulse: 61   SpO2: 99%   Weight: 74.8 kg (165 lb)   Height: 152.4 cm (60\")     Physical Exam  Constitutional:       Appearance: She is well-developed.      Comments: Debilitated   Neck:      Thyroid: No thyromegaly.      Vascular: No carotid bruit, hepatojugular reflux or JVD.      Trachea: No tracheal deviation.   Cardiovascular:      Rate and Rhythm: Normal rate and regular rhythm.      Chest Wall: PMI is not displaced.      Pulses: Normal pulses and intact distal pulses. No midsystolic click and no opening snap.           Radial pulses are 2+ on the right side and 2+ on the left side.        Dorsalis pedis pulses are 2+ on the right side and 2+ on the left side.        Posterior tibial pulses are 2+ on the right side and 2+ on the left side.      Heart sounds: S1 normal and S2 normal. Heart sounds not distant. Murmur heard.   No friction rub. No gallop.    Pulmonary:      Effort: Pulmonary effort is normal.      Breath sounds: Normal breath sounds. No wheezing or rales.   Abdominal:      General: Bowel sounds are normal.      Palpations: Abdomen is soft. There is no mass.      Tenderness: There is no abdominal tenderness. There is no guarding.   Musculoskeletal:      Cervical back: Normal range of motion and neck supple.         Diagnostic Data:  Procedures  Device check  Acceptable threshold and impedence  <1% MS  > 4 yrs generator      ASSESSMENT:   Diagnosis Plan   1. Chronic systolic congestive heart failure (CMS/HCC)     2. AV block, Mobitz II     3. Type 2 diabetes mellitus with hyperglycemia, without long-term current use of insulin (CMS/HCC)     4. Essential hypertension         PLAN:  CHF diastolic continue current rx    AV block acceptable pacer fxn    DM not ideal control.  " Target A1c <7    HTN controlled off agents with low nl bp    Advance Care Planning   ACP discussion was held with the patient during this visit. Patient has an advance directive (not in EMR), copy requested.      Brenna Luciano MD, thank you for referring Ms. Rod for evaluation.  I have forwarded my electronically generated recommendations to you for review.  Please do not hesitate to call with any questions.      Faisal Vang MD, FACC

## 2021-05-04 NOTE — TELEPHONE ENCOUNTER
"----- Message from Sarai Hawley PA-C sent at 5/4/2021  2:13 PM EDT -----  Per [facility] via messenger:    \"I'm so sorry but we will not be able to accept this patient for home health.  Most of our clinicians are going to be in training most of the week for our epic integration and we are accepting very few new patients.  I hope that you can find another agency and hopefully next week our staffing will be back to normal.  Thanks!:\"    "

## 2021-05-05 NOTE — TELEPHONE ENCOUNTER
[facility] [phone]  Clinical Intake  Mai   Advised the order is for:  Diagnosis: Primary osteoarthritis involving multiple joints (M89.49 [ICD-10-CM] 715.98 [ICD-9-CM])  Chronic bilateral low back pain without sciatica (M54.5,G89.29 [ICD-10-CM] 724.2,338.29 [ICD-9-CM])  Gait instability (R26.81 [ICD-10-CM] 781.2 [ICD-9-CM])      Refer to Dept:  [facility]  Refer to Provider:   Refer to Facility:       Face to Face Visit Date: 4/30/2021  Follow-up provider for Plan of Care? I will be treating the patient on an ongoing basis.  Please send me the Plan of Care for signature.  Follow-up provider: SARAH CONTRERAS [[id number]]  Reason/Clinical Findings: gait instability, weakness, chronic low back pain  Describe mobility limitations that make leaving home difficult: gait instability, wheelchair bound, Lewy body dementia  Nursing/Therapeutic Services Requested: Physical Therapy  Frequency: 1 Week 1  She states that should work out fine.     They need the orders faxed to ATTN: [facility] Fax number: [phone]. -They will need:  -Order  -[employer]  -Last OV note signed by PCP 04/30/2021  -Copy of Social Security card    Good verbal understanding.

## 2021-05-07 NOTE — TELEPHONE ENCOUNTER
[phone]talked to Darya     Therapist called to [facility] and pt did not want to be seen until next Wed-she is [facility] for that day

## 2021-05-09 NOTE — TELEPHONE ENCOUNTER
Please call daughter (caregiver)-- thyroid stable. A1c has improved, continue insulin. No evidence of UTI.     Only change is in her kidney function. This has decreased quite a bit since last check. I would really like to recheck this in the next 2-4 weeks when she is well-hydrated, and if remains this low I want her to see nephrology. May come in for labs only, BMP ordered.

## 2021-05-10 NOTE — TELEPHONE ENCOUNTER
[place] [phone]  Spoke to pt's daughter, advised of clinical results. Daughter's in agreement with plan, and states pt has a nephrology appointment on 05/21/2021. Good verbal understanding.

## 2021-05-12 NOTE — TELEPHONE ENCOUNTER
[facility] calling to receive verbal orders start Plan of care on 5/12. The therapist is not in office today. Please advise

## 2021-06-07 NOTE — TELEPHONE ENCOUNTER
Please call pt and let her know her kidney function remains decreased but slightly improved from last visit. She is also slightly anemic and want her to try to increase iron rich foods. We will continue to monitor these on a routine basis. No change to current meds.

## 2021-06-08 NOTE — TELEPHONE ENCOUNTER
Shila [place] [phone]  [facility] requesting call back to discuss lab results, office number given, [phone].    Hub please advise:  Let her know her kidney function remains decreased but slightly improved from last visit. She is also slightly anemic and I want her to try to increase iron rich foods. We will continue to monitor these on a routine basis. No change to current med's.

## 2021-06-09 NOTE — TELEPHONE ENCOUNTER
Shila [place] [phone]  [facility] requesting call back to discuss lab results, office number given, [phone].     Hub please advise:  Let her know her kidney function remains decreased but slightly improved from last visit. She is also slightly anemic and I want her to try to increase iron rich foods. We will continue to monitor these on a routine basis. No change to current med's.

## 2021-06-11 NOTE — TELEPHONE ENCOUNTER
Shila [place] [phone]  [facility] requesting call back to discuss lab results, office number given, [phone].     Hub please advise:  Let her know her kidney function remains decreased but slightly improved from last visit. She is also slightly anemic and I want her to try to increase iron rich foods. We will continue to monitor these on a routine basis. No change to current med's.

## 2021-06-14 NOTE — TELEPHONE ENCOUNTER
Shila [place] [phone]  [facility] requesting call back to discuss lab results, office number given, [phone].     Hub please advise:  Let her know her kidney function remains decreased but slightly improved from last visit. She is also slightly anemic and I want her to try to increase iron rich foods. We will continue to monitor these on a routine basis. No change to current med's.

## 2021-07-16 NOTE — PROGRESS NOTES
Subjective       You have chosen to receive care through a telehealth visit.  Do you consent to use a video/audio connection for your medical care today? Yes      Chief Complaint: memory loss      History of Present Illness   Faye Rod is a 75 y.o. female who returns to clinic today with a history of possible DLB. Her family  has noted symptoms since approximately 2015 marked initially by forgetfulness. This has varied over time, with significant worsening noted in 9/16 when hospitalized with renal insufficiency. Additional associated symptoms have included impairments in essentially all spheres of cognition. She has had associated symptoms of hallucinations and delusions previously.       She has also had a resting tremor of her left hand since 2016. This has somewhat worsened over time. She has had associated balance impairment, shuffling gait as well as bradykinesia.      Prior evaluation has included screening blood work and a head CT within the last year which were notable for only mild increases in BUN and creatinine. She is currently taking donepezil and memantine as well as Seroquel 50mg nightly and Seroquel 25/100mg TID.     Today: Since her last visit in 8/20, she feels essentially unchanged cognitively. She continues to note visual hallucinations, though these are not quite as distressing. Her tremor, shuffling, and rigidity have worsened. She may note wearing off between doses of sinemet.  She recently complete PT through home health.       I have reviewed and confirmed the past family, social and medical history as accurate on 7/16/21.     Review of Systems   Constitutional: Negative.    HENT: Negative.    Eyes: Negative.    Respiratory: Negative.    Cardiovascular: Negative.    Gastrointestinal: Negative.    Endocrine: Negative.    Genitourinary: Negative.    Musculoskeletal: Negative.    Skin: Negative.    Allergic/Immunologic: Negative.    Hematological: Negative.        Objective      General appearance today is normal.         Physical Exam  Psychiatric:         Speech: Speech normal.          Neurologic Exam     Mental Status   Oriented to person.   Disoriented to place. (Oriented to state only  )  Disoriented to time.   Registration: recalls 3 of 3 objects. Recall of objects at 5 minutes: 0/3 recall.   Attention: 4/5 sequencing.   Speech: speech is normal   Level of consciousness: alert  Unable to name object. Normal comprehension.     Cranial Nerves   Cranial nerves II through XII intact.         Results  MMSE=8      Assessment/Plan   Diagnoses and all orders for this visit:    1. Lewy body dementia with behavioral disturbance (CMS/HCC) (Primary)    Other orders  -     carbidopa-levodopa (SINEMET)  MG per tablet; Take 1 tablet by mouth 4 (Four) Times a Day.  Dispense: 120 tablet; Refill: 11          Discussion/Summary   Faye Rod returns to clinic today for evaluation of Dementia with Lewy Bodies. I again reviewed her current status and treatment options. After discussing potential treatment options, it was elected to increase her Sinemet to 25/100mg QID and continue on her other medications unchanged. She will then follow up in 6 months , or sooner if needed.   Total time of visit today: 30 minutes. As part of this visit I obtained additional history from the family which is incorporated in the HPI. I also discussed evaluation, current status, treatment options and management as discussed above.     This visit was performed via MyChart Video Chat with patient's consent.         Sarai Mohan PA-C

## 2021-08-06 NOTE — TELEPHONE ENCOUNTER
.    Caller: DWIGHT    Relationship:     Best call back number: [phone]    What form or medical record are you requesting:   OFFICE NOTES FOR 04/30/21 VISIT.  NEEDS TO HAVE SARAH CONTRERAS'S SIGNATURE OR ELECTRONIC SIGNATURE.    Who is requesting this form or medical record from you: DIMITIR     How would you like to receive the form or medical records (pick-up, mail, fax): FAX  If fax, what is the fax number:  [phone]    Additional notes: AS SOON AS POSSIBLE. THIS CHART WAS AUDITED.

## 2021-08-09 NOTE — TELEPHONE ENCOUNTER
Last office visit was faxed to [facility] [phone], confirmation fax recieved, closing call.

## 2021-08-15 PROBLEM — D64.9 ANEMIA: Status: ACTIVE | Noted: 2021-01-01

## 2021-08-15 PROBLEM — R29.6 MULTIPLE FALLS: Status: ACTIVE | Noted: 2021-01-01

## 2021-08-15 PROBLEM — R53.1 GENERALIZED WEAKNESS: Status: ACTIVE | Noted: 2021-01-01

## 2021-08-15 PROBLEM — R62.7 FTT (FAILURE TO THRIVE) IN ADULT: Status: ACTIVE | Noted: 2021-01-01

## 2021-08-15 NOTE — ED PROVIDER NOTES
[place]    EMERGENCY DEPARTMENT ENCOUNTER      Pt Name: Faye Rod  MRN: [id number]  YOB: 1945  Date of evaluation: 8/15/2021  Provider: Danny Leija DO    CHIEF COMPLAINT       Chief Complaint   Patient presents with   • Weakness - Generalized         HISTORY OF PRESENT ILLNESS  (Location/Symptom, Timing/Onset, Context/Setting, Quality, Duration, Modifying Factors, Severity.)   Faye Rod is a 75 y.o. female who presents to the emergency department for evaluation with her family member secondary to generalized weakness worsening over the last few days, increased falls her last few days as well.  Family member provides a majority of the history.  She is a patient does have multiple chronic issues, uses a walker to get around and is very unsteady on her feet even at baseline.  She notes recently she has had increasing gait issues, has fallen over, usually will fall backwards when she is try to get around with her walker.  She had a fall couple days ago while she was being helped around the house with her daughter, states she fell backward but she was able to guide her down to the ground without any significant head injury.  She also fell forward while she was in the bathroom no known head injury  yesterday as well..  She notes after the fall the patient was complaining of left upper extremity pain along the mid humerus region.  Worse with any type of movement.  She does note the patient has had increased weakness deconditioning.  No active chest pain cough congestion fevers or chills.  She has had a history of progressively worsening dementia, also with prior history of urinary tract infections, no recent antibiotic usage.  Denies any other acute systemic complaints this time.  She is fully vaccinated.      Nursing notes were reviewed.    REVIEW OF SYSTEMS    (2-9 systems for level 4, 10 or more for level 5)   ROS:  General:  No fevers, no chills, + general  weakness  Cardiovascular:  No chest pain, no palpitations  Respiratory:  No shortness of breath, no cough, no wheezing  Gastrointestinal:  No pain, no nausea, no vomiting, no diarrhea  Musculoskeletal: Positive generalized muscle weakness, no muscle pain, positive left upper arm pain  Skin:  No rash  Neurologic:  No speech problems, no headache, positive generalized weakness, no unilateral weakness.  Psychiatric:  No anxiety  Genitourinary:  No dysuria, no hematuria    Except as noted above the remainder of the review of systems was reviewed and negative.       PAST MEDICAL HISTORY     Past Medical History:   Diagnosis Date   • Anemia    • Arthritis    • Colon polyp    • Diabetes mellitus (CMS/HCC)    • Disease of thyroid gland    • Heart disease    • Heart valve disease    • Hyperlipidemia    • Hypertension    • Hypothyroidism    • Memory loss    • Pacemaker    • Shortness of breath 2/3/2017   • Sick sinus syndrome (CMS/HCC)          SURGICAL HISTORY       Past Surgical History:   Procedure Laterality Date   • CATARACT EXTRACTION Bilateral    • CHOLECYSTECTOMY     • PACEMAKER IMPLANTATION           CURRENT MEDICATIONS       Current Facility-Administered Medications:   •  sodium chloride 0.9 % flush 10 mL, 10 mL, Intravenous, PRN, Danny Leija, DO    Current Outpatient Medications:   •  acetaminophen (TYLENOL) 650 MG 8 hr tablet, Take 650 mg by mouth 2 (Two) Times a Day As Needed., Disp: , Rfl:   •  acetaminophen-codeine (TYLENOL #3) 300-30 MG per tablet, Take 1 tablet by mouth Every 4 (Four) Hours As Needed for Moderate Pain ., Disp: 15 tablet, Rfl: 0  •  amLODIPine (NORVASC) 2.5 MG tablet, TAKE 1 TABLET BY MOUTH  DAILY, Disp: 90 tablet, Rfl: 3  •  aspirin 81 MG EC tablet, Take 81 mg by mouth Daily., Disp: , Rfl:   •  atorvastatin (LIPITOR) 20 MG tablet, TAKE 1 TABLET BY MOUTH  DAILY, Disp: 90 tablet, Rfl: 3  •  carbidopa-levodopa (SINEMET)  MG per tablet, Take 1 tablet by mouth 4 (Four) Times a  Day., Disp: 120 tablet, Rfl: 11  •  Cholecalciferol (VITAMIN D3) 1.25 MG (79688 UT) tablet, Take 1 tablet by mouth 1 (One) Time Per Week., Disp: 4 tablet, Rfl: 1  •  donepezil (ARICEPT) 10 MG tablet, Take 1 tablet by mouth 2 (Two) Times a Day., Disp: 180 tablet, Rfl: 3  •  escitalopram (LEXAPRO) 20 MG tablet, TAKE 1 TABLET BY MOUTH  DAILY, Disp: 90 tablet, Rfl: 3  •  Fe Fum-FePoly-Vit C-Vit B3 (INTEGRA) 62.5-62.5-40-3 MG capsule, TAKE ONE CAPSULE BY MOUTH DAILY, Disp: 30 capsule, Rfl: 0  •  glucose blood (MADDY CONTOUR TEST) test strip, 1-2 times daily Use as instructed, Disp: 50 each, Rfl: 12  •  glucose blood (CONTOUR NEXT TEST) test strip, Use to test once or twice a day as directed, Disp: 100 each, Rfl: 11  •  ibandronate (Boniva) 150 MG tablet, Take 1 tablet by mouth Every 30 (Thirty) Days., Disp: 3 tablet, Rfl: 1  •  Insulin Glargine (Lantus SoloStar) 100 UNIT/ML injection pen, Inject 12 units subcu nightly., Disp: 30 mL, Rfl: 6  •  Insulin Pen Needle (B-D UF III MINI PEN NEEDLES) 31G X 5 MM misc, USE WITH LANTUS INSULIN, Disp: 90 each, Rfl: 3  •  levothyroxine (SYNTHROID, LEVOTHROID) 125 MCG tablet, TAKE 1 TABLET BY MOUTH  DAILY, Disp: 90 tablet, Rfl: 3  •  Loratadine 10 MG capsule, Take 1 capsule by mouth As Needed (allergy)., Disp: , Rfl:   •  memantine (NAMENDA) 10 MG tablet, TAKE 1 TABLET BY MOUTH  TWICE DAILY, Disp: 180 tablet, Rfl: 3  •  mupirocin (Bactroban) 2 % ointment, Apply  topically to the appropriate area as directed 3 (Three) Times a Day., Disp: 30 g, Rfl: 3  •  Myrbetriq 25 MG tablet sustained-release 24 hour 24 hr tablet, TAKE 1 TABLET BY MOUTH  DAILY, Disp: 90 tablet, Rfl: 3  •  nystatin (MYCOSTATIN) 814701 UNIT/GM powder, Apply  topically to the appropriate area as directed 3 (Three) Times a Day., Disp: 60 g, Rfl: 2  •  ondansetron ODT (ZOFRAN-ODT) 4 MG disintegrating tablet, Place 1 tablet on the tongue 4 (Four) Times a Day As Needed for Nausea or Vomiting., Disp: 15 tablet, Rfl: 0  •   QUEtiapine (SEROquel) 25 MG tablet, Take 2 tablets by mouth Every Night., Disp: 180 tablet, Rfl: 0  •  traMADol (ULTRAM) 50 MG tablet, Take 0.5-1 tab PO BID PRN moderate-severe pain, Disp: 60 tablet, Rfl: 0    ALLERGIES     Patient has no known allergies.    FAMILY HISTORY       Family History   Problem Relation Age of Onset   • Mental illness Mother    • Stroke Mother    • Arthritis Father    • Diabetes Father    • Heart attack Father    • Kidney disease Brother    • Thyroid disease Brother    • Hypertension Daughter    • Obesity Daughter           SOCIAL HISTORY       Social History     Socioeconomic History   • Marital status:      Spouse name: Not on file   • Number of children: Not on file   • Years of education: Not on file   • Highest education level: Not on file   Tobacco Use   • Smoking status: Never Smoker   • Smokeless tobacco: Never Used   Substance and Sexual Activity   • Alcohol use: No   • Drug use: No   • Sexual activity: Defer         PHYSICAL EXAM    (up to 7 for level 4, 8 or more for level 5)     Vitals:    08/15/21 1859 08/15/21 1900 08/15/21 1901 08/15/21 1915   BP:       Pulse:   72 73   Resp:       Temp:       TempSrc:       SpO2: 94% 96%  96%   Weight:       Height:           Physical Exam  General : Patient is awake, appears chronically ill, looks around the room, his understanding was going on but not fully conversational, underlying dementia is appreciated  HEENT: Pupils are equally round and reactive to light, EOMI, conjunctivae clear, sclerae white  Neck: Neck is supple, full range of motion, trachea midline  Cardiac: Heart regular rate, rhythm, no murmurs, rubs, or gallops  Lungs: Lungs decreased breath sound bilaterally, no focal wheezing, respiratory stress appreciated, no hypoxia.  Chest wall: There is no tenderness to palpation over the chest wall or over ribs  Abdomen: Abdomen is soft, nontender, nondistended. There are no firm or pulsatile masses, no rebound rigidity or  guarding.   Musculoskeletal: There is tenderness to palpation on the midshaft of the left humerus without any obvious deformity.  There are areas of multiple contusions in the bilateral upper extremities.  Patient has generalized weakness, muscle atrophy in bilateral upper and lower extremities.  3.5 out of 5 strength in all 4 extremities.  No focal muscle deficits are appreciated  Neuro: Motor intact, sensory intact, patient does have underlying dementia, is following commands, she not fully conversational, no acute focal neurological deficit on examination  Dermatology: Skin is warm and dry        DIAGNOSTIC RESULTS     EKG: All EKG's are interpreted by the Emergency Department Physician who either signs or Co-signs this chart in the absence of a cardiologist.    ECG 12 Lead   Final Result   Test Reason : Weak/Dizzy/AMS protocol   Blood Pressure :   */*   mmHG   Vent. Rate :  60 BPM     Atrial Rate :  43 BPM      P-R Int : 268 ms          QRS Dur :  68 ms       QT Int : 408 ms       P-R-T Axes :  61  14  10 degrees      QTc Int : 408 ms      Atrial-paced rhythm with prolonged AV conduction   Nonspecific ST and T wave abnormality   Abnormal ECG   When compared with ECG of 16-APR-2016 13:05,   Electronic atrial pacemaker has replaced Sinus rhythm   Confirmed by LOUIE OLSEN MD ([id number]) on 8/15/2021 5:08:43 PM      Referred By: EDMD           Confirmed By: LOUIE OLSEN MD          RADIOLOGY:   Non-plain film images such as CT, Ultrasound and MRI are read by the radiologist. Plain radiographic images are visualized and preliminarily interpreted by the emergency physician with the below findings:      [] Radiologist's Report Reviewed:  CT Head Without Contrast   Final Result   Senescent changes without acute abnormality.               Signer Name: HOMERO Echols MD    Signed: 8/15/2021 7:00 PM    Work[email or web address]     [facility]      CT Chest Without Contrast Diagnostic    Final Result   Small amount right middle lobe and lingular atelectasis.      Multiple healed or healing right posterior rib fractures with probable subacute right posterior 10th rib fracture.      No acute abdominal or pelvic pathology.      Generalized osteopenia with moderately advanced thoracic and lumbar degenerative disc disease and facet arthropathy with moderate L4-L5 spinal stenosis.      Signer Name: HOMERO Echols MD    Signed: 8/15/2021 7:08 PM    Workstation Name: [facility]     [facility]      CT Abdomen Pelvis Without Contrast   Final Result   Small amount right middle lobe and lingular atelectasis.      Multiple healed or healing right posterior rib fractures with probable subacute right posterior 10th rib fracture.      No acute abdominal or pelvic pathology.      Generalized osteopenia with moderately advanced thoracic and lumbar degenerative disc disease and facet arthropathy with moderate L4-L5 spinal stenosis.      Signer Name: HOMERO Echols MD    Signed: 8/15/2021 7:08 PM    Workstation Name: [facility]     [facility]      XR Humerus Left   Final Result   No definite acute fracture or subluxation.      Signer Name: Ana María White MD    Signed: 8/15/2021 7:22 PM    Workstation Name: [id number]     [facility]      XR Chest 1 View   Preliminary Result   Cardiomegaly without overt edema or effusion.              MRI Brain Without Contrast    (Results Pending)         ED BEDSIDE ULTRASOUND:   Performed by ED Physician - none    LABS:    I have reviewed and interpreted all of the currently available lab results from this visit (if applicable):  Results for orders placed or performed during the hospital encounter of 08/15/21   Comprehensive Metabolic Panel    Specimen: Blood   Result Value Ref Range    Glucose 103 (H) 65 - 99 mg/dL    BUN 25 (H) 8 - 23 mg/dL    Creatinine 1.18 (H) 0.57 - 1.00 mg/dL    Sodium 139 136 -  145 mmol/L    Potassium 4.5 3.5 - 5.2 mmol/L    Chloride 102 98 - 107 mmol/L    CO2 28.0 22.0 - 29.0 mmol/L    Calcium 9.2 8.6 - 10.5 mg/dL    Total Protein 6.5 6.0 - 8.5 g/dL    Albumin 4.10 3.50 - 5.20 g/dL    ALT (SGPT) 10 1 - 33 U/L    AST (SGOT) 20 1 - 32 U/L    Alkaline Phosphatase 69 39 - 117 U/L    Total Bilirubin 0.4 0.0 - 1.2 mg/dL    eGFR Non African Amer 45 (L) >60 mL/min/1.73    Globulin 2.4 gm/dL    A/G Ratio 1.7 g/dL    BUN/Creatinine Ratio 21.2 7.0 - 25.0    Anion Gap 9.0 5.0 - 15.0 mmol/L   Troponin    Specimen: Blood   Result Value Ref Range    Troponin T 0.022 0.000 - 0.030 ng/mL   Magnesium    Specimen: Blood   Result Value Ref Range    Magnesium 2.0 1.6 - 2.4 mg/dL   CBC Auto Differential    Specimen: Blood   Result Value Ref Range    WBC 6.69 3.40 - 10.80 10*3/mm3    RBC 3.31 (L) 3.77 - 5.28 10*6/mm3    Hemoglobin 10.4 (L) 12.0 - 15.9 g/dL    Hematocrit 33.1 (L) 34.0 - 46.6 %    .0 (H) 79.0 - 97.0 fL    MCH 31.4 26.6 - 33.0 pg    MCHC 31.4 (L) 31.5 - 35.7 g/dL    RDW 13.2 12.3 - 15.4 %    RDW-SD 48.4 37.0 - 54.0 fl    MPV 10.1 6.0 - 12.0 fL    Platelets 146 140 - 450 10*3/mm3    Neutrophil % 65.1 42.7 - 76.0 %    Lymphocyte % 24.8 19.6 - 45.3 %    Monocyte % 9.4 5.0 - 12.0 %    Eosinophil % 0.1 (L) 0.3 - 6.2 %    Basophil % 0.3 0.0 - 1.5 %    Immature Grans % 0.3 0.0 - 0.5 %    Neutrophils, Absolute 4.35 1.70 - 7.00 10*3/mm3    Lymphocytes, Absolute 1.66 0.70 - 3.10 10*3/mm3    Monocytes, Absolute 0.63 0.10 - 0.90 10*3/mm3    Eosinophils, Absolute 0.01 0.00 - 0.40 10*3/mm3    Basophils, Absolute 0.02 0.00 - 0.20 10*3/mm3    Immature Grans, Absolute 0.02 0.00 - 0.05 10*3/mm3    nRBC 0.0 0.0 - 0.2 /100 WBC   Urinalysis With Microscopic If Indicated (No Culture) - Urine, Catheter    Specimen: Urine, Catheter   Result Value Ref Range    Color, UA Yellow Yellow, Straw    Appearance, UA Clear Clear    pH, UA <=5.0 5.0 - 8.0    Specific Gravity, UA 1.022 1.001 - 1.030    Glucose, UA Negative  Negative    Ketones, UA Negative Negative    Bilirubin, UA Negative Negative    Blood, UA Negative Negative    Protein, UA Trace (A) Negative    Leuk Esterase, UA Negative Negative    Nitrite, UA Negative Negative    Urobilinogen, UA 0.2 E.U./dL 0.2 - 1.0 E.U./dL   TSH    Specimen: Blood   Result Value Ref Range    TSH 0.830 0.270 - 4.200 uIU/mL   ECG 12 Lead   Result Value Ref Range    QT Interval 408 ms    QTC Interval 408 ms   Green Top (Gel)   Result Value Ref Range    Extra Tube Hold for add-ons.    Lavender Top   Result Value Ref Range    Extra Tube hold for add-on    Gold Top - SST   Result Value Ref Range    Extra Tube Hold for add-ons.         All other labs were within normal range or not returned as of this dictation.      EMERGENCY DEPARTMENT COURSE and DIFFERENTIAL DIAGNOSIS/MDM:   Vitals:    Vitals:    08/15/21 1859 08/15/21 1900 08/15/21 1901 08/15/21 1915   BP:       Pulse:   72 73   Resp:       Temp:       TempSrc:       SpO2: 94% 96%  96%   Weight:       Height:                Patient with generalized weakness, increased falls which has been worsening over the last few days.  She does have underlying dementia, multiple chronic comorbidities fortunately not been doing well at home recently.  She is brought in today for multiple falls, generalized weakness.  Her vital signs are stable on arrival, during my evaluation patient does express some underlying dementia, has some pain discomfort on the left upper extremity.  She is she is very weak in general, appears to be significantly deconditioned.  We did obtain IV labs and imaging for further evaluation.  No underlying urinary tract infection, electrolytes are stable, kidney function is 1.18, baseline around 1.4.  White count 6.7.  Imaging the head chest abdomen pelvis obtained.  She does have some senescent changes neurologically, also with multiple right-sided rib fractures are very stages of healing.  No signs of acute pneumonia or any other acute  intra-abdominal process.  On reevaluation the patient is very weak, much worse than his she normally is at her baseline, family unable to care for her, with her recent falls, we discussed further evaluation for possible stroke, obtain MRI, case discussed with our stroke navigator for evaluation as well.  At this point I feel patient would benefit from addition in the hospital.  Case discussed with hospitalist team for admission.      MEDICATIONS ADMINISTERED IN ED:  Medications   sodium chloride 0.9 % flush 10 mL (has no administration in time range)       PROCEDURES:  Procedures    CRITICAL CARE TIME    Total Critical Care time was 0 minutes, excluding separately reportable procedures.   There was a high probability of clinically significant/life threatening deterioration in the patient's condition which required my urgent intervention.      FINAL IMPRESSION      1. Multiple falls    2. Generalized weakness    3. Closed fracture of multiple ribs of right side, initial encounter    4. Dementia without behavioral disturbance, unspecified dementia type (CMS/HCC)    5. Chronic renal impairment, unspecified CKD stage    6. Failure to thrive in adult          DISPOSITION/PLAN     ED Disposition     ED Disposition Condition Comment    Decision to Admit              Comment: Please note this report has been produced using speech recognition software.      Danny Leija DO  Attending Emergency Physician               Danny Leija,   08/15/21 1933

## 2021-08-16 NOTE — PLAN OF CARE
Goal Outcome Evaluation:  Plan of Care Reviewed With: patient        Progress: no change   SLP evaluation completed. Will address swallowing in tx/re-assessment. Will f/u for further cognitive-communication assessment, as appropriate. Please see note for further details and recommendations.

## 2021-08-16 NOTE — THERAPY EVALUATION
Patient Name: Faye Rod  : 1945    MRN: [id number]                              Today's Date: 2021       Admit Date: 8/15/2021    Visit Dx:     ICD-10-CM ICD-9-CM   1. Multiple falls  R29.6 V15.88   2. Generalized weakness  R53.1 780.79   3. Closed fracture of multiple ribs of right side, initial encounter  S22.41XA 807.09   4. Dementia without behavioral disturbance, unspecified dementia type (CMS/HCC)  F03.90 294.20   5. Chronic renal impairment, unspecified CKD stage  N18.9 585.9   6. Failure to thrive in adult  R62.7 783.7     Patient Active Problem List   Diagnosis   • Mixed hyperlipidemia   • Essential hypertension   • Type 2 diabetes mellitus (CMS/HCC)   • Primary osteoarthritis involving multiple joints   • Non-rheumatic mitral regurgitation   • Hypothyroidism, adult   • Lewy body dementia with behavioral disturbance (CMS/HCC)   • Mild nonproliferative diabetic retinopathy of both eyes without macular edema associated with type 2 diabetes mellitus (CMS/HCC)   • Morbidly obese (CMS/HCC)   • Sick sinus syndrome (CMS/HCC)   • CKD (chronic kidney disease) stage 3, GFR 30-59 ml/min (CMS/HCC)   • Overactive bladder   • Osteoporosis   • Multiple falls   • Generalized weakness   • FTT (failure to thrive) in adult   • Anemia     Past Medical History:   Diagnosis Date   • Anemia    • Arthritis    • Colon polyp    • Diabetes mellitus (CMS/HCC)    • Disease of thyroid gland    • Heart disease    • Heart valve disease    • Hyperlipidemia    • Hypertension    • Hypothyroidism    • Memory loss    • Pacemaker    • Shortness of breath 2/3/2017   • Sick sinus syndrome (CMS/HCC)      Past Surgical History:   Procedure Laterality Date   • CATARACT EXTRACTION Bilateral    • CHOLECYSTECTOMY     • PACEMAKER IMPLANTATION       General Information     Row Name 21 0943          OT Time and Intention    Document Type  evaluation  -HK     Mode of Treatment  occupational therapy  -HK     Row Name 21  0943          General Information    Patient Profile Reviewed  yes  -HK     Prior Level of Function  dependent:;dressing;bathing;mod assist:;gait;transfer PLOF difficult to obtain. Pt reports dependence for all dressing and bathing.  -HK     Existing Precautions/Restrictions  fall;other (see comments) pt has difficulty following one step commands and answering questions.  -HK     Barriers to Rehab  medically complex;previous functional deficit;cognitive status;ineffective coping  -HK     Row Name 08/16/21 0943          Living Environment    Lives With  child(chante), adult  -HK     Row Name 08/16/21 0943          Stairs Within Home, Primary    Stairs, Within Home, Primary  Pt poor historian and difficulty providing PLOF.  -HK     Number of Stairs, Within Home, Primary  none  -HK     Stair Railings, Within Home, Primary  none  -HK     Row Name 08/16/21 0943          Cognition    Orientation Status (Cognition)  oriented to;person;disoriented to;place;situation;time  -HK     Row Name 08/16/21 0943          Safety Issues, Functional Mobility    Safety Issues Affecting Function (Mobility)  safety precautions follow-through/compliance;safety precaution awareness;insight into deficits/self-awareness;sequencing abilities;judgment;at risk behavior observed;awareness of need for assistance;problem-solving;positioning of assistive device;ability to follow commands;friction/shear risk  -HK     Impairments Affecting Function (Mobility)  balance;cognition;endurance/activity tolerance;pain;strength  -HK     Cognitive Impairments, Mobility Safety/Performance  attention;awareness, need for assistance;insight into deficits/self-awareness;judgment;problem-solving/reasoning;safety precaution awareness;safety precaution follow-through;sequencing abilities  -HK       User Key  (r) = Recorded By, (t) = Taken By, (c) = Cosigned By    Initials Name Provider Type    HK Albertina Campbell, OT Occupational Therapist          Mobility/ADL's     Row  Name 08/16/21 1001          Bed Mobility    Bed Mobility  supine-sit;scooting/bridging  -HK     Scooting/Bridging [place] (Bed Mobility)  maximum assist (25% patient effort);2 person assist;verbal cues  -HK     Supine-Sit [place] (Bed Mobility)  dependent (less than 25% patient effort);2 person assist;verbal cues  -HK     Bed Mobility, Safety Issues  decreased use of arms for pushing/pulling;decreased use of legs for bridging/pushing;impaired trunk control for bed mobility;cognitive deficits limit understanding  -HK     Assistive Device (Bed Mobility)  bed rails;draw sheet;head of bed elevated  -     Comment (Bed Mobility)  Pt dependent to advance to EOB and maxAx2 to scoot to EOB. Despite max verbal cues pt unable to initiate.  -     Row Name 08/16/21 1001          Transfers    Transfers  sit-stand transfer;toilet transfer  -     Comment (Transfers)  Pt completed transfer from bed to BSC and from BSC to chair.  -HK     Bed-Chair [place] (Transfers)  maximum assist (25% patient effort);2 person assist;verbal cues  -HK     Assistive Device (Bed-Chair Transfers)  other (see comments) B UE support  -HK     Sit-Stand [place] (Transfers)  maximum assist (25% patient effort);2 person assist;verbal cues  -HK     [place] Level (Toilet Transfer)  maximum assist (25% patient effort);2 person assist;verbal cues  -HK     Assistive Device (Toilet Transfer)  walker, front-wheeled;raised toilet seat  -     Row Name 08/16/21 1001          Sit-Stand Transfer    Assistive Device (Sit-Stand Transfers)  other (see comments) B UE support  -     Row Name 08/16/21 1001          Toilet Transfer    Type (Toilet Transfer)  sit-stand;stand-sit  -     Row Name 08/16/21 1001          Functional Mobility    Functional Mobility- Ind. Level  not appropriate to assess  -     Row Name 08/16/21 1001          Activities of Daily Living    BADL Assessment/Intervention  lower body dressing;toileting  -     Row  Name 08/16/21 1001          Lower Body Dressing Assessment/Training    [place] Level (Lower Body Dressing)  dependent (less than 25% patient effort);don;socks  -HK     Position (Lower Body Dressing)  supine  -     Row Name 08/16/21 1001          Toileting Assessment/Training    [place] Level (Toileting)  dependent (less than 25% patient effort);perform perineal hygiene  -HK     Position (Toileting)  unsupported sitting;unsupported standing  -HK     Comment (Toileting)  pt dependent for all narda care.  -       User Key  (r) = Recorded By, (t) = Taken By, (c) = Cosigned By    Initials Name Provider Type     Albertina Campbell, OT Occupational Therapist        Obj/Interventions     Row Name 08/16/21 1004          Sensory Assessment (Somatosensory)    Sensory Assessment (Somatosensory)  sensation intact declines numbess and tingling  -     Row Name 08/16/21 1004          Vision Assessment/Intervention    Visual Impairment/Limitations  WFL  -     Row Name 08/16/21 1004          Range of Motion Comprehensive    General Range of Motion  upper extremity range of motion deficits identified  -     Comment, General Range of Motion  grossly 100 degrees AROM B UE.  -     Row Name 08/16/21 1004          Strength Comprehensive (MMT)    General Manual Muscle Testing (MMT) Assessment  upper extremity strength deficits identified  -     Comment, General Manual Muscle Testing (MMT) Assessment  Pt with generalized weakness  -     Row Name 08/16/21 1004          Upper Extremity (Manual Muscle Testing)    Upper Extremity: Manual Muscle Testing (MMT)  right shoulder strength deficit;left shoulder strength deficit  -     Row Name 08/16/21 1004          Motor Skills    Motor Skills  coordination  -     Coordination  WFL  -     Row Name 08/16/21 1004          Balance    Balance Assessment  sitting static balance;sitting dynamic balance;standing static balance  -     Static Sitting Balance  mild  impairment;unsupported;sitting, edge of bed  -HK     Dynamic Sitting Balance  moderate impairment;unsupported;sitting, edge of bed  -HK     Static Standing Balance  moderate impairment;supported;standing  -HK     Balance Interventions  sitting;occupation based/functional task  -     Row Name 08/16/21 1004          Right Shoulder (Manual Muscle Testing)    Right Shoulder Manual Muscle Testing (MMT)  flexion  -HK     MMT: Flexion, Right Shoulder  flexion  -HK     MMT, Gross Movement: Right Shoulder Flexion  (3+/5) fair plus  -HK     Row Name 08/16/21 1004          Left Shoulder (Manual Muscle Testing)    Left Shoulder Manual Muscle Testing (MMT)  flexion  -HK     MMT: Flexion, Left Shoulder  flexion  -HK     MMT, Gross Movement: Left Shoulder Flexion  (3+/5) fair plus  -HK       User Key  (r) = Recorded By, (t) = Taken By, (c) = Cosigned By    Initials Name Provider Type    Albertina Singh, OT Occupational Therapist        Goals/Plan     Row Name 08/16/21 1011          Bed Mobility Goal 1 (OT)    Activity/Assistive Device (Bed Mobility Goal 1, OT)  sit to supine/supine to sit;scooting  -HK     [place] Level/Cues Needed (Bed Mobility Goal 1, OT)  maximum assist (25-49% patient effort);verbal cues required  -HK     Time Frame (Bed Mobility Goal 1, OT)  by discharge;long term goal (LTG)  -HK     Progress/Outcomes (Bed Mobility Goal 1, OT)  goal ongoing  -     Row Name 08/16/21 1011          Transfer Goal 1 (OT)    Activity/Assistive Device (Transfer Goal 1, OT)  sit-to-stand/stand-to-sit;toilet  -HK     [place] Level/Cues Needed (Transfer Goal 1, OT)  moderate assist (50-74% patient effort);verbal cues required  -HK     Time Frame (Transfer Goal 1, OT)  by discharge;long term goal (LTG)  -HK     Progress/Outcome (Transfer Goal 1, OT)  goal ongoing  -     Row Name 08/16/21 1011          Toileting Goal 1 (OT)    Activity/Device (Toileting Goal 1, OT)  adjust/manage clothing;perform perineal hygiene  -HK   "   [place] Level/Cues Needed (Toileting Goal 1, OT)  verbal cues required;maximum assist (25-49% patient effort)  -HK     Time Frame (Toileting Goal 1, OT)  by discharge;long term goal (LTG)  -HK     Progress/Outcome (Toileting Goal 1, OT)  goal ongoing  -[facility] Name 08/16/21 1011          Grooming Goal 1 (OT)    Activity/Device (Grooming Goal 1, OT)  hair care;oral care;wash face, hands  -HK     [place] (Grooming Goal 1, OT)  moderate assist (50-74% patient effort);verbal cues required  -HK     Time Frame (Grooming Goal 1, OT)  by discharge;long term goal (LTG)  -HK     Progress/Outcome (Grooming Goal 1, OT)  goal ongoing  -[facility] Name 08/16/21 1011          Therapy Assessment/Plan (OT)    Planned Therapy Interventions (OT)  occupation/activity based interventions;adaptive equipment training;BADL retraining;functional balance retraining;transfer/mobility retraining;ROM/therapeutic exercise  -       User Key  (r) = Recorded By, (t) = Taken By, (c) = Cosigned By    Initials Name Provider Type     Albertina Campbell, OT Occupational Therapist        Clinical Impression     [facility] Name 08/16/21 1005          Pain Assessment    Additional Documentation  Pain Scale: FACES Pre/Post-Treatment (Group)  -HK     Row Name 08/16/21 1005          Pain Scale: Numbers Pre/Post-Treatment    Pain Intervention(s)  Ambulation/increased activity;Repositioned  -HK     Row Name 08/16/21 1005          Pain Scale: FACES Pre/Post-Treatment    Pain: FACES Scale, Pretreatment  4-->hurts little more  -HK     Posttreatment Pain Rating  6-->hurts even more  -HK     Pain Location - Side  Left  -HK     Pain Location - Orientation  generalized  -HK     Pain Location  shoulder  -HK     Pre/Posttreatment Pain Comment  Per pt she hurts \"all over\"  -     Row Name 08/16/21 1005          Plan of Care Review    Plan of Care Reviewed With  patient  -HK     Progress  improving  -HK     Outcome Summary  OT eval complete. Pt limited by " confusion and decreased command following. Pt becomes tearful due to pain. Pt dependent x2 for bed mobility and maxAx2 for all transfers. Pt with generalized weakness. Difficulty providing PLOF or home situation. Pt reports baseline dependence for bathing and dressing. Recommend d/c to SNF. If returning home will need 24/7 care.  -     Row Name 08/16/21 1005          Therapy Assessment/Plan (OT)    Patient/Family Therapy Goal Statement (OT)  Pt would like to improve and return home.  -     Rehab Potential (OT)  good, to achieve stated therapy goals  -     Criteria for Skilled Therapeutic Interventions Met (OT)  yes;skilled treatment is necessary  -     Therapy Frequency (OT)  daily  -     Row Name 08/16/21 1005          Therapy Plan Review/Discharge Plan (OT)    Anticipated Discharge Disposition (OT)  skilled nursing facility  -     Row Name 08/16/21 1005          Vital Signs    Pre Systolic BP Rehab  153  -HK     Pre Treatment Diastolic BP  66  -HK     Post Systolic BP Rehab  150  -HK     Post Treatment Diastolic BP  58  -HK     O2 Delivery Pre Treatment  room air  -HK     O2 Delivery Intra Treatment  room air  -HK     O2 Delivery Post Treatment  room air  -HK     Pre Patient Position  Supine  -HK     Intra Patient Position  Standing  -HK     Post Patient Position  Sitting  -HK     Row Name 08/16/21 1005          Positioning and Restraints    Pre-Treatment Position  in bed  -HK     Post Treatment Position  chair  -HK     In Chair  notified nsg;reclined;call light within reach;encouraged to call for assist;exit alarm on;waffle cushion;on mechanical lift sling  -HK       User Key  (r) = Recorded By, (t) = Taken By, (c) = Cosigned By    Initials Name Provider Type     Albertina Campbell, OT Occupational Therapist        Outcome Measures     Row Name 08/16/21 1012          How much help from another is currently needed...    Putting on and taking off regular lower body clothing?  1  -HK     Bathing (including  washing, rinsing, and drying)  1  -HK     Toileting (which includes using toilet bed pan or urinal)  1  -HK     Putting on and taking off regular upper body clothing  2  -HK     Taking care of personal grooming (such as brushing teeth)  2  -HK     Eating meals  2  -     AM-PAC 6 Clicks Score (OT)  9  -     Row Name 08/16/21 1005          How much help from another person do you currently need...    Turning from your back to your side while in flat bed without using bedrails?  2  -JH     Moving from lying on back to sitting on the side of a flat bed without bedrails?  2  -JH     Moving to and from a bed to a chair (including a wheelchair)?  2  -JH     Standing up from a chair using your arms (e.g., wheelchair, bedside chair)?  2  -JH     Climbing 3-5 steps with a railing?  1  -JH     To walk in hospital room?  1  -     AM-PAC 6 Clicks Score (PT)  10  -     Row Name 08/16/21 1012 08/16/21 1005       Functional Assessment    Outcome Measure Options  AM-PAC 6 Clicks Daily Activity (OT)  -  AM-PAC 6 Clicks Basic Mobility (PT)  -      User Key  (r) = Recorded By, (t) = Taken By, (c) = Cosigned By    Initials Name Provider Type     Albertina Campbell, OT Occupational Therapist     Chapito Mckeon, PT Physical Therapist          Occupational Therapy Education                 Title: PT OT SLP Therapies (In Progress)     Topic: Occupational Therapy (Done)     Point: ADL training (Done)     Description:   Instruct learner(s) on proper safety adaptation and remediation techniques during self care or transfers.   Instruct in proper use of assistive devices.              Learning Progress Summary           Patient Acceptance, E,TB,D,H, VU,NR by  at 8/16/2021 1015                   Point: Home exercise program (Done)     Description:   Instruct learner(s) on appropriate technique for monitoring, assisting and/or progressing therapeutic exercises/activities.              Learning Progress Summary           Patient  Acceptance, E,TB,D,H, VU,NR by  at 8/16/2021 1015                   Point: Precautions (Done)     Description:   Instruct learner(s) on prescribed precautions during self-care and functional transfers.              Learning Progress Summary           Patient Acceptance, E,TB,D,H, VU,NR by  at 8/16/2021 1015                   Point: Body mechanics (Done)     Description:   Instruct learner(s) on proper positioning and spine alignment during self-care, functional mobility activities and/or exercises.              Learning Progress Summary           Patient Acceptance, E,TB,D,H, VU,NR by  at 8/16/2021 1015                               User Key     Initials Effective Dates Name Provider Type Discipline     06/16/21 -  Albertina Campbell, OT Occupational Therapist OT              OT Recommendation and Plan  Planned Therapy Interventions (OT): occupation/activity based interventions, adaptive equipment training, BADL retraining, functional balance retraining, transfer/mobility retraining, ROM/therapeutic exercise  Therapy Frequency (OT): daily  Plan of Care Review  Plan of Care Reviewed With: patient  Progress: improving  Outcome Summary: OT eval complete. Pt limited by confusion and decreased command following. Pt becomes tearful due to pain. Pt dependent x2 for bed mobility and maxAx2 for all transfers. Pt with generalized weakness. Difficulty providing PLOF or home situation. Pt reports baseline dependence for bathing and dressing. Recommend d/c to SNF. If returning home will need 24/7 care.     Time Calculation:   Time Calculation- OT     Row Name 08/16/21 0850             Time Calculation- OT    OT Start Time  0850  -HK      OT Received On  08/16/21  -      OT Goal Re-Cert Due Date  08/26/21  -         Timed Charges    27634 - OT Self Care/Mgmt Minutes  15  -HK         Untimed Charges    OT Eval/Re-eval Minutes  45  -HK         Total Minutes    Timed Charges Total Minutes  15  -HK      Untimed Charges Total  Minutes  45  -HK       Total Minutes  60  -HK        User Key  (r) = Recorded By, (t) = Taken By, (c) = Cosigned By    Initials Name Provider Type     Albertina Campbell, OT Occupational Therapist        Therapy Charges for Today     Code Description Service Date Service Provider Modifiers Qty    [id number]  OT SELF CARE/MGMT/TRAIN EA 15 MIN 8/16/2021 Albertina Campbell, OT GO 1    [id number]  OT EVAL LOW COMPLEXITY 3 8/16/2021 Albertina Campbell OT GO 1               Albertina Campbell OT  8/16/2021

## 2021-08-16 NOTE — PLAN OF CARE
Goal Outcome Evaluation:  Plan of Care Reviewed With: patient Admitted to unit. Alert to self only. [place] out verbally threatening when staff attempted care. Pt rested quietly in dimly lit, quiet room.  Staff assisted positioning pt q 2 hrs due to wounds on gluteus. PO meds held due to pt failing dysphasia screen.

## 2021-08-16 NOTE — CONSULTS
Stroke Consult Note    Patient Name: Faye Rod   MRN: [id number]  Age: 75 y.o.  Sex: female  : 1945    Primary Care Physician: Sarai Hawley PA-C  Referring Physician:  Dr Leija    TIME STROKE TEAM CALLED:  EST     TIME PATIENT SEEN:  EST    Race: white    Chief Complaint/Reason for Consultation: generalized weakness, falls    HPI: Mrs Rod is a 75 year old white female with known diagnosis of Lewy body dementia, hyperlipidemia, hypertension, type 2 diabetes, hypothyroidism, questionable Parkinson's, chronic kidney disease, anemia, CHF, sick sinus syndrome status post pacemaker placement that has had gradual functional decline over the last 2 to 3 weeks, worse in the past 2 to 3 days with increased falls.  Her daughter is her primary caregiver who reports until 2 to 3 weeks ago she was ambulating independently, and has required a walker the past 2 to 3 weeks.  Had difficulty getting her out of bed this morning due to generalized weakness so brought her to the ER for evaluation.  Denies unilateral weakness.  She has aphasia at baseline and daughter reports she seems more confused.  No recent fever, chills, cough.  She has been eating normally.    She is not on antithrombotics at home.    Last Known Normal Date/Time: greater than 24 hours    Review of Systems   Reason unable to perform ROS: Obtained from daughter.   Constitutional: Negative for fever.   HENT: Negative for trouble swallowing.    Eyes: Negative for visual disturbance.   Respiratory: Negative for cough.    Gastrointestinal: Negative for vomiting.   Endocrine: Negative for polyuria.   Genitourinary:        Incontinent at baseline   Musculoskeletal: Positive for gait problem.   Skin: Negative for wound.   Neurological: Positive for tremors, speech difficulty and weakness. Negative for seizures and facial asymmetry.   Hematological: Negative.    Psychiatric/Behavioral: Positive for confusion.        Temp:  [98 °F (36.7  °C)] 98 °F (36.7 °C)  Heart Rate:  [61-79] 79  Resp:  [16] 16  BP: (128-142)/(47-63) 128/63    Neurological Exam  Mental Status  Awake and alert. Oriented only to person. Speech is normal. Expressive aphasia present. Fund of knowledge is abnormal.    Cranial Nerves  CN II: Visual fields full to confrontation.  CN III, IV, VI: Extraocular movements intact bilaterally.  CN V: Facial sensation is normal.  CN VII: Full and symmetric facial movement.  CN XI: Shoulder shrug strength is normal.  CN XII: Tongue midline without atrophy or fasciculations.    Motor  Decreased muscle bulk throughout. Tremors bilateral upper extremity, baseline for patient. Decreased muscle tone. Strength is 5/5 in all four extremities except as noted.  Strength 4/5 bilateral upper and lower extremities.    Sensory  Light touch is normal in upper and lower extremities.     Coordination  No obvious dysmetria.    Gait  Not observed.      Physical Exam  Vitals reviewed.   Constitutional:       General: She is awake.      Appearance: She is ill-appearing.   HENT:      Head: Normocephalic and atraumatic.      Mouth/Throat:      Mouth: Mucous membranes are moist.   Eyes:      Extraocular Movements: Extraocular movements intact.   Cardiovascular:      Rate and Rhythm: Normal rate.   Pulmonary:      Effort: Pulmonary effort is normal.   Skin:     General: Skin is warm and dry.   Neurological:      Mental Status: She is alert. She is disoriented.   Psychiatric:         Speech: Speech normal.         Acute Stroke Data    Alteplase (tPA) Inclusion / Exclusion Criteria    Time: 1930  Person Administering Scale: SHALONDA Aceves    Inclusion Criteria  [x]   18 years of age or greater   []   Onset of symptoms < 4.5 hours before beginning treatment (stroke onset = time patient was last seen well or without symptoms).   []   Diagnosis of acute ischemic stroke causing measurable disabling deficit (Complete Hemianopia, Any Aphasia, Visual or Sensory  Extinction, Any weakness limiting sustained effort against gravity)   []   Any remaining deficit considered potentially disabling in view of patient and practitioner   Exclusion criteria (Do not proceed with Alteplase if any are checked under exclusion criteria)  [x]   Onset unknown or GREATER than 4.5 hours   []   ICH on CT/MRI   []   CT demonstrates hypodensity representing acute or subacute infarct   []   Significant head trauma or prior stroke in the previous 3 months   []   Symptoms suggestive of subarachnoid hemorrhage   []   History of un-ruptured intracranial aneurysm GREATER than 10 mm   []   Recent intracranial or intraspinal surgery within the last 3 months   []   Arterial puncture at a non-compressible site in the previous 7 days   []   Active internal bleeding   []   Acute bleeding tendency   []   Platelet count LESS than 100,000 for known hematological diseases such as leukemia, thrombocytopenia or chronic cirrhosis   []   Current use of anticoagulant with INR GREATER than 1.7 or PT GREATER than 15 seconds, aPTT GREATER than 40 seconds   []   Heparin received within 48 hours, resulting in abnormally elevated aPTT GREATER than upper limit of normal   []   Current use of direct thrombin inhibitors or direct factor Xa inhibitors in the past 48 hours   []   Elevated blood pressure refractory to treatment (systolic GREATER than 185 mm/Hg or diastolic  GREATER than 110 mm/Hg   []   Suspected infective endocarditis and aortic arch dissection   []   Current use of therapeutic treatment dose of low-molecular-weight heparin (LMWH) within the previous 24 hours   []   Structural GI malignancy or bleed   Relative exclusion for all patients  []   Only minor non-disabling symptoms   []   Pregnancy   []   Seizure at onset with postictal residual neurological impairments   []   Major surgery or previous trauma within past 14 days   []   History of previous spontaneous ICH, intracranial neoplasm, or AV malformation   []    Postpartum (within previous 14 days)   []   Recent GI or urinary tract hemorrhage (within previous 21 days)   []   Recent acute MI (within previous 3 months)   []   History of un-ruptured intracranial aneurysm LESS than 10 mm   []   History of ruptured intracranial aneurysm   []   Blood glucose LESS than 50 mg/dL (2.7 mmol/L)   []   Dural puncture within the last 7 days   []   Known GREATER than 10 cerebral microbleeds   Additional exclusions for patients with symptoms onset between 3 and 4.5 hours.  []   Age > 80.   []   On any anticoagulants regardless of INR  >>> Warfarin (Coumadin), Heparin, Enoxaparin (Lovenox), fondaparinux (Arixtra), bivalirudin (Angiomax), Argatroban, dabigatran (Pradaxa), rivaroxaban (Xarelto), or apixaban (Eliquis)   []   Severe stroke (NIHSS > 25).   []   History of BOTH diabetes and previous ischemic stroke.   []   The risks and benefits have been discussed with the patient or family related to the administration of IV Alteplase for stroke symptoms.   []   I have discussed and reviewed the patient's case and imaging with the attending prior to IV Alteplase.   Not administered Time Alteplase administered       Past Medical History:   Diagnosis Date   • Anemia    • Arthritis    • Colon polyp    • Diabetes mellitus (CMS/HCC)    • Disease of thyroid gland    • Heart disease    • Heart valve disease    • Hyperlipidemia    • Hypertension    • Hypothyroidism    • Memory loss    • Pacemaker    • Shortness of breath 2/3/2017   • Sick sinus syndrome (CMS/HCC)      Past Surgical History:   Procedure Laterality Date   • CATARACT EXTRACTION Bilateral    • CHOLECYSTECTOMY     • PACEMAKER IMPLANTATION       Family History   Problem Relation Age of Onset   • Mental illness Mother    • Stroke Mother    • Arthritis Father    • Diabetes Father    • Heart attack Father    • Kidney disease Brother    • Thyroid disease Brother    • Hypertension Daughter    • Obesity Daughter      Social History      Socioeconomic History   • Marital status:      Spouse name: Not on file   • Number of children: Not on file   • Years of education: Not on file   • Highest education level: Not on file   Tobacco Use   • Smoking status: Never Smoker   • Smokeless tobacco: Never Used   Substance and Sexual Activity   • Alcohol use: No   • Drug use: No   • Sexual activity: Defer     No Known Allergies  Prior to Admission medications    Medication Sig Start Date End Date Taking? Authorizing Provider   acetaminophen (TYLENOL) 650 MG 8 hr tablet Take 650 mg by mouth 2 (Two) Times a Day As Needed.    Provider, MD Maira A   acetaminophen-codeine (TYLENOL #3) 300-30 MG per tablet Take 1 tablet by mouth Every 4 (Four) Hours As Needed for Moderate Pain . 8/18/20   Danny Leija,    amLODIPine (NORVASC) 2.5 MG tablet TAKE 1 TABLET BY MOUTH  DAILY 4/4/21   Sarai Hawley PA-C   aspirin 81 MG EC tablet Take 81 mg by mouth Daily.    ProviderMaria A MD   atorvastatin (LIPITOR) 20 MG tablet TAKE 1 TABLET BY MOUTH  DAILY 7/28/20   Brenna Luciano MD   carbidopa-levodopa (SINEMET)  MG per tablet Take 1 tablet by mouth 4 (Four) Times a Day. 7/16/21   Sarai Mohan PA-C   Cholecalciferol (VITAMIN D3) 1.25 MG (21777 UT) tablet Take 1 tablet by mouth 1 (One) Time Per Week. 5/8/20   Brenna Luciano MD   donepezil (ARICEPT) 10 MG tablet Take 1 tablet by mouth 2 (Two) Times a Day. 2/21/20   Joaquín Hamm MD   escitalopram (LEXAPRO) 20 MG tablet TAKE 1 TABLET BY MOUTH  DAILY 4/4/21   Sarai Hawley PA-C   Fe Fum-FePoly-Vit C-Vit B3 (INTEGRA) 62.5-62.5-40-3 MG capsule TAKE ONE CAPSULE BY MOUTH DAILY 7/8/20   Brenna Luciano MD   glucose blood (MADDY CONTOUR TEST) test strip 1-2 times daily Use as instructed 7/16/18   Telly Contreras APRN   glucose blood (CONTOUR NEXT TEST) test strip Use to test once or twice a day as directed 6/4/19   Telly Contreras APRN   ibandronate (Boniva) 150 MG tablet Take  1 tablet by mouth Every 30 (Thirty) Days. 1/29/21   Brenna Luciano MD   Insulin Glargine (Lantus SoloStar) 100 UNIT/ML injection pen Inject 12 units subcu nightly. 1/29/21   Brenna Luciano MD   Insulin Pen Needle (B-D UF III MINI PEN NEEDLES) 31G X 5 MM misc USE WITH LANTUS INSULIN 4/30/21   Sarai Hawley PA-C   levothyroxine (SYNTHROID, LEVOTHROID) 125 MCG tablet TAKE 1 TABLET BY MOUTH  DAILY 7/28/20   Brenna Luciano MD   Loratadine 10 MG capsule Take 1 capsule by mouth As Needed (allergy).    Provider, MD Maria A   memantine (NAMENDA) 10 MG tablet TAKE 1 TABLET BY MOUTH  TWICE DAILY 11/16/20   Joaquín Hamm MD   mupirocin (Bactroban) 2 % ointment Apply  topically to the appropriate area as directed 3 (Three) Times a Day. 1/29/21   Brenna Luciano MD   Myrbetriq 25 MG tablet sustained-release 24 hour 24 hr tablet TAKE 1 TABLET BY MOUTH  DAILY 1/26/21   Brenna Luciano MD   nystatin (MYCOSTATIN) 785820 UNIT/GM powder Apply  topically to the appropriate area as directed 3 (Three) Times a Day. 4/30/21   Sarai Hawley PA-C   ondansetron ODT (ZOFRAN-ODT) 4 MG disintegrating tablet Place 1 tablet on the tongue 4 (Four) Times a Day As Needed for Nausea or Vomiting. 8/18/20   Danny Leija DO   QUEtiapine (SEROquel) 25 MG tablet Take 2 tablets by mouth Every Night. 6/3/21   Randi Mancilla APRN   traMADol (ULTRAM) 50 MG tablet Take 0.5-1 tab PO BID PRN moderate-severe pain 1/29/21   Brenna Luciano MD       [facility] Meds:  Scheduled-    Infusions-     PRNs- •  sodium chloride    Functional Status Prior to Current Stroke/Louisa Score: 4    NIH Stroke Scale  Time: 1930  Person Administering Scale: SHALONDA Aceves    1a  Level of consciousness: 0=alert; keenly responsive   1b. LOC questions:  2=Performs neither task correctly   1c. LOC commands: 0=Performs both tasks correctly   2.  Best Gaze: 0=normal   3.  Visual: 0=No visual loss   4. Facial Palsy:  0=Normal symmetric movement   5a.  Motor left arm: 0=No drift, limb holds 90 (or 45) degrees for full 10 seconds   5b.  Motor right arm: 0=No drift, limb holds 90 (or 45) degrees for full 10 seconds   6a. motor left le=No drift, limb holds 90 (or 45) degrees for full 10 seconds   6b  Motor right le=No drift, limb holds 90 (or 45) degrees for full 10 seconds   7. Limb Ataxia: 0=Absent   8.  Sensory: 0=Normal; no sensory loss   9. Best Language:  1=Mild to moderate aphasia; some obvious loss of fluency or facility of comprehension without significant limitation on ideas expressed or form of expression.   10. Dysarthria: 0=Normal   11. Extinction and Inattention: 0=No abnormality    Total:   3       Results Reviewed:  I have personally reviewed current lab, radiology, and data and agree with results.  Lab Results (last 24 hours)     Procedure Component Value Units Date/Time    COVID PRE-OP / PRE-PROCEDURE SCREENING ORDER (NO ISOLATION) - Swab, Nasopharynx [[id number]]  (Normal) Collected: 08/15/21 1954    Specimen: Swab from Nasopharynx Updated: 08/15/21 2033    Narrative:      The following orders were created for panel order COVID PRE-OP / PRE-PROCEDURE SCREENING ORDER (NO ISOLATION) - Swab, Nasopharynx.  Procedure                               Abnormality         Status                     ---------                               -----------         ------                     COVID-19 and FLU A/B PCR...[[id number]]  Normal              Final result                 Please view results for these tests on the individual orders.    COVID-19 and FLU A/B PCR - Swab, Nasopharynx [[id number]]  (Normal) Collected: 08/15/21 1954    Specimen: Swab from Nasopharynx Updated: 08/15/21 2033     COVID19 Not Detected     Influenza A PCR Not Detected     Influenza B PCR Not Detected    Narrative:      Fact sheet for providers: https://www.fda.gov/media/496641/download    Fact sheet for patients:  https://www.fda.gov/media/565308/download    Test performed by PCR.    TSH [[id number]]  (Normal) Collected: 08/15/21 1705    Specimen: Blood Updated: 08/15/21 1830     TSH 0.830 uIU/mL     [place] Draw [[id number]] Collected: 08/15/21 1705    Specimen: Blood Updated: 08/15/21 1815    Narrative:      The following orders were created for panel order [place] Draw.  Procedure                               Abnormality         Status                     ---------                               -----------         ------                     Green Top (Gel)[[id number]]                                  Final result               Lavender Top[[id number]]                                     Final result               Gold Top - SST[[id number]]                                   Final result               Gray Top[[id number]]                                         In process                   Please view results for these tests on the individual orders.    Green Top (Gel) [[id number]] Collected: 08/15/21 1705    Specimen: Blood Updated: 08/15/21 1815     Extra Tube Hold for add-ons.     Comment: Auto resulted.       Lavender Top [[id number]] Collected: 08/15/21 1705    Specimen: Blood Updated: 08/15/21 1815     Extra Tube hold for add-on     Comment: Auto resulted       Gold Top - SST [[id number]] Collected: 08/15/21 1705    Specimen: Blood Updated: 08/15/21 1815     Extra Tube Hold for add-ons.     Comment: Auto resulted.       Comprehensive Metabolic Panel [[id number]]  (Abnormal) Collected: 08/15/21 1705    Specimen: Blood Updated: 08/15/21 1736     Glucose 103 mg/dL      BUN 25 mg/dL      Creatinine 1.18 mg/dL      Sodium 139 mmol/L      Potassium 4.5 mmol/L      Comment: Slight hemolysis detected by analyzer. Results may be affected.        Chloride 102 mmol/L      CO2 28.0 mmol/L      Calcium 9.2 mg/dL      Total Protein 6.5 g/dL      Albumin 4.10 g/dL      ALT (SGPT) 10 U/L      AST (SGOT) 20 U/L      Alkaline Phosphatase 69 U/L       Total Bilirubin 0.4 mg/dL      eGFR Non African Amer 45 mL/min/1.73      Globulin 2.4 gm/dL      A/G Ratio 1.7 g/dL      BUN/Creatinine Ratio 21.2     Anion Gap 9.0 mmol/L     Narrative:      GFR Normal >60  Chronic Kidney Disease <60  Kidney Failure <15      Troponin [[id number]]  (Normal) Collected: 08/15/21 1705    Specimen: Blood Updated: 08/15/21 1736     Troponin T 0.022 ng/mL     Narrative:      Troponin T Reference Range:  <= 0.03 ng/mL-   Negative for AMI  >0.03 ng/mL-     Abnormal for myocardial necrosis.  Clinicians would have to utilize clinical acumen, EKG, Troponin and serial changes to determine if it is an Acute Myocardial Infarction or myocardial injury due to an underlying chronic condition.       Results may be falsely decreased if patient taking Biotin.      Magnesium [[id number]]  (Normal) Collected: 08/15/21 1705    Specimen: Blood Updated: 08/15/21 1735     Magnesium 2.0 mg/dL     Urinalysis With Microscopic If Indicated (No Culture) - Urine, Catheter [[id number]]  (Abnormal) Collected: 08/15/21 1705    Specimen: Urine, Catheter Updated: 08/15/21 1721     Color, UA Yellow     Appearance, UA Clear     pH, UA <=5.0     Specific Gravity, UA 1.022     Glucose, UA Negative     Ketones, UA Negative     Bilirubin, UA Negative     Blood, UA Negative     Protein, UA Trace     Leuk Esterase, UA Negative     Nitrite, UA Negative     Urobilinogen, UA 0.2 E.U./dL    Narrative:      Urine microscopic not indicated.    CBC & Differential [[id number]]  (Abnormal) Collected: 08/15/21 1705    Specimen: Blood Updated: 08/15/21 1715    Narrative:      The following orders were created for panel order CBC & Differential.  Procedure                               Abnormality         Status                     ---------                               -----------         ------                     CBC Auto Differential[[id number]]        Abnormal            Final result                 Please view results for these  tests on the individual orders.    CBC Auto Differential [[id number]]  (Abnormal) Collected: 08/15/21 1705    Specimen: Blood Updated: 08/15/21 1715     WBC 6.69 10*3/mm3      RBC 3.31 10*6/mm3      Hemoglobin 10.4 g/dL      Hematocrit 33.1 %      .0 fL      MCH 31.4 pg      MCHC 31.4 g/dL      RDW 13.2 %      RDW-SD 48.4 fl      MPV 10.1 fL      Platelets 146 10*3/mm3      Neutrophil % 65.1 %      Lymphocyte % 24.8 %      Monocyte % 9.4 %      Eosinophil % 0.1 %      Basophil % 0.3 %      Immature Grans % 0.3 %      Neutrophils, Absolute 4.35 10*3/mm3      Lymphocytes, Absolute 1.66 10*3/mm3      Monocytes, Absolute 0.63 10*3/mm3      Eosinophils, Absolute 0.01 10*3/mm3      Basophils, Absolute 0.02 10*3/mm3      Immature Grans, Absolute 0.02 10*3/mm3      nRBC 0.0 /100 WBC     [place] Top [[id number]] Collected: 08/15/21 1706    Specimen: Blood Updated: 08/15/21 1710        Imaging Results (Last 24 Hours)     Procedure Component Value Units Date/Time    XR Humerus Left [[id number]] Collected: 08/15/21 1922     Updated: 08/15/21 1924    Narrative:      CR Humerus Min 2 Vws LT    INDICATION:   Acute left arm pain.    COMPARISON:   None available.    FINDINGS:   2 views of the left humerus.  No fracture or dislocation.  No bone erosion or destruction.  Articulation at the shoulder and elbow is anatomic.  No foreign body.      Impression:      No definite acute fracture or subluxation.    Signer Name: Ana María White MD   Signed: 8/15/2021 7:22 PM   Workstation Name: [id number]    [facility]    CT Chest Without Contrast Diagnostic [[id number]] Collected: 08/15/21 1908     Updated: 08/15/21 1910    Narrative:      CT Chest WO, CT Abdomen Pelvis WO    INDICATION:    Multiple falls with generalized weakness.    TECHNIQUE:   CT of the chest, abdomen and pelvis without IV contrast. Coronal and sagittal reconstructions were obtained.  Radiation dose reduction techniques included automated exposure  control or exposure modulation based on body size. Count of known CT and cardiac  nuc med studies performed in previous 12 months: 0.      COMPARISON:    CT abdomen and pelvis 8/17/2020    FINDINGS:  Chest: Right middle lobe and lingular atelectasis. No focal pneumonitis. No effusions. Cardiomegaly. Pacemaker leads noted. Aortic atherosclerotic changes without aneurysm. No adenopathy or central mass.    ABDOMEN: Liver and spleen unremarkable. Gallbladder surgically absent. Pancreas, kidneys and adrenal glands are unremarkable. The visualized GI tract unremarkable. Diffuse aortic atherosclerotic change without aneurysm.    Pelvis: Bladder minimally distended. Uterus and adnexa are unremarkable. Multiple healed and/or healing right posterior rib fractures with probable subacute right posterior 10th rib fracture. Diffuse osteopenia. Diffuse degenerative changes thoracic and  lumbar spine. Grade 1 spondylolisthesis L4 on L5 likely on the basis of facet arthropathy. Moderate L4-L5 spinal stenosis.      Impression:      Small amount right middle lobe and lingular atelectasis.    Multiple healed or healing right posterior rib fractures with probable subacute right posterior 10th rib fracture.    No acute abdominal or pelvic pathology.    Generalized osteopenia with moderately advanced thoracic and lumbar degenerative disc disease and facet arthropathy with moderate L4-L5 spinal stenosis.    Signer Name: HOMERO Echols MD   Signed: 8/15/2021 7:08 PM   [facility]: [email or web address]    [facility]    CT Abdomen Pelvis Without Contrast [[id number]] Collected: 08/15/21 1908     Updated: 08/15/21 1910    Narrative:      CT Chest WO, CT Abdomen Pelvis WO    INDICATION:    Multiple falls with generalized weakness.    TECHNIQUE:   CT of the chest, abdomen and pelvis without IV contrast. Coronal and sagittal reconstructions were obtained.  Radiation dose reduction techniques included automated exposure  control or exposure modulation based on body size. Count of known CT and cardiac  nuc med studies performed in previous 12 months: 0.      COMPARISON:    CT abdomen and pelvis 8/17/2020    FINDINGS:  Chest: Right middle lobe and lingular atelectasis. No focal pneumonitis. No effusions. Cardiomegaly. Pacemaker leads noted. Aortic atherosclerotic changes without aneurysm. No adenopathy or central mass.    ABDOMEN: Liver and spleen unremarkable. Gallbladder surgically absent. Pancreas, kidneys and adrenal glands are unremarkable. The visualized GI tract unremarkable. Diffuse aortic atherosclerotic change without aneurysm.    Pelvis: Bladder minimally distended. Uterus and adnexa are unremarkable. Multiple healed and/or healing right posterior rib fractures with probable subacute right posterior 10th rib fracture. Diffuse osteopenia. Diffuse degenerative changes thoracic and  lumbar spine. Grade 1 spondylolisthesis L4 on L5 likely on the basis of facet arthropathy. Moderate L4-L5 spinal stenosis.      Impression:      Small amount right middle lobe and lingular atelectasis.    Multiple healed or healing right posterior rib fractures with probable subacute right posterior 10th rib fracture.    No acute abdominal or pelvic pathology.    Generalized osteopenia with moderately advanced thoracic and lumbar degenerative disc disease and facet arthropathy with moderate L4-L5 spinal stenosis.    Signer Name: HOMERO Echols MD   Signed: 8/15/2021 7:08 PM   Workstation [facility]: [email or web address]    [facility]    CT Head Without Contrast [[id number]] Collected: 08/15/21 1900     Updated: 08/15/21 1902    Narrative:      CT Head WO    HISTORY:   Multiple falls. Generalized weakness.    TECHNIQUE:   Axial unenhanced head CT. Radiation dose reduction techniques included automated exposure control or exposure modulation based on body size. Count of known CT and cardiac nuc med studies performed in previous 12  months: 0.     Time of scan: 1841 hours    COMPARISON:   3/31/2017    FINDINGS:   No intracranial hemorrhage, mass, or infarct. No hydrocephalus or extra-axial fluid collection. Prominence of sulci and CSF spaces overlying both frontal lobes compatible with age related atrophy. Mild ventriculomegaly. The skull base, calvarium, and  extracranial soft tissues are normal.      Impression:      Senescent changes without acute abnormality.          Signer Name: HOMERO Echols MD   Signed: 8/15/2021 7:00 PM   Workstation Name: [facility]    [facility]    XR Chest 1 View [[id number]] Collected: 08/15/21 1739     Updated: 08/15/21 1740    Narrative:         EXAMINATION: XR CHEST 1 VW-      INDICATION: Weak/Dizzy/AMS triage protocol      COMPARISON: Chest x-ray 04/16/2016     FINDINGS: Cardiac size enlarged. No overt edema. No pneumothorax or  pleural effusion. Degenerative changes of the spine.           Impression:      Cardiomegaly without overt edema or effusion.              Results for orders placed in visit on 09/22/17    Adult Transthoracic Echo Complete W/ Cont if Necessary Per Protocol    Interpretation Summary  · Left ventricular systolic function is normal. Estimated EF = 60%.  · Moderate aortic valve regurgitation is present.  · Mild aortic valve stenosis is present.  · Ao mean PG 10 mmHg  · Mild-to-moderate mitral valve regurgitation is present  · Mild tricuspid valve regurgitation is present.  · Calculated right ventricular systolic pressure from tricuspid regurgitation is 28 mmHg.      Assessment/Plan:  Mrs Rod is a 75 year old white female with known diagnosis of Lewy body dementia, hyperlipidemia, hypertension, type 2 diabetes, hypothyroidism, questionable Parkinson's, chronic kidney disease, anemia, CHF, sick sinus syndrome status post pacemaker placement that has had gradual functional decline over the last 2 to 3 weeks, worse in the past 2 to 3 days with increased  falls.    Imaging/labs reviewed  -CT head no acute change      1. Generalized weakness  -MRI brain without contrast when cleared by cardiology  -MRA head/neck when cleared by cardiology  -Initiate TIA/AIS order set  -Aspirin 81 mg daily  -Normal blood pressure goals okay  -PT/OT/SLP  -Activity as tolerated  -Echo with bubble study  -Lipid panel and A1c in a.m.  -Lipitor 80 mg p.o. nightly    Thank you for the consult.  Neurology will continue to follow.  Case discussed with daughter and Dr. Gonzalez.    Ericka Elias, APRN  August 15, 2021  20:41 EDT

## 2021-08-16 NOTE — CONSULTS
Neurology    Referring provider:   No referring provider defined for this encounter.    Reason for Consultation: Lewy body dementia    Chief complaint: Frequent falls    History of present illness: 75-year-old woman seen for Dr. Pope for falling.    She is a longstanding patient of Dr. Dr. Vaughn Hamm with a diagnosis of Lewy body disease since 2015.    She has continued to deteriorate and is at the point now where she falls and her family cannot get her back up.    She is in chronic pain from her neck and her back and is taking Tylenol which does not help.  She has chronic renal disease which precludes the use of nonsteroidals orally.    She is still eating and swallows with only minimal difficulty.    She is essentially bedfast.    She has had some behavior issues with hallucinations delusions and occasional aggressive behavior.    She has been on Seroquel 50 mg at nighttime for that does reasonably well at home.    She is demented in all spheres.    She cannot give an adequate history.        Review of Systems: Patient cannot give a review of system.  Her daughter is her Primary caregiver.  She is .        Home meds:   Medications Prior to Admission   Medication Sig Dispense Refill Last Dose   • acetaminophen (TYLENOL) 650 MG 8 hr tablet Take 650 mg by mouth 2 (Two) Times a Day As Needed.      • acetaminophen-codeine (TYLENOL #3) 300-30 MG per tablet Take 1 tablet by mouth Every 4 (Four) Hours As Needed for Moderate Pain . 15 tablet 0    • amLODIPine (NORVASC) 2.5 MG tablet TAKE 1 TABLET BY MOUTH  DAILY 90 tablet 3    • aspirin 81 MG EC tablet Take 81 mg by mouth Daily.      • atorvastatin (LIPITOR) 20 MG tablet TAKE 1 TABLET BY MOUTH  DAILY 90 tablet 3    • carbidopa-levodopa (SINEMET)  MG per tablet Take 1 tablet by mouth 4 (Four) Times a Day. 120 tablet 11    • Cholecalciferol (VITAMIN D3) 1.25 MG (33901 UT) tablet Take 1 tablet by mouth 1 (One) Time Per Week. 4 tablet 1    • donepezil  (ARICEPT) 10 MG tablet Take 1 tablet by mouth 2 (Two) Times a Day. 180 tablet 3    • escitalopram (LEXAPRO) 20 MG tablet TAKE 1 TABLET BY MOUTH  DAILY 90 tablet 3    • Fe Fum-FePoly-Vit C-Vit B3 (INTEGRA) 62.5-62.5-40-3 MG capsule TAKE ONE CAPSULE BY MOUTH DAILY 30 capsule 0    • glucose blood (MADDY CONTOUR TEST) test strip 1-2 times daily Use as instructed 50 each 12    • glucose blood (CONTOUR NEXT TEST) test strip Use to test once or twice a day as directed 100 each 11    • ibandronate (Boniva) 150 MG tablet Take 1 tablet by mouth Every 30 (Thirty) Days. 3 tablet 1    • Insulin Glargine (Lantus SoloStar) 100 UNIT/ML injection pen Inject 12 units subcu nightly. 30 mL 6    • Insulin Pen Needle (B-D UF III MINI PEN NEEDLES) 31G X 5 MM misc USE WITH LANTUS INSULIN 90 each 3    • levothyroxine (SYNTHROID, LEVOTHROID) 125 MCG tablet TAKE 1 TABLET BY MOUTH  DAILY 90 tablet 3    • Loratadine 10 MG capsule Take 1 capsule by mouth As Needed (allergy).      • memantine (NAMENDA) 10 MG tablet TAKE 1 TABLET BY MOUTH  TWICE DAILY 180 tablet 3    • mupirocin (Bactroban) 2 % ointment Apply  topically to the appropriate area as directed 3 (Three) Times a Day. 30 g 3    • Myrbetriq 25 MG tablet sustained-release 24 hour 24 hr tablet TAKE 1 TABLET BY MOUTH  DAILY 90 tablet 3    • nystatin (MYCOSTATIN) 014460 UNIT/GM powder Apply  topically to the appropriate area as directed 3 (Three) Times a Day. 60 g 2    • ondansetron ODT (ZOFRAN-ODT) 4 MG disintegrating tablet Place 1 tablet on the tongue 4 (Four) Times a Day As Needed for Nausea or Vomiting. 15 tablet 0    • QUEtiapine (SEROquel) 25 MG tablet Take 2 tablets by mouth Every Night. 180 tablet 0    • traMADol (ULTRAM) 50 MG tablet Take 0.5-1 tab PO BID PRN moderate-severe pain 60 tablet 0        History  Past Medical History:   Diagnosis Date   • Anemia    • Arthritis    • Colon polyp    • Diabetes mellitus (CMS/HCC)    • Disease of thyroid gland    • Heart disease    • Heart  "valve disease    • Hyperlipidemia    • Hypertension    • Hypothyroidism    • Memory loss    • Pacemaker    • Shortness of breath 2/3/2017   • Sick sinus syndrome (CMS/HCC)    ,   Past Surgical History:   Procedure Laterality Date   • CATARACT EXTRACTION Bilateral    • CHOLECYSTECTOMY     • PACEMAKER IMPLANTATION     ,   Family History   Problem Relation Age of Onset   • Mental illness Mother    • Stroke Mother    • Arthritis Father    • Diabetes Father    • Heart attack Father    • Kidney disease Brother    • Thyroid disease Brother    • Hypertension Daughter    • Obesity Daughter    ,   Social History     Tobacco Use   • Smoking status: Never Smoker   • Smokeless tobacco: Never Used   Substance Use Topics   • Alcohol use: No   • Drug use: No    and Allergies:  Patient has no known allergies.,    Vital Signs   Blood pressure 153/66, pulse 65, temperature 98.8 °F (37.1 °C), temperature source Axillary, resp. rate 16, height 154.9 cm (61\"), weight 74.8 kg (165 lb), SpO2 92 %.  Body mass index is 31.18 kg/m².    Physical Exam:   General: Elderly white female              Head: No trauma              Neck: No bruits              Resp: Normal breath sounds              Cor: Regular rhythm              Extremities: No edema              Skin: Warm and dry              Neuro: Mentally the patient is vague distant and is unable to answer simple questions.    She can follow commands.    Speech is articulate soft and rambling.    Cranial nerves show a positive Myerson sign.  Eye movements are full.  Conjugate.    Fundi could not be visualized.    Face is shows limited mobility palate elevates normally tongue protrudes midline.    Reflexes are plus minus throughout.    Motor testing shows moderate resting tremor about 3/s.  It a bit more on the left than the right.    Motor testing shows diffuse weakness and moderately severe cogwheel rigidity.        Results Review: Creatinine is 1.19.  BUN is 22.    GFR is 44.    Hemoglobin " A1c is 6.4.    Labs:  Lab Results (last 72 hours)     Procedure Component Value Units Date/Time    Folate [[id number]]  (Normal) Collected: 08/15/21 1705    Specimen: Blood Updated: 08/16/21 0937     Folate 19.20 ng/mL     Narrative:      Results may be falsely increased if patient taking Biotin.      Vitamin B12 [[id number]]  (Abnormal) Collected: 08/15/21 1705    Specimen: Blood Updated: 08/16/21 0937     Vitamin B-12 153 pg/mL     Narrative:      Results may be falsely increased if patient taking Biotin.      POC Glucose Once [[id number]]  (Normal) Collected: 08/16/21 0742    Specimen: Blood Updated: 08/16/21 0745     Glucose 114 mg/dL      Comment: Meter: [id number] : [id number] Rambo Gomez       POC Glucose Once [[id number]]  (Normal) Collected: 08/16/21 0557    Specimen: Blood Updated: 08/16/21 0559     Glucose 108 mg/dL      Comment: Meter: [id number] : [id number] Jeni Beard       Lipid Panel [[id number]] Collected: 08/16/21 0303    Specimen: Blood Updated: 08/16/21 0407     Total Cholesterol 118 mg/dL      Triglycerides 104 mg/dL      HDL Cholesterol 48 mg/dL      LDL Cholesterol  51 mg/dL      VLDL Cholesterol 19 mg/dL      LDL/HDL Ratio 1.03    Narrative:      Cholesterol Reference Ranges  (U.S. Department of Health and Human Services ATP III Classifications)    Desirable          <200 mg/dL  Borderline High    200-239 mg/dL  High Risk          >240 mg/dL      Triglyceride Reference Ranges  (U.S. Department of Health and Human Services ATP III Classifications)    Normal           <150 mg/dL  Borderline High  150-199 mg/dL  High             200-499 mg/dL  Very High        >500 mg/dL    HDL Reference Ranges  (U.S. Department of Health and Human Services ATP III Classifcations)    Low     <40 mg/dl (major risk factor for CHD)  High    >60 mg/dl ('negative' risk factor for CHD)        LDL Reference Ranges  (U.S. Department of Health and Human Services ATP III Classifcations)    Optimal          <100  mg/dL  Near Optimal     100-129 mg/dL  Borderline High  130-159 mg/dL  High             160-189 mg/dL  Very High        >189 mg/dL    Basic Metabolic Panel [[id number]]  (Abnormal) Collected: 08/16/21 0303    Specimen: Blood Updated: 08/16/21 0407     Glucose 104 mg/dL      BUN 22 mg/dL      Creatinine 1.19 mg/dL      Sodium 135 mmol/L      Potassium 4.2 mmol/L      Chloride 101 mmol/L      CO2 23.0 mmol/L      Calcium 9.1 mg/dL      eGFR Non African Amer 44 mL/min/1.73      BUN/Creatinine Ratio 18.5     Anion Gap 11.0 mmol/L     Narrative:      GFR Normal >60  Chronic Kidney Disease <60  Kidney Failure <15      Magnesium [[id number]]  (Normal) Collected: 08/16/21 0303    Specimen: Blood Updated: 08/16/21 0407     Magnesium 1.8 mg/dL     Hemoglobin A1c [[id number]]  (Abnormal) Collected: 08/16/21 0303    Specimen: Blood Updated: 08/16/21 0405     Hemoglobin A1C 6.40 %     Narrative:      Hemoglobin A1C Ranges:    Increased Risk for Diabetes  5.7% to 6.4%  Diabetes                     >= 6.5%  Diabetic Goal                < 7.0%    Scan Slide [[id number]]  (Normal) Collected: 08/16/21 0303    Specimen: Blood Updated: 08/16/21 0341     RBC Morphology Normal     WBC Morphology Normal     Platelet Morphology Normal    CBC Auto Differential [[id number]]  (Abnormal) Collected: 08/16/21 0303    Specimen: Blood Updated: 08/16/21 0340     WBC 5.66 10*3/mm3      RBC 3.19 10*6/mm3      Hemoglobin 10.0 g/dL      Hematocrit 31.0 %      MCV 97.2 fL      MCH 31.3 pg      MCHC 32.3 g/dL      RDW 13.0 %      RDW-SD 46.5 fl      MPV 10.2 fL      Platelets 142 10*3/mm3      Neutrophil % 62.1 %      Lymphocyte % 26.5 %      Monocyte % 10.1 %      Eosinophil % 0.7 %      Basophil % 0.2 %      Immature Grans % 0.4 %      Neutrophils, Absolute 3.52 10*3/mm3      Lymphocytes, Absolute 1.50 10*3/mm3      Monocytes, Absolute 0.57 10*3/mm3      Eosinophils, Absolute 0.04 10*3/mm3      Basophils, Absolute 0.01 10*3/mm3      Immature Grans, Absolute  0.02 10*3/mm3      nRBC 0.0 /100 WBC     Ferritin [[id number]]  (Normal) Collected: 08/15/21 1705    Specimen: Blood Updated: 08/16/21 0200     Ferritin 26.98 ng/mL     Narrative:      Results may be falsely decreased if patient taking Biotin.      Iron Profile [[id number]]  (Abnormal) Collected: 08/15/21 1705    Specimen: Blood Updated: 08/16/21 0155     Iron 27 mcg/dL      Iron Saturation 7 %      Transferrin 251 mg/dL      TIBC 374 mcg/dL     Reticulocytes [[id number]]  (Normal) Collected: 08/15/21 1705    Specimen: Blood Updated: 08/16/21 0056     Reticulocyte % 1.40 %      Reticulocyte Absolute 0.0462 10*6/mm3     POC Glucose Once [[id number]]  (Normal) Collected: 08/15/21 2130    Specimen: Blood Updated: 08/15/21 2133     Glucose 97 mg/dL      Comment: Meter: [id number] : [id number] Derek [place]       [place] Draw [[id number]] Collected: 08/15/21 1705    Specimen: Blood Updated: 08/15/21 2115    Narrative:      The following orders were created for panel order [place] Draw.  Procedure                               Abnormality         Status                     ---------                               -----------         ------                     Green Top (Gel)[[id number]]                                  Final result               Lavender Top[[id number]]                                     Final result               Gold Top - SST[[id number]]                                   Final result               Gray Top[[id number]]                                         Final result                 Please view results for these tests on the individual orders.    Gray Top [[id number]] Collected: 08/15/21 1706    Specimen: Blood Updated: 08/15/21 2115     Extra Tube Hold for add-ons.     Comment: Auto resulted.       COVID PRE-OP / PRE-PROCEDURE SCREENING ORDER (NO ISOLATION) - Swab, Nasopharynx [[id number]]  (Normal) Collected: 08/15/21 1954    Specimen: Swab from Nasopharynx Updated: 08/15/21 2033    Narrative:      The  following orders were created for panel order COVID PRE-OP / PRE-PROCEDURE SCREENING ORDER (NO ISOLATION) - Swab, Nasopharynx.  Procedure                               Abnormality         Status                     ---------                               -----------         ------                     COVID-19 and FLU A/B PCR...[[id number]]  Normal              Final result                 Please view results for these tests on the individual orders.    COVID-19 and FLU A/B PCR - Swab, Nasopharynx [[id number]]  (Normal) Collected: 08/15/21 1954    Specimen: Swab from Nasopharynx Updated: 08/15/21 2033     COVID19 Not Detected     Influenza A PCR Not Detected     Influenza B PCR Not Detected    Narrative:      Fact sheet for providers: https://www.fda.gov/media/076563/download    Fact sheet for patients: https://www.fda.gov/media/147772/download    Test performed by PCR.    TSH [[id number]]  (Normal) Collected: 08/15/21 1705    Specimen: Blood Updated: 08/15/21 1830     TSH 0.830 uIU/mL     Green Top (Gel) [[id number]] Collected: 08/15/21 1705    Specimen: Blood Updated: 08/15/21 1815     Extra Tube Hold for add-ons.     Comment: Auto resulted.       Lavender Top [[id number]] Collected: 08/15/21 1705    Specimen: Blood Updated: 08/15/21 1815     Extra Tube hold for add-on     Comment: Auto resulted       Gold Top - SST [[id number]] Collected: 08/15/21 1705    Specimen: Blood Updated: 08/15/21 1815     Extra Tube Hold for add-ons.     Comment: Auto resulted.       Comprehensive Metabolic Panel [[id number]]  (Abnormal) Collected: 08/15/21 1705    Specimen: Blood Updated: 08/15/21 1736     Glucose 103 mg/dL      BUN 25 mg/dL      Creatinine 1.18 mg/dL      Sodium 139 mmol/L      Potassium 4.5 mmol/L      Comment: Slight hemolysis detected by analyzer. Results may be affected.        Chloride 102 mmol/L      CO2 28.0 mmol/L      Calcium 9.2 mg/dL      Total Protein 6.5 g/dL      Albumin 4.10 g/dL      ALT (SGPT) 10 U/L      AST  (SGOT) 20 U/L      Alkaline Phosphatase 69 U/L      Total Bilirubin 0.4 mg/dL      eGFR Non African Amer 45 mL/min/1.73      Globulin 2.4 gm/dL      A/G Ratio 1.7 g/dL      BUN/Creatinine Ratio 21.2     Anion Gap 9.0 mmol/L     Narrative:      GFR Normal >60  Chronic Kidney Disease <60  Kidney Failure <15      Troponin [[id number]]  (Normal) Collected: 08/15/21 1705    Specimen: Blood Updated: 08/15/21 1736     Troponin T 0.022 ng/mL     Narrative:      Troponin T Reference Range:  <= 0.03 ng/mL-   Negative for AMI  >0.03 ng/mL-     Abnormal for myocardial necrosis.  Clinicians would have to utilize clinical acumen, EKG, Troponin and serial changes to determine if it is an Acute Myocardial Infarction or myocardial injury due to an underlying chronic condition.       Results may be falsely decreased if patient taking Biotin.      Magnesium [[id number]]  (Normal) Collected: 08/15/21 1705    Specimen: Blood Updated: 08/15/21 1735     Magnesium 2.0 mg/dL     Urinalysis With Microscopic If Indicated (No Culture) - Urine, Catheter [[id number]]  (Abnormal) Collected: 08/15/21 1705    Specimen: Urine, Catheter Updated: 08/15/21 1721     Color, UA Yellow     Appearance, UA Clear     pH, UA <=5.0     Specific Gravity, UA 1.022     Glucose, UA Negative     Ketones, UA Negative     Bilirubin, UA Negative     Blood, UA Negative     Protein, UA Trace     Leuk Esterase, UA Negative     Nitrite, UA Negative     Urobilinogen, UA 0.2 E.U./dL    Narrative:      Urine microscopic not indicated.    CBC & Differential [[id number]]  (Abnormal) Collected: 08/15/21 1705    Specimen: Blood Updated: 08/15/21 1715    Narrative:      The following orders were created for panel order CBC & Differential.  Procedure                               Abnormality         Status                     ---------                               -----------         ------                     CBC Auto Differential[[id number]]        Abnormal            Final result                  Please view results for these tests on the individual orders.    CBC Auto Differential [[id number]]  (Abnormal) Collected: 08/15/21 1705    Specimen: Blood Updated: 08/15/21 1715     WBC 6.69 10*3/mm3      RBC 3.31 10*6/mm3      Hemoglobin 10.4 g/dL      Hematocrit 33.1 %      .0 fL      MCH 31.4 pg      MCHC 31.4 g/dL      RDW 13.2 %      RDW-SD 48.4 fl      MPV 10.1 fL      Platelets 146 10*3/mm3      Neutrophil % 65.1 %      Lymphocyte % 24.8 %      Monocyte % 9.4 %      Eosinophil % 0.1 %      Basophil % 0.3 %      Immature Grans % 0.3 %      Neutrophils, Absolute 4.35 10*3/mm3      Lymphocytes, Absolute 1.66 10*3/mm3      Monocytes, Absolute 0.63 10*3/mm3      Eosinophils, Absolute 0.01 10*3/mm3      Basophils, Absolute 0.02 10*3/mm3      Immature Grans, Absolute 0.02 10*3/mm3      nRBC 0.0 /100 WBC           Rads:  Imaging Results (Last 72 Hours)     Procedure Component Value Units Date/Time    XR Chest 1 View [[id number]] Collected: 08/15/21 1739     Updated: 08/16/21 0920    Narrative:      EXAMINATION: XR CHEST 1 VW- 08/15/2021     INDICATION: Weak/Dizzy/AMS triage protocol      COMPARISON: Chest x-ray 04/16/2016     FINDINGS: Cardiac size enlarged. No overt edema. No pneumothorax or  pleural effusion. Degenerative changes of the spine.          Impression:      Cardiomegaly without overt edema or effusion.     D:  08/15/2021  E:  08/16/2021          XR Humerus Left [[id number]] Collected: 08/15/21 1922     Updated: 08/15/21 1924    Narrative:      CR Humerus Min 2 Vws LT    INDICATION:   Acute left arm pain.    COMPARISON:   None available.    FINDINGS:   2 views of the left humerus.  No fracture or dislocation.  No bone erosion or destruction.  Articulation at the shoulder and elbow is anatomic.  No foreign body.      Impression:      No definite acute fracture or subluxation.    Signer Name: Ana María White MD   Signed: 8/15/2021 7:22 PM   Workstation Name: [id number]    [facility]  [facility]    CT Chest Without Contrast Diagnostic [[id number]] Collected: 08/15/21 1908     Updated: 08/15/21 1910    Narrative:      CT Chest WO, CT Abdomen Pelvis WO    INDICATION:    Multiple falls with generalized weakness.    TECHNIQUE:   CT of the chest, abdomen and pelvis without IV contrast. Coronal and sagittal reconstructions were obtained.  Radiation dose reduction techniques included automated exposure control or exposure modulation based on body size. Count of known CT and cardiac  nuc med studies performed in previous 12 months: 0.      COMPARISON:    CT abdomen and pelvis 8/17/2020    FINDINGS:  Chest: Right middle lobe and lingular atelectasis. No focal pneumonitis. No effusions. Cardiomegaly. Pacemaker leads noted. Aortic atherosclerotic changes without aneurysm. No adenopathy or central mass.    ABDOMEN: Liver and spleen unremarkable. Gallbladder surgically absent. Pancreas, kidneys and adrenal glands are unremarkable. The visualized GI tract unremarkable. Diffuse aortic atherosclerotic change without aneurysm.    Pelvis: Bladder minimally distended. Uterus and adnexa are unremarkable. Multiple healed and/or healing right posterior rib fractures with probable subacute right posterior 10th rib fracture. Diffuse osteopenia. Diffuse degenerative changes thoracic and  lumbar spine. Grade 1 spondylolisthesis L4 on L5 likely on the basis of facet arthropathy. Moderate L4-L5 spinal stenosis.      Impression:      Small amount right middle lobe and lingular atelectasis.    Multiple healed or healing right posterior rib fractures with probable subacute right posterior 10th rib fracture.    No acute abdominal or pelvic pathology.    Generalized osteopenia with moderately advanced thoracic and lumbar degenerative disc disease and facet arthropathy with moderate L4-L5 spinal stenosis.    Signer Name: HOMERO Echols MD   Signed: 8/15/2021 7:08 PM   Workstation Name: [facility]    [facility]  [facility]    CT Abdomen Pelvis Without Contrast [[id number]] Collected: 08/15/21 1908     Updated: 08/15/21 1910    Narrative:      CT Chest WO, CT Abdomen Pelvis WO    INDICATION:    Multiple falls with generalized weakness.    TECHNIQUE:   CT of the chest, abdomen and pelvis without IV contrast. Coronal and sagittal reconstructions were obtained.  Radiation dose reduction techniques included automated exposure control or exposure modulation based on body size. Count of known CT and cardiac  nuc med studies performed in previous 12 months: 0.      COMPARISON:    CT abdomen and pelvis 8/17/2020    FINDINGS:  Chest: Right middle lobe and lingular atelectasis. No focal pneumonitis. No effusions. Cardiomegaly. Pacemaker leads noted. Aortic atherosclerotic changes without aneurysm. No adenopathy or central mass.    ABDOMEN: Liver and spleen unremarkable. Gallbladder surgically absent. Pancreas, kidneys and adrenal glands are unremarkable. The visualized GI tract unremarkable. Diffuse aortic atherosclerotic change without aneurysm.    Pelvis: Bladder minimally distended. Uterus and adnexa are unremarkable. Multiple healed and/or healing right posterior rib fractures with probable subacute right posterior 10th rib fracture. Diffuse osteopenia. Diffuse degenerative changes thoracic and  lumbar spine. Grade 1 spondylolisthesis L4 on L5 likely on the basis of facet arthropathy. Moderate L4-L5 spinal stenosis.      Impression:      Small amount right middle lobe and lingular atelectasis.    Multiple healed or healing right posterior rib fractures with probable subacute right posterior 10th rib fracture.    No acute abdominal or pelvic pathology.    Generalized osteopenia with moderately advanced thoracic and lumbar degenerative disc disease and facet arthropathy with moderate L4-L5 spinal stenosis.    Signer Name: HOMERO Echols MD   Signed: 8/15/2021 7:08 PM   Workstation Name: [facility]    [facility]  [place]    CT Head Without Contrast [[id number]] Collected: 08/15/21 1900     Updated: 08/15/21 1902    Narrative:      CT Head WO    HISTORY:   Multiple falls. Generalized weakness.    TECHNIQUE:   Axial unenhanced head CT. Radiation dose reduction techniques included automated exposure control or exposure modulation based on body size. Count of known CT and cardiac nuc med studies performed in previous 12 months: 0.     Time of scan: 1841 hours    COMPARISON:   3/31/2017    FINDINGS:   No intracranial hemorrhage, mass, or infarct. No hydrocephalus or extra-axial fluid collection. Prominence of sulci and CSF spaces overlying both frontal lobes compatible with age related atrophy. Mild ventriculomegaly. The skull base, calvarium, and  extracranial soft tissues are normal.      Impression:      Senescent changes without acute abnormality.          Signer Name: HOMERO Echols MD   Signed: 8/15/2021 7:00 PM   Workstation Name: [email or web address] [place] [place]    [facility] [place]            Assessment: Lewy body dementia, approaching end-stage.    Frequent falls.    Poor quality of life.    Degenerative disease of the cervical and lumbar spine with chronic pain.    Behavioral issues associated with dementia, stable on Seroquel    Left wrist pain         Plan:    Discontinue MRI.    X-ray left wrist    Nursing home placement    Palliative care consult.    I think the patient is a candidate for hospice.    Comment:   The patient's medicines are stable and do not seem to be of great benefit at this point.    The patient seems to be in a functional decline which is irreversible and with combination of increasing immobility and dementia she seems unlikely to survive long-term.    I discussed the patients findings and my recommendations with family      Kalpesh Gurorla MD  08/16/21  11:19 EDT

## 2021-08-16 NOTE — CASE MANAGEMENT/SOCIAL WORK
Discharge Planning Assessment  [facility]     Patient Name: Faye Rod  MRN: [id number]  Today's Date: 8/16/2021    Admit Date: 8/15/2021    Discharge Needs Assessment     Row Name 08/16/21 1624       Living Environment    Lives With  child(chante), adult    Name(s) of Who Lives With Patient  Shila Floyd ( daughter)    Current Living Arrangements  home/apartment/condo    Primary Care Provided by  child(chante)    Provides Primary Care For  no one    Family Caregiver if Needed  child(chante), adult    Quality of Family Relationships  helpful;involved;supportive    Able to Return to Prior Arrangements  no       Resource/Environmental Concerns    Transportation Concerns  car, none       Transition Planning    Patient/Family Anticipates Transition to  inpatient rehabilitation facility;long-term care facility    Patient/Family Anticipated Services at Transition  skilled nursing;rehabilitation services    Transportation Anticipated  family or friend will provide       Discharge Needs Assessment    Readmission Within the Last 30 Days  no previous admission in last 30 days    Equipment Currently Used at Home  walker, rolling;rollator    Concerns to be Addressed  basic needs;discharge planning    Anticipated Changes Related to Illness  inability to care for self    Outpatient/Agency/Support Group Needs  skilled nursing facility    Provided Post Acute Provider List?  Yes    Post Acute Provider List  Nursing Home    Delivered To  Support Person    Method of Delivery  In person    Current Discharge Risk  dependent with mobility/activities of daily living;cognitively impaired        Discharge Plan     Row Name 08/16/21 1630       Plan    Plan  SNF    Patient/Family in Agreement with Plan  yes    Plan Comments  chart reviewed. I met with daughter at bedside to initiate d/c planning. Daughter states Ms Rod lives with her,she has been primary caregiver. Ms Rod had been ambulatory in the home with her walker and  assistance from daughter, but has declined in mobility the past 2-3 weeks and daughter does not feel she can manage her care. She would like to pursue SNF with possiblity of transitioning to long term care. List/options provided. She has requested a referral to [facility]. Referrals done. Await call back re: beds available    Final Discharge Disposition Code  03 - skilled nursing facility (SNF)        Continued Care and Services - Admitted Since 8/15/2021    Coordination has not been started for this encounter.         Demographic Summary     Row Name 08/16/21 1620       General Information    Admission Type  observation    Arrived From  home    Referral Source  physician    Reason for Consult  discharge planning    Preferred Language  English    General Information Comments  PCP- Sarai Hawley       Contact Information    Permission Granted to Share Info With  family/designee;    Contact Information Comments  Shila Claire ( daughter and POA) [phone]        Functional Status     Row Name 08/16/21 1620       Functional Status    Usual Activity Tolerance  fair    Current Activity Tolerance  -- see therapy notes       Functional Status, IADL    Medications  assistive person    Meal Preparation  assistive person    Housekeeping  assistive person    Laundry  assistive person    Shopping  completely dependent       Mental Status    General Appearance WDL  WDL       Mental Status Summary    Recent Changes in Mental Status/Cognitive Functioning  unable to assess       Employment/    Employment Status  disabled;retired    Employment/ Comments  insurance- [employer] and [employer]        Psychosocial    No documentation.       Abuse/Neglect    No documentation.       Legal    No documentation.       Substance Abuse    No documentation.       Patient Forms    No documentation.           Sonja C Kellerman, RN

## 2021-08-16 NOTE — NURSING NOTE
WOC consult for: pressure injury on coccyx     Assessment and Care provided:     1. Bilateral gluteal scabs with surrounding purple areas probably venous congestion not pressure     Recommendation(s): continue cleaning with blue wipes, foam spray q12 and prn with soiling. Apply z guard. Turn q2.    *Maintain good skin care, keep dry, turn q 2 hr with wedge if possible, keep heels elevated and offloaded with heel boots.    *Apply z-guard to bottom and bony prominences daily and as needed with incontinence episodes.  *Follow C.A.R.E protocol if medical devices (Bipap, slade, Ng tube, etc) are being used.      All skin interventions in place. JO ordered on nightshift. Head to toe assessment completed. WOC orders placed.  Discussed plan of care with RN. Thank you for consulting WOCN.  Will continue to follow due to low donna (11) and high risk for further breakdown. Please call with questions or if needs arise.

## 2021-08-16 NOTE — CONSULTS
Sarai Hawley PA-C  Consulting physician: Darryn Gurrola  Reason for referral: hospice referral, discussion  Chief Complaint   Patient presents with   • Weakness - Generalized     HPI:   75 year old white female with known diagnosis of Lewy body dementia, hyperlipidemia, hypertension, type 2 diabetes, hypothyroidism, questionable Parkinson's, chronic kidney disease, anemia, CHF, sick sinus syndrome status post pacemaker placement that has had gradual functional decline over the last 2 to 3 weeks, worse in the past 2 to 3 days with increased falls, presents to ED on 8/15/2021 after having more difficulty getting out of bed in the morning.  Her daughter, Shila, is her primary caregiver since initial diagnosis in 2016 and reports until 2 to 3 weeks ago she was ambulating independently, and has required a walker the past 2 to 3 weeks.  She has aphasia at baseline and daughter reports she seems more confused.  No recent fever, chills, cough.    Symptoms:   Daughter is feeding patient, reports patient usually feeds herself and has maintained appetite  AMS - patient has chronic visual hallucinations - people and animals, usually pleasant not disturbing hallucinations. Reports quetiapine has been effective  Chronic arthritic bone pain - across shoulders, lower neck with kyphosis, lower back transverse are primary sites.   Chronic bilateral feet neuropathy - patient has reported she feels like glass stuck in her feet.   Chronic anxiety after dementia diagnosis, quetiapine has assisted to manage  No nausea or constipation.  Incontinent of bowel and bladder  Intermittent insomnia, usually is good sleeper.   Advance care planning discussed:   Code Status:   Current Code Status     Date Active Code Status Order ID Comments User Context       8/15/2021 2051 No CPR [id number]  Norah Patel, [facility]     Advance Care Planning Activity      Questions for Current Code Status     Question Answer Comment    Code Status No CPR      Medical Interventions (Level of Support Prior to Arrest) Limited     Limited Support to NOT Include Intubation      Cardioversion/Defibrillation      Dialysis     Level Of Support Discussed With Next of Kin (If No Surrogate)         Advance Directive: completed forms, none on medical record  Surrogate decision maker: daughterShila  Past Medical History:   Diagnosis Date   • Anemia    • Arthritis    • Colon polyp    • Diabetes mellitus (CMS/HCC)    • Disease of thyroid gland    • Heart disease    • Heart valve disease    • Hyperlipidemia    • Hypertension    • Hypothyroidism    • Memory loss    • Pacemaker    • Shortness of breath 2/3/2017   • Sick sinus syndrome (CMS/HCC)      Past Surgical History:   Procedure Laterality Date   • CATARACT EXTRACTION Bilateral    • CHOLECYSTECTOMY     • PACEMAKER IMPLANTATION         Reviewed current scheduled and prn medications for route, type, dose and frequency.    Current Facility-Administered Medications   Medication Dose Route Frequency Provider Last Rate Last Admin   • acetaminophen (TYLENOL) tablet 650 mg  650 mg Oral Q4H PRN Norah Patel APRN        Or   • acetaminophen (TYLENOL) 160 MG/5ML solution 650 mg  650 mg Oral Q4H PRN Norah Patel APRN        Or   • acetaminophen (TYLENOL) suppository 650 mg  650 mg Rectal Q4H PRN Norah Patel APRGEORGE       • amLODIPine (NORVASC) tablet 2.5 mg  2.5 mg Oral Daily Norah Patel APRN       • aspirin chewable tablet 81 mg  81 mg Oral Daily Ericka Elias APRN        Or   • aspirin suppository 300 mg  300 mg Rectal Daily Ericka Elias APRGEORGE       • atorvastatin (LIPITOR) tablet 80 mg  80 mg Oral Nightly Ericka Elias APRN       • carbidopa-levodopa (SINEMET)  MG per tablet 1 tablet  1 tablet Oral 4x Daily Norah Patel APRGEORGE       • dextrose (D50W) 25 g/ 50mL Intravenous Solution 25 g  25 g Intravenous Q15 Min PRN Norah Patel APRN       • dextrose  (GLUTOSE) oral gel 15 g  15 g Oral Q15 Min PRN Norah Patel APRN       • Diclofenac Sodium (VOLTAREN) 1 % gel 4 g  4 g Topical 4x Daily Kalpesh Gurrola MD       • donepezil (ARICEPT) tablet 10 mg  10 mg Oral BID Norah Patel APRN       • escitalopram (LEXAPRO) tablet 20 mg  20 mg Oral Daily Norah Patel APRN       • glucagon (human recombinant) (GLUCAGEN DIAGNOSTIC) injection 1 mg  1 mg Subcutaneous Q15 Min PRN Norah Patel APRGEORGE       • haloperidol lactate (HALDOL) injection 0.5 mg  0.5 mg Intravenous Once Nancy Wilder APRN       • insulin lispro (humaLOG) injection 0-7 Units  0-7 Units Subcutaneous TID AC Norah Patel APRN       • levothyroxine (SYNTHROID, LEVOTHROID) tablet 125 mcg  125 mcg Oral Q AM Norah Patel APRN       • lidocaine (LIDODERM) 5 % 1 patch  1 patch Transdermal Q24H Norah Patel APRGEORGE       • memantine (NAMENDA) tablet 10 mg  10 mg Oral BID Norah Patel APRN       • ondansetron (ZOFRAN) tablet 4 mg  4 mg Oral Q6H PRN Norah Patel APRN        Or   • ondansetron (ZOFRAN) injection 4 mg  4 mg Intravenous Q6H PRN Norah Patel APRN       • oxybutynin XL (DITROPAN-XL) 24 hr tablet 5 mg  5 mg Oral Daily Norah Patel APRN       • QUEtiapine (SEROquel) tablet 50 mg  50 mg Oral Nightly Norah Patel APRN       • sodium chloride 0.9 % flush 10 mL  10 mL Intravenous PRN Danny Leija DO       • sodium chloride 0.9 % flush 10 mL  10 mL Intravenous Q12H Ericka Elias APRN   10 mL at 08/16/21 0217   • sodium chloride 0.9 % flush 10 mL  10 mL Intravenous PRN Ericka Elias APRGEORGE       • traMADol (ULTRAM) tablet 25 mg  25 mg Oral Q6H PRN Norah Patel, SHALONDA            •  acetaminophen **OR** acetaminophen **OR** acetaminophen  •  dextrose  •  dextrose  •  glucagon (human recombinant)  •  ondansetron **OR** ondansetron  •  sodium chloride  •  sodium chloride  •  traMADol  No  "Known Allergies  Family History   Problem Relation Age of Onset   • Mental illness Mother    • Stroke Mother    • Arthritis Father    • Diabetes Father    • Heart attack Father    • Kidney disease Brother    • Thyroid disease Brother    • Hypertension Daughter    • Obesity Daughter      Social History     Socioeconomic History   • Marital status:      Spouse name: Not on file   • Number of children: Not on file   • Years of education: Not on file   • Highest education level: Not on file   Tobacco Use   • Smoking status: Never Smoker   • Smokeless tobacco: Never Used   Substance and Sexual Activity   • Alcohol use: No   • Drug use: No   • Sexual activity: Defer     Review of Systems - +/- per HPI and symptom review.    PPS: 30%   /66 (BP Location: Left arm, Patient Position: Lying)   Pulse 65   Temp 98.8 °F (37.1 °C) (Axillary)   Resp 16   Ht 154.9 cm (61\")   Wt 74.8 kg (165 lb)   SpO2 92%   BMI 31.18 kg/m²   74.8 kg (165 lb) Body mass index is 31.18 kg/m².  Intake & Output (last day)       08/15 0701 - 08/16 0700 08/16 0701 - 08/17 0700    P.O. 0 0    I.V. (mL/kg) 0 (0)     Total Intake(mL/kg) 0 (0) 0 (0)    Net 0 0              Physical Exam:  General Appearance: No acute distress, weak appearing  Head: Normocephalic without obvious abnormality, atraumatic  Eyes: Conjunctivae and sclerae normal, no icterus, HARLAN  Lungs: Clear to auscultation, respirations regular, even and non-labored  Heart: Regular rhythm and normal rate, normal S1  Abdomen: Normal bowel sounds, soft, non-distended, non-tender  Extremities: Moves all extremities, no redness, no cyanosis, no edema  Pulses: Distal pulses palpable and equal bilaterally  Skin: Warm and dry  Neurological: Awake, no eye contact, minimally interactive, no myoclonus, equal hand  and strength, able to lift lower extremities off bed    Reviewed labs and diagnostic results.  Lab Results   Component Value Date    HGBA1C 6.40 (H) 08/16/2021 "     Results from last 7 days   Lab Units 08/16/21  0303   WBC 10*3/mm3 5.66   HEMOGLOBIN g/dL 10.0*   HEMATOCRIT % 31.0*   PLATELETS 10*3/mm3 142     Results from last 7 days   Lab Units 08/16/21  0303 08/15/21  1705 08/15/21  1705   SODIUM mmol/L 135*   < > 139   POTASSIUM mmol/L 4.2   < > 4.5   CHLORIDE mmol/L 101   < > 102   CO2 mmol/L 23.0   < > 28.0   BUN mg/dL 22   < > 25*   CREATININE mg/dL 1.19*   < > 1.18*   CALCIUM mg/dL 9.1   < > 9.2   BILIRUBIN mg/dL  --   --  0.4   ALK PHOS U/L  --   --  69   ALT (SGPT) U/L  --   --  10   AST (SGOT) U/L  --   --  20   GLUCOSE mg/dL 104*   < > 103*    < > = values in this interval not displayed.     Results from last 7 days   Lab Units 08/16/21  0303   SODIUM mmol/L 135*   POTASSIUM mmol/L 4.2   CHLORIDE mmol/L 101   CO2 mmol/L 23.0   BUN mg/dL 22   CREATININE mg/dL 1.19*   GLUCOSE mg/dL 104*   CALCIUM mg/dL 9.1     Imaging Results (Last 72 Hours)     Procedure Component Value Units Date/Time    XR Chest 1 View [[id number]] Collected: 08/15/21 1739     Updated: 08/16/21 0920    Narrative:      EXAMINATION: XR CHEST 1 VW- 08/15/2021     INDICATION: Weak/Dizzy/AMS triage protocol      COMPARISON: Chest x-ray 04/16/2016     FINDINGS: Cardiac size enlarged. No overt edema. No pneumothorax or  pleural effusion. Degenerative changes of the spine.          Impression:      Cardiomegaly without overt edema or effusion.     D:  08/15/2021  E:  08/16/2021          XR Humerus Left [[id number]] Collected: 08/15/21 1922     Updated: 08/15/21 1924    Narrative:      CR Humerus Min 2 Vws LT    INDICATION:   Acute left arm pain.    COMPARISON:   None available.    FINDINGS:   2 views of the left humerus.  No fracture or dislocation.  No bone erosion or destruction.  Articulation at the shoulder and elbow is anatomic.  No foreign body.      Impression:      No definite acute fracture or subluxation.    Signer Name: Ana María White MD   Signed: 8/15/2021 7:22 PM   Workstation Name: [id number]     [facility]    CT Chest Without Contrast Diagnostic [[id number]] Collected: 08/15/21 1908     Updated: 08/15/21 1910    Narrative:      CT Chest WO, CT Abdomen Pelvis WO    INDICATION:    Multiple falls with generalized weakness.    TECHNIQUE:   CT of the chest, abdomen and pelvis without IV contrast. Coronal and sagittal reconstructions were obtained.  Radiation dose reduction techniques included automated exposure control or exposure modulation based on body size. Count of known CT and cardiac  nuc med studies performed in previous 12 months: 0.      COMPARISON:    CT abdomen and pelvis 8/17/2020    FINDINGS:  Chest: Right middle lobe and lingular atelectasis. No focal pneumonitis. No effusions. Cardiomegaly. Pacemaker leads noted. Aortic atherosclerotic changes without aneurysm. No adenopathy or central mass.    ABDOMEN: Liver and spleen unremarkable. Gallbladder surgically absent. Pancreas, kidneys and adrenal glands are unremarkable. The visualized GI tract unremarkable. Diffuse aortic atherosclerotic change without aneurysm.    Pelvis: Bladder minimally distended. Uterus and adnexa are unremarkable. Multiple healed and/or healing right posterior rib fractures with probable subacute right posterior 10th rib fracture. Diffuse osteopenia. Diffuse degenerative changes thoracic and  lumbar spine. Grade 1 spondylolisthesis L4 on L5 likely on the basis of facet arthropathy. Moderate L4-L5 spinal stenosis.      Impression:      Small amount right middle lobe and lingular atelectasis.    Multiple healed or healing right posterior rib fractures with probable subacute right posterior 10th rib fracture.    No acute abdominal or pelvic pathology.    Generalized osteopenia with moderately advanced thoracic and lumbar degenerative disc disease and facet arthropathy with moderate L4-L5 spinal stenosis.    Signer Name: HOMERO Echols MD   Signed: 8/15/2021 7:08 PM   Workstation Name: [id number]     [facility]    CT Abdomen Pelvis Without Contrast [[id number]] Collected: 08/15/21 1908     Updated: 08/15/21 1910    Narrative:      CT Chest WO, CT Abdomen Pelvis WO    INDICATION:    Multiple falls with generalized weakness.    TECHNIQUE:   CT of the chest, abdomen and pelvis without IV contrast. Coronal and sagittal reconstructions were obtained.  Radiation dose reduction techniques included automated exposure control or exposure modulation based on body size. Count of known CT and cardiac  nuc med studies performed in previous 12 months: 0.      COMPARISON:    CT abdomen and pelvis 8/17/2020    FINDINGS:  Chest: Right middle lobe and lingular atelectasis. No focal pneumonitis. No effusions. Cardiomegaly. Pacemaker leads noted. Aortic atherosclerotic changes without aneurysm. No adenopathy or central mass.    ABDOMEN: Liver and spleen unremarkable. Gallbladder surgically absent. Pancreas, kidneys and adrenal glands are unremarkable. The visualized GI tract unremarkable. Diffuse aortic atherosclerotic change without aneurysm.    Pelvis: Bladder minimally distended. Uterus and adnexa are unremarkable. Multiple healed and/or healing right posterior rib fractures with probable subacute right posterior 10th rib fracture. Diffuse osteopenia. Diffuse degenerative changes thoracic and  lumbar spine. Grade 1 spondylolisthesis L4 on L5 likely on the basis of facet arthropathy. Moderate L4-L5 spinal stenosis.      Impression:      Small amount right middle lobe and lingular atelectasis.    Multiple healed or healing right posterior rib fractures with probable subacute right posterior 10th rib fracture.    No acute abdominal or pelvic pathology.    Generalized osteopenia with moderately advanced thoracic and lumbar degenerative disc disease and facet arthropathy with moderate L4-L5 spinal stenosis.    Signer Name: HOMERO Echols MD   Signed: 8/15/2021 7:08 PM   Workstation Name: [id number]     [facility]    CT Head Without Contrast [[id number]] Collected: 08/15/21 1900     Updated: 08/15/21 1902    Narrative:      CT Head WO    HISTORY:   Multiple falls. Generalized weakness.    TECHNIQUE:   Axial unenhanced head CT. Radiation dose reduction techniques included automated exposure control or exposure modulation based on body size. Count of known CT and cardiac nuc med studies performed in previous 12 months: 0.     Time of scan: 1841 hours    COMPARISON:   3/31/2017    FINDINGS:   No intracranial hemorrhage, mass, or infarct. No hydrocephalus or extra-axial fluid collection. Prominence of sulci and CSF spaces overlying both frontal lobes compatible with age related atrophy. Mild ventriculomegaly. The skull base, calvarium, and  extracranial soft tissues are normal.      Impression:      Senescent changes without acute abnormality.          Signer Name: HOMERO Echols MD   Signed: 8/15/2021 7:00 PM   Workstation Name: [id number]    [facility]        Impression: 75 y.o. female with Lewy Body dementia, frequent falls  Plan:   Chronic musculoskeletal pain - lower back, transverse shoulders/lower neck.  Scheduled diclofenac ointment, scheduled acetamionophen and lidoderm patch  Prn hydrocodone/APAP    Anxiety, hallucinations - continue to monitor, patient has managed with scheduled quetiapine    Goals of care discussion - spoke with daughter at bedside, she is only family member.   Daughter has been caring for the patient since 2016 and is having more difficulties with current functional decline.  She knows that she will not be able to care for him with progressive weakness and less ability to transfer or assist with repositioning.   Discussed palliative and hospice services with facility or home.   Daughter is focused currently on SNF placement with transition to long term care bed.   Palliative team provide support to patient/family.     Eryn Smith,  SHALONDA  [phone]  08/16/21  11:20 EDT      Time: 60  minutes spent reviewing medical and medication records, assessing and examining patient, discussing with patient and family, answering questions, formulating a plan and documentation of care. > 50% time spent face to face

## 2021-08-16 NOTE — H&P
[facility] Medicine Services  HISTORY AND PHYSICAL    Patient Name: Faye Rod  : 1945  MRN: [id number]  Primary Care Physician: Sarai Hawley PA-C  Date of admission: 8/15/2021    Subjective   Subjective     Chief Complaint:  weakness    HPI:  Faye Rod is a 75 y.o. female with a history of hyperlipidemia, hypertension, T2DM, hypothyroidism, Lewy body dementia, CKD, falls, SSS s/p PPM, presents to the ED with complaints of weakness and falls.  Per the patient's daughter patient averages about 1 fall a month.  Over the last week she has had multiple falls including one on Friday where she fell in the bathroom, and then 1 yesterday where she fell backwards on her walker but was assisted to the floor by her daughter.  Couple of weeks ago the daughter was out of town and home instead stayed with the patient, and she was found sitting on the floor with an unwitnessed fall.  Daughter reports that the patient has had a loss of appetite today and some mild diarrhea.  She also has been complaining of some left upper extremity pain which prompted family to bring her to the ED for evaluation.  No recent fevers, chest pain, vomiting, or any other complaints per her daughter.  CT of chest/abdomen/pelvis shows small amount of right middle lobe and lingular atelectasis, multiple healed or healing right posterior rib fractures with probable subacute right posterior 10th rib fracture.  CT head shows senescent changes without acute abnormality.  Labs unremarkable.  MRI has been ordered by the ED to rule out stroke.  Daughter states that she is having difficulty taking care of the patient at home and is interested in placement.  Patient is being admitted to the hospital medicine service for further evaluation and management.      COVID Details:        Symptoms: [x] NONE [] Fever []  Cough [] Shortness of breath [] Change in taste or smell  The patient has a COVID HM  Topic on their chart, and they are fully vaccinated.    Review of Systems   Unable to perform ROS: Dementia      All other systems reviewed and are negative.     Personal History     Past Medical History:   Diagnosis Date   • Anemia    • Arthritis    • Colon polyp    • Diabetes mellitus (CMS/HCC)    • Disease of thyroid gland    • Heart disease    • Heart valve disease    • Hyperlipidemia    • Hypertension    • Hypothyroidism    • Memory loss    • Pacemaker    • Shortness of breath 2/3/2017   • Sick sinus syndrome (CMS/HCC)        Past Surgical History:   Procedure Laterality Date   • CATARACT EXTRACTION Bilateral    • CHOLECYSTECTOMY     • PACEMAKER IMPLANTATION         Family History:  family history includes Arthritis in her father; Diabetes in her father; Heart attack in her father; Hypertension in her daughter; Kidney disease in her brother; Mental illness in her mother; Obesity in her daughter; Stroke in her mother; Thyroid disease in her brother. Otherwise pertinent FHx was reviewed and unremarkable.     Social History:  reports that she has never smoked. She has never used smokeless tobacco. She reports that she does not drink alcohol and does not use drugs.  Social History     Social History Narrative   • Not on file       Medications:  Insulin Glargine, Insulin Pen Needle, Integra, Loratadine, Mirabegron ER, QUEtiapine, Vitamin D3, acetaminophen, acetaminophen-codeine, amLODIPine, aspirin, atorvastatin, carbidopa-levodopa, donepezil, escitalopram, glucose blood, ibandronate, levothyroxine, memantine, mupirocin, nystatin, ondansetron ODT, and traMADol    No Known Allergies    Objective   Objective     Vital Signs:   Temp:  [98 °F (36.7 °C)] 98 °F (36.7 °C)  Heart Rate:  [61-79] 68  Resp:  [16-18] 18  BP: ()/(47-88) 132/88    Physical Exam   Constitutional: Awake, alert, resting in bed, daughter at bedside  Eyes: PERRLA, sclerae anicteric, no conjunctival injection  HENT: NCAT, mucous membranes  moist  Neck: Supple, no thyromegaly, no lymphadenopathy, trachea midline  Respiratory: Clear to auscultation bilaterally, nonlabored respirations   Cardiovascular: RRR, no murmurs, rubs, or gallops, palpable pedal pulses bilaterally  Gastrointestinal: Positive bowel sounds, soft, nontender, nondistended  Musculoskeletal: No bilateral ankle edema, no clubbing or cyanosis to extremities  Psychiatric: Appropriate affect, cooperative  Neurologic: Oriented to self, strength symmetric in all extremities with weakness present, Cranial Nerves grossly intact to confrontation, speech clear  Skin: No rashes        Result Review:  I have personally reviewed the results from the time of this admission to 08/15/21 8:31 PM EDT and agree with these findings:  [x]  Laboratory  []  Microbiology  [x]  Radiology  [x]  EKG/Telemetry   []  Cardiology/Vascular   []  Pathology  []  Old records  []  Other:   Most notable findings include:       LAB RESULTS:      Lab 08/15/21  1705   WBC 6.69   HEMOGLOBIN 10.4*   HEMATOCRIT 33.1*   PLATELETS 146   NEUTROS ABS 4.35   IMMATURE GRANS (ABS) 0.02   LYMPHS ABS 1.66   MONOS ABS 0.63   EOS ABS 0.01   .0*         Lab 08/15/21  1705   SODIUM 139   POTASSIUM 4.5   CHLORIDE 102   CO2 28.0   ANION GAP 9.0   BUN 25*   CREATININE 1.18*   GLUCOSE 103*   CALCIUM 9.2   MAGNESIUM 2.0   TSH 0.830         Lab 08/15/21  1705   TOTAL PROTEIN 6.5   ALBUMIN 4.10   GLOBULIN 2.4   ALT (SGPT) 10   AST (SGOT) 20   BILIRUBIN 0.4   ALK PHOS 69         Lab 08/15/21  1705   TROPONIN T 0.022                 UA    Urinalysis 4/30/21 6/4/21 6/4/21 8/15/21     1518 1518    Squamous Epithelial Cells, UA   0-2    Specific Gravity, UA  >=1.030  1.022   Ketones, UA 15 mg/dL (A) Trace (A)  Negative   Blood, UA  Trace (A)  Negative   Leukocytes, UA 25 Cheko/ul (A) Negative  Negative   Nitrite, UA  Negative  Negative   RBC, UA   None Seen    WBC, UA   3-5 (A)    Bacteria, UA   3+ (A)    (A) Abnormal value            Microbiology  Results (last 10 days)     Procedure Component Value - Date/Time    COVID PRE-OP / PRE-PROCEDURE SCREENING ORDER (NO ISOLATION) - Swab, Nasopharynx [[id number]]  (Normal) Collected: 08/15/21 1954    Lab Status: Final result Specimen: Swab from Nasopharynx Updated: 08/15/21 2033    Narrative:      The following orders were created for panel order COVID PRE-OP / PRE-PROCEDURE SCREENING ORDER (NO ISOLATION) - Swab, Nasopharynx.  Procedure                               Abnormality         Status                     ---------                               -----------         ------                     COVID-19 and FLU A/B PCR...[[id number]]  Normal              Final result                 Please view results for these tests on the individual orders.    COVID-19 and FLU A/B PCR - Swab, Nasopharynx [[id number]]  (Normal) Collected: 08/15/21 1954    Lab Status: Final result Specimen: Swab from Nasopharynx Updated: 08/15/21 2033     COVID19 Not Detected     Influenza A PCR Not Detected     Influenza B PCR Not Detected    Narrative:      Fact sheet for providers: https://www.fda.gov/media/148375/download    Fact sheet for patients: https://www.fda.gov/media/969732/download    Test performed by PCR.          CT Abdomen Pelvis Without Contrast    Result Date: 8/15/2021  CT Chest WO, CT Abdomen Pelvis WO INDICATION:  Multiple falls with generalized weakness. TECHNIQUE: CT of the chest, abdomen and pelvis without IV contrast. Coronal and sagittal reconstructions were obtained.  Radiation dose reduction techniques included automated exposure control or exposure modulation based on body size. Count of known CT and cardiac nuc med studies performed in previous 12 months: 0.  COMPARISON:  CT abdomen and pelvis 8/17/2020 FINDINGS: Chest: Right middle lobe and lingular atelectasis. No focal pneumonitis. No effusions. Cardiomegaly. Pacemaker leads noted. Aortic atherosclerotic changes without aneurysm. No adenopathy or central mass.  ABDOMEN: Liver and spleen unremarkable. Gallbladder surgically absent. Pancreas, kidneys and adrenal glands are unremarkable. The visualized GI tract unremarkable. Diffuse aortic atherosclerotic change without aneurysm. Pelvis: Bladder minimally distended. Uterus and adnexa are unremarkable. Multiple healed and/or healing right posterior rib fractures with probable subacute right posterior 10th rib fracture. Diffuse osteopenia. Diffuse degenerative changes thoracic and lumbar spine. Grade 1 spondylolisthesis L4 on L5 likely on the basis of facet arthropathy. Moderate L4-L5 spinal stenosis.     Impression: Small amount right middle lobe and lingular atelectasis. Multiple healed or healing right posterior rib fractures with probable subacute right posterior 10th rib fracture. No acute abdominal or pelvic pathology. Generalized osteopenia with moderately advanced thoracic and lumbar degenerative disc disease and facet arthropathy with moderate L4-L5 spinal stenosis. Signer Name: HOMERO Echols MD  Signed: 8/15/2021 7:08 PM  Workstation Name: [id number]  [facility]    XR Humerus Left    Result Date: 8/15/2021  CR Humerus Min 2 Vws LT INDICATION: Acute left arm pain. COMPARISON: None available. FINDINGS: 2 views of the left humerus.  No fracture or dislocation.  No bone erosion or destruction.  Articulation at the shoulder and elbow is anatomic.  No foreign body.     Impression: No definite acute fracture or subluxation. Signer Name: Ana María White MD  Signed: 8/15/2021 7:22 PM  Workstation Name: [id number]  [facility]    CT Head Without Contrast    Result Date: 8/15/2021  CT Head WO HISTORY: Multiple falls. Generalized weakness. TECHNIQUE: Axial unenhanced head CT. Radiation dose reduction techniques included automated exposure control or exposure modulation based on body size. Count of known CT and cardiac nuc med studies performed in previous 12 months: 0. Time of  scan: 1841 hours COMPARISON: 3/31/2017 FINDINGS: No intracranial hemorrhage, mass, or infarct. No hydrocephalus or extra-axial fluid collection. Prominence of sulci and CSF spaces overlying both frontal lobes compatible with age related atrophy. Mild ventriculomegaly. The skull base, calvarium, and extracranial soft tissues are normal.     Impression: Senescent changes without acute abnormality. Signer Name: HOMERO Echols MD  Signed: 8/15/2021 7:00 PM  Workstation Name: [email or web address]  [facility]    CT Chest Without Contrast Diagnostic    Result Date: 8/15/2021  CT Chest WO, CT Abdomen Pelvis WO INDICATION:  Multiple falls with generalized weakness. TECHNIQUE: CT of the chest, abdomen and pelvis without IV contrast. Coronal and sagittal reconstructions were obtained.  Radiation dose reduction techniques included automated exposure control or exposure modulation based on body size. Count of known CT and cardiac nuc med studies performed in previous 12 months: 0.  COMPARISON:  CT abdomen and pelvis 8/17/2020 FINDINGS: Chest: Right middle lobe and lingular atelectasis. No focal pneumonitis. No effusions. Cardiomegaly. Pacemaker leads noted. Aortic atherosclerotic changes without aneurysm. No adenopathy or central mass. ABDOMEN: Liver and spleen unremarkable. Gallbladder surgically absent. Pancreas, kidneys and adrenal glands are unremarkable. The visualized GI tract unremarkable. Diffuse aortic atherosclerotic change without aneurysm. Pelvis: Bladder minimally distended. Uterus and adnexa are unremarkable. Multiple healed and/or healing right posterior rib fractures with probable subacute right posterior 10th rib fracture. Diffuse osteopenia. Diffuse degenerative changes thoracic and lumbar spine. Grade 1 spondylolisthesis L4 on L5 likely on the basis of facet arthropathy. Moderate L4-L5 spinal stenosis.     Impression: Small amount right middle lobe and lingular atelectasis. Multiple healed  or healing right posterior rib fractures with probable subacute right posterior 10th rib fracture. No acute abdominal or pelvic pathology. Generalized osteopenia with moderately advanced thoracic and lumbar degenerative disc disease and facet arthropathy with moderate L4-L5 spinal stenosis. Signer Name: HOMERO Echols MD  Signed: 8/15/2021 7:08 PM  Workstation Name: [email or web address]  [facility]    XR Chest 1 View    Result Date: 8/15/2021   EXAMINATION: XR CHEST 1 VW-  INDICATION: Weak/Dizzy/AMS triage protocol  COMPARISON: Chest x-ray 04/16/2016  FINDINGS: Cardiac size enlarged. No overt edema. No pneumothorax or pleural effusion. Degenerative changes of the spine.         Impression: Cardiomegaly without overt edema or effusion.         Results for orders placed in visit on 09/22/17    Adult Transthoracic Echo Complete W/ Cont if Necessary Per Protocol    Interpretation Summary  · Left ventricular systolic function is normal. Estimated EF = 60%.  · Moderate aortic valve regurgitation is present.  · Mild aortic valve stenosis is present.  · Ao mean PG 10 mmHg  · Mild-to-moderate mitral valve regurgitation is present  · Mild tricuspid valve regurgitation is present.  · Calculated right ventricular systolic pressure from tricuspid regurgitation is 28 mmHg.      Assessment/Plan   Assessment & Plan       Multiple falls    Mixed hyperlipidemia    Essential hypertension    Type 2 diabetes mellitus (CMS/HCC)    Hypothyroidism, adult    Lewy body dementia with behavioral disturbance (CMS/HCC)    CKD (chronic kidney disease) stage 3, GFR 30-59 ml/min (CMS/HCC)    Generalized weakness    FTT (failure to thrive) in adult    Anemia    Faye Rod is a 75 y.o. female with a history of hyperlipidemia, hypertension, T2DM, hypothyroidism, Lewy body dementia, CKD, falls, SSS s/p PPM, presents to the ED with complaints of weakness and falls.      Assessment and Plan:    Adult FTT  Generalized  weakness  Rib fractures  --CT chest/abdomen/pelvis shows multiple healed or healing right posterior rib fractures with probable subacute right posterior 10th rib fracture, loss osteopenia with moderately advanced thoracic and lumbar degenerative disc disease with facet arthropathy with moderate L4-L5 spinal stenosis  --fall precautions  --MRI of the brain pending  --pt/ot consult  --case management consult  --A.m. labs    Lewy body dementia  --continue sinemet, aricept, namenda, seroquel    Dysphagia  --failed RN dysphagia screen  --consult SLP in the am    T2DM  --a1c in the am  --fsbg with ssi    HTN  HLD  --continue norvasc with hold parameters  --continue statin  --continue aspirin    Hypothyroidism  --tsh 0.830  --continue synthroid    CKD  --baseline creatinine 1.28-1.63  --creatinine 1.18 today  --bmp in the am and continue to monitor    Anemia  --no overt signs of bleeding  --anemia profile  --stool for occult blood  --cbc in the am    DVT prophylaxis:  mechanical    CODE STATUS:    Limited Support to NOT Include: Intubation; Cardioversion/Defibrillation; Dialysis  Level Of Support Discussed With: Next of Kin (If No Surrogate)  Code Status: No CPR  Medical Interventions (Level of Support Prior to Arrest): Limited      This note has been completed as part of a split-shared workflow.   Signature: Electronically signed by SHALONDA Felipe, 08/15/21, 9:31 PM EDT.       Brief Attending Admission Attestation     I have seen and examined the patient, performing an independent face-to-face diagnostic evaluation with plan of care reviewed and developed with the advanced practice clinician (APC).    Old records reviewed and summarized in PM hx.    Brief Summary Statement:  75-year-old female who had at least 1 fall every month likely secondary to poor gait from her Lewy body dementia.  For past few weeks has increasing gait issues, frequent fall, third fall this week.  Patient complained of left upper  extremity pain since her fall yesterday.  Chronic aphasia, also noted to have L.facial droop, ? Old vs new, unknown.  History of frequent urinary tract infection  -No fever was noted, blood pressure borderline in ED-systolic 100s      Remainder of detailed HPI is as noted above and has been reviewed and/or edited by me for completeness.    PM HX:  Hyperlipidemia-20 mg  Lewy body dementia/resting tremor in left hand -Sinemet  every 6, Aricept 10 mg every 12, Namenda 10 mg every 12  DM 2-Lantus 12 units [id number]  Hypothyroid-Synthroid 25 mcg  Bladder dysfunction  Mood disorder-Seroquel 50 mg nightly, Lexapro 20 mg daily  Hypertension-Norvasc 2.5 mg daily,   tramadol as needed for pain  Echo-3/2018-EF of 60%, moderate AR  CKD-stage III, baseline creatinine around 1.2  Vitals:    08/15/21 2056   BP:  99/74-1 42/62   Pulse: 74   Resp:  16-92% on room air   Temp:  Afebrile   SpO2: 93%       Attending Physical Exam:  RS- CTA-BL, ,  No wheezing , no crackles, good effort.  CVS- s1s2 regular, +murmur.  ABD- soft, non tender, not distended, no organomegaly.  EXT- no edema.  NEURO- AAO-confused, C/o of pain, moving all 4 ext symetrically, does appear to have L.facial droop.  , no focal defecit.      LABS:  Troponin-0.022  BUN/creatinine 25/1.2  Sodium-139, potassium 4.5, LFTs-WNL  TSH 0.8  Magnesium 2.0  BUN/creatinine 10/33, MCV of 100, platelets 146  UA-negative  -Negative  Chest x-ray-cardiomegaly without any overt edema or effusion  Left humerus no fracture or dislocation  CT chest-right posterior 10th rib fracture subacute, moderate L4-L5 spinal stenosis.  CT head-no acute changes  EKG sinus rhythm 60 bpm, poor baseline, paced rhythm, , Q waves    Brief Assessment/Plan  Frequent falls/Pain control- may need Iv med's, since she failed her dysphagia screen.  Pt/ot consult may need placement.    L.facial droop- hard to say new, vs old- couldn't get mri, asa intiated,  -      See above for further detailed  assessment and plan developed with [facility] which I have reviewed and/or edited for completeness.    Admission Status: I believe that this patient meets OBSERVATION status, however if further evaluation or treatment plans warrant, status may change.  Based upon current information, I predict patient's care encounter to be less than or equal to 2 midnights.

## 2021-08-16 NOTE — PLAN OF CARE
Goal Outcome Evaluation:  Plan of Care Reviewed With: patient        Progress: no change (PT eval)  Outcome Summary: PT eval complete. Pt presents with declined functional mobility warranting IPPT to promote strength and facilitate IND with her mobility. She required max A x 2 for sup > sit, Max A x 2 for bed > BSC with BUE support, mod A x 2 with RW for sit > stand from BSC, stood within RW for 1 minute with mod A, and mod A x 2 for BSC > chair with RW. Recommend d/c SNF.

## 2021-08-16 NOTE — THERAPY EVALUATION
Patient Name: Faye Rod  : 1945    MRN: [id number]                              Today's Date: 2021       Admit Date: 8/15/2021    Visit Dx:     ICD-10-CM ICD-9-CM   1. Multiple falls  R29.6 V15.88   2. Generalized weakness  R53.1 780.79   3. Closed fracture of multiple ribs of right side, initial encounter  S22.41XA 807.09   4. Dementia without behavioral disturbance, unspecified dementia type (CMS/HCC)  F03.90 294.20   5. Chronic renal impairment, unspecified CKD stage  N18.9 585.9   6. Failure to thrive in adult  R62.7 783.7     Patient Active Problem List   Diagnosis   • Mixed hyperlipidemia   • Essential hypertension   • Type 2 diabetes mellitus (CMS/HCC)   • Primary osteoarthritis involving multiple joints   • Non-rheumatic mitral regurgitation   • Hypothyroidism, adult   • Lewy body dementia with behavioral disturbance (CMS/HCC)   • Mild nonproliferative diabetic retinopathy of both eyes without macular edema associated with type 2 diabetes mellitus (CMS/HCC)   • Morbidly obese (CMS/HCC)   • Sick sinus syndrome (CMS/HCC)   • CKD (chronic kidney disease) stage 3, GFR 30-59 ml/min (CMS/HCC)   • Overactive bladder   • Osteoporosis   • Multiple falls   • Generalized weakness   • FTT (failure to thrive) in adult   • Anemia     Past Medical History:   Diagnosis Date   • Anemia    • Arthritis    • Colon polyp    • Diabetes mellitus (CMS/HCC)    • Disease of thyroid gland    • Heart disease    • Heart valve disease    • Hyperlipidemia    • Hypertension    • Hypothyroidism    • Memory loss    • Pacemaker    • Shortness of breath 2/3/2017   • Sick sinus syndrome (CMS/HCC)      Past Surgical History:   Procedure Laterality Date   • CATARACT EXTRACTION Bilateral    • CHOLECYSTECTOMY     • PACEMAKER IMPLANTATION       General Information     Row Name 21 0953          Physical Therapy Time and Intention    Document Type  evaluation  -     Mode of Treatment  physical therapy  -     Row  Name 08/16/21 0953          General Information    Patient Profile Reviewed  yes  -     Prior Level of Function  -- Pt having increased frequency of falls at home. She reports she used her walker sometimes. Her daughter has had to progressively assist with her ambulatory skills and ADLs as pt has gotten weaker over the last 2-3 weeks.  -     Existing Precautions/Restrictions  fall;other (see comments) aphasia at baseline  -     Barriers to Rehab  previous functional deficit;cognitive status  -     Row Name 08/16/21 0953          Living Environment    Lives With  child(chante), adult;other (see comments) daughter  -     Row Name 08/16/21 0953          Home Main Entrance    Number of Stairs, Main Entrance  other (see comments) unable to gather information from pt  -     Row Name 08/16/21 0953          Stairs Within Home, Primary    Stairs, Within Home, Primary  unable to gather information from pt, will need to clarify  -     Row Name 08/16/21 0953          Cognition    Orientation Status (Cognition)  oriented to;person;disoriented to;place;unable/difficult to assess  -     Row Name 08/16/21 0953          Safety Issues, Functional Mobility    Safety Issues Affecting Function (Mobility)  ability to follow commands;at risk behavior observed;awareness of need for assistance;insight into deficits/self-awareness;judgment;positioning of assistive device;problem-solving;safety precaution awareness;safety precautions follow-through/compliance;sequencing abilities  [facility]     Impairments Affecting Function (Mobility)  balance;cognition;endurance/activity tolerance;pain;strength  -     Cognitive Impairments, Mobility Safety/Performance  attention;awareness, need for assistance;insight into deficits/self-awareness;judgment;problem-solving/reasoning;safety precaution awareness;safety precaution follow-through;sequencing abilities  [facility]       User Key  (r) = Recorded By, (t) = Taken By, (c) = Cosigned By    Initials  Name Provider Type     Chapito Mckeon, PT Physical Therapist        Mobility     Row Name 08/16/21 0956          Bed Mobility    Bed Mobility  supine-sit;scooting/bridging  -     Scooting/Bridging [place] (Bed Mobility)  dependent (less than 25% patient effort);2 person assist;other (see comments) in recliner. Practiced scooting with max A x 1 with cues and edu on weight shifting but pt unable to perform without A x 2  -     Supine-Sit [place] (Bed Mobility)  maximum assist (25% patient effort);2 person assist;verbal cues;nonverbal cues (demo/gesture)  -     Assistive Device (Bed Mobility)  head of bed elevated;draw sheet  -     Comment (Bed Mobility)  Pt required VCs for LE advancement off EOB and assist at BLE and posterior trunk to obtain sitting EOB.  -     Row Name 08/16/21 0956          Transfers    Comment (Transfers)  Verbal/tactile cues used throughout for UE hand placement with BUE support transfers and use of safe hand placement with RW. Required tactile cues for weight shifting for LE placement during transfers. Max A x 2 for bed > BSC with BUE support, mod A x 2 with RW for sit > stand from BSC, and mod A x 2 for BSC > chair with RW.  -     Row Name 08/16/21 0956          Bed-Chair Transfer    Bed-Chair [place] (Transfers)  maximum assist (25% patient effort);2 person assist;verbal cues;nonverbal cues (demo/gesture)  -     Assistive Device (Bed-Chair Transfers)  other (see comments) BUE support  -     Row Name 08/16/21 0956          Sit-Stand Transfer    Sit-Stand [place] (Transfers)  moderate assist (50% patient effort);maximum assist (25% patient effort);2 person assist;verbal cues;nonverbal cues (demo/gesture)  -     Assistive Device (Sit-Stand Transfers)  other (see comments) BUE support and RW  -     Row Name 08/16/21 0956          Gait/Stairs (Locomotion)    [place] Level (Gait)  moderate assist (50% patient effort);2 person assist;verbal  cues;nonverbal cues (demo/gesture)  -     Assistive Device (Gait)  walker, front-wheeled  -     Distance in Feet (Gait)  2 feet  -     Comment (Gait/Stairs)  Pt required lateral weight shifting and verbal cues for LE advancement to take steps BSC > Chair. Challenged with lifting feet from floor safely and fearful of falling.  -       User Key  (r) = Recorded By, (t) = Taken By, (c) = Cosigned By    Initials Name Provider Type     Chapito Mckeon, PT Physical Therapist        Obj/Interventions     Row Name 08/16/21 1000          Range of Motion Comprehensive    General Range of Motion  lower extremity range of motion deficits identified  -     Comment, General Range of Motion  BLE AROM limited to 75% due to weakness  -     Row Name 08/16/21 1000          Strength Comprehensive (MMT)    General Manual Muscle Testing (MMT) Assessment  lower extremity strength deficits identified  -     Comment, General Manual Muscle Testing (MMT) Assessment  unable to formally assess via MMT but pt shows generalized weakness and funcitonally weak requiring A x 2 for all functional mobility. At least 3/5 grossly BLE.  -     Row Name 08/16/21 1000          Balance    Balance Assessment  sitting static balance;sitting dynamic balance;standing static balance;standing dynamic balance  -     Static Sitting Balance  mild impairment;sitting, edge of bed  -     Dynamic Sitting Balance  mild impairment;sitting, edge of bed  -     Static Standing Balance  moderate impairment;supported  -     Dynamic Standing Balance  moderate impairment;supported  -     Balance Interventions  sitting;standing;sit to stand;supported;static;dynamic;moderate challenge  -     Comment, Balance  Pt able to maintain standing within RW for up to 1 minute with mod A x 1.  -     Row Name 08/16/21 1000          Sensory Assessment (Somatosensory)    Sensory Assessment (Somatosensory)  unable/difficult to assess  -       User Key  (r) =  Recorded By, (t) = Taken By, (c) = Cosigned By    Initials Name Provider Type    Chapito Harris, PT Physical Therapist        Goals/Plan     Row Name 08/16/21 1005          Bed Mobility Goal 1 (PT)    Activity/Assistive Device (Bed Mobility Goal 1, PT)  bed mobility activities, all  -     Hooks Level/Cues Needed (Bed Mobility Goal 1, PT)  minimum assist (75% or more patient effort)  -     Time Frame (Bed Mobility Goal 1, PT)  short term goal (STG);5 days  -     Row Name 08/16/21 1005          Transfer Goal 1 (PT)    Activity/Assistive Device (Transfer Goal 1, PT)  sit-to-stand/stand-to-sit;bed-to-chair/chair-to-bed;walker, rolling  -     Hooks Level/Cues Needed (Transfer Goal 1, PT)  minimum assist (75% or more patient effort);1 person assist  -     Time Frame (Transfer Goal 1, PT)  short term goal (STG);5 days  -     Row Name 08/16/21 1005          Gait Training Goal 1 (PT)    Activity/Assistive Device (Gait Training Goal 1, PT)  gait (walking locomotion);assistive device use;decrease fall risk;improve balance and speed;increase endurance/gait distance;increase energy conservation;walker, rolling  -     Hooks Level (Gait Training Goal 1, PT)  minimum assist (75% or more patient effort);1 person assist  -     Distance (Gait Training Goal 1, PT)  20 feet  -     Time Frame (Gait Training Goal 1, PT)  long term goal (LTG);1 week  -     Row Name 08/16/21 1005          Patient Education Goal (PT)    Activity (Patient Education Goal, PT)  HEP/POC  -     Hooks/Cues/Accuracy (Memory Goal 2, PT)  demonstrates adequately;independent;verbalizes understanding  -     Time Frame (Patient Education Goal, PT)  long term goal (LTG);1 week  -       User Key  (r) = Recorded By, (t) = Taken By, (c) = Cosigned By    Initials Name Provider Type    Chapito Harris, PT Physical Therapist        Clinical Impression     Row Name 08/16/21 1002          Pain    Additional Documentation   Pain Scale: FACES Pre/Post-Treatment (Group)  -     Row Name 08/16/21 1002          Pain Scale: FACES Pre/Post-Treatment    Pain: FACES Scale, Pretreatment  6-->hurts even more  -     Posttreatment Pain Rating  6-->hurts even more  -     Pain Location - Side  Left  -     Pain Location - Orientation  upper  -     Pain Location  extremity  -     Row Name 08/16/21 1002          Plan of Care Review    Plan of Care Reviewed With  patient  -     Progress  no change PT eval  -     Outcome Summary  PT eval complete. Pt presents with declined functional mobility warranting IPPT to promote strength and facilitate IND with her mobility. She required max A x 2 for sup > sit, Max A x 2 for bed > BSC with BUE support, mod A x 2 with RW for sit > stand from BSC, stood within RW for 1 minute with mod A, and mod A x 2 for BSC > chair with RW. Recommend d/c SNF.  -     Row Name 08/16/21 1002          Therapy Assessment/Plan (PT)    Patient/Family Therapy Goals Statement (PT)  improve mobility  -     Rehab Potential (PT)  fair, will monitor progress closely  -     Criteria for Skilled Interventions Met (PT)  yes;skilled treatment is necessary;meets criteria  -     Predicted Duration of Therapy Intervention (PT)  1 week  -     Row Name 08/16/21 1002          Vital Signs    Pre Systolic BP Rehab  153  -JH     Pre Treatment Diastolic BP  66  -JH     Post Systolic BP Rehab  150  -JH     Post Treatment Diastolic BP  58  -JH     Pretreatment Heart Rate (beats/min)  65  -JH     Posttreatment Heart Rate (beats/min)  65  -JH     Pre SpO2 (%)  95  -JH     O2 Delivery Pre Treatment  room air  -     O2 Delivery Intra Treatment  room air  -     Post SpO2 (%)  96  -JH     O2 Delivery Post Treatment  room air  -     Pre Patient Position  Supine  -     Intra Patient Position  Standing  -     Post Patient Position  Sitting  -     Row Name 08/16/21 1002          Positioning and Restraints    Pre-Treatment Position   in bed  -     Post Treatment Position  chair  -     In Chair  notified nsg;reclined;call light within reach;encouraged to call for assist;exit alarm on;legs elevated;waffle cushion;on mechanical lift sling  -       User Key  (r) = Recorded By, (t) = Taken By, (c) = Cosigned By    Initials Name Provider Type    Chapito Harris, PT Physical Therapist        Outcome Measures     Row Name 08/16/21 1005          How much help from another person do you currently need...    Turning from your back to your side while in flat bed without using bedrails?  2  -JH     Moving from lying on back to sitting on the side of a flat bed without bedrails?  2  -JH     Moving to and from a bed to a chair (including a wheelchair)?  2  -JH     Standing up from a chair using your arms (e.g., wheelchair, bedside chair)?  2  -JH     Climbing 3-5 steps with a railing?  1  -JH     To walk in hospital room?  1  -     AM-PAC 6 Clicks Score (PT)  10  -     Row Name 08/16/21 1005          Functional Assessment    Outcome Measure Options  AM-PAC 6 Clicks Basic Mobility (PT)  -       User Key  (r) = Recorded By, (t) = Taken By, (c) = Cosigned By    Initials Name Provider Type    Chapito Harris, PT Physical Therapist                       Physical Therapy Education                 Title: PT OT SLP Therapies (In Progress)     Topic: Physical Therapy (In Progress)     Point: Mobility training (In Progress)     Learning Progress Summary           Patient Acceptance, E,TB, NR by  at 8/16/2021 1007    Comment: Edu on PT services, POC, d/c planning, safety with mobility                   Point: Home exercise program (In Progress)     Learning Progress Summary           Patient Acceptance, E,TB, NR by  at 8/16/2021 1007    Comment: Edu on PT services, POC, d/c planning, safety with mobility                   Point: Body mechanics (In Progress)     Learning Progress Summary           Patient Acceptance, E,TB, NR by  at 8/16/2021 1007     Comment: Edu on PT services, POC, d/c planning, safety with mobility                   Point: Precautions (In Progress)     Learning Progress Summary           Patient Acceptance, E,TB, NR by  at 8/16/2021 1007    Comment: Edu on PT services, POC, d/c planning, safety with mobility                               User Key     Initials Effective Dates Name Provider Type [facility] 09/22/20 -  Chapito Mckeon, PT Physical Therapist PT              PT Recommendation and Plan  Planned Therapy Interventions (PT): balance training, bed mobility training, gait training, home exercise program, patient/family education, postural re-education, ROM (range of motion), stair training, strengthening, stretching, transfer training  Plan of Care Reviewed With: patient  Progress: no change (PT eval)  Outcome Summary: PT eval complete. Pt presents with declined functional mobility warranting IPPT to promote strength and facilitate IND with her mobility. She required max A x 2 for sup > sit, Max A x 2 for bed > BSC with BUE support, mod A x 2 with RW for sit > stand from BSC, stood within RW for 1 minute with mod A, and mod A x 2 for BSC > chair with RW. Recommend d/c SNF.     Time Calculation:   PT Charges     Row Name 08/16/21 1008             Time Calculation    Start Time  0905  -      PT Received On  08/16/21  -      PT Goal Re-Cert Due Date  08/26/21  -         Time Calculation- PT    Total Timed Code Minutes- PT  10 minute(s)  -         Timed Charges    51469 - PT Therapeutic Activity Minutes  10  -         Untimed Charges    PT Eval/Re-eval Minutes  51  -         Total Minutes    Timed Charges Total Minutes  10  -      Untimed Charges Total Minutes  51  -       Total Minutes  61  -        User Key  (r) = Recorded By, (t) = Taken By, (c) = Cosigned By    Initials Name Provider Type     Chapito Mckeon, PT Physical Therapist        Therapy Charges for Today     Code Description Service Date Service  Provider Modifiers Qty    37017799817 HC PT THERAPEUTIC ACT EA 15 MIN 8/16/2021 Chapito Mckeon, PT GP 1    [id number] HC PT EVAL LOW COMPLEXITY 4 8/16/2021 Chapito Mckeon, PT GP 1          PT G-Codes  Outcome Measure Options: AM-PAC 6 Clicks Basic Mobility (PT)  AM-PAC 6 Clicks Score (PT): 10    Chapito Mckeon, PT  8/16/2021

## 2021-08-16 NOTE — THERAPY EVALUATION
Acute Care - Speech Language Pathology   Swallow Initial Evaluation [facility]   Clinical Swallow Evaluation     Patient Name: Faye Rod  : 1945  MRN: [id number]  Today's Date: 2021               Admit Date: 8/15/2021    Visit Dx:     ICD-10-CM ICD-9-CM   1. Multiple falls  R29.6 V15.88   2. Generalized weakness  R53.1 780.79   3. Closed fracture of multiple ribs of right side, initial encounter  S22.41XA 807.09   4. Dementia without behavioral disturbance, unspecified dementia type (CMS/HCC)  F03.90 294.20   5. Chronic renal impairment, unspecified CKD stage  N18.9 585.9   6. Failure to thrive in adult  R62.7 783.7   7. Dysphagia, unspecified type  R13.10 787.20     Patient Active Problem List   Diagnosis   • Mixed hyperlipidemia   • Essential hypertension   • Type 2 diabetes mellitus (CMS/HCC)   • Primary osteoarthritis involving multiple joints   • Non-rheumatic mitral regurgitation   • Hypothyroidism, adult   • Lewy body dementia with behavioral disturbance (CMS/HCC)   • Mild nonproliferative diabetic retinopathy of both eyes without macular edema associated with type 2 diabetes mellitus (CMS/HCC)   • Morbidly obese (CMS/HCC)   • Sick sinus syndrome (CMS/HCC)   • CKD (chronic kidney disease) stage 3, GFR 30-59 ml/min (CMS/HCC)   • Overactive bladder   • Osteoporosis   • Multiple falls   • Generalized weakness   • FTT (failure to thrive) in adult   • Anemia     Past Medical History:   Diagnosis Date   • Anemia    • Arthritis    • Colon polyp    • Diabetes mellitus (CMS/HCC)    • Disease of thyroid gland    • Heart disease    • Heart valve disease    • Hyperlipidemia    • Hypertension    • Hypothyroidism    • Memory loss    • Pacemaker    • Shortness of breath 2/3/2017   • Sick sinus syndrome (CMS/HCC)      Past Surgical History:   Procedure Laterality Date   • CATARACT EXTRACTION Bilateral    • CHOLECYSTECTOMY     • PACEMAKER IMPLANTATION         SLP Recommendation and Plan  SLP  Swallowing Diagnosis: mild, oral dysphagia, R/O pharyngeal dysphagia  SLP Diet Recommendation: soft textures, ground, thin liquids  Recommended Precautions and Strategies: upright posture during/after eating, general aspiration precautions  SLP Rec. for Method of Medication Administration: meds whole, meds crushed, with pudding or applesauce, as tolerated     Monitor for Signs of Aspiration: yes, notify SLP if any concerns  Recommended Diagnostics: VFSS (MBS), other (see comments) (may consider as indicated/appropriate)  Swallow Criteria for Skilled Therapeutic Interventions Met: demonstrates skilled criteria  Anticipated Discharge Disposition (SLP): anticipate therapy at next level of care  Rehab Potential/Prognosis, Swallowing: re-evaluate goals as necessary  Therapy Frequency (Swallow): PRN  Predicted Duration Therapy Intervention (Days): until discharge             Plan of Care Reviewed With: patient  Progress: no change         SWALLOW EVALUATION (last 72 hours)      SLP Adult Swallow Evaluation     Row Name 08/16/21 0955                   General Information    Current Method of Nutrition  NPO  -AC        Prior Level of Function-Communication  cognitive-linguistic impairment  -AC        Prior Level of Function-Swallowing  unknown  -AC        Plans/Goals Discussed with  patient  -AC        Barriers to Rehab  cognitive status  -AC        Patient's Goals for Discharge  patient did not state  -AC           Pain    Additional Documentation  Pain Scale: FACES Pre/Post-Treatment (Group)  -AC           Pain Scale: FACES Pre/Post-Treatment    Pain: FACES Scale, Pretreatment  0-->no hurt  -AC        Posttreatment Pain Rating  0-->no hurt  -AC           Oral Motor Structure and Function    Dentition Assessment  upper dentures/partial in place;lower dentures/partial in place  -AC        Secretion Management  WNL/WFL  -AC        Mucosal Quality  moist, healthy  -AC           Oral Musculature and Cranial Nerve Assessment     Oral Motor General Assessment  generalized oral motor weakness  -           General Eating/Swallowing Observations    Respiratory Support Currently in Use  room air  -        Eating/Swallowing Skills  fed by SLP  -        Positioning During Eating  upright 90 degree;upright in chair  -        Utensils Used  spoon;cup;straw  -        Consistencies Trialed  ice chips;thin liquids;pureed;regular textures  -           Clinical Swallow Eval    Oral Prep Phase  impaired  -AC        Oral Transit  WFL  -AC        Oral Residue  WFL  -        Clinical Swallow Evaluation Summary  Pt exhibited throat clearing @ baseline--? habitual throat clear--and x2 during eval (not necessarily following PO trials). Otherwise, no overt clincial s/sxs aspiration appreciated w/ any consistency trialed. General aspiration risk given general weakness, confusion, & hx dementia/Parkinsonism. Leaving for echo during eval. Will f/u to assess if instrumental swallow study appropriate/desired.  -           Oral Prep Concerns    Oral Prep Concerns  prolonged mastication  -        Prolonged Mastication  regular consistencies  -        Oral Prep Concerns, Comment  And difficulty tearing bite of cracker. Affected by ill-fitting dentures. Otherwise, oral phase seemingly WFL.  -           Clinical Impression    SLP Swallowing Diagnosis  mild;oral dysphagia;R/O pharyngeal dysphagia  -        Functional Impact  risk of aspiration/pneumonia  -        Rehab Potential/Prognosis, Swallowing  re-evaluate goals as necessary  -        Swallow Criteria for Skilled Therapeutic Interventions Met  demonstrates skilled criteria  -           Recommendations    Therapy Frequency (Swallow)  PRN  -        Predicted Duration Therapy Intervention (Days)  until discharge  -        SLP Diet Recommendation  soft textures;ground;thin liquids  -        Recommended Diagnostics  VFSS (MBS);other (see comments) may consider as indicated/appropriate   -AC        Recommended Precautions and Strategies  upright posture during/after eating;general aspiration precautions  -AC        Oral Care Recommendations  Oral Care BID/PRN  -AC        SLP Rec. for Method of Medication Administration  meds whole;meds crushed;with pudding or applesauce;as tolerated  -AC        Monitor for Signs of Aspiration  yes;notify SLP if any concerns  -AC        Anticipated Discharge Disposition (SLP)  anticipate therapy at next level of care  -AC          User Key  (r) = Recorded By, (t) = Taken By, (c) = Cosigned By    Initials Name Effective Dates    Melissa Albert MS CCC-SLP 06/16/21 -           EDUCATION  The patient has been educated in the following areas:   Dysphagia (Swallowing Impairment) Oral Care/Hydration Modified Diet Instruction.       SLP GOALS     Row Name 08/16/21 0955             Oral Nutrition/Hydration Goal 1 (SLP)    Oral Nutrition/Hydration Goal 1, SLP  LTG: Pt will tolerate soft diet and thin liquids w/o s/sxs aspiration w/ 100% acc w/o cues.  -AC      Time Frame (Oral Nutrition/Hydration Goal 1, SLP)  by discharge  -AC         Oral Nutrition/Hydration Goal 2 (SLP)    Oral Nutrition/Hydration Goal 2, SLP  Pt will tolerate trials of soft solids and thin liquids w/o s/sxs aspiration w/ 100% acc w/o cues.  -AC      Time Frame (Oral Nutrition/Hydration Goal 2, SLP)  short term goal (STG)  -AC        User Key  (r) = Recorded By, (t) = Taken By, (c) = Cosigned By    Initials Name Provider Type    Melissa Albert MS CCC-SLP Speech and Language Pathologist             Time Calculation:   Time Calculation- SLP     Row Name 08/16/21 1036             Time Calculation- SLP    SLP Start Time  0955  -      SLP Received On  08/16/21  -         Untimed Charges    24314-FT Eval Oral Pharyng Swallow Minutes  48  -AC         Total Minutes    Untimed Charges Total Minutes  48  -AC       Total Minutes  48  -AC        User Key  (r) = Recorded By, (t) = Taken By, (c) = Cosigned By     Initials Name Provider Type     Melissa Mohan MS CCC-SLP Speech and Language Pathologist          Therapy Charges for Today     Code Description Service Date Service Provider Modifiers Qty    [id number] HC ST EVAL ORAL PHARYNG SWALLOW 3 8/16/2021 Melissa Mohan MS CCC-SLP GN 1        Patient was not wearing a face mask and did not exhibit coughing during this therapy encounter.  Procedure performed was aerosolizing, involved close contact (within 6 feet for at least 15 minutes or longer), and did not involve contact with infectious secretions or specimens.  Therapist used appropriate personal protective equipment including gloves, standard procedure mask and eye protection.  Appropriate PPE was worn during the entire therapy session.  Hand hygiene was completed before and after therapy session.          MS LORENE ConwaySLP  8/16/2021

## 2021-08-16 NOTE — PLAN OF CARE
Goal Outcome Evaluation:  Plan of Care Reviewed With: patient        Progress: improving  Outcome Summary: OT eval complete. Pt limited by confusion and decreased command following. Pt becomes tearful due to pain. Pt dependent x2 for bed mobility and maxAx2 for all transfers. Pt with generalized weakness. Difficulty providing PLOF or home situation. Pt reports baseline dependence for bathing and dressing. Recommend d/c to SNF. If returning home will need 24/7 care.

## 2021-08-16 NOTE — PLAN OF CARE
Goal Outcome Evaluation:  Plan of Care Reviewed With: patient, daughter        Progress: no change  Outcome Summary: Palliative consult for hospice referral and discussion. Pt's daughter present at times of Palliative APRN and RN encounters. Pt able to answer yes/no to some questions, not always appropriate to context, and difficulty word finding as phrases become longer. Pt's daughter reported chronic arthritis pain especially to neck and low back; pt able to report neuropathic foot pain; diclofenac topical added, has prn Tylenol, but dtr reported pt takes it scheduled and prn at home; ALYCIA Smith APRN notified. CM working with family for possibly placement for STR with transition to LTC. Palliative following for continued symptom management, support to family in GOC/POC discussion.    [facility] Palliative Team Conference: ROSELYN Contreras RN, CHPN; HERNANDO Lua DO; ALYCIA Smith, APRN; CARLOS Marr, RN, PN; LINDSAY Cheng, [facility], [facility]-    Problem: Palliative Care  Goal: Enhanced Quality of Life  Outcome: Ongoing, Progressing  Intervention: Maximize Comfort  Flowsheets (Taken 8/16/2021 1624)  Pain Management Interventions: (pt takes scheduled and prn Tylenol at home; added diclofenac topical today)   pain management plan reviewed with patient/caregiver   other (see comments)  Intervention: Optimize Function  Flowsheets (Taken 8/16/2021 1624)  Sensory Stimulation Regulation: quiet environment promoted  Sleep/Rest Enhancement: natural light exposure provided  Intervention: Promote Advance Care Planning  Flowsheets (Taken 8/16/2021 1624)  Life Transition/Adjustment:   palliative care discussed   palliative care initiated  Intervention: Optimize Psychosocial Wellbeing  Flowsheets (Taken 8/16/2021 1624)  Spiritual Activities Assistance: (Spiritual Care consult) other (see comments)  Family/Support System Care:   caregiver stress acknowledged   involvement promoted   presence promoted   self-care encouraged   support provided

## 2021-08-16 NOTE — PROGRESS NOTES
[facility]  PROGRESS NOTE    Patient Name: Faye Rod  : 1945  MRN: [id number]    Date of Admission: 8/15/2021  Primary Care Physician: Sarai Hawley PA-C    Subjective   Subjective     CC:  F/U multiple falls     HPI:  Patient seen and examined. RN notes reviewed. No acute events overnight. Pt tells me her name, doesn't know location or year. Otherwise unable to provide any history.    ROS:  UTO-dementia      Objective   Objective     Vital Signs:   Temp:  [97.6 °F (36.4 °C)-99.2 °F (37.3 °C)] 99.2 °F (37.3 °C)  Heart Rate:  [61-79] 66  Resp:  [16-18] 18  BP: ()/(47-97) 138/61     Physical Exam:  Constitutional: No acute distress, awake, alert  HENT: NCAT, mucous membranes moist  Respiratory: Clear to auscultation bilaterally, respiratory effort normal   Cardiovascular: RRR, no murmurs, rubs, or gallops  Gastrointestinal: Positive bowel sounds, soft, nontender, nondistended  Musculoskeletal: No bilateral ankle edema  Psychiatric: Appropriate affect, cooperative  Neurologic: Oriented to person only  Skin: No rashes    Results Reviewed:  LAB RESULTS:      Lab 21  0303 08/15/21  1705   WBC 5.66 6.69   HEMOGLOBIN 10.0* 10.4*   HEMATOCRIT 31.0* 33.1*   PLATELETS 142 146   NEUTROS ABS 3.52 4.35   IMMATURE GRANS (ABS) 0.02 0.02   LYMPHS ABS 1.50 1.66   MONOS ABS 0.57 0.63   EOS ABS 0.04 0.01   MCV 97.2* 100.0*         Lab 21  0303 08/15/21  1705   SODIUM 135* 139   POTASSIUM 4.2 4.5   CHLORIDE 101 102   CO2 23.0 28.0   ANION GAP 11.0 9.0   BUN 22 25*   CREATININE 1.19* 1.18*   GLUCOSE 104* 103*   CALCIUM 9.1 9.2   MAGNESIUM 1.8 2.0   HEMOGLOBIN A1C 6.40*  --    TSH  --  0.830         Lab 08/15/21  1705   TOTAL PROTEIN 6.5   ALBUMIN 4.10   GLOBULIN 2.4   ALT (SGPT) 10   AST (SGOT) 20   BILIRUBIN 0.4   ALK PHOS 69         Lab 08/15/21  1705   TROPONIN T 0.022         Lab 21  0303   CHOLESTEROL 118   LDL CHOL 51   HDL CHOL 48    TRIGLYCERIDES 104         Lab 08/15/21  1705   IRON 27*   IRON SATURATION 7*   TIBC 374   TRANSFERRIN 251   FERRITIN 26.98   FOLATE 19.20   VITAMIN B 12 153*         Brief Urine Lab Results  (Last result in the past 365 days)      Color   Clarity   Blood   Leuk Est   Nitrite   Protein   CREAT   Urine HCG        08/15/21 1705 Yellow Clear Negative Negative Negative Trace               Microbiology Results Abnormal     Procedure Component Value - Date/Time    COVID PRE-OP / PRE-PROCEDURE SCREENING ORDER (NO ISOLATION) - Swab, Nasopharynx [[id number]]  (Normal) Collected: 08/15/21 1954    Lab Status: Final result Specimen: Swab from Nasopharynx Updated: 08/15/21 2033    Narrative:      The following orders were created for panel order COVID PRE-OP / PRE-PROCEDURE SCREENING ORDER (NO ISOLATION) - Swab, Nasopharynx.  Procedure                               Abnormality         Status                     ---------                               -----------         ------                     COVID-19 and FLU A/B PCR...[[id number]]  Normal              Final result                 Please view results for these tests on the individual orders.    COVID-19 and FLU A/B PCR - Swab, Nasopharynx [[id number]]  (Normal) Collected: 08/15/21 1954    Lab Status: Final result Specimen: Swab from Nasopharynx Updated: 08/15/21 2033     COVID19 Not Detected     Influenza A PCR Not Detected     Influenza B PCR Not Detected    Narrative:      Fact sheet for providers: https://www.fda.gov/media/077830/download    Fact sheet for patients: https://www.fda.gov/media/330243/download    Test performed by PCR.          CT Abdomen Pelvis Without Contrast    Result Date: 8/15/2021  CT Chest WO, CT Abdomen Pelvis WO INDICATION:  Multiple falls with generalized weakness. TECHNIQUE: CT of the chest, abdomen and pelvis without IV contrast. Coronal and sagittal reconstructions were obtained.  Radiation dose reduction techniques included automated exposure  control or exposure modulation based on body size. Count of known CT and cardiac nuc med studies performed in previous 12 months: 0.  COMPARISON:  CT abdomen and pelvis 8/17/2020 FINDINGS: Chest: Right middle lobe and lingular atelectasis. No focal pneumonitis. No effusions. Cardiomegaly. Pacemaker leads noted. Aortic atherosclerotic changes without aneurysm. No adenopathy or central mass. ABDOMEN: Liver and spleen unremarkable. Gallbladder surgically absent. Pancreas, kidneys and adrenal glands are unremarkable. The visualized GI tract unremarkable. Diffuse aortic atherosclerotic change without aneurysm. Pelvis: Bladder minimally distended. Uterus and adnexa are unremarkable. Multiple healed and/or healing right posterior rib fractures with probable subacute right posterior 10th rib fracture. Diffuse osteopenia. Diffuse degenerative changes thoracic and lumbar spine. Grade 1 spondylolisthesis L4 on L5 likely on the basis of facet arthropathy. Moderate L4-L5 spinal stenosis.     Impression: Small amount right middle lobe and lingular atelectasis. Multiple healed or healing right posterior rib fractures with probable subacute right posterior 10th rib fracture. No acute abdominal or pelvic pathology. Generalized osteopenia with moderately advanced thoracic and lumbar degenerative disc disease and facet arthropathy with moderate L4-L5 spinal stenosis. Signer Name: HOMERO Echols MD  Signed: 8/15/2021 7:08 PM  Workstation Name: [id number]  [facility]    XR Humerus Left    Result Date: 8/15/2021  CR Humerus Min 2 Vws LT INDICATION: Acute left arm pain. COMPARISON: None available. FINDINGS: 2 views of the left humerus.  No fracture or dislocation.  No bone erosion or destruction.  Articulation at the shoulder and elbow is anatomic.  No foreign body.     Impression: No definite acute fracture or subluxation. Signer Name: Ana María White MD  Signed: 8/15/2021 7:22 PM  Workstation Name: [id number]   [facility] [place]    CT Head Without Contrast    Result Date: 8/15/2021  CT Head WO HISTORY: Multiple falls. Generalized weakness. TECHNIQUE: Axial unenhanced head CT. Radiation dose reduction techniques included automated exposure control or exposure modulation based on body size. Count of known CT and cardiac nuc med studies performed in previous 12 months: 0. Time of scan: 1841 hours COMPARISON: 3/31/2017 FINDINGS: No intracranial hemorrhage, mass, or infarct. No hydrocephalus or extra-axial fluid collection. Prominence of sulci and CSF spaces overlying both frontal lobes compatible with age related atrophy. Mild ventriculomegaly. The skull base, calvarium, and extracranial soft tissues are normal.     Impression: Senescent changes without acute abnormality. Signer Name: HOMERO Echols MD  Signed: 8/15/2021 7:00 PM  Workstation Name: [id number]  [facility]    CT Chest Without Contrast Diagnostic    Result Date: 8/15/2021  CT Chest WO, CT Abdomen Pelvis WO INDICATION:  Multiple falls with generalized weakness. TECHNIQUE: CT of the chest, abdomen and pelvis without IV contrast. Coronal and sagittal reconstructions were obtained.  Radiation dose reduction techniques included automated exposure control or exposure modulation based on body size. Count of known CT and cardiac nuc med studies performed in previous 12 months: 0.  COMPARISON:  CT abdomen and pelvis 8/17/2020 FINDINGS: Chest: Right middle lobe and lingular atelectasis. No focal pneumonitis. No effusions. Cardiomegaly. Pacemaker leads noted. Aortic atherosclerotic changes without aneurysm. No adenopathy or central mass. ABDOMEN: Liver and spleen unremarkable. Gallbladder surgically absent. Pancreas, kidneys and adrenal glands are unremarkable. The visualized GI tract unremarkable. Diffuse aortic atherosclerotic change without aneurysm. Pelvis: Bladder minimally distended. Uterus and adnexa are unremarkable.  Multiple healed and/or healing right posterior rib fractures with probable subacute right posterior 10th rib fracture. Diffuse osteopenia. Diffuse degenerative changes thoracic and lumbar spine. Grade 1 spondylolisthesis L4 on L5 likely on the basis of facet arthropathy. Moderate L4-L5 spinal stenosis.     Impression: Small amount right middle lobe and lingular atelectasis. Multiple healed or healing right posterior rib fractures with probable subacute right posterior 10th rib fracture. No acute abdominal or pelvic pathology. Generalized osteopenia with moderately advanced thoracic and lumbar degenerative disc disease and facet arthropathy with moderate L4-L5 spinal stenosis. Signer Name: HOMERO Echols MD  Signed: 8/15/2021 7:08 PM  Workstation Name: [email or web address]  [facility]    XR Chest 1 View    Result Date: 8/16/2021  EXAMINATION: XR CHEST 1 VW- 08/15/2021  INDICATION: Weak/Dizzy/AMS triage protocol  COMPARISON: Chest x-ray 04/16/2016  FINDINGS: Cardiac size enlarged. No overt edema. No pneumothorax or pleural effusion. Degenerative changes of the spine.        Impression: Cardiomegaly without overt edema or effusion.  D:  08/15/2021 E:  08/16/2021         Results for orders placed in visit on 09/22/17    Adult Transthoracic Echo Complete W/ Cont if Necessary Per Protocol    Interpretation Summary  · Left ventricular systolic function is normal. Estimated EF = 60%.  · Moderate aortic valve regurgitation is present.  · Mild aortic valve stenosis is present.  · Ao mean PG 10 mmHg  · Mild-to-moderate mitral valve regurgitation is present  · Mild tricuspid valve regurgitation is present.  · Calculated right ventricular systolic pressure from tricuspid regurgitation is 28 mmHg.      I have reviewed the medications:  Scheduled Meds:amLODIPine, 2.5 mg, Oral, Daily  aspirin, 81 mg, Oral, Daily   Or  aspirin, 300 mg, Rectal, Daily  atorvastatin, 80 mg, Oral, Nightly  carbidopa-levodopa, 1  tablet, Oral, 4x Daily  Diclofenac Sodium, 4 g, Topical, 4x Daily  donepezil, 10 mg, Oral, BID  escitalopram, 20 mg, Oral, Daily  haloperidol lactate, 0.5 mg, Intravenous, Once  insulin lispro, 0-7 Units, Subcutaneous, TID AC  levothyroxine, 125 mcg, Oral, Q AM  lidocaine, 1 patch, Transdermal, Q24H  memantine, 10 mg, Oral, BID  oxybutynin XL, 5 mg, Oral, Daily  QUEtiapine, 50 mg, Oral, Nightly  sodium chloride, 10 mL, Intravenous, Q12H      Continuous Infusions:   PRN Meds:.•  acetaminophen **OR** acetaminophen **OR** acetaminophen  •  dextrose  •  dextrose  •  glucagon (human recombinant)  •  ondansetron **OR** ondansetron  •  sodium chloride  •  sodium chloride  •  traMADol    Assessment/Plan   Assessment & Plan     Active Hospital Problems    Diagnosis  POA   • **Multiple falls [R29.6]  Not Applicable   • Generalized weakness [R53.1]  Yes   • FTT (failure to thrive) in adult [R62.7]  Yes   • Anemia [D64.9]  Yes   • CKD (chronic kidney disease) stage 3, GFR 30-59 ml/min (CMS/[facility]) [N18.30]  Yes   • Lewy body dementia with behavioral disturbance (CMS/[facility]) [G31.83, F02.81]  Yes   • Type 2 diabetes mellitus (CMS/[facility]) [E11.9]  Yes   • Hypothyroidism, adult [E03.9]  Yes   • Essential hypertension [I10]  Yes   • Mixed hyperlipidemia [E78.2]  Yes      Resolved Hospital Problems   No resolved problems to display.        Brief Hospital Course to date:  Faye Rod is a 75 y.o. female  with a history of hyperlipidemia, hypertension, T2DM, hypothyroidism, Lewy body dementia, CKD, falls, SSS s/p PPM, presents to the ED with complaints of weakness and falls.       This patient's problems and plans were partially entered by my partner and updated as appropriate by me 08/16/21.  All problems are new to me today      Adult FTT  Generalized weakness  Rib fractures  --PT/OT recommend rehab, CM to see but patient likely needs placement   --Pain control as needed   --Echo ordered on admission, pending      Lewy body  dementia  --Continue sinemet, aricept, namenda, Seroquel  --Dr Gurrola has seen. Feels like she is approaching end-stage dementia. Recommends Palliative consult, likely hospice appropriate.     L wrist pain  -XR ordered      Dysphagia  --SLP has seen, on soft/ground diet now      T2DM  --a1c 6.4  --fsbg with ldssi     HTN  HLD  --continue norvasc with hold parameters  --continue statin  --continue aspirin    Hypothyroidism  --tsh 0.830  --continue synthroid     CKD  --baseline creatinine 1.28-1.63  --creatinine stable     Anemia  --no overt signs of bleeding  --Iron low, will supplement  --B12 low, will start IM injections while inpatient. Daily x 7 days, weekly for 1 month, then monthly     DVT prophylaxis:  Mechanical DVT prophylaxis orders are present.       AM-PAC 6 Clicks Score (PT): 10 (08/16/21 1005)    Disposition: I expect the patient to be discharged TBD.     CODE STATUS:   Code Status and Medical Interventions:   Ordered at: 08/15/21 2051     Limited Support to NOT Include:    Intubation    Cardioversion/Defibrillation    Dialysis     Level Of Support Discussed With:    Next of Kin (If No Surrogate)     Code Status:    No CPR     Medical Interventions (Level of Support Prior to Arrest):    Limited       Yris Pope DO  08/16/21

## 2021-08-17 NOTE — PLAN OF CARE
Problem: Palliative Care  Goal: Enhanced Quality of Life  Intervention: Optimize Psychosocial Wellbeing  Recent Flowsheet Documentation  Taken 8/17/2021 1208 by Rosa Cheng, MSW  Supportive Measures: (symptom assessment)   active listening utilized   positive reinforcement provided   verbalization of feelings encouraged   other (see comments)  Visit with patient at bedside, pt up to chair, reports hurting all over.  Patient tired, weary, but engaging.  Awaiting placement.    Progressive decline.  Referrals made to [facility] and [facility] working to facilitate placement.  Palliative Care following for support and assist with symptom management and plan of care.  1300 Palliative Care IDT - Palliative Team members present:  HERNANDO Lua DO; ALYCIA Smith APRN; ROSELYN Contreras RN, CHPN; HAN Cheng LCSW, ACHP-SW; NELI Olsen RN, OCN, HAILY; Hospice SW NELI Wren W

## 2021-08-17 NOTE — PROGRESS NOTES
[facility] Medicine Services  PROGRESS NOTE    Patient Name: Faye Rod  : 1945  MRN: [id number]    Date of Admission: 8/15/2021  Primary Care Physician: Sarai Hawley PA-C    Subjective   Subjective     CC:  Multiple falls    HPI:  In bed, eating breakfast, nurse at bedside, no issues.    Unable to assess ROS  Objective   Objective     Vital Signs:   Temp:  [98 °F (36.7 °C)-100.2 °F (37.9 °C)] 98.2 °F (36.8 °C)  Heart Rate:  [60-85] 60  Resp:  [16-18] 16  BP: ()/(45-69) 122/57     Physical Exam:  NAD, alert  OP clear, MMM  PERRL  Neck supple  No LAD  RRR  CTAB  +BS, ND, soft  No obvious rashes  Results Reviewed:  LAB RESULTS:      Lab 21  0303 08/15/21  1705   WBC 5.66 6.69   HEMOGLOBIN 10.0* 10.4*   HEMATOCRIT 31.0* 33.1*   PLATELETS 142 146   NEUTROS ABS 3.52 4.35   IMMATURE GRANS (ABS) 0.02 0.02   LYMPHS ABS 1.50 1.66   MONOS ABS 0.57 0.63   EOS ABS 0.04 0.01   MCV 97.2* 100.0*         Lab 21  0303 08/15/21  1705   SODIUM 135* 139   POTASSIUM 4.2 4.5   CHLORIDE 101 102   CO2 23.0 28.0   ANION GAP 11.0 9.0   BUN 22 25*   CREATININE 1.19* 1.18*   GLUCOSE 104* 103*   CALCIUM 9.1 9.2   MAGNESIUM 1.8 2.0   HEMOGLOBIN A1C 6.40*  --    TSH  --  0.830         Lab 08/15/21  1705   TOTAL PROTEIN 6.5   ALBUMIN 4.10   GLOBULIN 2.4   ALT (SGPT) 10   AST (SGOT) 20   BILIRUBIN 0.4   ALK PHOS 69         Lab 08/15/21  1705   TROPONIN T 0.022         Lab 21  0303   CHOLESTEROL 118   LDL CHOL 51   HDL CHOL 48   TRIGLYCERIDES 104         Lab 08/15/21  1705   IRON 27*   IRON SATURATION 7*   TIBC 374   TRANSFERRIN 251   FERRITIN 26.98   FOLATE 19.20   VITAMIN B 12 153*         Brief Urine Lab Results  (Last result in the past 365 days)      Color   Clarity   Blood   Leuk Est   Nitrite   Protein   CREAT   Urine HCG        08/15/21 1705 Yellow Clear Negative Negative Negative Trace               Microbiology Results Abnormal     Procedure Component Value -  Date/Time    COVID PRE-OP / PRE-PROCEDURE SCREENING ORDER (NO ISOLATION) - Swab, Nasopharynx [[id number]]  (Normal) Collected: 08/15/21 1954    Lab Status: Final result Specimen: Swab from Nasopharynx Updated: 08/15/21 2033    Narrative:      The following orders were created for panel order COVID PRE-OP / PRE-PROCEDURE SCREENING ORDER (NO ISOLATION) - Swab, Nasopharynx.  Procedure                               Abnormality         Status                     ---------                               -----------         ------                     COVID-19 and FLU A/B PCR...[[id number]]  Normal              Final result                 Please view results for these tests on the individual orders.    COVID-19 and FLU A/B PCR - Swab, Nasopharynx [[id number]]  (Normal) Collected: 08/15/21 1954    Lab Status: Final result Specimen: Swab from Nasopharynx Updated: 08/15/21 2033     COVID19 Not Detected     Influenza A PCR Not Detected     Influenza B PCR Not Detected    Narrative:      Fact sheet for providers: https://www.fda.gov/media/428603/download    Fact sheet for patients: https://www.fda.gov/media/932506/download    Test performed by PCR.          Adult Transthoracic Echo Complete W/ Cont if Necessary Per Protocol (With Agitated Saline)    Result Date: 8/16/2021  · Very technically hard study due to patient very tender per technician. · LVEF 60% · Estimated right ventricular systolic pressure from tricuspid regurgitation is normal (<35 mmHg). · Mild mitral valve regurgitation is present. · Mild to moderate aortic valve regurgitation is present. · AV gradients off axis due to technical limitations.      CT Abdomen Pelvis Without Contrast    Result Date: 8/15/2021  CT Chest WO, CT Abdomen Pelvis WO INDICATION:  Multiple falls with generalized weakness. TECHNIQUE: CT of the chest, abdomen and pelvis without IV contrast. Coronal and sagittal reconstructions were obtained.  Radiation dose reduction techniques included  automated exposure control or exposure modulation based on body size. Count of known CT and cardiac nuc med studies performed in previous 12 months: 0.  COMPARISON:  CT abdomen and pelvis 8/17/2020 FINDINGS: Chest: Right middle lobe and lingular atelectasis. No focal pneumonitis. No effusions. Cardiomegaly. Pacemaker leads noted. Aortic atherosclerotic changes without aneurysm. No adenopathy or central mass. ABDOMEN: Liver and spleen unremarkable. Gallbladder surgically absent. Pancreas, kidneys and adrenal glands are unremarkable. The visualized GI tract unremarkable. Diffuse aortic atherosclerotic change without aneurysm. Pelvis: Bladder minimally distended. Uterus and adnexa are unremarkable. Multiple healed and/or healing right posterior rib fractures with probable subacute right posterior 10th rib fracture. Diffuse osteopenia. Diffuse degenerative changes thoracic and lumbar spine. Grade 1 spondylolisthesis L4 on L5 likely on the basis of facet arthropathy. Moderate L4-L5 spinal stenosis.     Impression: Small amount right middle lobe and lingular atelectasis. Multiple healed or healing right posterior rib fractures with probable subacute right posterior 10th rib fracture. No acute abdominal or pelvic pathology. Generalized osteopenia with moderately advanced thoracic and lumbar degenerative disc disease and facet arthropathy with moderate L4-L5 spinal stenosis. Signer Name: HOMERO Echols MD  Signed: 8/15/2021 7:08 PM  Workstation Name: [email or web address]  [facility]    XR Humerus Left    Result Date: 8/15/2021  CR Humerus Min 2 Vws LT INDICATION: Acute left arm pain. COMPARISON: None available. FINDINGS: 2 views of the left humerus.  No fracture or dislocation.  No bone erosion or destruction.  Articulation at the shoulder and elbow is anatomic.  No foreign body.     Impression: No definite acute fracture or subluxation. Signer Name: Ana María White MD  Signed: 8/15/2021 7:22 PM  Workstation  Name: [id number]  [facility]    XR Wrist 3+ View Left    Result Date: 8/16/2021  EXAMINATION: XR WRIST 3+ VW LEFT- 08/16/2021  INDICATION: Frequent falls, left wrist pain; R29.6-Repeated falls; R53.1-Weakness; S22.41XA-Multiple fractures of ribs, right side, initial encounter for closed fracture; F03.90-Unspecified dementia without behavioral disturbance; N18.9-Chronic kidney disease, unspecified; R62.7-Adult failure to thrive; R13.10-Dysphagia, unspecified  COMPARISON: NONE  FINDINGS: 3 views of the left wrist reveal severe osteopenia of the bony structures. Degenerative changes seen of the first carpometacarpal joint. Cortex is intact. Joint spaces are preserved. No joint effusion.       Impression: Degenerative changes seen within the first carpometacarpal joint with no evidence of acute bony abnormality.  D:  08/16/2021 E:  08/16/2021  This report was finalized on 8/16/2021 4:16 PM by Dr. Sima Bartholomew MD.      CT Head Without Contrast    Result Date: 8/15/2021  CT Head WO HISTORY: Multiple falls. Generalized weakness. TECHNIQUE: Axial unenhanced head CT. Radiation dose reduction techniques included automated exposure control or exposure modulation based on body size. Count of known CT and cardiac nuc med studies performed in previous 12 months: 0. Time of scan: 1841 hours COMPARISON: 3/31/2017 FINDINGS: No intracranial hemorrhage, mass, or infarct. No hydrocephalus or extra-axial fluid collection. Prominence of sulci and CSF spaces overlying both frontal lobes compatible with age related atrophy. Mild ventriculomegaly. The skull base, calvarium, and extracranial soft tissues are normal.     Impression: Senescent changes without acute abnormality. Signer Name: HOMERO Echols MD  Signed: 8/15/2021 7:00 PM  Workstation Name: [email or web address]  [facility]    CT Chest Without Contrast Diagnostic    Result Date: 8/15/2021  CT Chest WO, CT Abdomen Pelvis WO  INDICATION:  Multiple falls with generalized weakness. TECHNIQUE: CT of the chest, abdomen and pelvis without IV contrast. Coronal and sagittal reconstructions were obtained.  Radiation dose reduction techniques included automated exposure control or exposure modulation based on body size. Count of known CT and cardiac nuc med studies performed in previous 12 months: 0.  COMPARISON:  CT abdomen and pelvis 8/17/2020 FINDINGS: Chest: Right middle lobe and lingular atelectasis. No focal pneumonitis. No effusions. Cardiomegaly. Pacemaker leads noted. Aortic atherosclerotic changes without aneurysm. No adenopathy or central mass. ABDOMEN: Liver and spleen unremarkable. Gallbladder surgically absent. Pancreas, kidneys and adrenal glands are unremarkable. The visualized GI tract unremarkable. Diffuse aortic atherosclerotic change without aneurysm. Pelvis: Bladder minimally distended. Uterus and adnexa are unremarkable. Multiple healed and/or healing right posterior rib fractures with probable subacute right posterior 10th rib fracture. Diffuse osteopenia. Diffuse degenerative changes thoracic and lumbar spine. Grade 1 spondylolisthesis L4 on L5 likely on the basis of facet arthropathy. Moderate L4-L5 spinal stenosis.     Impression: Small amount right middle lobe and lingular atelectasis. Multiple healed or healing right posterior rib fractures with probable subacute right posterior 10th rib fracture. No acute abdominal or pelvic pathology. Generalized osteopenia with moderately advanced thoracic and lumbar degenerative disc disease and facet arthropathy with moderate L4-L5 spinal stenosis. Signer Name: HOMERO Echols MD  Signed: 8/15/2021 7:08 PM  Workstation Name: [email or web address]  [facility]    XR Chest 1 View    Result Date: 8/16/2021  EXAMINATION: XR CHEST 1 VW- 08/15/2021  INDICATION: Weak/Dizzy/AMS triage protocol  COMPARISON: Chest x-ray 04/16/2016  FINDINGS: Cardiac size enlarged. No overt  edema. No pneumothorax or pleural effusion. Degenerative changes of the spine.        Impression: Cardiomegaly without overt edema or effusion.  D:  08/15/2021 E:  08/16/2021  This report was finalized on 8/16/2021 3:54 PM by Dr. Joao Cuevas.        Results for orders placed during the hospital encounter of 08/15/21    Adult Transthoracic Echo Complete W/ Cont if Necessary Per Protocol (With Agitated Saline)    Interpretation Summary  · Very technically hard study due to patient very tender per technician.  · LVEF 60%  · Estimated right ventricular systolic pressure from tricuspid regurgitation is normal (<35 mmHg).  · Mild mitral valve regurgitation is present.  · Mild to moderate aortic valve regurgitation is present.  · AV gradients off axis due to technical limitations.      I have reviewed the medications:  Scheduled Meds:acetaminophen, 500 mg, Oral, 4x Daily  amLODIPine, 2.5 mg, Oral, Daily  aspirin, 81 mg, Oral, Daily   Or  aspirin, 300 mg, Rectal, Daily  atorvastatin, 80 mg, Oral, Nightly  carbidopa-levodopa, 1 tablet, Oral, 4x Daily  cyanocobalamin, 1,000 mcg, Intramuscular, Daily  Diclofenac Sodium, 4 g, Topical, 4x Daily  donepezil, 10 mg, Oral, BID  escitalopram, 20 mg, Oral, Daily  ferrous sulfate, 325 mg, Oral, Daily With Breakfast  haloperidol lactate, 0.5 mg, Intravenous, Once  insulin lispro, 0-7 Units, Subcutaneous, TID AC  levothyroxine, 125 mcg, Oral, Q AM  lidocaine, 1 patch, Transdermal, Q24H  memantine, 10 mg, Oral, BID  oxybutynin XL, 5 mg, Oral, Daily  QUEtiapine, 50 mg, Oral, Nightly  sodium chloride, 10 mL, Intravenous, Q12H      Continuous Infusions:   PRN Meds:.•  acetaminophen **OR** acetaminophen **OR** acetaminophen  •  dextrose  •  dextrose  •  glucagon (human recombinant)  •  ondansetron **OR** ondansetron  •  sodium chloride  •  sodium chloride  •  traMADol    Assessment/Plan   Assessment & Plan     Active Hospital Problems    Diagnosis  POA   • **Multiple falls [R29.6]  Not  Applicable   • Generalized weakness [R53.1]  Yes   • FTT (failure to thrive) in adult [R62.7]  Yes   • Anemia [D64.9]  Yes   • CKD (chronic kidney disease) stage 3, GFR 30-59 ml/min (CMS/[facility]) [N18.30]  Yes   • Lewy body dementia with behavioral disturbance (CMS/[facility]) [G31.83, F02.81]  Yes   • Type 2 diabetes mellitus (CMS/[facility]) [E11.9]  Yes   • Hypothyroidism, adult [E03.9]  Yes   • Essential hypertension [I10]  Yes   • Mixed hyperlipidemia [E78.2]  Yes      Resolved Hospital Problems   No resolved problems to display.        Brief Hospital Course to date:  Faye Rod is a 75 y.o. female  with a history of hyperlipidemia, hypertension, T2DM, hypothyroidism, Lewy body dementia, CKD, falls, SSS s/p PPM, presents to the ED with complaints of weakness and falls.          Adult FTT  Generalized weakness  Rib fractures  -approaching end stage Lewy body dementia  -PT/OT/placement  -ECHO reviewed, normal EF     Lewy body dementia  -continue sinement, aricept, namenda, and seroquel as ordered  -Dr. Gurrola evaluated, felt to be end stage dementia    L wrist pain  -no fracture on xray     Dysphagia  --SLP has seen, on soft/ground diet now      T2DM  --SSI     HTN  HLD  --continue norvasc with hold parameters  --continue statin  --continue aspirin    Hypothyroidism  --tsh 0.830  --continue synthroid     CKD  --baseline creatinine 1.28-1.63  --creatinine stable/monitoring     Anemia  --no overt signs of bleeding  --on iron supplement, B12 injections for low B12    DVT prophylaxis:  Mechanical DVT prophylaxis orders are present.       AM-PAC 6 Clicks Score (PT): 10 (08/16/21 1005)    Disposition: I expect the patient to be discharged TBD.     CODE STATUS:   Code Status and Medical Interventions:   Ordered at: 08/15/21 2051     Limited Support to NOT Include:    Intubation    Cardioversion/Defibrillation    Dialysis     Level Of Support Discussed With:    Next of Kin (If No Surrogate)     Code Status:    No CPR      Medical Interventions (Level of Support Prior to Arrest):    Limited       Marco A Huff MD  08/17/21

## 2021-08-17 NOTE — PLAN OF CARE
Goal Outcome Evaluation:  Plan of Care Reviewed With: patient        Progress: improving  Outcome Summary: Patient demonstrates some improvement today w/ mobility.  She completed bed mobility w/ max A x 2, transfers w/ mod A x 2, and ambulated 3ft w/ RW and mod A x 2.  Pt. also participated in BLE/BUE AAROM TherEx w/ good effort.  Will continue to progress as able.  Recommend SNF rehab at D/C.

## 2021-08-17 NOTE — PROGRESS NOTES
Neurology       Patient Care Team:  Sarai Hawley PA-C as PCP - General (Physician Assistant)  Faisal Vang MD as Consulting Physician (Cardiology)    Chief complaint: Dementia    History: Patient seems reasonably calm.  He was able to walk 3 feet with physical therapy.    The diclofenac seems to be helping her back and neck pain.          Past Medical History:   Diagnosis Date   • Anemia    • Arthritis    • Colon polyp    • Diabetes mellitus (CMS/HCC)    • Disease of thyroid gland    • Heart disease    • Heart valve disease    • Hyperlipidemia    • Hypertension    • Hypothyroidism    • Memory loss    • Pacemaker    • Shortness of breath 2/3/2017   • Sick sinus syndrome (CMS/HCC)        Vital Signs   Vitals:    08/16/21 2338 08/17/21 0427 08/17/21 0735 08/17/21 1317   BP: 95/52 94/69 122/57 99/53   BP Location: Left arm Left arm Left arm Left arm   Patient Position: Lying Lying Lying Sitting   Pulse: 85 66 60 66   Resp: 16 16 16 16   Temp: 98.5 °F (36.9 °C) 98 °F (36.7 °C) 98.2 °F (36.8 °C) 98.1 °F (36.7 °C)   TempSrc: Axillary Axillary Axillary Oral   SpO2: 91% (!) 89% 95% 97%   Weight:       Height:           Physical Exam:   General: Awake and alert              Neuro: Oriented to person.    Answer simple questions.    Follow simple commands.        Results Review:  Reviewed wrist x-ray which is negative for fracture shows arthritis.  Results from last 7 days   Lab Units 08/16/21  0303   WBC 10*3/mm3 5.66   HEMOGLOBIN g/dL 10.0*   HEMATOCRIT % 31.0*   PLATELETS 10*3/mm3 142     Results from last 7 days   Lab Units 08/16/21  0303 08/15/21  1705   SODIUM mmol/L 135* 139   POTASSIUM mmol/L 4.2 4.5   CHLORIDE mmol/L 101 102   CO2 mmol/L 23.0 28.0   BUN mg/dL 22 25*   CREATININE mg/dL 1.19* 1.18*   CALCIUM mg/dL 9.1 9.2   BILIRUBIN mg/dL  --  0.4   ALK PHOS U/L  --  69   ALT (SGPT) U/L  --  10   AST (SGOT) U/L  --  20   GLUCOSE mg/dL 104* 103*       Imaging Results (Last 24 Hours)     ** No results found for  the last 24 hours. **          Assessment:  Lewy body disease    Frequent falls    Degenerative lumbar cervical spondylosis    Behavioral issues, stable    Plan:  Skilled nursing facility and likely placement thereafter    Comment:  Appreciate palliative care note         I discussed the patients findings and my recommendations with patient and family    Kalpesh Gurrola MD  08/17/21  15:16 EDT

## 2021-08-17 NOTE — PLAN OF CARE
Goal Outcome Evaluation:  Plan of Care Reviewed With: patient  Progress: no change   SLP treatment completed. Will continue to address dysphagia w/ f/u to ensure diet tolerance. Please see note for further details and recommendations.

## 2021-08-17 NOTE — CONSULTS
" Discussed and taught patient about type 2 diabetes self-management, risk factors, and importance of blood glucose control to reduce complications. Target blood glucose readings and A1c goals per ADA were reviewed. Reviewed with patient current A1c 6.4 and discussed its significance. Signs, symptoms, and treatment of hyperglycemia and hypoglycemia were discussed. Lifestyle changes such as physical activity with MD approval and healthy eating were encouraged. Stressed the importance of strict blood sugar control after surgery to prevent complications such as infection and to promote healing of incision. Encouraged pt to monitor blood sugar at home 2+  times per day and to call PCP if blood sugar is trending adrien. Encouraged to keep record of blood glucose readings to take to follow up appointment with PCP. Provided patient with copy of [place]'s \"What is Diabetes\" handout, \"Blood Glucose Goals\" handout, and \"What is A1c\" handout.   "

## 2021-08-17 NOTE — THERAPY TREATMENT NOTE
Acute Care - Speech Language Pathology   Swallow Treatment Note [facility]     Patient Name: Faye Rod  : 1945  MRN: [id number]  Today's Date: 2021               Admit Date: 8/15/2021    Visit Dx:     ICD-10-CM ICD-9-CM   1. Multiple falls  R29.6 V15.88   2. Generalized weakness  R53.1 780.79   3. Closed fracture of multiple ribs of right side, initial encounter  S22.41XA 807.09   4. Dementia without behavioral disturbance, unspecified dementia type (CMS/HCC)  F03.90 294.20   5. Chronic renal impairment, unspecified CKD stage  N18.9 585.9   6. Failure to thrive in adult  R62.7 783.7   7. Dysphagia, unspecified type  R13.10 787.20     Patient Active Problem List   Diagnosis   • Mixed hyperlipidemia   • Essential hypertension   • Type 2 diabetes mellitus (CMS/HCC)   • Primary osteoarthritis involving multiple joints   • Non-rheumatic mitral regurgitation   • Hypothyroidism, adult   • Lewy body dementia with behavioral disturbance (CMS/HCC)   • Mild nonproliferative diabetic retinopathy of both eyes without macular edema associated with type 2 diabetes mellitus (CMS/HCC)   • Morbidly obese (CMS/HCC)   • Sick sinus syndrome (CMS/HCC)   • CKD (chronic kidney disease) stage 3, GFR 30-59 ml/min (CMS/HCC)   • Overactive bladder   • Osteoporosis   • Multiple falls   • Generalized weakness   • FTT (failure to thrive) in adult   • Anemia     Past Medical History:   Diagnosis Date   • Anemia    • Arthritis    • Colon polyp    • Diabetes mellitus (CMS/HCC)    • Disease of thyroid gland    • Heart disease    • Heart valve disease    • Hyperlipidemia    • Hypertension    • Hypothyroidism    • Memory loss    • Pacemaker    • Shortness of breath 2/3/2017   • Sick sinus syndrome (CMS/HCC)      Past Surgical History:   Procedure Laterality Date   • CATARACT EXTRACTION Bilateral    • CHOLECYSTECTOMY     • PACEMAKER IMPLANTATION         SLP Recommendation and Plan  SLP Swallowing Diagnosis: mild, oral  "dysphagia, R/O pharyngeal dysphagia  SLP Diet Recommendation: soft textures, ground, thin liquids  Recommended Precautions and Strategies: upright posture during/after eating, small bites of food and sips of liquid, general aspiration precautions, reflux precautions  SLP Rec. for Method of Medication Administration: meds whole, meds crushed, with pudding or applesauce, as tolerated     Monitor for Signs of Aspiration: yes, notify SLP if any concerns  Recommended Diagnostics: VFSS (MBS), other (see comments) (as pt/family agreeable pending GOC)  Swallow Criteria for Skilled Therapeutic Interventions Met: demonstrates skilled criteria  Anticipated Discharge Disposition (SLP): unknown, anticipate therapy at next level of care  Rehab Potential/Prognosis, Swallowing: re-evaluate goals as necessary  Therapy Frequency (Swallow): PRN  Predicted Duration Therapy Intervention (Days): until discharge     Daily Summary of Progress (SLP): progress toward functional goals as expected    Plan for Continued Treatment (SLP): Saw for dysphagia treatment. No immediate s/s of aspiration w/ PO trials on current diet. Although, throat clearing noted to be delayed at end of PO trials. Pt had already eaten breakfast and trials w/ SLP. ? if reflux related. No family present to discuss GOC. Spoke to pt about completing MBS to r/o aspiration, however pt w/ poor comprehension of reasoning. Pt did report that she did not want a test today and requested SLP to \"check back\" with her \"tomorrow\". Palliative following. Pt okay to continue current diet w/ general precautions. If any further concerns, may consider MBS as pt/family agreeable.                Plan of Care Reviewed With: patient  Progress: no change         SWALLOW EVALUATION (last 72 hours)      SLP Adult Swallow Evaluation     Row Name 08/17/21 7761                Rehab Evaluation    Document Type  therapy note (daily note)  -RD       Subjective Information  no complaints  -RD       " "Patient Observations  alert;cooperative;agree to therapy  -RD       Patient/Family/Caregiver Comments/Observations  no family present  -RD       Care Plan Review  evaluation/treatment results reviewed;care plan/treatment goals reviewed;risks/benefits reviewed;current/potential barriers reviewed;patient/other agree to care plan  -RD       Patient Effort  good  -RD       Comment  Per neuro, no acute CVA. Spoke to MD who approved discontinuing SLC eval as pt w/ end stage Lewy Body dementia.   -RD          Pain    Additional Documentation  Pain Scale: FACES Pre/Post-Treatment (Group)  -RD          Pain Scale: FACES Pre/Post-Treatment    Pain: FACES Scale, Pretreatment  0-->no hurt  -RD       Posttreatment Pain Rating  0-->no hurt  -RD          Clinical Impression    Daily Summary of Progress (SLP)  progress toward functional goals as expected  -RD       SLP Swallowing Diagnosis  mild;oral dysphagia;R/O pharyngeal dysphagia  -RD       Functional Impact  risk of aspiration/pneumonia  -RD       Rehab Potential/Prognosis, Swallowing  re-evaluate goals as necessary  -RD       Swallow Criteria for Skilled Therapeutic Interventions Met  demonstrates skilled criteria  -RD       Plan for Continued Treatment (SLP)  Saw for dysphagia treatment. No immediate s/s of aspiration w/ PO trials on current diet. Although, throat clearing noted to be delayed at end of PO trials. Pt had already eaten breakfast and trials w/ SLP. ? if reflux related. No family present to discuss GOC. Spoke to pt about completing MBS to r/o aspiration, however pt w/ poor comprehension of reasoning. Pt did report that she did not want a test today and requested SLP to \"check back\" with her \"tomorrow\". Palliative following. Pt okay to continue current diet w/ general precautions. If any further concerns, may consider MBS as pt/family agreeable.    -RD          Recommendations    Therapy Frequency (Swallow)  PRN  -RD       Predicted Duration Therapy Intervention " (Days)  until discharge  -RD       SLP Diet Recommendation  soft textures;ground;thin liquids  -RD       Recommended Diagnostics  VFSS (MBS);other (see comments) as pt/family agreeable pending GOC  -RD       Recommended Precautions and Strategies  upright posture during/after eating;small bites of food and sips of liquid;general aspiration precautions;reflux precautions  -RD       Oral Care Recommendations  Oral Care BID/PRN  -RD       SLP Rec. for Method of Medication Administration  meds whole;meds crushed;with pudding or applesauce;as tolerated  -RD       Monitor for Signs of Aspiration  yes;notify SLP if any concerns  -RD       Anticipated Discharge Disposition (SLP)  unknown;anticipate therapy at next level of care  -RD         User Key  (r) = Recorded By, (t) = Taken By, (c) = Cosigned By    Initials Name Effective Dates    AC Melissa Mohan, MS CCC-SLP 06/16/21 -     RD Tiffanie Vargas, MS CCC-SLP 06/16/21 -           EDUCATION  The patient has been educated in the following areas:   Dysphagia (Swallowing Impairment) Oral Care/Hydration Modified Diet Instruction.       SLP GOALS     Row Name 08/17/21 0930 08/16/21 0955          Oral Nutrition/Hydration Goal 1 (SLP)    Oral Nutrition/Hydration Goal 1, SLP  LTG: Pt will tolerate soft diet and thin liquids w/o s/sxs aspiration w/ 100% acc w/o cues.  -RD  LTG: Pt will tolerate soft diet and thin liquids w/o s/sxs aspiration w/ 100% acc w/o cues.  -AC     Time Frame (Oral Nutrition/Hydration Goal 1, SLP)  by discharge  -RD  by discharge  -AC     Progress/Outcomes (Oral Nutrition/Hydration Goal 1, SLP)  continuing progress toward goal  -RD  --        Oral Nutrition/Hydration Goal 2 (SLP)    Oral Nutrition/Hydration Goal 2, SLP  Pt will tolerate trials of soft solids and thin liquids w/o s/sxs aspiration w/ 100% acc w/o cues.  -RD  Pt will tolerate trials of soft solids and thin liquids w/o s/sxs aspiration w/ 100% acc w/o cues.  -AC     Time Frame (Oral  Nutrition/Hydration Goal 2, SLP)  short term goal (STG)  -RD  short term goal (STG)  -AC     Barriers (Oral Nutrition/Hydration Goal 2, SLP)  delayed throat clearing at the end of PO trials. ? if related to esophageal dysphagia/reflux. Pt declined MBS today, f/u to determine further instrumental needs vs. GOC  -RD  --     Progress/Outcomes (Oral Nutrition/Hydration Goal 2, SLP)  continuing progress toward goal  -RD  --       User Key  (r) = Recorded By, (t) = Taken By, (c) = Cosigned By    Initials Name Provider Type    Melissa Albert MS CCC-SLP Speech and Language Pathologist    Tiffanie Dunbar MS CCC-SLP Speech and Language Pathologist             Time Calculation:   Time Calculation- SLP     Row Name 08/17/21 1132             Time Calculation- SLP    SLP Start Time  0930  -RD      SLP Received On  08/17/21  -RD         Untimed Charges    47498-VC Treatment Swallow Minutes  38  -RD         Total Minutes    Untimed Charges Total Minutes  38  -RD       Total Minutes  38  -RD        User Key  (r) = Recorded By, (t) = Taken By, (c) = Cosigned By    Initials Name Provider Type    Tiffanie Dunbar MS CCC-SLP Speech and Language Pathologist          Therapy Charges for Today     Code Description Service Date Service Provider Modifiers Qty    37991132573 HC ST TREATMENT SWALLOW 3 8/17/2021 Tiffanie Vargas MS CCC-SLP GN 1        Patient was not wearing a face mask and did exhibit coughing during this therapy encounter.  Procedure performed was aerosolizing, involved close contact (within 6 feet for at least 15 minutes or longer), and did not involve contact with infectious secretions or specimens.  Therapist used appropriate personal protective equipment including gloves, standard procedure mask and eye protection.  Appropriate PPE was worn during the entire therapy session.  Hand hygiene was completed before and after therapy session.          MS BANDAR Diego  8/17/2021

## 2021-08-17 NOTE — THERAPY TREATMENT NOTE
Patient Name: Faye Rod  : 1945    MRN: [id number]                              Today's Date: 2021       Admit Date: 8/15/2021    Visit Dx:     ICD-10-CM ICD-9-CM   1. Multiple falls  R29.6 V15.88   2. Generalized weakness  R53.1 780.79   3. Closed fracture of multiple ribs of right side, initial encounter  S22.41XA 807.09   4. Dementia without behavioral disturbance, unspecified dementia type (CMS/HCC)  F03.90 294.20   5. Chronic renal impairment, unspecified CKD stage  N18.9 585.9   6. Failure to thrive in adult  R62.7 783.7   7. Dysphagia, unspecified type  R13.10 787.20     Patient Active Problem List   Diagnosis   • Mixed hyperlipidemia   • Essential hypertension   • Type 2 diabetes mellitus (CMS/[facility])   • Primary osteoarthritis involving multiple joints   • Non-rheumatic mitral regurgitation   • Hypothyroidism, adult   • Lewy body dementia with behavioral disturbance (CMS/[facility])   • Mild nonproliferative diabetic retinopathy of both eyes without macular edema associated with type 2 diabetes mellitus (CMS/[facility])   • Morbidly obese (CMS/[facility])   • Sick sinus syndrome (CMS/[facility])   • CKD (chronic kidney disease) stage 3, GFR 30-59 ml/min (CMS/[facility])   • Overactive bladder   • Osteoporosis   • Multiple falls   • Generalized weakness   • FTT (failure to thrive) in adult   • Anemia     Past Medical History:   Diagnosis Date   • Anemia    • Arthritis    • Colon polyp    • Diabetes mellitus (CMS/[facility])    • Disease of thyroid gland    • Heart disease    • Heart valve disease    • Hyperlipidemia    • Hypertension    • Hypothyroidism    • Memory loss    • Pacemaker    • Shortness of breath 2/3/2017   • Sick sinus syndrome (CMS/HCC)      Past Surgical History:   Procedure Laterality Date   • CATARACT EXTRACTION Bilateral    • CHOLECYSTECTOMY     • PACEMAKER IMPLANTATION       General Information     Row Name 21 0841          Physical Therapy Time and Intention    Document Type  therapy note (daily  note)  -MB     Mode of Treatment  physical therapy  -MB     Row Name 08/17/21 0841          General Information    Patient Profile Reviewed  yes  -MB     Existing Precautions/Restrictions  fall;other (see comments) dementia  -MB     Barriers to Rehab  previous functional deficit;cognitive status  -MB     Row Name 08/17/21 0841          Cognition    Orientation Status (Cognition)  oriented to;person;disoriented to;place;situation;time  -MB     Row Name 08/17/21 0841          Safety Issues, Functional Mobility    Safety Issues Affecting Function (Mobility)  ability to follow commands;awareness of need for assistance;insight into deficits/self-awareness;judgment;positioning of assistive device;problem-solving;safety precaution awareness;safety precautions follow-through/compliance;sequencing abilities  -MB     Impairments Affecting Function (Mobility)  balance;cognition;endurance/activity tolerance;pain;strength  -MB     Cognitive Impairments, Mobility Safety/Performance  attention;awareness, need for assistance;insight into deficits/self-awareness;judgment;problem-solving/reasoning;safety precaution follow-through;safety precaution awareness;sequencing abilities  -MB       User Key  (r) = Recorded By, (t) = Taken By, (c) = Cosigned By    Initials Name Provider Type    MB Luann Tinoco, PT Physical Therapist        Mobility     Row Name 08/17/21 0841          Bed Mobility    Supine-Sit [place] (Bed Mobility)  maximum assist (25% patient effort);2 person assist;verbal cues;nonverbal cues (demo/gesture)  -MB     Assistive Device (Bed Mobility)  bed rails;draw sheet;head of bed elevated  -MB     Comment (Bed Mobility)  Pt. attempted to assist moving BLEs and reaching for bed rail; required max A x 2 to manage BLEs, raise trunk/shoulders, and scoot hips forward to EOB.  -MB     Row Name 08/17/21 0841          Transfers    Comment (Transfers)  Pt. required consistent VCs/tactile cues for set up (B foot  placement), safe hand placement, sequencing, upright posture, and care to block B feet from sliding.  Pt. stands ~75% upright.  -MB     Row Name 08/17/21 0841          Sit-Stand Transfer    Sit-Stand [place] (Transfers)  moderate assist (50% patient effort);2 person assist;verbal cues;nonverbal cues (demo/gesture)  -MB     Assistive Device (Sit-Stand Transfers)  walker, front-wheeled  -MB     Row Name 08/17/21 0841          Gait/Stairs (Locomotion)    [place] Level (Gait)  moderate assist (50% patient effort);2 person assist  -MB     Assistive Device (Gait)  walker, front-wheeled  -MB     Distance in Feet (Gait)  3  -MB     Deviations/Abnormal Patterns (Gait)  base of support, narrow;michaela decreased;gait speed decreased;stride length decreased;weight shifting decreased;festinating/shuffling  -MB     Bilateral Gait Deviations  forward flexed posture;heel strike decreased  -MB     Comment (Gait/Stairs)  Pt. ambulated w/ step to gait pattern w/ very slow pace and required assist to shift weight laterally, VCs for step sequence and to maintain upright posture.  -MB       User Key  (r) = Recorded By, (t) = Taken By, (c) = Cosigned By    Initials Name Provider Type    MB Luann Tinoco, PT Physical Therapist        Obj/Interventions     Row Name 08/17/21 0841          Motor Skills    Therapeutic Exercise  shoulder;hip;ankle;knee  -MB     Row Name 08/17/21 0841          Shoulder (Therapeutic Exercise)    Shoulder AAROM (Therapeutic Exercise)  bilateral;flexion;aBduction elbow f/e x 10 reps  -MB     Row Name 08/17/21 0841          Hip (Therapeutic Exercise)    Hip (Therapeutic Exercise)  AAROM (active assistive range of motion)  -MB     Hip AAROM (Therapeutic Exercise)  bilateral;flexion;aBduction;10 repetitions  -MB     Row Name 08/17/21 0841          Knee (Therapeutic Exercise)    Knee (Therapeutic Exercise)  AAROM (active assistive range of motion)  -MB     Knee AAROM (Therapeutic Exercise)   "flexion;extension;10 repetitions  -MB     Row Name 08/17/21 0841          Ankle (Therapeutic Exercise)    Ankle (Therapeutic Exercise)  AAROM (active assistive range of motion)  -MB     Ankle AAROM (Therapeutic Exercise)  bilateral;dorsiflexion;plantarflexion;10 repetitions  -MB     Row Name 08/17/21 0841          Balance    Balance Assessment  sitting static balance;sitting dynamic balance;standing static balance;standing dynamic balance  -MB     Static Sitting Balance  WFL;unsupported;sitting, edge of bed  -MB     Dynamic Sitting Balance  mild impairment;unsupported;sitting, edge of bed  -MB     Static Standing Balance  mild impairment;supported  -MB     Dynamic Standing Balance  moderate impairment;supported  -MB     Balance Interventions  standing;sit to stand;weight shifting activity  -MB       User Key  (r) = Recorded By, (t) = Taken By, (c) = Cosigned By    Initials Name Provider Type    Luann Lane, PT Physical Therapist        Goals/Plan    No documentation.       Clinical Impression     Row Name 08/17/21 8693          Pain    Additional Documentation  Pain Scale: FACES Pre/Post-Treatment (Group);Pain Scale: Numbers Pre/Post-Treatment (Group)  -MB     Row Name 08/17/21 0956          Pain Scale: FACES Pre/Post-Treatment    Pain: FACES Scale, Pretreatment  2-->hurts little bit  -MB     Posttreatment Pain Rating  4-->hurts little more  -MB     Pain Location - Side  Left  -MB     Pain Location  shoulder  -MB     Pre/Posttreatment Pain Comment  Pt. states she \"hurts all over.\"  -MB     Row Name 08/17/21 0931          Plan of Care Review    Plan of Care Reviewed With  patient  -MB     Progress  improving  -MB     Outcome Summary  Patient demonstrates some improvement today w/ mobility.  She completed bed mobility w/ max A x 2, transfers w/ mod A x 2, and ambulated 3ft w/ RW and mod A x 2.  Pt. also participated in BLE/BUE AAROM TherEx w/ good effort.  Will continue to progress as able.  Recommend SNF " rehab at D/C.  -MB     Row Name 08/17/21 0947          Vital Signs    Pre Systolic BP Rehab  -- VSS.  RN cleared for PT.  -MB     Pre Patient Position  Supine  -MB     Intra Patient Position  Standing  -MB     Post Patient Position  Sitting  -MB     Row Name 08/17/21 0947          Positioning and Restraints    Pre-Treatment Position  in bed  -MB     Post Treatment Position  chair  -MB     In Chair  notified nsg;reclined;call light within reach;encouraged to call for assist;exit alarm on;waffle cushion;on mechanical lift sling  -MB       User Key  (r) = Recorded By, (t) = Taken By, (c) = Cosigned By    Initials Name Provider Type    Luann Lane, PT Physical Therapist        Outcome Measures     Row Name 08/17/21 0841          How much help from another person do you currently need...    Turning from your back to your side while in flat bed without using bedrails?  2  -MB     Moving from lying on back to sitting on the side of a flat bed without bedrails?  2  -MB     Moving to and from a bed to a chair (including a wheelchair)?  2  -MB     Standing up from a chair using your arms (e.g., wheelchair, bedside chair)?  2  -MB     Climbing 3-5 steps with a railing?  1  -MB     To walk in hospital room?  2  -MB     AM-PAC 6 Clicks Score (PT)  11  -MB     Row Name 08/17/21 0841          Functional Assessment    Outcome Measure Options  AM-PAC 6 Clicks Basic Mobility (PT)  -MB       User Key  (r) = Recorded By, (t) = Taken By, (c) = Cosigned By    Initials Name Provider Type    Luann Lane, PT Physical Therapist                       Physical Therapy Education                 Title: PT OT SLP Therapies (In Progress)     Topic: Physical Therapy (In Progress)     Point: Mobility training (In Progress)     Learning Progress Summary           Patient Acceptance, E,TB, NR by  at 8/16/2021 1007    Comment: Edu on PT services, POC, d/c planning, safety with mobility                   Point: Home exercise  program (In Progress)     Learning Progress Summary           Patient Acceptance, E,TB, NR by  at 8/16/2021 1007    Comment: Edu on PT services, POC, d/c planning, safety with mobility                   Point: Body mechanics (In Progress)     Learning Progress Summary           Patient Acceptance, E,TB, NR by  at 8/16/2021 1007    Comment: Edu on PT services, POC, d/c planning, safety with mobility                   Point: Precautions (In Progress)     Learning Progress Summary           Patient Acceptance, E,TB, NR by  at 8/16/2021 1007    Comment: Edu on PT services, POC, d/c planning, safety with mobility                               User Key     Initials Effective Dates Name Provider Type [facility] 09/22/20 -  Chapito Mckeon, PT Physical Therapist PT              PT Recommendation and Plan     Plan of Care Reviewed With: patient  Progress: improving  Outcome Summary: Patient demonstrates some improvement today w/ mobility.  She completed bed mobility w/ max A x 2, transfers w/ mod A x 2, and ambulated 3ft w/ RW and mod A x 2.  Pt. also participated in BLE/BUE AAROM TherEx w/ good effort.  Will continue to progress as able.  Recommend SNF rehab at D/C.     Time Calculation:   PT Charges     Row Name 08/17/21 0953             Time Calculation    Start Time  0841  -MB      PT Received On  08/17/21  -MB      PT Goal Re-Cert Due Date  08/26/21  -MB         Time Calculation- PT    Total Timed Code Minutes- PT  35 minute(s)  -MB         Timed Charges    53465 - PT Therapeutic Exercise Minutes  20  -MB      50091 - Gait Training Minutes   15  -MB         Total Minutes    Timed Charges Total Minutes  35  -MB       Total Minutes  35  -MB        User Key  (r) = Recorded By, (t) = Taken By, (c) = Cosigned By    Initials Name Provider Type    Luann Lane, PT Physical Therapist        Therapy Charges for Today     Code Description Service Date Service Provider Modifiers Qty    78784585552  PT THER  PROC EA 15 MIN 8/17/2021 Luann Tinoco, PT GP 1    07324106925 HC GAIT TRAINING EA 15 MIN 8/17/2021 Luann Tinoco, PT GP 1    32980407108 HC PT THER SUPP EA 15 MIN 8/17/2021 Luann Tinoco, PT GP 1          PT G-Codes  Outcome Measure Options: AM-PAC 6 Clicks Basic Mobility (PT)  AM-PAC 6 Clicks Score (PT): 11  AM-PAC 6 Clicks Score (OT): 9  Modified Posey Scale: 4 - Moderately severe disability.  Unable to walk without assistance, and unable to attend to own bodily needs without assistance.    Luann Tinoco, PT  8/17/2021

## 2021-08-17 NOTE — PLAN OF CARE
Goal Outcome Evaluation:               Pt is confused. VSS. Slept throughout the night. Q2h turns.

## 2021-08-18 NOTE — SIGNIFICANT NOTE
08/18/21 1618   SLP Deferred Reason   SLP Deferred Reason   (Per RN, pt's family requested a regular comfort diet last night. This was ok'd by Dr. Huff and pt has remained on regular diet and thin liquids--tolerating w/o issue per RN. Attempted to f/u w/ pt/family, but no famiy in room & pt unarousable to auditory/tactile stimuli. Will sign-off @ this time. Please notify SLP if questions/concerns arise.)

## 2021-08-18 NOTE — PLAN OF CARE
Problem: Palliative Care  Goal: Enhanced Quality of Life  Intervention: Optimize Psychosocial Wellbeing  Flowsheets (Taken 8/18/2021 1151)  Supportive Measures:   active listening utilized   verbalization of feelings encouraged  Grieving Process Facilitation: reaction to loss explored  Family/Support System Care:   caregiver stress acknowledged   involvement promoted   presence promoted   self-care encouraged   support provided   Goal Outcome Evaluation:  Plan of Care Reviewed With: daughter        Progress: no change  Outcome Summary: Pt was resting comfortably.  Norco has helped pain control. Dtr reports pt would cry at home often related to her back and neck pain. Tylenol was not effective at home. Pt has a good appetite.  Plans for snf.  Dtr grieving the loss of caring for her mother being at home but recognizes the toll it has taken on caring for her. Discussed palliative at snf.  Dtr in agreement.  Norco prn dose decreased.  continue palliative support.Palliative Team meeting 1300, Juan Jose Lua DO, Eryn LIZ, Marjorie Marti RN, CHPN, Karen Marr RN CHPN, Isi Olsen RN, HAILY, John Wren MSW, Ita Contreras RN CHPN

## 2021-08-18 NOTE — CONSULTS
"                  Clinical Nutrition     Nutrition Assessment  Reason for Visit:   Identified at risk by screening criteria, MST score 2+      Patient Name: Faye Rod  YOB: 1945  MRN: [id number]  Date of Encounter: 08/17/21 20:23 EDT  Admission date: 8/15/2021      Comments:  Pt has sustained a wt loss but apparently prior to 1 yr ago. Does show some wasting but not meeting criteria for malnutrition dx. Family rpt pt was eating well PTA.        Admission Diagnosis    Multiple falls [R29.6]     Problem List    Generalized weakness    FTT (failure to thrive) in adult    Lewy body dementia with behavioral disturbance (CMS/HCC)    Anemia    Essential hypertension    Type 2 diabetes mellitus (CMS/HCC)    Hypothyroidism, adult    CKD (chronic kidney disease) stage 3, GFR 30-59 ml/min (CMS/HCC)      Applicable Interval History: Per SLP 8/16 ok for soft ground food thin liquid      Applicable PMH/PSxH:     PMH: She  has a past medical history of Anemia, Arthritis, Colon polyp, Diabetes mellitus (CMS/HCC), Disease of thyroid gland, Heart disease, Heart valve disease, Hyperlipidemia, Hypertension, Hypothyroidism, Memory loss, Pacemaker, Shortness of breath (2/3/2017), and Sick sinus syndrome (CMS/HCC).   PSxH: She  has a past surgical history that includes Pacemaker Implantation; Cholecystectomy; and Cataract extraction (Bilateral).         Diet/Nutrition Related History:     Pt family and pt wt now has been 165 lb. Pt was eating well PTA. Has required some assistance in hospital.     Anthropometrics     Admission Height  154.9 cm (61\") Documented at 08/15/2021 1654   Admission Weight  74.8 kg (165 lb) Documented at 08/15/2021 1654        Height: 154.9 cm (61\")    Last filed wt: Weight: 74.8 kg (165 lb) (08/15/21 1654)       BMI: BMI (Calculated): 31.2  Obese Class I: 30-34.9kg/m2    Ideal Body Weight (IBW) (kg): 48.15      Weight Change   UBW:165 lb from EMR January - note last year rpts of 181 lb " and 177 lb.   Weight change: if current wt accurate apparently stable now at 165 lb   % wt change:   Time frame of weight loss:     Labs reviewed   Yes  Pertinent:  Low serum Na at this time. Low Vitamin B12      Medications reviewed   Yes  Pertinent: sinemet, Vitamin B12, FeSO4, Insulin       Intake/Ouptut 24 hrs reviewed   Yes      Nutrition Focused Physical Exam     Subcutaneous fat:  Orbital Mild   Buccal No fat loss identified   Tricep No fat loss identified   Ribs- thoracic and lumbar region, lower back, midaxillary line No fat loss identified     Muscle:  Temple/temporalis Mild   Clavicle/acromion- pectoralis major, deltoid, trapezius Mild   Shoulder- deltoid Moderate   Scapular- trapezius, latissimus dorsi Moderate   Interosseous- dorsal hand Mild   Thigh- quadriceps Mild   Calf- gastrocnemius Mild   Note severe wasting in region of patella.    Current Nutrition Prescription     PO: Diet Regular; Thin; Cardiac, Consistent Carbohydrate    Intake: 56% x 4 meals - 88% x last 2 meals      Nutrition Diagnosis     8/14  Problem Increased nutrient needs   Etiology For healing   Signs/Symptoms Pt w fx    Status:    Goal:   General: Nutrition support treatment  PO: Maintain intake as documentation of last 2 meals.  Additional goals:      Nutrition Intervention     Follow treatment progress, Care plan reviewed, Advise alternate selection, Menu provided, Supplement offered/refused      Monitoring/Evaluation:   Per protocol, PO intake, Pertinent labs, Weight, Symptoms        Maria Luz Dalal RD,   Time Spent: 30 min

## 2021-08-18 NOTE — CASE MANAGEMENT/SOCIAL WORK
Continued Stay Note  [facility]     Patient Name: Faye Rod  MRN: [id number]  Today's Date: 8/18/2021    Admit Date: 8/15/2021    Discharge Plan     Row Name 08/18/21 1433       Plan    Plan  Placement    Patient/Family in Agreement with Plan  yes    Plan Comments  [facility] unable to offer patient a bed. I called dtr Shila/[phone]. Shila told me she is patient's POA. I made dtr aware of bed denial and where she wanted me to look. Dtr not interested in [place] and not really interested in [facility]. I called referral to [facility] facilities in  [facility] and [facility]. I sent ref to [place]. CM working. Hopefully, patient is able to be skilled(even thought she is an observation status, then transition over to a long term care bed.  Giuliana/Destini @ [phone]    Final Discharge Disposition Code  03 - skilled nursing facility (SNF);04 - intermediate care facility        Discharge Codes    No documentation.             Sabrina Rudd, JIMMY

## 2021-08-18 NOTE — DISCHARGE PLACEMENT REQUEST
"Jai Rod (75 y.o. Female) Need a STR bed to transition to a long term care bed. DTR is POA. Patient is not Medicaid appropriate will need to spin down some assets. Call Destini Ramos RN @ [phone]    Date of Birth Social Security Number Address Home Phone MRN    1945  [address] [place] [address] [address] [place] [address] [phone] [id number]    Faith Marital Status          Taoism        Admission Date Admission Type Admitting Provider Attending Provider Department, Room/Bed    8/15/21 Emergency Marco A Huff MD Russell, Marc P, MD [address], S368/1    Discharge Date Discharge Disposition Discharge Destination                       Attending Provider: Marco A Huff MD    Allergies: No Known Allergies    Isolation: None   Infection: None   Code Status: No CPR    Ht: 154.9 cm (61\")   Wt: 74.8 kg (165 lb)    Admission Cmt: None   Principal Problem: Multiple falls [R29.6]                 Active Insurance as of 8/15/2021     Primary Coverage     Payor Plan Insurance Group Employer/Plan Group    MEDICARE MEDICARE A & B      Payor Plan Address Payor Plan Phone Number Payor Plan Fax Number Effective Dates    [address] [phone]  9/1/2009 - None Entered    [facility] [id number]       Subscriber Name Subscriber Birth Date Member ID       JAI ROD 1945 [id number]           Secondary Coverage     Payor Plan Insurance Group Employer/Plan Group    [employer] [place] [employer] [place]      Payor Plan Address Payor Plan Phone Number Payor Plan Fax Number Effective Dates    [address] [place] [address] [phone]  9/1/2009 - None Entered    [place] [address]       Subscriber Name Subscriber Birth Date Member ID       JAI ROD 1945 [id number]                 Emergency Contacts      (Rel.) Home Phone Work Phone Mobile Phone    Shila Floyd (Daughter) -- [phone]               History & Physical    "   Earlene Sharp MD at 08/15/21 2031              [facility] Medicine Services  HISTORY AND PHYSICAL    Patient Name: Faye Rod  : 1945  MRN: [id number]  Primary Care Physician: Sarai Hawley PA-C  Date of admission: 8/15/2021    Subjective   Subjective     Chief Complaint:  weakness    HPI:  Faye Rod is a 75 y.o. female with a history of hyperlipidemia, hypertension, T2DM, hypothyroidism, Lewy body dementia, CKD, falls, SSS s/p PPM, presents to the ED with complaints of weakness and falls.  Per the patient's daughter patient averages about 1 fall a month.  Over the last week she has had multiple falls including one on Friday where she fell in the bathroom, and then 1 yesterday where she fell backwards on her walker but was assisted to the floor by her daughter.  Couple of weeks ago the daughter was out of town and home instead stayed with the patient, and she was found sitting on the floor with an unwitnessed fall.  Daughter reports that the patient has had a loss of appetite today and some mild diarrhea.  She also has been complaining of some left upper extremity pain which prompted family to bring her to the ED for evaluation.  No recent fevers, chest pain, vomiting, or any other complaints per her daughter.  CT of chest/abdomen/pelvis shows small amount of right middle lobe and lingular atelectasis, multiple healed or healing right posterior rib fractures with probable subacute right posterior 10th rib fracture.  CT head shows senescent changes without acute abnormality.  Labs unremarkable.  MRI has been ordered by the ED to rule out stroke.  Daughter states that she is having difficulty taking care of the patient at home and is interested in placement.  Patient is being admitted to the hospital medicine service for further evaluation and management.      COVID Details:        Symptoms: [x] NONE [] Fever []  Cough [] Shortness of breath [] Change  in taste or smell  The patient has a COVID HM Topic on their chart, and they are fully vaccinated.    Review of Systems   Unable to perform ROS: Dementia      All other systems reviewed and are negative.     Personal History     Past Medical History:   Diagnosis Date   • Anemia    • Arthritis    • Colon polyp    • Diabetes mellitus (CMS/HCC)    • Disease of thyroid gland    • Heart disease    • Heart valve disease    • Hyperlipidemia    • Hypertension    • Hypothyroidism    • Memory loss    • Pacemaker    • Shortness of breath 2/3/2017   • Sick sinus syndrome (CMS/HCC)        Past Surgical History:   Procedure Laterality Date   • CATARACT EXTRACTION Bilateral    • CHOLECYSTECTOMY     • PACEMAKER IMPLANTATION         Family History:  family history includes Arthritis in her father; Diabetes in her father; Heart attack in her father; Hypertension in her daughter; Kidney disease in her brother; Mental illness in her mother; Obesity in her daughter; Stroke in her mother; Thyroid disease in her brother. Otherwise pertinent FHx was reviewed and unremarkable.     Social History:  reports that she has never smoked. She has never used smokeless tobacco. She reports that she does not drink alcohol and does not use drugs.  Social History     Social History Narrative   • Not on file       Medications:  Insulin Glargine, Insulin Pen Needle, Integra, Loratadine, Mirabegron ER, QUEtiapine, Vitamin D3, acetaminophen, acetaminophen-codeine, amLODIPine, aspirin, atorvastatin, carbidopa-levodopa, donepezil, escitalopram, glucose blood, ibandronate, levothyroxine, memantine, mupirocin, nystatin, ondansetron ODT, and traMADol    No Known Allergies    Objective   Objective     Vital Signs:   Temp:  [98 °F (36.7 °C)] 98 °F (36.7 °C)  Heart Rate:  [61-79] 68  Resp:  [16-18] 18  BP: ()/(47-88) 132/88    Physical Exam   Constitutional: Awake, alert, resting in bed, daughter at bedside  Eyes: PERRLA, sclerae anicteric, no conjunctival  injection  HENT: NCAT, mucous membranes moist  Neck: Supple, no thyromegaly, no lymphadenopathy, trachea midline  Respiratory: Clear to auscultation bilaterally, nonlabored respirations   Cardiovascular: RRR, no murmurs, rubs, or gallops, palpable pedal pulses bilaterally  Gastrointestinal: Positive bowel sounds, soft, nontender, nondistended  Musculoskeletal: No bilateral ankle edema, no clubbing or cyanosis to extremities  Psychiatric: Appropriate affect, cooperative  Neurologic: Oriented to self, strength symmetric in all extremities with weakness present, Cranial Nerves grossly intact to confrontation, speech clear  Skin: No rashes        Result Review:  I have personally reviewed the results from the time of this admission to 08/15/21 8:31 PM EDT and agree with these findings:  [x]  Laboratory  []  Microbiology  [x]  Radiology  [x]  EKG/Telemetry   []  Cardiology/Vascular   []  Pathology  []  Old records  []  Other:   Most notable findings include:       LAB RESULTS:      Lab 08/15/21  1705   WBC 6.69   HEMOGLOBIN 10.4*   HEMATOCRIT 33.1*   PLATELETS 146   NEUTROS ABS 4.35   IMMATURE GRANS (ABS) 0.02   LYMPHS ABS 1.66   MONOS ABS 0.63   EOS ABS 0.01   .0*         Lab 08/15/21  1705   SODIUM 139   POTASSIUM 4.5   CHLORIDE 102   CO2 28.0   ANION GAP 9.0   BUN 25*   CREATININE 1.18*   GLUCOSE 103*   CALCIUM 9.2   MAGNESIUM 2.0   TSH 0.830         Lab 08/15/21  1705   TOTAL PROTEIN 6.5   ALBUMIN 4.10   GLOBULIN 2.4   ALT (SGPT) 10   AST (SGOT) 20   BILIRUBIN 0.4   ALK PHOS 69         Lab 08/15/21  1705   TROPONIN T 0.022                 UA    Urinalysis 4/30/21 6/4/21 6/4/21 8/15/21     1518 1518    Squamous Epithelial Cells, UA   0-2    Specific Gravity, UA  >=1.030  1.022   Ketones, UA 15 mg/dL (A) Trace (A)  Negative   Blood, UA  Trace (A)  Negative   Leukocytes, UA 25 Cheko/ul (A) Negative  Negative   Nitrite, UA  Negative  Negative   RBC, UA   None Seen    WBC, UA   3-5 (A)    Bacteria, UA   3+ (A)    (A)  Abnormal value            Microbiology Results (last 10 days)     Procedure Component Value - Date/Time    COVID PRE-OP / PRE-PROCEDURE SCREENING ORDER (NO ISOLATION) - Swab, Nasopharynx [[id number]]  (Normal) Collected: 08/15/21 1954    Lab Status: Final result Specimen: Swab from Nasopharynx Updated: 08/15/21 2033    Narrative:      The following orders were created for panel order COVID PRE-OP / PRE-PROCEDURE SCREENING ORDER (NO ISOLATION) - Swab, Nasopharynx.  Procedure                               Abnormality         Status                     ---------                               -----------         ------                     COVID-19 and FLU A/B PCR...[[id number]]  Normal              Final result                 Please view results for these tests on the individual orders.    COVID-19 and FLU A/B PCR - Swab, Nasopharynx [[id number]]  (Normal) Collected: 08/15/21 1954    Lab Status: Final result Specimen: Swab from Nasopharynx Updated: 08/15/21 2033     COVID19 Not Detected     Influenza A PCR Not Detected     Influenza B PCR Not Detected    Narrative:      Fact sheet for providers: https://www.fda.gov/media/875391/download    Fact sheet for patients: https://www.fda.gov/media/753952/download    Test performed by PCR.          CT Abdomen Pelvis Without Contrast    Result Date: 8/15/2021  CT Chest WO, CT Abdomen Pelvis WO INDICATION:  Multiple falls with generalized weakness. TECHNIQUE: CT of the chest, abdomen and pelvis without IV contrast. Coronal and sagittal reconstructions were obtained.  Radiation dose reduction techniques included automated exposure control or exposure modulation based on body size. Count of known CT and cardiac nuc med studies performed in previous 12 months: 0.  COMPARISON:  CT abdomen and pelvis 8/17/2020 FINDINGS: Chest: Right middle lobe and lingular atelectasis. No focal pneumonitis. No effusions. Cardiomegaly. Pacemaker leads noted. Aortic atherosclerotic changes without  aneurysm. No adenopathy or central mass. ABDOMEN: Liver and spleen unremarkable. Gallbladder surgically absent. Pancreas, kidneys and adrenal glands are unremarkable. The visualized GI tract unremarkable. Diffuse aortic atherosclerotic change without aneurysm. Pelvis: Bladder minimally distended. Uterus and adnexa are unremarkable. Multiple healed and/or healing right posterior rib fractures with probable subacute right posterior 10th rib fracture. Diffuse osteopenia. Diffuse degenerative changes thoracic and lumbar spine. Grade 1 spondylolisthesis L4 on L5 likely on the basis of facet arthropathy. Moderate L4-L5 spinal stenosis.     Impression: Small amount right middle lobe and lingular atelectasis. Multiple healed or healing right posterior rib fractures with probable subacute right posterior 10th rib fracture. No acute abdominal or pelvic pathology. Generalized osteopenia with moderately advanced thoracic and lumbar degenerative disc disease and facet arthropathy with moderate L4-L5 spinal stenosis. Signer Name: HOMERO Echols MD  Signed: 8/15/2021 7:08 PM  Workstation Name: [id number]  [facility]    XR Humerus Left    Result Date: 8/15/2021  CR Humerus Min 2 Vws LT INDICATION: Acute left arm pain. COMPARISON: None available. FINDINGS: 2 views of the left humerus.  No fracture or dislocation.  No bone erosion or destruction.  Articulation at the shoulder and elbow is anatomic.  No foreign body.     Impression: No definite acute fracture or subluxation. Signer Name: Ana María White MD  Signed: 8/15/2021 7:22 PM  Workstation Name: [id number]  [facility]    CT Head Without Contrast    Result Date: 8/15/2021  CT Head WO HISTORY: Multiple falls. Generalized weakness. TECHNIQUE: Axial unenhanced head CT. Radiation dose reduction techniques included automated exposure control or exposure modulation based on body size. Count of known CT and cardiac nuc med studies  performed in previous 12 months: 0. Time of scan: 1841 hours COMPARISON: 3/31/2017 FINDINGS: No intracranial hemorrhage, mass, or infarct. No hydrocephalus or extra-axial fluid collection. Prominence of sulci and CSF spaces overlying both frontal lobes compatible with age related atrophy. Mild ventriculomegaly. The skull base, calvarium, and extracranial soft tissues are normal.     Impression: Senescent changes without acute abnormality. Signer Name: HOMERO Echols MD  Signed: 8/15/2021 7:00 PM  Workstation Name: [facility]  [facility]    CT Chest Without Contrast Diagnostic    Result Date: 8/15/2021  CT Chest WO, CT Abdomen Pelvis WO INDICATION:  Multiple falls with generalized weakness. TECHNIQUE: CT of the chest, abdomen and pelvis without IV contrast. Coronal and sagittal reconstructions were obtained.  Radiation dose reduction techniques included automated exposure control or exposure modulation based on body size. Count of known CT and cardiac nuc med studies performed in previous 12 months: 0.  COMPARISON:  CT abdomen and pelvis 8/17/2020 FINDINGS: Chest: Right middle lobe and lingular atelectasis. No focal pneumonitis. No effusions. Cardiomegaly. Pacemaker leads noted. Aortic atherosclerotic changes without aneurysm. No adenopathy or central mass. ABDOMEN: Liver and spleen unremarkable. Gallbladder surgically absent. Pancreas, kidneys and adrenal glands are unremarkable. The visualized GI tract unremarkable. Diffuse aortic atherosclerotic change without aneurysm. Pelvis: Bladder minimally distended. Uterus and adnexa are unremarkable. Multiple healed and/or healing right posterior rib fractures with probable subacute right posterior 10th rib fracture. Diffuse osteopenia. Diffuse degenerative changes thoracic and lumbar spine. Grade 1 spondylolisthesis L4 on L5 likely on the basis of facet arthropathy. Moderate L4-L5 spinal stenosis.     Impression: Small amount right middle  lobe and lingular atelectasis. Multiple healed or healing right posterior rib fractures with probable subacute right posterior 10th rib fracture. No acute abdominal or pelvic pathology. Generalized osteopenia with moderately advanced thoracic and lumbar degenerative disc disease and facet arthropathy with moderate L4-L5 spinal stenosis. Signer Name: HOMERO Echols MD  Signed: 8/15/2021 7:08 PM  Workstation Name: [facility]  [facility]    XR Chest 1 View    Result Date: 8/15/2021   EXAMINATION: XR CHEST 1 VW-  INDICATION: Weak/Dizzy/AMS triage protocol  COMPARISON: Chest x-ray 04/16/2016  FINDINGS: Cardiac size enlarged. No overt edema. No pneumothorax or pleural effusion. Degenerative changes of the spine.         Impression: Cardiomegaly without overt edema or effusion.         Results for orders placed in visit on 09/22/17    Adult Transthoracic Echo Complete W/ Cont if Necessary Per Protocol    Interpretation Summary  · Left ventricular systolic function is normal. Estimated EF = 60%.  · Moderate aortic valve regurgitation is present.  · Mild aortic valve stenosis is present.  · Ao mean PG 10 mmHg  · Mild-to-moderate mitral valve regurgitation is present  · Mild tricuspid valve regurgitation is present.  · Calculated right ventricular systolic pressure from tricuspid regurgitation is 28 mmHg.      Assessment/Plan   Assessment & Plan       Multiple falls    Mixed hyperlipidemia    Essential hypertension    Type 2 diabetes mellitus (CMS/HCC)    Hypothyroidism, adult    Lewy body dementia with behavioral disturbance (CMS/HCC)    CKD (chronic kidney disease) stage 3, GFR 30-59 ml/min (CMS/HCC)    Generalized weakness    FTT (failure to thrive) in adult    Anemia    Faye Rod is a 75 y.o. female with a history of hyperlipidemia, hypertension, T2DM, hypothyroidism, Lewy body dementia, CKD, falls, SSS s/p PPM, presents to the ED with complaints of weakness and falls.       Assessment and Plan:    Adult FTT  Generalized weakness  Rib fractures  --CT chest/abdomen/pelvis shows multiple healed or healing right posterior rib fractures with probable subacute right posterior 10th rib fracture, loss osteopenia with moderately advanced thoracic and lumbar degenerative disc disease with facet arthropathy with moderate L4-L5 spinal stenosis  --fall precautions  --MRI of the brain pending  --pt/ot consult  --case management consult  --A.m. labs    Lewy body dementia  --continue sinemet, aricept, namenda, seroquel    Dysphagia  --failed RN dysphagia screen  --consult SLP in the am    T2DM  --a1c in the am  --fsbg with ssi    HTN  HLD  --continue norvasc with hold parameters  --continue statin  --continue aspirin    Hypothyroidism  --tsh 0.830  --continue synthroid    CKD  --baseline creatinine 1.28-1.63  --creatinine 1.18 today  --bmp in the am and continue to monitor    Anemia  --no overt signs of bleeding  --anemia profile  --stool for occult blood  --cbc in the am    DVT prophylaxis:  mechanical    CODE STATUS:    Limited Support to NOT Include: Intubation; Cardioversion/Defibrillation; Dialysis  Level Of Support Discussed With: Next of Kin (If No Surrogate)  Code Status: No CPR  Medical Interventions (Level of Support Prior to Arrest): Limited      This note has been completed as part of a split-shared workflow.   Signature: Electronically signed by SHALONDA Felipe, 08/15/21, 9:31 PM EDT.       Brief Attending Admission Attestation     I have seen and examined the patient, performing an independent face-to-face diagnostic evaluation with plan of care reviewed and developed with the advanced practice clinician (APC).    Old records reviewed and summarized in PM hx.    Brief Summary Statement:  75-year-old female who had at least 1 fall every month likely secondary to poor gait from her Lewy body dementia.  For past few weeks has increasing gait issues, frequent fall, third fall  this week.  Patient complained of left upper extremity pain since her fall yesterday.  Chronic aphasia, also noted to have L.facial droop, ? Old vs new, unknown.  History of frequent urinary tract infection  -No fever was noted, blood pressure borderline in ED-systolic 100s      Remainder of detailed HPI is as noted above and has been reviewed and/or edited by me for completeness.    PM HX:  Hyperlipidemia-20 mg  Lewy body dementia/resting tremor in left hand -Sinemet  every 6, Aricept 10 mg every 12, Namenda 10 mg every 12  DM 2-Lantus 12 units [id number]  Hypothyroid-Synthroid 25 mcg  Bladder dysfunction-#1025 daily  Mood disorder-Seroquel 50 mg nightly, Lexapro 20 mg daily  Hypertension-Norvasc 2.5 mg daily,   tramadol as needed for pain  Echo-3/2018-EF of 60%, moderate AR  CKD-stage III, baseline creatinine around 1.2  Vitals:    08/15/21 2056   BP:  99/74-1 42/62   Pulse: 74   Resp:  16-92% on room air   Temp:  Afebrile   SpO2: 93%       Attending Physical Exam:  RS- CTA-BL, ,  No wheezing , no crackles, good effort.  CVS- s1s2 regular, +murmur.  ABD- soft, non tender, not distended, no organomegaly.  EXT- no edema.  NEURO- AAO-confused, C/o of pain, moving all 4 ext symetrically, does appear to have L.facial droop.  , no focal defecit.      LABS:  Troponin-0.022  BUN/creatinine 25/1.2  Sodium-139, potassium 4.5, LFTs-WNL  TSH 0.8  Magnesium 2.0  BUN/creatinine 10/33, MCV of 100, platelets 146  UA-negative  -Negative  Chest x-ray-cardiomegaly without any overt edema or effusion  Left humerus no fracture or dislocation  CT chest-right posterior 10th rib fracture subacute, moderate L4-L5 spinal stenosis.  CT head-no acute changes  EKG sinus rhythm 60 bpm, poor baseline, paced rhythm, , Q waves    Brief Assessment/Plan  Frequent falls/Pain control- may need Iv med's, since she failed her dysphagia screen.  Pt/ot consult may need placement.    L.facial droop- hard to say new, vs old- couldn't get  mri, asa intiated,  -      See above for further detailed assessment and plan developed with APC which I have reviewed and/or edited for completeness.    Admission Status: I believe that this patient meets OBSERVATION status, however if further evaluation or treatment plans warrant, status may change.  Based upon current information, I predict patient's care encounter to be less than or equal to 2 midnights.                Electronically signed by Earlene Sharp MD at 08/15/21 1843         Current Facility-Administered Medications   Medication Dose Route Frequency Provider Last Rate Last Admin   • acetaminophen (TYLENOL) tablet 650 mg  650 mg Oral Q4H PRN Norah Patel, APRN        Or   • acetaminophen (TYLENOL) 160 MG/5ML solution 650 mg  650 mg Oral Q4H PRN Norah Patel APRN        Or   • acetaminophen (TYLENOL) suppository 650 mg  650 mg Rectal Q4H PRN Norah Patel, APRN       • acetaminophen (TYLENOL) tablet 500 mg  500 mg Oral 4x Daily Eryn Smith APRN   500 mg at 08/18/21 1247   • amLODIPine (NORVASC) tablet 2.5 mg  2.5 mg Oral Daily Norah Patel APRN   2.5 mg at 08/18/21 0816   • aspirin chewable tablet 81 mg  81 mg Oral Daily Ericka Elias APRN   81 mg at 08/18/21 0815    Or   • aspirin suppository 300 mg  300 mg Rectal Daily Ericka Elias S, APRN       • atorvastatin (LIPITOR) tablet 80 mg  80 mg Oral Nightly Ericka Elias APRN   80 mg at 08/17/21 2006   • carbidopa-levodopa (SINEMET)  MG per tablet 1 tablet  1 tablet Oral 4x Daily Norah Patel APRN   1 tablet at 08/18/21 1247   • cyanocobalamin injection 1,000 mcg  1,000 mcg Intramuscular Daily Yris Pope DO   1,000 mcg at 08/18/21 0820   • dextrose (D50W) 25 g/ 50mL Intravenous Solution 25 g  25 g Intravenous Q15 Min PRN Norah Patel, APRN       • dextrose (GLUTOSE) oral gel 15 g  15 g Oral Q15 Min PRN Norah Patel, APRN       • Diclofenac Sodium (VOLTAREN) 1 %  gel 4 g  4 g Topical 4x Daily Kalpesh Gurrola MD   4 g at 08/18/21 1247   • donepezil (ARICEPT) tablet 10 mg  10 mg Oral BID Norah Patel APRN   10 mg at 08/18/21 0816   • escitalopram (LEXAPRO) tablet 20 mg  20 mg Oral Daily Norah Patel APRN   20 mg at 08/18/21 0816   • ferrous sulfate tablet 325 mg  325 mg Oral Daily With Breakfast Yris Pope DO   325 mg at 08/18/21 0815   • gabapentin (NEURONTIN) capsule 100 mg  100 mg Oral Nightly Eryn Smith APRN   100 mg at 08/17/21 2006   • glucagon (human recombinant) (GLUCAGEN DIAGNOSTIC) injection 1 mg  1 mg Subcutaneous Q15 Min PRN Norah Patel APRN       • haloperidol lactate (HALDOL) injection 0.5 mg  0.5 mg Intravenous Once Nancy Wilder APRN       • HYDROcodone-acetaminophen (NORCO) 5-325 MG per tablet 0.5 tablet  0.5 tablet Oral TID Eryn Smith APRN   0.5 tablet at 08/18/21 0818   • HYDROcodone-acetaminophen (NORCO) 5-325 MG per tablet 0.5 tablet  0.5 tablet Oral Q4H PRN Eryn Smith APRN       • insulin lispro (humaLOG) injection 0-7 Units  0-7 Units Subcutaneous TID AC Norah Patel APRN   2 Units at 08/18/21 1247   • levothyroxine (SYNTHROID, LEVOTHROID) tablet 125 mcg  125 mcg Oral Q AM Norah Patel APRN   125 mcg at 08/18/21 0535   • lidocaine (LIDODERM) 5 % 1 patch  1 patch Transdermal Q24H Eryn Smith APRN   1 patch at 08/18/21 0816   • memantine (NAMENDA) tablet 10 mg  10 mg Oral BID Norah Patel APRN   10 mg at 08/18/21 0816   • ondansetron (ZOFRAN) tablet 4 mg  4 mg Oral Q6H PRN Norah Patel APRN        Or   • ondansetron (ZOFRAN) injection 4 mg  4 mg Intravenous Q6H PRN Norah Patel APRN       • oxybutynin XL (DITROPAN-XL) 24 hr tablet 5 mg  5 mg Oral Daily Norah Patel APRN   5 mg at 08/18/21 0816   • QUEtiapine (SEROquel) tablet 50 mg  50 mg Oral Nightly Norah Patel APRN   50 mg at 08/17/21 2006   • sodium chloride 0.9 % flush  10 mL  10 mL Intravenous PRN Danny Leija, DO       • sodium chloride 0.9 % flush 10 mL  10 mL Intravenous Q12H Ericka Elias APRN   10 mL at 21 0817   • sodium chloride 0.9 % flush 10 mL  10 mL Intravenous PRN Ericka Elias APRN       • traMADol (ULTRAM) tablet 25 mg  25 mg Oral Q6H PRN Norah Patel APRN            Physician Progress Notes (most recent note)      Marco A Huff MD at 21 [facility]9              [facility] Medicine Services  PROGRESS NOTE    Patient Name: Faye Rod  : 1945  MRN: [id number]    Date of Admission: 8/15/2021  Primary Care Physician: Sarai Hawley PA-C    Subjective   Subjective     CC:  Multiple falls    HPI:  In bed resting, no issues.    Unable to assess ROS  Objective   Objective     Vital Signs:   Temp:  [98.1 °F (36.7 °C)-99.1 °F (37.3 °C)] 98.4 °F (36.9 °C)  Heart Rate:  [65-79] 66  Resp:  [16-18] 18  BP: ()/(51-64) 134/64     Physical Exam:  NAD, alert  OP clear, MMM  PERRL  Neck supple  No LAD  RRR  CTAB  +BS, ND, soft  No obvious rashes  Exam unchanged from   Results Reviewed:  LAB RESULTS:      Lab 21  0303 08/15/21  1705   WBC 5.66 6.69   HEMOGLOBIN 10.0* 10.4*   HEMATOCRIT 31.0* 33.1*   PLATELETS 142 146   NEUTROS ABS 3.52 4.35   IMMATURE GRANS (ABS) 0.02 0.02   LYMPHS ABS 1.50 1.66   MONOS ABS 0.57 0.63   EOS ABS 0.04 0.01   MCV 97.2* 100.0*         Lab 21  0303 08/15/21  1705   SODIUM 135* 139   POTASSIUM 4.2 4.5   CHLORIDE 101 102   CO2 23.0 28.0   ANION GAP 11.0 9.0   BUN 22 25*   CREATININE 1.19* 1.18*   GLUCOSE 104* 103*   CALCIUM 9.1 9.2   MAGNESIUM 1.8 2.0   HEMOGLOBIN A1C 6.40*  --    TSH  --  0.830         Lab 08/15/21  1705   TOTAL PROTEIN 6.5   ALBUMIN 4.10   GLOBULIN 2.4   ALT (SGPT) 10   AST (SGOT) 20   BILIRUBIN 0.4   ALK PHOS 69         Lab 08/15/21  1705   TROPONIN T 0.022         Lab 21  0303   CHOLESTEROL 118   LDL CHOL 51   HDL CHOL 48    TRIGLYCERIDES 104         Lab 08/15/21  1705   IRON 27*   IRON SATURATION 7*   TIBC 374   TRANSFERRIN 251   FERRITIN 26.98   FOLATE 19.20   VITAMIN B 12 153*         Brief Urine Lab Results  (Last result in the past 365 days)      Color   Clarity   Blood   Leuk Est   Nitrite   Protein   CREAT   Urine HCG        08/15/21 1705 Yellow Clear Negative Negative Negative Trace               Microbiology Results Abnormal     Procedure Component Value - Date/Time    COVID PRE-OP / PRE-PROCEDURE SCREENING ORDER (NO ISOLATION) - Swab, Nasopharynx [[id number]]  (Normal) Collected: 08/15/21 1954    Lab Status: Final result Specimen: Swab from Nasopharynx Updated: 08/15/21 2033    Narrative:      The following orders were created for panel order COVID PRE-OP / PRE-PROCEDURE SCREENING ORDER (NO ISOLATION) - Swab, Nasopharynx.  Procedure                               Abnormality         Status                     ---------                               -----------         ------                     COVID-19 and FLU A/B PCR...[[id number]]  Normal              Final result                 Please view results for these tests on the individual orders.    COVID-19 and FLU A/B PCR - Swab, Nasopharynx [[id number]]  (Normal) Collected: 08/15/21 1954    Lab Status: Final result Specimen: Swab from Nasopharynx Updated: 08/15/21 2033     COVID19 Not Detected     Influenza A PCR Not Detected     Influenza B PCR Not Detected    Narrative:      Fact sheet for providers: https://www.fda.gov/media/168079/download    Fact sheet for patients: https://www.fda.gov/media/559163/download    Test performed by PCR.          Adult Transthoracic Echo Complete W/ Cont if Necessary Per Protocol (With Agitated Saline)    Result Date: 8/16/2021  · Very technically hard study due to patient very tender per technician. · LVEF 60% · Estimated right ventricular systolic pressure from tricuspid regurgitation is normal (<35 mmHg). · Mild mitral valve regurgitation is  present. · Mild to moderate aortic valve regurgitation is present. · AV gradients off axis due to technical limitations.      XR Wrist 3+ View Left    Result Date: 8/16/2021  EXAMINATION: XR WRIST 3+ VW LEFT- 08/16/2021  INDICATION: Frequent falls, left wrist pain; R29.6-Repeated falls; R53.1-Weakness; S22.41XA-Multiple fractures of ribs, right side, initial encounter for closed fracture; F03.90-Unspecified dementia without behavioral disturbance; N18.9-Chronic kidney disease, unspecified; R62.7-Adult failure to thrive; R13.10-Dysphagia, unspecified  COMPARISON: NONE  FINDINGS: 3 views of the left wrist reveal severe osteopenia of the bony structures. Degenerative changes seen of the first carpometacarpal joint. Cortex is intact. Joint spaces are preserved. No joint effusion.       Impression: Degenerative changes seen within the first carpometacarpal joint with no evidence of acute bony abnormality.  D:  08/16/2021 E:  08/16/2021  This report was finalized on 8/16/2021 4:16 PM by Dr. Sima Bartholomew MD.        Results for orders placed during the hospital encounter of 08/15/21    Adult Transthoracic Echo Complete W/ Cont if Necessary Per Protocol (With Agitated Saline)    Interpretation Summary  · Very technically hard study due to patient very tender per technician.  · LVEF 60%  · Estimated right ventricular systolic pressure from tricuspid regurgitation is normal (<35 mmHg).  · Mild mitral valve regurgitation is present.  · Mild to moderate aortic valve regurgitation is present.  · AV gradients off axis due to technical limitations.      I have reviewed the medications:  Scheduled Meds:acetaminophen, 500 mg, Oral, 4x Daily  amLODIPine, 2.5 mg, Oral, Daily  aspirin, 81 mg, Oral, Daily   Or  aspirin, 300 mg, Rectal, Daily  atorvastatin, 80 mg, Oral, Nightly  carbidopa-levodopa, 1 tablet, Oral, 4x Daily  cyanocobalamin, 1,000 mcg, Intramuscular, Daily  Diclofenac Sodium, 4 g, Topical, 4x Daily  donepezil, 10 mg,  Oral, BID  escitalopram, 20 mg, Oral, Daily  ferrous sulfate, 325 mg, Oral, Daily With Breakfast  gabapentin, 100 mg, Oral, Nightly  haloperidol lactate, 0.5 mg, Intravenous, Once  HYDROcodone-acetaminophen, 0.5 tablet, Oral, TID  insulin lispro, 0-7 Units, Subcutaneous, TID AC  levothyroxine, 125 mcg, Oral, Q AM  lidocaine, 1 patch, Transdermal, Q24H  memantine, 10 mg, Oral, BID  oxybutynin XL, 5 mg, Oral, Daily  QUEtiapine, 50 mg, Oral, Nightly  sodium chloride, 10 mL, Intravenous, Q12H      Continuous Infusions:   PRN Meds:.•  acetaminophen **OR** acetaminophen **OR** acetaminophen  •  dextrose  •  dextrose  •  glucagon (human recombinant)  •  HYDROcodone-acetaminophen  •  ondansetron **OR** ondansetron  •  sodium chloride  •  sodium chloride  •  traMADol    Assessment/Plan   Assessment & Plan     Active Hospital Problems    Diagnosis  POA   • **Multiple falls [R29.6]  Not Applicable   • Generalized weakness [R53.1]  Yes   • FTT (failure to thrive) in adult [R62.7]  Yes   • Anemia [D64.9]  Yes   • CKD (chronic kidney disease) stage 3, GFR 30-59 ml/min (CMS/HCC) [N18.30]  Yes   • Lewy body dementia with behavioral disturbance (CMS/[facility]) [G31.83, F02.81]  Yes   • Type 2 diabetes mellitus (CMS/[facility]) [E11.9]  Yes   • Hypothyroidism, adult [E03.9]  Yes   • Essential hypertension [I10]  Yes   • Mixed hyperlipidemia [E78.2]  Yes      Resolved Hospital Problems   No resolved problems to display.        Brief Hospital Course to date:  aFye Rod is a 75 y.o. female  with a history of hyperlipidemia, hypertension, T2DM, hypothyroidism, Lewy body dementia, CKD, falls, SSS s/p PPM, presents to the ED with complaints of weakness and falls.          Adult FTT  Generalized weakness  Rib fractures  -approaching end stage Lewy body dementia  -PT/OT/placement  -ECHO reviewed, normal EF  -awaiting placement     Lewy body dementia  -continue sinement, aricept, namenda, and seroquel as ordered  -Dr. Gurrola evaluated, felt  to be end stage dementia  -awaiting placement    L wrist pain  -no fracture on xray  -seemingly no pain today     Dysphagia  --SLP has seen, on soft/ground diet now      T2DM  --SSI, stable     HTN  HLD  --continue norvasc with hold parameters  --continue statin  --continue aspirin    Hypothyroidism  --tsh 0.830  --continue synthroid     CKD  --baseline creatinine 1.28-1.63  --creatinine stable/monitoring     Anemia  --no overt signs of bleeding  --on iron supplement, B12 injections for low B12    DVT prophylaxis:  Mechanical DVT prophylaxis orders are present.       AM-PAC 6 Clicks Score (PT): 11 (08/17/21 0841)    Disposition: I expect the patient to be discharged TBD.     CODE STATUS:   Code Status and Medical Interventions:   Ordered at: 08/15/21 2051     Limited Support to NOT Include:    Intubation    Cardioversion/Defibrillation    Dialysis     Level Of Support Discussed With:    Next of Kin (If No Surrogate)     Code Status:    No CPR     Medical Interventions (Level of Support Prior to Arrest):    Limited       Marco A Huff MD  08/18/21                Electronically signed by Marco A Huff MD at 08/18/21 1030          Consult Notes (most recent note)      Eryn Smith APRN at 08/16/21 1120      Consult Orders    1. Inpatient Palliative Care MD Consult [[id number]] ordered by Kalpesh Gurrola MD at 08/16/21 1116               Sarai Hawley, PADianeC  Consulting physician: Darryn Gurrola  Reason for referral: hospice referral, discussion  Chief Complaint   Patient presents with   • Weakness - Generalized     HPI:   75 year old white female with known diagnosis of Lewy body dementia, hyperlipidemia, hypertension, type 2 diabetes, hypothyroidism, questionable Parkinson's, chronic kidney disease, anemia, CHF, sick sinus syndrome status post pacemaker placement that has had gradual functional decline over the last 2 to 3 weeks, worse in the past 2 to 3 days with increased falls, presents to ED on  8/15/2021 after having more difficulty getting out of bed in the morning.  Her daughter, Shila, is her primary caregiver since initial diagnosis in 2016 and reports until 2 to 3 weeks ago she was ambulating independently, and has required a walker the past 2 to 3 weeks.  She has aphasia at baseline and daughter reports she seems more confused.  No recent fever, chills, cough.    Symptoms:   Daughter is feeding patient, reports patient usually feeds herself and has maintained appetite  AMS - patient has chronic visual hallucinations - people and animals, usually pleasant not disturbing hallucinations. Reports quetiapine has been effective  Chronic arthritic bone pain - across shoulders, lower neck with kyphosis, lower back transverse are primary sites.   Chronic bilateral feet neuropathy - patient has reported she feels like glass stuck in her feet.   Chronic anxiety after dementia diagnosis, quetiapine has assisted to manage  No nausea or constipation.  Incontinent of bowel and bladder  Intermittent insomnia, usually is good sleeper.   Advance care planning discussed:   Code Status:   Current Code Status     Date Active Code Status Order ID Comments User Context       8/15/2021 2051 No CPR [id number]  Norah Patel, [facility]ON[facility] ED     Advance Care Planning Activity      Questions for Current Code Status     Question Answer Comment    Code Status No CPR     Medical Interventions (Level of Support Prior to Arrest) Limited     Limited Support to NOT Include Intubation      Cardioversion/Defibrillation      Dialysis     Level Of Support Discussed With Next of Kin (If No Surrogate)         Advance Directive: completed forms, none on medical record  Surrogate decision maker: daughterShila  Past Medical History:   Diagnosis Date   • Anemia    • Arthritis    • Colon polyp    • Diabetes mellitus (CMS/HCC)    • Disease of thyroid gland    • Heart disease    • Heart valve disease    • Hyperlipidemia    •  Hypertension    • Hypothyroidism    • Memory loss    • Pacemaker    • Shortness of breath 2/3/2017   • Sick sinus syndrome (CMS/HCC)      Past Surgical History:   Procedure Laterality Date   • CATARACT EXTRACTION Bilateral    • CHOLECYSTECTOMY     • PACEMAKER IMPLANTATION         Reviewed current scheduled and prn medications for route, type, dose and frequency.    Current Facility-Administered Medications   Medication Dose Route Frequency Provider Last Rate Last Admin   • acetaminophen (TYLENOL) tablet 650 mg  650 mg Oral Q4H PRN Norah Patel APRN        Or   • acetaminophen (TYLENOL) 160 MG/5ML solution 650 mg  650 mg Oral Q4H PRN Norah Patel APRN        Or   • acetaminophen (TYLENOL) suppository 650 mg  650 mg Rectal Q4H PRN Norah Patel APRN       • amLODIPine (NORVASC) tablet 2.5 mg  2.5 mg Oral Daily Norah Patel APRN       • aspirin chewable tablet 81 mg  81 mg Oral Daily Ericka Elias APRN        Or   • aspirin suppository 300 mg  300 mg Rectal Daily Ericka Elias APRGEORGE       • atorvastatin (LIPITOR) tablet 80 mg  80 mg Oral Nightly Ericka Elias APRGEORGE       • carbidopa-levodopa (SINEMET)  MG per tablet 1 tablet  1 tablet Oral 4x Daily Norah Patel APRGEORGE       • dextrose (D50W) 25 g/ 50mL Intravenous Solution 25 g  25 g Intravenous Q15 Min PRN Norah Patel APRN       • dextrose (GLUTOSE) oral gel 15 g  15 g Oral Q15 Min PRN Norah Patel APRN       • Diclofenac Sodium (VOLTAREN) 1 % gel 4 g  4 g Topical 4x Daily Kalpesh Gurrola MD       • donepezil (ARICEPT) tablet 10 mg  10 mg Oral BID Norah Patel APRN       • escitalopram (LEXAPRO) tablet 20 mg  20 mg Oral Daily Norah Patel APRN       • glucagon (human recombinant) (GLUCAGEN DIAGNOSTIC) injection 1 mg  1 mg Subcutaneous Q15 Min PRN Norah Patel APRN       • haloperidol lactate (HALDOL) injection 0.5 mg  0.5 mg Intravenous Once Nancy Wilder  APRN       • insulin lispro (humaLOG) injection 0-7 Units  0-7 Units Subcutaneous TID AC Norah Patel APRN       • levothyroxine (SYNTHROID, LEVOTHROID) tablet 125 mcg  125 mcg Oral Q AM Norah Patel, APRN       • lidocaine (LIDODERM) 5 % 1 patch  1 patch Transdermal Q24H Norah Patel, APRN       • memantine (NAMENDA) tablet 10 mg  10 mg Oral BID Norah Patel APRN       • ondansetron (ZOFRAN) tablet 4 mg  4 mg Oral Q6H PRN Norah Patel, APRGEORGE        Or   • ondansetron (ZOFRAN) injection 4 mg  4 mg Intravenous Q6H PRN Norah Patel APRN       • oxybutynin XL (DITROPAN-XL) 24 hr tablet 5 mg  5 mg Oral Daily Norah Patel, APRN       • QUEtiapine (SEROquel) tablet 50 mg  50 mg Oral Nightly Norah Patel APRN       • sodium chloride 0.9 % flush 10 mL  10 mL Intravenous PRN Danny Leija,        • sodium chloride 0.9 % flush 10 mL  10 mL Intravenous Q12H Ericka Elias APRGEORGE   10 mL at 08/16/21 0217   • sodium chloride 0.9 % flush 10 mL  10 mL Intravenous PRN Ericka Elias APRN       • traMADol (ULTRAM) tablet 25 mg  25 mg Oral Q6H PRN Norah Patel APRGEORGE            •  acetaminophen **OR** acetaminophen **OR** acetaminophen  •  dextrose  •  dextrose  •  glucagon (human recombinant)  •  ondansetron **OR** ondansetron  •  sodium chloride  •  sodium chloride  •  traMADol  No Known Allergies  Family History   Problem Relation Age of Onset   • Mental illness Mother    • Stroke Mother    • Arthritis Father    • Diabetes Father    • Heart attack Father    • Kidney disease Brother    • Thyroid disease Brother    • Hypertension Daughter    • Obesity Daughter      Social History     Socioeconomic History   • Marital status:      Spouse name: Not on file   • Number of children: Not on file   • Years of education: Not on file   • Highest education level: Not on file   Tobacco Use   • Smoking status: Never Smoker   • Smokeless tobacco:  "Never Used   Substance and Sexual Activity   • Alcohol use: No   • Drug use: No   • Sexual activity: Defer     Review of Systems - +/- per HPI and symptom review.    PPS: 30%   /66 (BP Location: Left arm, Patient Position: Lying)   Pulse 65   Temp 98.8 °F (37.1 °C) (Axillary)   Resp 16   Ht 154.9 cm (61\")   Wt 74.8 kg (165 lb)   SpO2 92%   BMI 31.18 kg/m²   74.8 kg (165 lb) Body mass index is 31.18 kg/m².  Intake & Output (last day)       08/15 0701 - 08/16 0700 08/16 0701 - 08/17 0700    P.O. 0 0    I.V. (mL/kg) 0 (0)     Total Intake(mL/kg) 0 (0) 0 (0)    Net 0 0              Physical Exam:  General Appearance: No acute distress, weak appearing  Head: Normocephalic without obvious abnormality, atraumatic  Eyes: Conjunctivae and sclerae normal, no icterus, HARLAN  Lungs: Clear to auscultation, respirations regular, even and non-labored  Heart: Regular rhythm and normal rate, normal S1  Abdomen: Normal bowel sounds, soft, non-distended, non-tender  Extremities: Moves all extremities, no redness, no cyanosis, no edema  Pulses: Distal pulses palpable and equal bilaterally  Skin: Warm and dry  Neurological: Awake, no eye contact, minimally interactive, no myoclonus, equal hand  and strength, able to lift lower extremities off bed    Reviewed labs and diagnostic results.  Lab Results   Component Value Date    HGBA1C 6.40 (H) 08/16/2021     Results from last 7 days   Lab Units 08/16/21  0303   WBC 10*3/mm3 5.66   HEMOGLOBIN g/dL 10.0*   HEMATOCRIT % 31.0*   PLATELETS 10*3/mm3 142     Results from last 7 days   Lab Units 08/16/21  0303 08/15/21  1705 08/15/21  1705   SODIUM mmol/L 135*   < > 139   POTASSIUM mmol/L 4.2   < > 4.5   CHLORIDE mmol/L 101   < > 102   CO2 mmol/L 23.0   < > 28.0   BUN mg/dL 22   < > 25*   CREATININE mg/dL 1.19*   < > 1.18*   CALCIUM mg/dL 9.1   < > 9.2   BILIRUBIN mg/dL  --   --  0.4   ALK PHOS U/L  --   --  69   ALT (SGPT) U/L  --   --  10   AST (SGOT) U/L  --   --  20   GLUCOSE " mg/dL 104*   < > 103*    < > = values in this interval not displayed.     Results from last 7 days   Lab Units 08/16/21  0303   SODIUM mmol/L 135*   POTASSIUM mmol/L 4.2   CHLORIDE mmol/L 101   CO2 mmol/L 23.0   BUN mg/dL 22   CREATININE mg/dL 1.19*   GLUCOSE mg/dL 104*   CALCIUM mg/dL 9.1     Imaging Results (Last 72 Hours)     Procedure Component Value Units Date/Time    XR Chest 1 View [[id number]] Collected: 08/15/21 1739     Updated: 08/16/21 0920    Narrative:      EXAMINATION: XR CHEST 1 VW- 08/15/2021     INDICATION: Weak/Dizzy/AMS triage protocol      COMPARISON: Chest x-ray 04/16/2016     FINDINGS: Cardiac size enlarged. No overt edema. No pneumothorax or  pleural effusion. Degenerative changes of the spine.          Impression:      Cardiomegaly without overt edema or effusion.     D:  08/15/2021  E:  08/16/2021          XR Humerus Left [[id number]] Collected: 08/15/21 1922     Updated: 08/15/21 1924    Narrative:      CR Humerus Min 2 Vws LT    INDICATION:   Acute left arm pain.    COMPARISON:   None available.    FINDINGS:   2 views of the left humerus.  No fracture or dislocation.  No bone erosion or destruction.  Articulation at the shoulder and elbow is anatomic.  No foreign body.      Impression:      No definite acute fracture or subluxation.    Signer Name: Ana María White MD   Signed: 8/15/2021 7:22 PM   Workstation Name: [id number]    [facility]    CT Chest Without Contrast Diagnostic [[id number]] Collected: 08/15/21 1908     Updated: 08/15/21 1910    Narrative:      CT Chest WO, CT Abdomen Pelvis WO    INDICATION:    Multiple falls with generalized weakness.    TECHNIQUE:   CT of the chest, abdomen and pelvis without IV contrast. Coronal and sagittal reconstructions were obtained.  Radiation dose reduction techniques included automated exposure control or exposure modulation based on body size. Count of known CT and cardiac  nuc med studies performed in previous 12 months: 0.       COMPARISON:    CT abdomen and pelvis 8/17/2020    FINDINGS:  Chest: Right middle lobe and lingular atelectasis. No focal pneumonitis. No effusions. Cardiomegaly. Pacemaker leads noted. Aortic atherosclerotic changes without aneurysm. No adenopathy or central mass.    ABDOMEN: Liver and spleen unremarkable. Gallbladder surgically absent. Pancreas, kidneys and adrenal glands are unremarkable. The visualized GI tract unremarkable. Diffuse aortic atherosclerotic change without aneurysm.    Pelvis: Bladder minimally distended. Uterus and adnexa are unremarkable. Multiple healed and/or healing right posterior rib fractures with probable subacute right posterior 10th rib fracture. Diffuse osteopenia. Diffuse degenerative changes thoracic and  lumbar spine. Grade 1 spondylolisthesis L4 on L5 likely on the basis of facet arthropathy. Moderate L4-L5 spinal stenosis.      Impression:      Small amount right middle lobe and lingular atelectasis.    Multiple healed or healing right posterior rib fractures with probable subacute right posterior 10th rib fracture.    No acute abdominal or pelvic pathology.    Generalized osteopenia with moderately advanced thoracic and lumbar degenerative disc disease and facet arthropathy with moderate L4-L5 spinal stenosis.    Signer Name: HOMERO Echols MD   Signed: 8/15/2021 7:08 PM   Workstation Name: [facility]    [facility]    CT Abdomen Pelvis Without Contrast [[id number]] Collected: 08/15/21 1908     Updated: 08/15/21 1910    Narrative:      CT Chest WO, CT Abdomen Pelvis WO    INDICATION:    Multiple falls with generalized weakness.    TECHNIQUE:   CT of the chest, abdomen and pelvis without IV contrast. Coronal and sagittal reconstructions were obtained.  Radiation dose reduction techniques included automated exposure control or exposure modulation based on body size. Count of known CT and cardiac  nuc med studies performed in previous 12 months: 0.       COMPARISON:    CT abdomen and pelvis 8/17/2020    FINDINGS:  Chest: Right middle lobe and lingular atelectasis. No focal pneumonitis. No effusions. Cardiomegaly. Pacemaker leads noted. Aortic atherosclerotic changes without aneurysm. No adenopathy or central mass.    ABDOMEN: Liver and spleen unremarkable. Gallbladder surgically absent. Pancreas, kidneys and adrenal glands are unremarkable. The visualized GI tract unremarkable. Diffuse aortic atherosclerotic change without aneurysm.    Pelvis: Bladder minimally distended. Uterus and adnexa are unremarkable. Multiple healed and/or healing right posterior rib fractures with probable subacute right posterior 10th rib fracture. Diffuse osteopenia. Diffuse degenerative changes thoracic and  lumbar spine. Grade 1 spondylolisthesis L4 on L5 likely on the basis of facet arthropathy. Moderate L4-L5 spinal stenosis.      Impression:      Small amount right middle lobe and lingular atelectasis.    Multiple healed or healing right posterior rib fractures with probable subacute right posterior 10th rib fracture.    No acute abdominal or pelvic pathology.    Generalized osteopenia with moderately advanced thoracic and lumbar degenerative disc disease and facet arthropathy with moderate L4-L5 spinal stenosis.    Signer Name: HOMERO Echols MD   Signed: 8/15/2021 7:08 PM   Workstation Name: [facility]    [facility]    CT Head Without Contrast [[id number]] Collected: 08/15/21 1900     Updated: 08/15/21 1902    Narrative:      CT Head WO    HISTORY:   Multiple falls. Generalized weakness.    TECHNIQUE:   Axial unenhanced head CT. Radiation dose reduction techniques included automated exposure control or exposure modulation based on body size. Count of known CT and cardiac nuc med studies performed in previous 12 months: 0.     Time of scan: 1841 hours    COMPARISON:   3/31/2017    FINDINGS:   No intracranial hemorrhage, mass, or infarct. No  hydrocephalus or extra-axial fluid collection. Prominence of sulci and CSF spaces overlying both frontal lobes compatible with age related atrophy. Mild ventriculomegaly. The skull base, calvarium, and  extracranial soft tissues are normal.      Impression:      Senescent changes without acute abnormality.          Signer Name: HOMERO Echols MD   Signed: 8/15/2021 7:00 PM   Workstation Name: [facility]    [facility]        Impression: 75 y.o. female with Lewy Body dementia, frequent falls  Plan:   Chronic musculoskeletal pain - lower back, transverse shoulders/lower neck.  Scheduled diclofenac ointment, scheduled acetamionophen and lidoderm patch  Prn hydrocodone/APAP    Anxiety, hallucinations - continue to monitor, patient has managed with scheduled quetiapine    Goals of care discussion - spoke with daughter at bedside, she is only family member.   Daughter has been caring for the patient since 2016 and is having more difficulties with current functional decline.  She knows that she will not be able to care for him with progressive weakness and less ability to transfer or assist with repositioning.   Discussed palliative and hospice services with facility or home.   Daughter is focused currently on SNF placement with transition to long term care bed.   Palliative team provide support to patient/family.     SHALONDA Cooper  [phone]  21  11:20 EDT      Time: 60  minutes spent reviewing medical and medication records, assessing and examining patient, discussing with patient and family, answering questions, formulating a plan and documentation of care. > 50% time spent face to face       Electronically signed by Eryn mSith APRN at 21 [id number]          Physical Therapy Notes (most recent note)      Luann Tinoco, PT at 21 0841  Version 1 of 1         Patient Name: Faye Rod  : 1945    MRN: [id number]                               Today's Date: 8/17/2021       Admit Date: 8/15/2021    Visit Dx:     ICD-10-CM ICD-9-CM   1. Multiple falls  R29.6 V15.88   2. Generalized weakness  R53.1 780.79   3. Closed fracture of multiple ribs of right side, initial encounter  S22.41XA 807.09   4. Dementia without behavioral disturbance, unspecified dementia type (CMS/[facility])  F03.90 294.20   5. Chronic renal impairment, unspecified CKD stage  N18.9 585.9   6. Failure to thrive in adult  R62.7 783.7   7. Dysphagia, unspecified type  R13.10 787.20     Patient Active Problem List   Diagnosis   • Mixed hyperlipidemia   • Essential hypertension   • Type 2 diabetes mellitus (CMS/[facility])   • Primary osteoarthritis involving multiple joints   • Non-rheumatic mitral regurgitation   • Hypothyroidism, adult   • Lewy body dementia with behavioral disturbance (CMS/[facility])   • Mild nonproliferative diabetic retinopathy of both eyes without macular edema associated with type 2 diabetes mellitus (CMS/[facility])   • Morbidly obese (CMS/[facility])   • Sick sinus syndrome (CMS/[facility])   • CKD (chronic kidney disease) stage 3, GFR 30-59 ml/min (CMS/[facility])   • Overactive bladder   • Osteoporosis   • Multiple falls   • Generalized weakness   • FTT (failure to thrive) in adult   • Anemia     Past Medical History:   Diagnosis Date   • Anemia    • Arthritis    • Colon polyp    • Diabetes mellitus (CMS/[facility])    • Disease of thyroid gland    • Heart disease    • Heart valve disease    • Hyperlipidemia    • Hypertension    • Hypothyroidism    • Memory loss    • Pacemaker    • Shortness of breath 2/3/2017   • Sick sinus syndrome (CMS/[facility])      Past Surgical History:   Procedure Laterality Date   • CATARACT EXTRACTION Bilateral    • CHOLECYSTECTOMY     • PACEMAKER IMPLANTATION       General Information     Row Name 08/17/21 0841          Physical Therapy Time and Intention    Document Type  therapy note (daily note)  -MB     Mode of Treatment  physical therapy  -MB     Row Name 08/17/21 0841          General  Information    Patient Profile Reviewed  yes  -MB     Existing Precautions/Restrictions  fall;other (see comments) dementia  -MB     Barriers to Rehab  previous functional deficit;cognitive status  -MB     Row Name 08/17/21 0841          Cognition    Orientation Status (Cognition)  oriented to;person;disoriented to;place;situation;time  -MB     Row Name 08/17/21 0841          Safety Issues, Functional Mobility    Safety Issues Affecting Function (Mobility)  ability to follow commands;awareness of need for assistance;insight into deficits/self-awareness;judgment;positioning of assistive device;problem-solving;safety precaution awareness;safety precautions follow-through/compliance;sequencing abilities  -MB     Impairments Affecting Function (Mobility)  balance;cognition;endurance/activity tolerance;pain;strength  -MB     Cognitive Impairments, Mobility Safety/Performance  attention;awareness, need for assistance;insight into deficits/self-awareness;judgment;problem-solving/reasoning;safety precaution follow-through;safety precaution awareness;sequencing abilities  -MB       User Key  (r) = Recorded By, (t) = Taken By, (c) = Cosigned By    Initials Name Provider Type    MB Luann Tinoco, PT Physical Therapist        Mobility     Row Name 08/17/21 0841          Bed Mobility    Supine-Sit [place] (Bed Mobility)  maximum assist (25% patient effort);2 person assist;verbal cues;nonverbal cues (demo/gesture)  -MB     Assistive Device (Bed Mobility)  bed rails;draw sheet;head of bed elevated  -MB     Comment (Bed Mobility)  Pt. attempted to assist moving BLEs and reaching for bed rail; required max A x 2 to manage BLEs, raise trunk/shoulders, and scoot hips forward to EOB.  -MB     Row Name 08/17/21 0841          Transfers    Comment (Transfers)  Pt. required consistent VCs/tactile cues for set up (B foot placement), safe hand placement, sequencing, upright posture, and care to block B feet from sliding.  Pt.  stands ~75% upright.  -MB     Row Name 08/17/21 0841          Sit-Stand Transfer    Sit-Stand [place] (Transfers)  moderate assist (50% patient effort);2 person assist;verbal cues;nonverbal cues (demo/gesture)  -MB     Assistive Device (Sit-Stand Transfers)  walker, front-wheeled  -MB     Row Name 08/17/21 0841          Gait/Stairs (Locomotion)    [place] Level (Gait)  moderate assist (50% patient effort);2 person assist  -MB     Assistive Device (Gait)  walker, front-wheeled  -MB     Distance in Feet (Gait)  3  -MB     Deviations/Abnormal Patterns (Gait)  base of support, narrow;michaela decreased;gait speed decreased;stride length decreased;weight shifting decreased;festinating/shuffling  -MB     Bilateral Gait Deviations  forward flexed posture;heel strike decreased  -MB     Comment (Gait/Stairs)  Pt. ambulated w/ step to gait pattern w/ very slow pace and required assist to shift weight laterally, VCs for step sequence and to maintain upright posture.  -MB       User Key  (r) = Recorded By, (t) = Taken By, (c) = Cosigned By    Initials Name Provider Type    MB Luann Tinoco, PT Physical Therapist        Obj/Interventions     Row Name 08/17/21 0841          Motor Skills    Therapeutic Exercise  shoulder;hip;ankle;knee  -MB     Row Name 08/17/21 0841          Shoulder (Therapeutic Exercise)    Shoulder AAROM (Therapeutic Exercise)  bilateral;flexion;aBduction elbow f/e x 10 reps  -MB     Row Name 08/17/21 0841          Hip (Therapeutic Exercise)    Hip (Therapeutic Exercise)  AAROM (active assistive range of motion)  -MB     Hip AAROM (Therapeutic Exercise)  bilateral;flexion;aBduction;10 repetitions  -MB     Row Name 08/17/21 0841          Knee (Therapeutic Exercise)    Knee (Therapeutic Exercise)  AAROM (active assistive range of motion)  -MB     Knee AAROM (Therapeutic Exercise)  flexion;extension;10 repetitions  -MB     Row Name 08/17/21 0841          Ankle (Therapeutic Exercise)    Ankle  "(Therapeutic Exercise)  AAROM (active assistive range of motion)  -MB     Ankle AAROM (Therapeutic Exercise)  bilateral;dorsiflexion;plantarflexion;10 repetitions  -MB     Row Name 08/17/21 5459          Balance    Balance Assessment  sitting static balance;sitting dynamic balance;standing static balance;standing dynamic balance  -MB     Static Sitting Balance  WFL;unsupported;sitting, edge of bed  -MB     Dynamic Sitting Balance  mild impairment;unsupported;sitting, edge of bed  -MB     Static Standing Balance  mild impairment;supported  -MB     Dynamic Standing Balance  moderate impairment;supported  -MB     Balance Interventions  standing;sit to stand;weight shifting activity  -MB       User Key  (r) = Recorded By, (t) = Taken By, (c) = Cosigned By    Initials Name Provider Type    Luann Lane, PT Physical Therapist        Goals/Plan    No documentation.       Clinical Impression     Row Name 08/17/21 8628          Pain    Additional Documentation  Pain Scale: FACES Pre/Post-Treatment (Group);Pain Scale: Numbers Pre/Post-Treatment (Group)  -MB     Row Name 08/17/21 8417          Pain Scale: FACES Pre/Post-Treatment    Pain: FACES Scale, Pretreatment  2-->hurts little bit  -MB     Posttreatment Pain Rating  4-->hurts little more  -MB     Pain Location - Side  Left  -MB     Pain Location  shoulder  -MB     Pre/Posttreatment Pain Comment  Pt. states she \"hurts all over.\"  -MB     Row Name 08/17/21 5328          Plan of Care Review    Plan of Care Reviewed With  patient  -MB     Progress  improving  -MB     Outcome Summary  Patient demonstrates some improvement today w/ mobility.  She completed bed mobility w/ max A x 2, transfers w/ mod A x 2, and ambulated 3ft w/ RW and mod A x 2.  Pt. also participated in BLE/BUE AAROM TherEx w/ good effort.  Will continue to progress as able.  Recommend SNF rehab at D/C.  -MB     Row Name 08/17/21 3297          Vital Signs    Pre Systolic BP Rehab  -- VSS.  RN cleared " for PT.  -MB     Pre Patient Position  Supine  -MB     Intra Patient Position  Standing  -MB     Post Patient Position  Sitting  -MB     Row Name 08/17/21 0947          Positioning and Restraints    Pre-Treatment Position  in bed  -MB     Post Treatment Position  chair  -MB     In Chair  notified nsg;reclined;call light within reach;encouraged to call for assist;exit alarm on;waffle cushion;on mechanical lift sling  -MB       User Key  (r) = Recorded By, (t) = Taken By, (c) = Cosigned By    Initials Name Provider Type    Luann Lane, PT Physical Therapist        Outcome Measures     Row Name 08/17/21 0841          How much help from another person do you currently need...    Turning from your back to your side while in flat bed without using bedrails?  2  -MB     Moving from lying on back to sitting on the side of a flat bed without bedrails?  2  -MB     Moving to and from a bed to a chair (including a wheelchair)?  2  -MB     Standing up from a chair using your arms (e.g., wheelchair, bedside chair)?  2  -MB     Climbing 3-5 steps with a railing?  1  -MB     To walk in hospital room?  2  -MB     AM-PAC 6 Clicks Score (PT)  11  -MB     Row Name 08/17/21 0841          Functional Assessment    Outcome Measure Options  AM-PAC 6 Clicks Basic Mobility (PT)  -MB       User Key  (r) = Recorded By, (t) = Taken By, (c) = Cosigned By    Initials Name Provider Type    Luann Lane, PT Physical Therapist                       Physical Therapy Education                 Title: PT OT SLP Therapies (In Progress)     Topic: Physical Therapy (In Progress)     Point: Mobility training (In Progress)     Learning Progress Summary           Patient Acceptance, E,TB, NR by  at 8/16/2021 1007    Comment: Edu on PT services, POC, d/c planning, safety with mobility                   Point: Home exercise program (In Progress)     Learning Progress Summary           Patient Acceptance, E,TB, NR by  at 8/16/2021 1007     Comment: Edu on PT services, POC, d/c planning, safety with mobility                   Point: Body mechanics (In Progress)     Learning Progress Summary           Patient Acceptance, E,TB, NR by  at 8/16/2021 1007    Comment: Edu on PT services, POC, d/c planning, safety with mobility                   Point: Precautions (In Progress)     Learning Progress Summary           Patient Acceptance, E,TB, NR by  at 8/16/2021 1007    Comment: Edu on PT services, POC, d/c planning, safety with mobility                               User Key     Initials Effective Dates Name Provider Type [facility] 09/22/20 -  Chapito Mckeon, PT Physical Therapist PT              PT Recommendation and Plan     Plan of Care Reviewed With: patient  Progress: improving  Outcome Summary: Patient demonstrates some improvement today w/ mobility.  She completed bed mobility w/ max A x 2, transfers w/ mod A x 2, and ambulated 3ft w/ RW and mod A x 2.  Pt. also participated in BLE/BUE AAROM TherEx w/ good effort.  Will continue to progress as able.  Recommend SNF rehab at D/C.     Time Calculation:   PT Charges     Row Name 08/17/21 0953             Time Calculation    Start Time  0841  -MB      PT Received On  08/17/21  -MB      PT Goal Re-Cert Due Date  08/26/21  -MB         Time Calculation- PT    Total Timed Code Minutes- PT  35 minute(s)  -MB         Timed Charges    67302 - PT Therapeutic Exercise Minutes  20  -MB      67761 - Gait Training Minutes   15  -MB         Total Minutes    Timed Charges Total Minutes  35  -MB       Total Minutes  35  -MB        User Key  (r) = Recorded By, (t) = Taken By, (c) = Cosigned By    Initials Name Provider Type    Luann Lane, PT Physical Therapist        Therapy Charges for Today     Code Description Service Date Service Provider Modifiers Qty    42734495394 HC PT THER PROC EA 15 MIN 8/17/2021 Luann Tinoco, PT GP 1    80216184651 HC GAIT TRAINING EA 15 MIN 8/17/2021 Earnest  Luann HAND, PT GP 1    [id number]  PT THER SUPP EA 15 MIN 2021 Luann Tinoco, PT GP 1          PT G-Codes  Outcome Measure Options: AM-PAC 6 Clicks Basic Mobility (PT)  AM-PAC 6 Clicks Score (PT): 11  AM-PAC 6 Clicks Score (OT): 9  Modified Mills Scale: 4 - Moderately severe disability.  Unable to walk without assistance, and unable to attend to own bodily needs without assistance.    Luann Tinoco, PT  2021      Electronically signed by Luann Tinoco, PT at 21 0953          Occupational Therapy Notes (most recent note)      Albertina Campbell, OT at 21 0850          Patient Name: Faye Rod  : 1945    MRN: [id number]                              Today's Date: 2021       Admit Date: 8/15/2021    Visit Dx:     ICD-10-CM ICD-9-CM   1. Multiple falls  R29.6 V15.88   2. Generalized weakness  R53.1 780.79   3. Closed fracture of multiple ribs of right side, initial encounter  S22.41XA 807.09   4. Dementia without behavioral disturbance, unspecified dementia type (CMS/HCC)  F03.90 294.20   5. Chronic renal impairment, unspecified CKD stage  N18.9 585.9   6. Failure to thrive in adult  R62.7 783.7     Patient Active Problem List   Diagnosis   • Mixed hyperlipidemia   • Essential hypertension   • Type 2 diabetes mellitus (CMS/[facility])   • Primary osteoarthritis involving multiple joints   • Non-rheumatic mitral regurgitation   • Hypothyroidism, adult   • Lewy body dementia with behavioral disturbance (CMS/[facility])   • Mild nonproliferative diabetic retinopathy of both eyes without macular edema associated with type 2 diabetes mellitus (CMS/[facility])   • Morbidly obese (CMS/[facility])   • Sick sinus syndrome (CMS/[facility])   • CKD (chronic kidney disease) stage 3, GFR 30-59 ml/min (CMS/[facility])   • Overactive bladder   • Osteoporosis   • Multiple falls   • Generalized weakness   • FTT (failure to thrive) in adult   • Anemia     Past Medical History:   Diagnosis Date   • Anemia    • Arthritis     • Colon polyp    • Diabetes mellitus (CMS/HCC)    • Disease of thyroid gland    • Heart disease    • Heart valve disease    • Hyperlipidemia    • Hypertension    • Hypothyroidism    • Memory loss    • Pacemaker    • Shortness of breath 2/3/2017   • Sick sinus syndrome (CMS/HCC)      Past Surgical History:   Procedure Laterality Date   • CATARACT EXTRACTION Bilateral    • CHOLECYSTECTOMY     • PACEMAKER IMPLANTATION       General Information     Row Name 08/16/21 0943          OT Time and Intention    Document Type  evaluation  -HK     Mode of Treatment  occupational therapy  -     Row Name 08/16/21 0943          General Information    Patient Profile Reviewed  yes  -HK     Prior Level of Function  dependent:;dressing;bathing;mod assist:;gait;transfer PLOF difficult to obtain. Pt reports dependence for all dressing and bathing.  -HK     Existing Precautions/Restrictions  fall;other (see comments) pt has difficulty following one step commands and answering questions.  -HK     Barriers to Rehab  medically complex;previous functional deficit;cognitive status;ineffective coping  -HK     Row Name 08/16/21 0943          Living Environment    Lives With  child(chante), adult  -     Row Name 08/16/21 0943          Stairs Within Home, Primary    Stairs, Within Home, Primary  Pt poor historian and difficulty providing PLOF.  -HK     Number of Stairs, Within Home, Primary  none  -HK     Stair Railings, Within Home, Primary  none  -HK     Row Name 08/16/21 0943          Cognition    Orientation Status (Cognition)  oriented to;person;disoriented to;place;situation;time  -HK     Row Name 08/16/21 0943          Safety Issues, Functional Mobility    Safety Issues Affecting Function (Mobility)  safety precautions follow-through/compliance;safety precaution awareness;insight into deficits/self-awareness;sequencing abilities;judgment;at risk behavior observed;awareness of need for assistance;problem-solving;positioning of assistive  device;ability to follow commands;friction/shear risk  -HK     Impairments Affecting Function (Mobility)  balance;cognition;endurance/activity tolerance;pain;strength  -HK     Cognitive Impairments, Mobility Safety/Performance  attention;awareness, need for assistance;insight into deficits/self-awareness;judgment;problem-solving/reasoning;safety precaution awareness;safety precaution follow-through;sequencing abilities  -HK       User Key  (r) = Recorded By, (t) = Taken By, (c) = Cosigned By    Initials Name Provider Type    HK Albertina Campbell OT Occupational Therapist          Mobility/ADL's     Row Name 08/16/21 1001          Bed Mobility    Bed Mobility  supine-sit;scooting/bridging  -HK     Scooting/Bridging [place] (Bed Mobility)  maximum assist (25% patient effort);2 person assist;verbal cues  -     Supine-Sit [place] (Bed Mobility)  dependent (less than 25% patient effort);2 person assist;verbal cues  -HK     Bed Mobility, Safety Issues  decreased use of arms for pushing/pulling;decreased use of legs for bridging/pushing;impaired trunk control for bed mobility;cognitive deficits limit understanding  -     Assistive Device (Bed Mobility)  bed rails;draw sheet;head of bed elevated  -HK     Comment (Bed Mobility)  Pt dependent to advance to EOB and maxAx2 to scoot to EOB. Despite max verbal cues pt unable to initiate.  -     Row Name 08/16/21 1001          Transfers    Transfers  sit-stand transfer;toilet transfer  -HK     Comment (Transfers)  Pt completed transfer from bed to BSC and from BSC to chair.  -HK     Bed-Chair [place] (Transfers)  maximum assist (25% patient effort);2 person assist;verbal cues  -     Assistive Device (Bed-Chair Transfers)  other (see comments) B UE support  -HK     Sit-Stand [place] (Transfers)  maximum assist (25% patient effort);2 person assist;verbal cues  -     [place] Level (Toilet Transfer)  maximum assist (25% patient effort);2 person  assist;verbal cues  -     Assistive Device (Toilet Transfer)  walker, front-wheeled;raised toilet seat  -[facility] Name 08/16/21 1001          Sit-Stand Transfer    Assistive Device (Sit-Stand Transfers)  other (see comments) B UE support  -[facility] Name 08/16/21 1001          Toilet Transfer    Type (Toilet Transfer)  sit-stand;stand-sit  -HK     Row Name 08/16/21 1001          Functional Mobility    Functional Mobility- Ind. Level  not appropriate to assess  -[facility] Name 08/16/21 1001          Activities of Daily Living    BADL Assessment/Intervention  lower body dressing;toileting  -HK     Row Name 08/16/21 1001          Lower Body Dressing Assessment/Training    [place] Level (Lower Body Dressing)  dependent (less than 25% patient effort);don;socks  -     Position (Lower Body Dressing)  supine  -HK     Row Name 08/16/21 1001          Toileting Assessment/Training    [place] Level (Toileting)  dependent (less than 25% patient effort);perform perineal hygiene  -     Position (Toileting)  unsupported sitting;unsupported standing  -     Comment (Toileting)  pt dependent for all narda care.  -       User Key  (r) = Recorded By, (t) = Taken By, (c) = Cosigned By    Initials Name Provider Type    Albertina Singh, OT Occupational Therapist        Obj/Interventions     [facility] Name 08/16/21 1004          Sensory Assessment (Somatosensory)    Sensory Assessment (Somatosensory)  sensation intact declines numbess and tingling  -HK     Row Name 08/16/21 1004          Vision Assessment/Intervention    Visual Impairment/Limitations  WFL  -HK     Row Name 08/16/21 1004          Range of Motion Comprehensive    General Range of Motion  upper extremity range of motion deficits identified  -     Comment, General Range of Motion  grossly 100 degrees AROM B UE.  -[facility] Name 08/16/21 1004          Strength Comprehensive (MMT)    General Manual Muscle Testing (MMT) Assessment  upper extremity strength  deficits identified  -HK     Comment, General Manual Muscle Testing (MMT) Assessment  Pt with generalized weakness  -HK     Row Name 08/16/21 1004          Upper Extremity (Manual Muscle Testing)    Upper Extremity: Manual Muscle Testing (MMT)  right shoulder strength deficit;left shoulder strength deficit  -HK     Row Name 08/16/21 1004          Motor Skills    Motor Skills  coordination  -     Coordination  WFL  -     Row Name 08/16/21 1004          Balance    Balance Assessment  sitting static balance;sitting dynamic balance;standing static balance  -HK     Static Sitting Balance  mild impairment;unsupported;sitting, edge of bed  -HK     Dynamic Sitting Balance  moderate impairment;unsupported;sitting, edge of bed  -HK     Static Standing Balance  moderate impairment;supported;standing  -HK     Balance Interventions  sitting;occupation based/functional task  -HK     Row Name 08/16/21 1004          Right Shoulder (Manual Muscle Testing)    Right Shoulder Manual Muscle Testing (MMT)  flexion  -HK     MMT: Flexion, Right Shoulder  flexion  -HK     MMT, Gross Movement: Right Shoulder Flexion  (3+/5) fair plus  -HK     [facility] Name 08/16/21 1004          Left Shoulder (Manual Muscle Testing)    Left Shoulder Manual Muscle Testing (MMT)  flexion  -HK     MMT: Flexion, Left Shoulder  flexion  -HK     MMT, Gross Movement: Left Shoulder Flexion  (3+/5) fair plus  -HK       User Key  (r) = Recorded By, (t) = Taken By, (c) = Cosigned By    Initials Name Provider Type    HK Albertina Campbell, OT Occupational Therapist        Goals/Plan     Row Name 08/16/21 1011          Bed Mobility Goal 1 (OT)    Activity/Assistive Device (Bed Mobility Goal 1, OT)  sit to supine/supine to sit;scooting  -HK     [place] Level/Cues Needed (Bed Mobility Goal 1, OT)  maximum assist (25-49% patient effort);verbal cues required  -HK     Time Frame (Bed Mobility Goal 1, OT)  by discharge;long term goal (LTG)  -HK     Progress/Outcomes (Bed  Mobility Goal 1, OT)  goal ongoing  -HK     Row Name 08/16/21 1011          Transfer Goal 1 (OT)    Activity/Assistive Device (Transfer Goal 1, OT)  sit-to-stand/stand-to-sit;toilet  -HK     [place] Level/Cues Needed (Transfer Goal 1, OT)  moderate assist (50-74% patient effort);verbal cues required  -HK     Time Frame (Transfer Goal 1, OT)  by discharge;long term goal (LTG)  -HK     Progress/Outcome (Transfer Goal 1, OT)  goal ongoing  -HK     Row Name 08/16/21 1011          Toileting Goal 1 (OT)    Activity/Device (Toileting Goal 1, OT)  adjust/manage clothing;perform perineal hygiene  -HK     [place] Level/Cues Needed (Toileting Goal 1, OT)  verbal cues required;maximum assist (25-49% patient effort)  -HK     Time Frame (Toileting Goal 1, OT)  by discharge;long term goal (LTG)  -HK     Progress/Outcome (Toileting Goal 1, OT)  goal ongoing  -HK     Row Name 08/16/21 1011          Grooming Goal 1 (OT)    Activity/Device (Grooming Goal 1, OT)  hair care;oral care;wash face, hands  -HK     [place] (Grooming Goal 1, OT)  moderate assist (50-74% patient effort);verbal cues required  -HK     Time Frame (Grooming Goal 1, OT)  by discharge;long term goal (LTG)  -HK     Progress/Outcome (Grooming Goal 1, OT)  goal ongoing  -HK     Row Name 08/16/21 1011          Therapy Assessment/Plan (OT)    Planned Therapy Interventions (OT)  occupation/activity based interventions;adaptive equipment training;BADL retraining;functional balance retraining;transfer/mobility retraining;ROM/therapeutic exercise  -       User Key  (r) = Recorded By, (t) = Taken By, (c) = Cosigned By    Initials Name Provider Type     Albertina Campbell, OT Occupational Therapist        Clinical Impression     Row Name 08/16/21 1005          Pain Assessment    Additional Documentation  Pain Scale: FACES Pre/Post-Treatment (Group)  -HK     Row Name 08/16/21 1005          Pain Scale: Numbers Pre/Post-Treatment    Pain Intervention(s)   "Ambulation/increased activity;Repositioned  -     Row Name 08/16/21 1005          Pain Scale: FACES Pre/Post-Treatment    Pain: FACES Scale, Pretreatment  4-->hurts little more  -HK     Posttreatment Pain Rating  6-->hurts even more  -HK     Pain Location - Side  Left  -HK     Pain Location - Orientation  generalized  -HK     Pain Location  shoulder  -HK     Pre/Posttreatment Pain Comment  Per pt she hurts \"all over\"  -     Row Name 08/16/21 1005          Plan of Care Review    Plan of Care Reviewed With  patient  -HK     Progress  improving  -HK     Outcome Summary  OT eval complete. Pt limited by confusion and decreased command following. Pt becomes tearful due to pain. Pt dependent x2 for bed mobility and maxAx2 for all transfers. Pt with generalized weakness. Difficulty providing PLOF or home situation. Pt reports baseline dependence for bathing and dressing. Recommend d/c to SNF. If returning home will need 24/7 care.  -     Row Name 08/16/21 1005          Therapy Assessment/Plan (OT)    Patient/Family Therapy Goal Statement (OT)  Pt would like to improve and return home.  -     Rehab Potential (OT)  good, to achieve stated therapy goals  -     Criteria for Skilled Therapeutic Interventions Met (OT)  yes;skilled treatment is necessary  -     Therapy Frequency (OT)  daily  -     Row Name 08/16/21 1005          Therapy Plan Review/Discharge Plan (OT)    Anticipated Discharge Disposition (OT)  skilled nursing facility  -     Row Name 08/16/21 1005          Vital Signs    Pre Systolic BP Rehab  153  -HK     Pre Treatment Diastolic BP  66  -HK     Post Systolic BP Rehab  150  -HK     Post Treatment Diastolic BP  58  -HK     O2 Delivery Pre Treatment  room air  -HK     O2 Delivery Intra Treatment  room air  -HK     O2 Delivery Post Treatment  room air  -HK     Pre Patient Position  Supine  -HK     Intra Patient Position  Standing  -HK     Post Patient Position  Sitting  -HK     Row Name 08/16/21 1005 "          Positioning and Restraints    Pre-Treatment Position  in bed  -HK     Post Treatment Position  chair  -HK     In Chair  notified nsg;reclined;call light within reach;encouraged to call for assist;exit alarm on;waffle cushion;on mechanical lift sling  -       User Key  (r) = Recorded By, (t) = Taken By, (c) = Cosigned By    Initials Name Provider Type    HK Albertina Campbell, OT Occupational Therapist        Outcome Measures     Row Name 08/16/21 1012          How much help from another is currently needed...    Putting on and taking off regular lower body clothing?  1  -HK     Bathing (including washing, rinsing, and drying)  1  -HK     Toileting (which includes using toilet bed pan or urinal)  1  -HK     Putting on and taking off regular upper body clothing  2  -HK     Taking care of personal grooming (such as brushing teeth)  2  -HK     Eating meals  2  -HK     AM-PAC 6 Clicks Score (OT)  9  -HK     Row Name 08/16/21 1005          How much help from another person do you currently need...    Turning from your back to your side while in flat bed without using bedrails?  2  -JH     Moving from lying on back to sitting on the side of a flat bed without bedrails?  2  -JH     Moving to and from a bed to a chair (including a wheelchair)?  2  -JH     Standing up from a chair using your arms (e.g., wheelchair, bedside chair)?  2  -JH     Climbing 3-5 steps with a railing?  1  -JH     To walk in hospital room?  1  -     AM-PAC 6 Clicks Score (PT)  10  -     Row Name 08/16/21 1012 08/16/21 1005       Functional Assessment    Outcome Measure Options  AM-PAC 6 Clicks Daily Activity (OT)  -  AM-PAC 6 Clicks Basic Mobility (PT)  -      User Key  (r) = Recorded By, (t) = Taken By, (c) = Cosigned By    Initials Name Provider Type    Albertina Singh, OT Occupational Therapist    Chapito Harris PT Physical Therapist          Occupational Therapy Education                 Title: PT OT SLP Therapies (In Progress)      Topic: Occupational Therapy (Done)     Point: ADL training (Done)     Description:   Instruct learner(s) on proper safety adaptation and remediation techniques during self care or transfers.   Instruct in proper use of assistive devices.              Learning Progress Summary           Patient Acceptance, E,TB,D,H, VU,NR by  at 8/16/2021 1015                   Point: Home exercise program (Done)     Description:   Instruct learner(s) on appropriate technique for monitoring, assisting and/or progressing therapeutic exercises/activities.              Learning Progress Summary           Patient Acceptance, E,TB,D,H, VU,NR by  at 8/16/2021 1015                   Point: Precautions (Done)     Description:   Instruct learner(s) on prescribed precautions during self-care and functional transfers.              Learning Progress Summary           Patient Acceptance, E,TB,D,H, VU,NR by  at 8/16/2021 1015                   Point: Body mechanics (Done)     Description:   Instruct learner(s) on proper positioning and spine alignment during self-care, functional mobility activities and/or exercises.              Learning Progress Summary           Patient Acceptance, E,TB,D,H, VU,NR by  at 8/16/2021 1015                               User Key     Initials Effective Dates Name Provider Type Discipline     06/16/21 -  Albertina Campbell, OT Occupational Therapist OT              OT Recommendation and Plan  Planned Therapy Interventions (OT): occupation/activity based interventions, adaptive equipment training, BADL retraining, functional balance retraining, transfer/mobility retraining, ROM/therapeutic exercise  Therapy Frequency (OT): daily  Plan of Care Review  Plan of Care Reviewed With: patient  Progress: improving  Outcome Summary: OT eval complete. Pt limited by confusion and decreased command following. Pt becomes tearful due to pain. Pt dependent x2 for bed mobility and maxAx2 for all transfers. Pt with generalized  weakness. Difficulty providing PLOF or home situation. Pt reports baseline dependence for bathing and dressing. Recommend d/c to SNF. If returning home will need 24/7 care.     Time Calculation:   Time Calculation- OT     Row Name 08/16/21 0850             Time Calculation- OT    OT Start Time  0850  -HK      OT Received On  08/16/21  -HK      OT Goal Re-Cert Due Date  08/26/21  -HK         Timed Charges    56889 - OT Self Care/Mgmt Minutes  15  -HK         Untimed Charges    OT Eval/Re-eval Minutes  45  -HK         Total Minutes    Timed Charges Total Minutes  15  -HK      Untimed Charges Total Minutes  45  -HK       Total Minutes  60  -HK        User Key  (r) = Recorded By, (t) = Taken By, (c) = Cosigned By    Initials Name Provider Type    HK Albertina Campbell OT Occupational Therapist        Therapy Charges for Today     Code Description Service Date Service Provider Modifiers Qty    31498427091  OT SELF CARE/MGMT/TRAIN EA 15 MIN 8/16/2021 Albertina Campbell OT GO 1    [id number]  OT EVAL LOW COMPLEXITY 3 8/16/2021 Albertina Campbell OT GO 1               Albertina Campbell OT  8/16/2021    Electronically signed by Albertina Campbell OT at 08/16/21 1018

## 2021-08-18 NOTE — PLAN OF CARE
Goal Outcome Evaluation:   Patient alert to self and compliant with care. She is calm and cooperative with occasional complaints of generalized pain. Scheduled pain medication adequate for pain control.

## 2021-08-18 NOTE — PLAN OF CARE
Problem: Fall Injury Risk  Goal: Absence of Fall and Fall-Related Injury  Outcome: Ongoing, Progressing  Intervention: Identify and Manage Contributors to Fall Injury Risk  Recent Flowsheet Documentation  Taken 8/18/2021 0400 by Suzan Lares RN  Self-Care Promotion: independence encouraged  Taken 8/18/2021 0200 by Suzan Lares RN  Self-Care Promotion: independence encouraged  Taken 8/18/2021 0000 by Suzan Lares RN  Self-Care Promotion: independence encouraged  Taken 8/17/2021 2200 by Suzan Lares RN  Self-Care Promotion: independence encouraged  Taken 8/17/2021 2000 by Suzan Lares RN  Medication Review/Management: medications reviewed  Self-Care Promotion: independence encouraged  Intervention: Promote Injury-Free Environment  Recent Flowsheet Documentation  Taken 8/18/2021 0400 by Suzan Lares RN  Safety Promotion/Fall Prevention:   activity supervised   assistive device/personal items within reach   safety round/check completed  Taken 8/18/2021 0200 by Suzan Lares RN  Safety Promotion/Fall Prevention:   activity supervised   assistive device/personal items within reach   safety round/check completed  Taken 8/18/2021 0000 by Suzan Lares RN  Safety Promotion/Fall Prevention:   activity supervised   assistive device/personal items within reach   safety round/check completed  Taken 8/17/2021 2200 by Suzan Lares RN  Safety Promotion/Fall Prevention:   activity supervised   assistive device/personal items within reach   safety round/check completed  Taken 8/17/2021 2000 by Suzan Lares RN  Safety Promotion/Fall Prevention:   activity supervised   assistive device/personal items within reach   clutter free environment maintained   fall prevention program maintained   lighting adjusted   muscle strengthening facilitated   nonskid shoes/slippers when out of bed   room organization consistent   safety round/check completed     Problem: Adult Inpatient Plan of Care  Goal: Plan  of Care Review  Outcome: Ongoing, Progressing  Flowsheets (Taken 8/18/2021 0434)  Progress: improving  Plan of Care Reviewed With: patient  Goal: Patient-Specific Goal (Individualized)  Outcome: Ongoing, Progressing  Goal: Absence of Hospital-Acquired Illness or Injury  Outcome: Ongoing, Progressing  Intervention: Identify and Manage Fall Risk  Recent Flowsheet Documentation  Taken 8/18/2021 0400 by Suzan Lares RN  Safety Promotion/Fall Prevention:   activity supervised   assistive device/personal items within reach   safety round/check completed  Taken 8/18/2021 0200 by Suzan Lares RN  Safety Promotion/Fall Prevention:   activity supervised   assistive device/personal items within reach   safety round/check completed  Taken 8/18/2021 0000 by Suzan Lares RN  Safety Promotion/Fall Prevention:   activity supervised   assistive device/personal items within reach   safety round/check completed  Taken 8/17/2021 2200 by Suzan Lares RN  Safety Promotion/Fall Prevention:   activity supervised   assistive device/personal items within reach   safety round/check completed  Taken 8/17/2021 2000 by Suzan Lares RN  Safety Promotion/Fall Prevention:   activity supervised   assistive device/personal items within reach   clutter free environment maintained   fall prevention program maintained   lighting adjusted   muscle strengthening facilitated   nonskid shoes/slippers when out of bed   room organization consistent   safety round/check completed  Intervention: Prevent Skin Injury  Recent Flowsheet Documentation  Taken 8/18/2021 0400 by Suzan Lares RN  Body Position:   side-lying, right   weight shift assistance provided  Taken 8/18/2021 0200 by Suzan Lares RN  Body Position:   weight shift assistance provided   tilted, left  Taken 8/18/2021 0000 by Suzan Lares RN  Body Position:   tilted, right   weight shift assistance provided  Taken 8/17/2021 2200 by Suzna Lares RN  Body Position:    tilted, left   weight shift assistance provided  Taken 8/17/2021 2000 by Suzan Lares RN  Body Position:   weight shift assistance provided   tilted, right  Skin Protection:   adhesive use limited   incontinence pads utilized  Intervention: Prevent and Manage VTE (venous thromboembolism) Risk  Recent Flowsheet Documentation  Taken 8/18/2021 0400 by Suzan Lares RN  VTE Prevention/Management:   bilateral   sequential compression devices off  Taken 8/18/2021 0200 by Suzan Lares RN  VTE Prevention/Management:   bilateral   sequential compression devices off  Taken 8/18/2021 0000 by Suzan Lares RN  VTE Prevention/Management:   bilateral   sequential compression devices off  Taken 8/17/2021 2200 by Suzan Lares RN  VTE Prevention/Management:   bilateral   sequential compression devices off  Taken 8/17/2021 2000 by Suzan Lares RN  VTE Prevention/Management:   bilateral   sequential compression devices off  Intervention: Prevent Infection  Recent Flowsheet Documentation  Taken 8/18/2021 0400 by Suzan Lares RN  Infection Prevention:   hand hygiene promoted   rest/sleep promoted  Taken 8/18/2021 0200 by Suzan Lares RN  Infection Prevention:   hand hygiene promoted   rest/sleep promoted  Taken 8/18/2021 0000 by Suzan Lares RN  Infection Prevention:   hand hygiene promoted   rest/sleep promoted  Taken 8/17/2021 2200 by Suzan Lares RN  Infection Prevention:   hand hygiene promoted   rest/sleep promoted  Taken 8/17/2021 2000 by Suzan Lares RN  Infection Prevention:   hand hygiene promoted   rest/sleep promoted  Goal: Optimal Comfort and Wellbeing  Outcome: Ongoing, Progressing  Intervention: Provide Person-Centered Care  Recent Flowsheet Documentation  Taken 8/18/2021 0400 by Suzan Lares RN  Trust Relationship/Rapport: care explained  Taken 8/18/2021 0200 by Suzan Lares RN  Trust Relationship/Rapport: care explained  Taken 8/18/2021 0000 by Suzan Lares RN  Trust  Relationship/Rapport: care explained  Taken 8/17/2021 2200 by Suzan Lares RN  Trust Relationship/Rapport: care explained  Taken 8/17/2021 2000 by Suzan Lares RN  Trust Relationship/Rapport:   care explained   choices provided   questions answered   questions encouraged   reassurance provided  Goal: Readiness for Transition of Care  Outcome: Ongoing, Progressing     Problem: Diabetes Comorbidity  Goal: Blood Glucose Level Within Desired Range  Outcome: Ongoing, Progressing  Intervention: Maintain Glycemic Control  Recent Flowsheet Documentation  Taken 8/17/2021 2000 by Suzan Lares RN  Glycemic Management: blood glucose monitoring     Problem: Skin Injury Risk Increased  Goal: Skin Health and Integrity  Outcome: Ongoing, Progressing  Intervention: Optimize Skin Protection  Recent Flowsheet Documentation  Taken 8/18/2021 0400 by Suzan Lares RN  Head of Bed (HOB): HOB at 20-30 degrees  Taken 8/18/2021 0200 by Suzan Lares RN  Head of Bed (HOB): HOB at 15 degrees  Taken 8/18/2021 0000 by Suzan Lares RN  Pressure Reduction Techniques: weight shift assistance provided  Head of Bed (HOB): HOB at 20-30 degrees  Pressure Reduction Devices: pressure-redistributing mattress utilized  Taken 8/17/2021 2200 by Suzan Lares RN  Head of Bed (HOB): HOB at 15 degrees  Taken 8/17/2021 2000 by Suzan Lares RN  Pressure Reduction Techniques:   frequent weight shift encouraged   weight shift assistance provided  Head of Bed (HOB): HOB at 20-30 degrees  Pressure Reduction Devices: pressure-redistributing mattress utilized  Skin Protection:   adhesive use limited   incontinence pads utilized     Problem: Palliative Care  Goal: Enhanced Quality of Life  Outcome: Ongoing, Progressing  Intervention: Maximize Comfort  Recent Flowsheet Documentation  Taken 8/18/2021 0400 by Suzan Lares RN  Pain Management Interventions: quiet environment facilitated  Taken 8/18/2021 0200 by Suzan Lares RN  Pain  Management Interventions: quiet environment facilitated  Taken 8/18/2021 0000 by Suzan Lares RN  Pain Management Interventions: quiet environment facilitated  Taken 8/17/2021 2200 by Suzan Lares RN  Pain Management Interventions: quiet environment facilitated  Taken 8/17/2021 2000 by Suzan Lares RN  Pain Management Interventions: quiet environment facilitated  Intervention: Optimize Function  Recent Flowsheet Documentation  Taken 8/17/2021 2000 by Suzan Lares RN  Sensory Stimulation Regulation: quiet environment promoted  Fatigue Management: activity schedule adjusted  Intervention: Optimize Psychosocial Wellbeing  Recent Flowsheet Documentation  Taken 8/17/2021 2000 by Suzan Lares RN  Supportive Measures: active listening utilized  Family/Support System Care:   support provided   self-care encouraged   Goal Outcome Evaluation:  Plan of Care Reviewed With: patient      Pt currently in bed resting quietly. No complaints of pain or discomfort at this time. Vitals WNL on room air. Pt gluteal area blanchable, barrier applied. Waiting on placement. No other observations at this time. Will continue to monitor, call bell in reach.  Progress: improving

## 2021-08-18 NOTE — THERAPY TREATMENT NOTE
Patient Name: Faye Rod  : 1945    MRN: [id number]                              Today's Date: 2021       Admit Date: 8/15/2021    Visit Dx:     ICD-10-CM ICD-9-CM   1. Multiple falls  R29.6 V15.88   2. Generalized weakness  R53.1 780.79   3. Closed fracture of multiple ribs of right side, initial encounter  S22.41XA 807.09   4. Dementia without behavioral disturbance, unspecified dementia type (CMS/HCC)  F03.90 294.20   5. Chronic renal impairment, unspecified CKD stage  N18.9 585.9   6. Failure to thrive in adult  R62.7 783.7   7. Dysphagia, unspecified type  R13.10 787.20     Patient Active Problem List   Diagnosis   • Mixed hyperlipidemia   • Essential hypertension   • Type 2 diabetes mellitus (CMS/HCC)   • Primary osteoarthritis involving multiple joints   • Non-rheumatic mitral regurgitation   • Hypothyroidism, adult   • Lewy body dementia with behavioral disturbance (CMS/HCC)   • Mild nonproliferative diabetic retinopathy of both eyes without macular edema associated with type 2 diabetes mellitus (CMS/HCC)   • Morbidly obese (CMS/[facility])   • Sick sinus syndrome (CMS/HCC)   • CKD (chronic kidney disease) stage 3, GFR 30-59 ml/min (CMS/HCC)   • Overactive bladder   • Osteoporosis   • Multiple falls   • Generalized weakness   • FTT (failure to thrive) in adult   • Anemia     Past Medical History:   Diagnosis Date   • Anemia    • Arthritis    • Colon polyp    • Diabetes mellitus (CMS/HCC)    • Disease of thyroid gland    • Heart disease    • Heart valve disease    • Hyperlipidemia    • Hypertension    • Hypothyroidism    • Memory loss    • Pacemaker    • Shortness of breath 2/3/2017   • Sick sinus syndrome (CMS/HCC)      Past Surgical History:   Procedure Laterality Date   • CATARACT EXTRACTION Bilateral    • CHOLECYSTECTOMY     • PACEMAKER IMPLANTATION       General Information     Row Name 21 1427          OT Time and Intention    Document Type  therapy note (daily note)  -TB      Mode of Treatment  occupational therapy;individual therapy  -TB     Row Name 08/18/21 1427          General Information    Patient Profile Reviewed  yes  -TB     Existing Precautions/Restrictions  fall;other (see comments) Lewy Body Dementia  -TB     Barriers to Rehab  cognitive status;previous functional deficit  -TB     Row Name 08/18/21 1427          Occupational Profile    Reason for Services/Referral (Occupational Profile)  to advance occupational engagement  -TB     Row Name 08/18/21 1427          Cognition    Orientation Status (Cognition)  oriented to;person  -TB     Row Name 08/18/21 1427          Safety Issues, Functional Mobility    Safety Issues Affecting Function (Mobility)  ability to follow commands;awareness of need for assistance;insight into deficits/self-awareness;problem-solving;safety precaution awareness;safety precautions follow-through/compliance;sequencing abilities  -TB     Impairments Affecting Function (Mobility)  cognition;endurance/activity tolerance;pain;strength;balance  -TB     Cognitive Impairments, Mobility Safety/Performance  attention;awareness, need for assistance;insight into deficits/self-awareness;problem-solving/reasoning;safety precaution awareness;safety precaution follow-through;sequencing abilities  -TB       User Key  (r) = Recorded By, (t) = Taken By, (c) = Cosigned By    Initials Name Provider Type    TB Ruth Sheffield OT Occupational Therapist          Mobility/ADL's     Row Name 08/18/21 1432          Bed Mobility    Bed Mobility  rolling left;rolling right  -TB     Rolling Left [place] (Bed Mobility)  maximum assist (25% patient effort);verbal cues  -TB     Rolling Right [place] (Bed Mobility)  maximum assist (25% patient effort);verbal cues  -TB     Bed Mobility, Safety Issues  decreased use of arms for pushing/pulling;decreased use of legs for bridging/pushing;impaired trunk control for bed mobility;cognitive deficits limit understanding  -TB      Assistive Device (Bed Mobility)  draw sheet;bed rails  -TB     Comment (Bed Mobility)  Rolling to reposition for comfort  -TB     Row Name 08/18/21 1432          Transfers    Comment (Transfers)  OOB deferred  -TB     Row Name 08/18/21 1432          Activities of Daily Living    BADL Assessment/Intervention  grooming;feeding;upper body dressing;toileting  -TB     Row Name 08/18/21 1432          Toileting Assessment/Training    [place] Level (Toileting)  perform perineal hygiene;dependent (less than 25% patient effort)  -TB     Position (Toileting)  supine  -TB     Row Name 08/18/21 1432          Grooming Assessment/Training    [place] Level (Grooming)  maximum assist (25% patient effort);wash face, hands  -TB     Position (Grooming)  sitting up in bed;supported sitting  -TB     Row Name 08/18/21 1432          Self-Feeding Assessment/Training    [place] Level (Feeding)  moderate assist (50% patient effort);liquids to mouth  -TB     Position (Self-Feeding)  sitting up in bed;supported sitting  -TB     Row Name 08/18/21 [facility]          Upper Body Dressing Assessment/Training    [place] Level (Upper Body Dressing)  doff;don;pajama/robe;maximum assist (25% patient effort);verbal cues  -TB     Position (Upper Body Dressing)  sitting up in bed;supported sitting  -TB     Comment (Upper Body Dressing)  Assist to doff soiled gown and don clean  -TB       User Key  (r) = Recorded By, (t) = Taken By, (c) = Cosigned By    Initials Name Provider Type    TB Ruth Sheffield OT Occupational Therapist        Obj/Interventions     Row Name 08/18/21 1434          Shoulder (Therapeutic Exercise)    Shoulder (Therapeutic Exercise)  AAROM (active assistive range of motion)  -TB     Shoulder AAROM (Therapeutic Exercise)  bilateral;flexion;extension;aBduction;aDduction;10 repetitions  -TB     Row Name 08/18/21 1434          Elbow/Forearm (Therapeutic Exercise)    Elbow/Forearm (Therapeutic Exercise)  AAROM  "(active assistive range of motion)  -     Elbow/Forearm AAROM (Therapeutic Exercise)  bilateral;flexion;extension;10 repetitions  -     Row Name 08/18/21 1434          Motor Skills    Motor Skills  therapeutic exercise  -     Row Name 08/18/21 1434          Therapeutic Exercise    Therapeutic Exercise  elbow/forearm;shoulder Education reinforced for HEP to support function. BUE AAROM x10 reps followed by BLE AAROM x10 reps  -TB       User Key  (r) = Recorded By, (t) = Taken By, (c) = Cosigned By    Initials Name Provider Type    TB Ruth Sheffield, OT Occupational Therapist        Goals/Plan    No documentation.       Clinical Impression     Row Name 08/18/21 1436          Pain Assessment    Additional Documentation  Pain Scale: FACES Pre/Post-Treatment (Group)  -     Row Name 08/18/21 1436          Pain Scale: Numbers Pre/Post-Treatment    Pain Intervention(s)  Repositioned  -     Row Name 08/18/21 1436          Pain Scale: FACES Pre/Post-Treatment    Pain: FACES Scale, Pretreatment  2-->hurts little bit  -TB     Posttreatment Pain Rating  4-->hurts little more  -TB     Pain Location - Orientation  generalized  -TB     Pre/Posttreatment Pain Comment  Pt reports \"all over\" pain  -TB     Row Name 08/18/21 1436          Plan of Care Review    Plan of Care Reviewed With  patient  -TB     Outcome Summary  Pt is alert, Ox1 and participates in therapy with encouragement. Max A rolling in bed to reposition for comfort. Dependent toileting. Max A UB dressing to doff soiled gown and don clean. Mod A simple grooming with cues and increased time. BUE/LE ther ex completed bed level with good effort. OT will continue to follow 3x/week IP. Plan is SNF at d/c.  -TB     Row Name 08/18/21 1436          Therapy Assessment/Plan (OT)    Therapy Frequency (OT)  3 times/wk  -TB     Row Name 08/18/21 1436          Therapy Plan Review/Discharge Plan (OT)    Anticipated Discharge Disposition (OT)  skilled nursing facility  " -TB     Row Name 08/18/21 1436          Vital Signs    Pre Systolic BP Rehab  -- RN cleared OT  -TB     O2 Delivery Pre Treatment  room air  -TB     O2 Delivery Intra Treatment  room air  -TB     O2 Delivery Post Treatment  room air  -TB     Pre Patient Position  Supine  -TB     Intra Patient Position  Supine  -TB     Post Patient Position  Supine  -TB     Row Name 08/18/21 1436          Positioning and Restraints    Pre-Treatment Position  in bed  -TB     Post Treatment Position  bed  -TB     In Bed  fowlers;call light within reach;encouraged to call for assist;exit alarm on;with family/caregiver;heels elevated  -TB       User Key  (r) = Recorded By, (t) = Taken By, (c) = Cosigned By    Initials Name Provider Type    TB Ruth Sheffield, OT Occupational Therapist        Outcome Measures     Row Name 08/18/21 1440          How much help from another is currently needed...    Putting on and taking off regular lower body clothing?  1  -TB     Bathing (including washing, rinsing, and drying)  1  -TB     Toileting (which includes using toilet bed pan or urinal)  1  -TB     Putting on and taking off regular upper body clothing  2  -TB     Taking care of personal grooming (such as brushing teeth)  2  -TB     Eating meals  2  -TB     AM-PAC 6 Clicks Score (OT)  9  -TB     Row Name 08/18/21 0810          How much help from another person do you currently need...    Turning from your back to your side while in flat bed without using bedrails?  2  -KL     Moving from lying on back to sitting on the side of a flat bed without bedrails?  2  -KL     Moving to and from a bed to a chair (including a wheelchair)?  2  -KL     Standing up from a chair using your arms (e.g., wheelchair, bedside chair)?  2  -KL     Climbing 3-5 steps with a railing?  1  -KL     To walk in hospital room?  1  -KL     AM-PAC 6 Clicks Score (PT)  10  -KL     Row Name 08/18/21 1440          Functional Assessment    Outcome Measure Options  AM-PAC 6  Clicks Daily Activity (OT)  -       User Key  (r) = Recorded By, (t) = Taken By, (c) = Cosigned By    Initials Name Provider Type     Ruth Sheffield OT Occupational Therapist    Ana Rosa Luevano, RN Registered Nurse          Occupational Therapy Education                 Title: PT OT SLP Therapies (In Progress)     Topic: Occupational Therapy (In Progress)     Point: ADL training (In Progress)     Description:   Instruct learner(s) on proper safety adaptation and remediation techniques during self care or transfers.   Instruct in proper use of assistive devices.              Learning Progress Summary           Patient Acceptance, E,D, NR by  at 8/18/2021 1440    Acceptance, E,TB,D,H, VU,NR by  at 8/16/2021 1015   Family Acceptance, E,D, NR by  at 8/18/2021 1440                   Point: Home exercise program (In Progress)     Description:   Instruct learner(s) on appropriate technique for monitoring, assisting and/or progressing therapeutic exercises/activities.              Learning Progress Summary           Patient Acceptance, E,D, NR by  at 8/18/2021 1440    Acceptance, E,TB,D,H, VU,NR by  at 8/16/2021 1015   Family Acceptance, E,D, NR by  at 8/18/2021 1440                   Point: Precautions (Done)     Description:   Instruct learner(s) on prescribed precautions during self-care and functional transfers.              Learning Progress Summary           Patient Acceptance, E,TB,D,H, VU,NR by  at 8/16/2021 1015                   Point: Body mechanics (Done)     Description:   Instruct learner(s) on proper positioning and spine alignment during self-care, functional mobility activities and/or exercises.              Learning Progress Summary           Patient Acceptance, E,TB,D,H, VU,NR by  at 8/16/2021 1015                               User Key     Initials Effective Dates Name Provider Type Discipline     06/16/21 -  Ruth Sheffield OT Occupational Therapist OT      06/16/21 -  Albertina Campbell OT Occupational Therapist OT              OT Recommendation and Plan  Therapy Frequency (OT): 3 times/wk  Plan of Care Review  Plan of Care Reviewed With: patient  Outcome Summary: Pt is alert, Ox1 and participates in therapy with encouragement. Max A rolling in bed to reposition for comfort. Dependent toileting. Max A UB dressing to doff soiled gown and don clean. Mod A simple grooming with cues and increased time. BUE/LE ther ex completed bed level with good effort. OT will continue to follow 3x/week IP. Plan is SNF at d/c.     Time Calculation:   Time Calculation- OT     Row Name 08/18/21 1357             Time Calculation- OT    OT Start Time  1357  -TB      OT Received On  08/18/21  -TB      OT Goal Re-Cert Due Date  08/26/21  -TB         Timed Charges    41202 - OT Therapeutic Exercise Minutes  12  -TB      13310 - OT Self Care/Mgmt Minutes  13  -TB         Total Minutes    Timed Charges Total Minutes  25  -TB       Total Minutes  25  -TB        User Key  (r) = Recorded By, (t) = Taken By, (c) = Cosigned By    Initials Name Provider Type    TB Ruth Sheffield OT Occupational Therapist        Therapy Charges for Today     Code Description Service Date Service Provider Modifiers Qty    06955682512  OT THER PROC EA 15 MIN 8/18/2021 Ruth Sheffield OT GO 1    [id number]  OT SELF CARE/MGMT/TRAIN EA 15 MIN 8/18/2021 Ruth Sheffield OT GO 1               Ruth Sheffield OT  8/18/2021

## 2021-08-18 NOTE — CASE MANAGEMENT/SOCIAL WORK
Continued Stay Note  [facility]     Patient Name: Faye Rod  MRN: [id number]  Today's Date: 8/18/2021    Admit Date: 8/15/2021    Discharge Plan     Row Name 08/18/21 1028       Plan    Plan Comments  [facility] has denied pt. Isabel with [facility] is still considering.        Discharge Codes    No documentation.             Destini Ramos RN

## 2021-08-18 NOTE — PLAN OF CARE
Problem: Adult Inpatient Plan of Care  Goal: Plan of Care Review  Recent Flowsheet Documentation  Taken 8/18/2021 1436 by Ruth Sheffield OT  Plan of Care Reviewed With: patient  Outcome Summary: Pt is alert, Ox1 and participates in therapy with encouragement. Max A rolling in bed to reposition for comfort. Dependent toileting. Max A UB dressing to doff soiled gown and don clean. Mod A simple grooming with cues and increased time. BUE/LE ther ex completed bed level with good effort. OT will continue to follow 3x/week IP. Plan is SNF at d/c.

## 2021-08-18 NOTE — PROGRESS NOTES
"Palliative Care Progress Note    Date of Admission: 8/15/2021    Code Status:   Current Code Status     Date Active Code Status Order ID Comments User Context       8/15/2021 2051 No CPR [id number]  Norah Patel APRN ED     Advance Care Planning Activity      Questions for Current Code Status     Question Answer Comment    Code Status No CPR     Medical Interventions (Level of Support Prior to Arrest) Limited     Limited Support to NOT Include Intubation      Cardioversion/Defibrillation      Dialysis     Level Of Support Discussed With Next of Kin (If No Surrogate)         Subjective:   Daughter at bedside, patient has been restful. Reports at home she would have periods of just sleeping.   Nursing reports patient awake earlier this morning and ate breakfast.   Patient restful, unable to awaken to answer yes/no questions.  Yesterday, reported patient reported some persistent general musculoskeletal pain  Reviewed current scheduled and prn medications for route, type, dose, and frequency. Reviewed medical record     •  acetaminophen **OR** acetaminophen **OR** acetaminophen  •  dextrose  •  dextrose  •  glucagon (human recombinant)  •  HYDROcodone-acetaminophen  •  ondansetron **OR** ondansetron  •  sodium chloride  •  sodium chloride  •  traMADol    Objective:  PPS 30%  /64 (BP Location: Left arm, Patient Position: Lying)   Pulse 66   Temp 98.4 °F (36.9 °C) (Oral)   Resp 18   Ht 154.9 cm (61\")   Wt 74.8 kg (165 lb)   SpO2 91%   BMI 31.18 kg/m²    Intake & Output (last day)       08/17 0701 - 08/18 0700 08/18 0701 - 08/19 0700    P.O. 717 240    Total Intake(mL/kg) 717 (9.6) 240 (3.2)    Urine (mL/kg/hr) 1000 (0.6)     Stool 0     Total Output 1000     Net -283 +240          Stool Unmeasured Occurrence 1 x         Lab Results (last 24 hours)     Procedure Component Value Units Date/Time    POC Glucose Once [[id number]]  (Abnormal) Collected: 08/18/21 1152    Specimen: Blood Updated: 08/18/21 1155 "     Glucose 159 mg/dL      Comment: Meter: [id number] : [id number] Ronnie Sawyeren       POC Glucose Once [[id number]]  (Normal) Collected: 08/18/21 0732    Specimen: Blood Updated: 08/18/21 0735     Glucose 124 mg/dL      Comment: Meter: [id number] : [id number] Brown Lorenza       POC Glucose Once [[id number]]  (Abnormal) Collected: 08/17/21 2101    Specimen: Blood Updated: 08/17/21 2102     Glucose 207 mg/dL      Comment: Meter: [id number] : [id number] Refugio Marvin           Imaging Results (Last 24 Hours)     ** No results found for the last 24 hours. **        Physical Exam:  Gen: NAD, resting in bed  Skin: warm, dry   Eyes: HARLAN, conjunctivae clear and moist   Resp/thorax: even effort, nonlabored, CTA   CV: RRR   ABD: soft, bowel sounds +, nontender  Ext: no edema, no redness   Neuro: somnolent, no myoclonus     Reviewed labs and diagnostic results.   Lab Results   Component Value Date    HGBA1C 6.40 (H) 08/16/2021     Results from last 7 days   Lab Units 08/16/21  0303   WBC 10*3/mm3 5.66   HEMOGLOBIN g/dL 10.0*   HEMATOCRIT % 31.0*   PLATELETS 10*3/mm3 142     Results from last 7 days   Lab Units 08/16/21  0303 08/15/21  1705 08/15/21  1705   SODIUM mmol/L 135*   < > 139   POTASSIUM mmol/L 4.2   < > 4.5   CHLORIDE mmol/L 101   < > 102   CO2 mmol/L 23.0   < > 28.0   BUN mg/dL 22   < > 25*   CREATININE mg/dL 1.19*   < > 1.18*   CALCIUM mg/dL 9.1   < > 9.2   BILIRUBIN mg/dL  --   --  0.4   ALK PHOS U/L  --   --  69   ALT (SGPT) U/L  --   --  10   AST (SGOT) U/L  --   --  20   GLUCOSE mg/dL 104*   < > 103*    < > = values in this interval not displayed.       Impression: 75 y.o. female with Lewy Body dementia, frequent falls, degenerative lumbar spondylosis    Plan:   Chronic musculoskeletal pain - adjusted analgesia med regimen, added hydrocodone/APAP 2.5mg tid scheduled, continue with same does as prn.   Continue with diclofenac ointment, scheduled acetaminophen, lidoderm patch    Tiredness - reviewed  with daughter. Noted patient has sleeping periods at home, with addition of improved pain management and able to rest.   Will reduce prn hydrocodone/APAP from 5mg to 2.5mg.     Goals of care - SNF placement with long term care bed placement, informed about palliative services to follow after SNF placement  Time: 40 minutes   > 50% time spent in counseling and education concerning current orders for symptom management with daughter    Eryn Smith, APRN  [phone]  08/18/21  14:35 EDT

## 2021-08-18 NOTE — PROGRESS NOTES
[facility] Medicine Services  PROGRESS NOTE    Patient Name: Faye Rod  : 1945  MRN: [id number]    Date of Admission: 8/15/2021  Primary Care Physician: Sarai Hawley PA-C    Subjective   Subjective     CC:  Multiple falls    HPI:  In bed resting, no issues.    Unable to assess ROS  Objective   Objective     Vital Signs:   Temp:  [98.1 °F (36.7 °C)-99.1 °F (37.3 °C)] 98.4 °F (36.9 °C)  Heart Rate:  [65-79] 66  Resp:  [16-18] 18  BP: ()/(51-64) 134/64     Physical Exam:  NAD, alert  OP clear, MMM  PERRL  Neck supple  No LAD  RRR  CTAB  +BS, ND, soft  No obvious rashes  Exam unchanged from   Results Reviewed:  LAB RESULTS:      Lab 21  0303 08/15/21  1705   WBC 5.66 6.69   HEMOGLOBIN 10.0* 10.4*   HEMATOCRIT 31.0* 33.1*   PLATELETS 142 146   NEUTROS ABS 3.52 4.35   IMMATURE GRANS (ABS) 0.02 0.02   LYMPHS ABS 1.50 1.66   MONOS ABS 0.57 0.63   EOS ABS 0.04 0.01   MCV 97.2* 100.0*         Lab 21  0303 08/15/21  1705   SODIUM 135* 139   POTASSIUM 4.2 4.5   CHLORIDE 101 102   CO2 23.0 28.0   ANION GAP 11.0 9.0   BUN 22 25*   CREATININE 1.19* 1.18*   GLUCOSE 104* 103*   CALCIUM 9.1 9.2   MAGNESIUM 1.8 2.0   HEMOGLOBIN A1C 6.40*  --    TSH  --  0.830         Lab 08/15/21  1705   TOTAL PROTEIN 6.5   ALBUMIN 4.10   GLOBULIN 2.4   ALT (SGPT) 10   AST (SGOT) 20   BILIRUBIN 0.4   ALK PHOS 69         Lab 08/15/21  1705   TROPONIN T 0.022         Lab 21  0303   CHOLESTEROL 118   LDL CHOL 51   HDL CHOL 48   TRIGLYCERIDES 104         Lab 08/15/21  1705   IRON 27*   IRON SATURATION 7*   TIBC 374   TRANSFERRIN 251   FERRITIN 26.98   FOLATE 19.20   VITAMIN B 12 153*         Brief Urine Lab Results  (Last result in the past 365 days)      Color   Clarity   Blood   Leuk Est   Nitrite   Protein   CREAT   Urine HCG        08/15/21 1705 Yellow Clear Negative Negative Negative Trace               Microbiology Results Abnormal     Procedure Component Value -  Date/Time    COVID PRE-OP / PRE-PROCEDURE SCREENING ORDER (NO ISOLATION) - Swab, Nasopharynx [[id number]]  (Normal) Collected: 08/15/21 1954    Lab Status: Final result Specimen: Swab from Nasopharynx Updated: 08/15/21 2033    Narrative:      The following orders were created for panel order COVID PRE-OP / PRE-PROCEDURE SCREENING ORDER (NO ISOLATION) - Swab, Nasopharynx.  Procedure                               Abnormality         Status                     ---------                               -----------         ------                     COVID-19 and FLU A/B PCR...[[id number]]  Normal              Final result                 Please view results for these tests on the individual orders.    COVID-19 and FLU A/B PCR - Swab, Nasopharynx [[id number]]  (Normal) Collected: 08/15/21 1954    Lab Status: Final result Specimen: Swab from Nasopharynx Updated: 08/15/21 2033     COVID19 Not Detected     Influenza A PCR Not Detected     Influenza B PCR Not Detected    Narrative:      Fact sheet for providers: https://www.fda.gov/media/983144/download    Fact sheet for patients: https://www.fda.gov/media/384664/download    Test performed by PCR.          Adult Transthoracic Echo Complete W/ Cont if Necessary Per Protocol (With Agitated Saline)    Result Date: 8/16/2021  · Very technically hard study due to patient very tender per technician. · LVEF 60% · Estimated right ventricular systolic pressure from tricuspid regurgitation is normal (<35 mmHg). · Mild mitral valve regurgitation is present. · Mild to moderate aortic valve regurgitation is present. · AV gradients off axis due to technical limitations.      XR Wrist 3+ View Left    Result Date: 8/16/2021  EXAMINATION: XR WRIST 3+ VW LEFT- 08/16/2021  INDICATION: Frequent falls, left wrist pain; R29.6-Repeated falls; R53.1-Weakness; S22.41XA-Multiple fractures of ribs, right side, initial encounter for closed fracture; F03.90-Unspecified dementia without behavioral disturbance;  N18.9-Chronic kidney disease, unspecified; R62.7-Adult failure to thrive; R13.10-Dysphagia, unspecified  COMPARISON: NONE  FINDINGS: 3 views of the left wrist reveal severe osteopenia of the bony structures. Degenerative changes seen of the first carpometacarpal joint. Cortex is intact. Joint spaces are preserved. No joint effusion.       Impression: Degenerative changes seen within the first carpometacarpal joint with no evidence of acute bony abnormality.  D:  08/16/2021 E:  08/16/2021  This report was finalized on 8/16/2021 4:16 PM by Dr. Sima Bartholomew MD.        Results for orders placed during the hospital encounter of 08/15/21    Adult Transthoracic Echo Complete W/ Cont if Necessary Per Protocol (With Agitated Saline)    Interpretation Summary  · Very technically hard study due to patient very tender per technician.  · LVEF 60%  · Estimated right ventricular systolic pressure from tricuspid regurgitation is normal (<35 mmHg).  · Mild mitral valve regurgitation is present.  · Mild to moderate aortic valve regurgitation is present.  · AV gradients off axis due to technical limitations.      I have reviewed the medications:  Scheduled Meds:acetaminophen, 500 mg, Oral, 4x Daily  amLODIPine, 2.5 mg, Oral, Daily  aspirin, 81 mg, Oral, Daily   Or  aspirin, 300 mg, Rectal, Daily  atorvastatin, 80 mg, Oral, Nightly  carbidopa-levodopa, 1 tablet, Oral, 4x Daily  cyanocobalamin, 1,000 mcg, Intramuscular, Daily  Diclofenac Sodium, 4 g, Topical, 4x Daily  donepezil, 10 mg, Oral, BID  escitalopram, 20 mg, Oral, Daily  ferrous sulfate, 325 mg, Oral, Daily With Breakfast  gabapentin, 100 mg, Oral, Nightly  haloperidol lactate, 0.5 mg, Intravenous, Once  HYDROcodone-acetaminophen, 0.5 tablet, Oral, TID  insulin lispro, 0-7 Units, Subcutaneous, TID AC  levothyroxine, 125 mcg, Oral, Q AM  lidocaine, 1 patch, Transdermal, Q24H  memantine, 10 mg, Oral, BID  oxybutynin XL, 5 mg, Oral, Daily  QUEtiapine, 50 mg, Oral,  Nightly  sodium chloride, 10 mL, Intravenous, Q12H      Continuous Infusions:   PRN Meds:.•  acetaminophen **OR** acetaminophen **OR** acetaminophen  •  dextrose  •  dextrose  •  glucagon (human recombinant)  •  HYDROcodone-acetaminophen  •  ondansetron **OR** ondansetron  •  sodium chloride  •  sodium chloride  •  traMADol    Assessment/Plan   Assessment & Plan     Active Hospital Problems    Diagnosis  POA   • **Multiple falls [R29.6]  Not Applicable   • Generalized weakness [R53.1]  Yes   • FTT (failure to thrive) in adult [R62.7]  Yes   • Anemia [D64.9]  Yes   • CKD (chronic kidney disease) stage 3, GFR 30-59 ml/min (CMS/[facility]) [N18.30]  Yes   • Lewy body dementia with behavioral disturbance (CMS/[facility]) [G31.83, F02.81]  Yes   • Type 2 diabetes mellitus (CMS/[facility]) [E11.9]  Yes   • Hypothyroidism, adult [E03.9]  Yes   • Essential hypertension [I10]  Yes   • Mixed hyperlipidemia [E78.2]  Yes      Resolved Hospital Problems   No resolved problems to display.        Brief Hospital Course to date:  Faye Rod is a 75 y.o. female  with a history of hyperlipidemia, hypertension, T2DM, hypothyroidism, Lewy body dementia, CKD, falls, SSS s/p PPM, presents to the ED with complaints of weakness and falls.          Adult FTT  Generalized weakness  Rib fractures  -approaching end stage Lewy body dementia  -PT/OT/placement  -ECHO reviewed, normal EF  -awaiting placement     Lewy body dementia  -continue sinement, aricept, namenda, and seroquel as ordered  -Dr. Gurrola evaluated, felt to be end stage dementia  -awaiting placement    L wrist pain  -no fracture on xray  -seemingly no pain today     Dysphagia  --SLP has seen, on soft/ground diet now      T2DM  --SSI, stable     HTN  HLD  --continue norvasc with hold parameters  --continue statin  --continue aspirin    Hypothyroidism  --tsh 0.830  --continue synthroid     CKD  --baseline creatinine 1.28-1.63  --creatinine stable/monitoring     Anemia  --no overt signs of  bleeding  --on iron supplement, B12 injections for low B12    DVT prophylaxis:  Mechanical DVT prophylaxis orders are present.       AM-PAC 6 Clicks Score (PT): 11 (08/17/21 0841)    Disposition: I expect the patient to be discharged TBD.     CODE STATUS:   Code Status and Medical Interventions:   Ordered at: 08/15/21 2051     Limited Support to NOT Include:    Intubation    Cardioversion/Defibrillation    Dialysis     Level Of Support Discussed With:    Next of Kin (If No Surrogate)     Code Status:    No CPR     Medical Interventions (Level of Support Prior to Arrest):    Limited       Marco A Huff MD  08/18/21

## 2021-08-19 NOTE — PROGRESS NOTES
[facility] Medicine Services  PROGRESS NOTE    Patient Name: Faye Rod  : 1945  MRN: [id number]    Date of Admission: 8/15/2021  Primary Care Physician: Sarai Hawley PA-C    Subjective   Subjective     CC:  Multiple falls, dementia    HPI:  Resting comfortably in bed with daughter at bedside. Ate a good lunch today. Pleasantly confused. No current complaints.     Review of Systems:     Difficult to obtain due to dementia. No current complaints.     Objective   Objective     Vital Signs:   Temp:  [97.2 °F (36.2 °C)-99.1 °F (37.3 °C)] 98.8 °F (37.1 °C)  Heart Rate:  [62-73] 65  Resp:  [16-18] 16  BP: ()/(52-70) 114/70     Physical Exam:    Constitutional: No acute distress, awake, alert, resting comfortably in bed, daughter at bedside  HENT: NCAT, mucous membranes moist  Respiratory: Clear to auscultation bilaterally, respiratory effort normal on room air  Cardiovascular: RRR, no murmurs, rubs, or gallops  Gastrointestinal: Positive bowel sounds, soft, nontender, nondistended  Musculoskeletal: No bilateral ankle edema  Psychiatric: Appropriate affect, cooperative  Neurologic: Oriented x 1-2, , speech clear  Skin: No rashes noted to exposed skin             Results Reviewed:  LAB RESULTS:      Lab 21  0303 08/15/21  1705   WBC 5.66 6.69   HEMOGLOBIN 10.0* 10.4*   HEMATOCRIT 31.0* 33.1*   PLATELETS 142 146   NEUTROS ABS 3.52 4.35   IMMATURE GRANS (ABS) 0.02 0.02   LYMPHS ABS 1.50 1.66   MONOS ABS 0.57 0.63   EOS ABS 0.04 0.01   MCV 97.2* 100.0*         Lab 21  0303 08/15/21  1705   SODIUM 135* 139   POTASSIUM 4.2 4.5   CHLORIDE 101 102   CO2 23.0 28.0   ANION GAP 11.0 9.0   BUN 22 25*   CREATININE 1.19* 1.18*   GLUCOSE 104* 103*   CALCIUM 9.1 9.2   MAGNESIUM 1.8 2.0   HEMOGLOBIN A1C 6.40*  --    TSH  --  0.830         Lab 08/15/21  1705   TOTAL PROTEIN 6.5   ALBUMIN 4.10   GLOBULIN 2.4   ALT (SGPT) 10   AST (SGOT) 20   BILIRUBIN 0.4   ALK PHOS 69          Lab 08/15/21  1705   TROPONIN T 0.022         Lab 08/16/21  0303   CHOLESTEROL 118   LDL CHOL 51   HDL CHOL 48   TRIGLYCERIDES 104         Lab 08/15/21  1705   IRON 27*   IRON SATURATION 7*   TIBC 374   TRANSFERRIN 251   FERRITIN 26.98   FOLATE 19.20   VITAMIN B 12 153*         Brief Urine Lab Results  (Last result in the past 365 days)      Color   Clarity   Blood   Leuk Est   Nitrite   Protein   CREAT   Urine HCG        08/15/21 1705 Yellow Clear Negative Negative Negative Trace               Microbiology Results Abnormal     Procedure Component Value - Date/Time    COVID PRE-OP / PRE-PROCEDURE SCREENING ORDER (NO ISOLATION) - Swab, Nasopharynx [[id number]]  (Normal) Collected: 08/15/21 1954    Lab Status: Final result Specimen: Swab from Nasopharynx Updated: 08/15/21 2033    Narrative:      The following orders were created for panel order COVID PRE-OP / PRE-PROCEDURE SCREENING ORDER (NO ISOLATION) - Swab, Nasopharynx.  Procedure                               Abnormality         Status                     ---------                               -----------         ------                     COVID-19 and FLU A/B PCR...[[id number]]  Normal              Final result                 Please view results for these tests on the individual orders.    COVID-19 and FLU A/B PCR - Swab, Nasopharynx [[id number]]  (Normal) Collected: 08/15/21 1954    Lab Status: Final result Specimen: Swab from Nasopharynx Updated: 08/15/21 2033     COVID19 Not Detected     Influenza A PCR Not Detected     Influenza B PCR Not Detected    Narrative:      Fact sheet for providers: https://www.fda.gov/media/450911/download    Fact sheet for patients: https://www.fda.gov/media/462573/download    Test performed by PCR.          No radiology results from the last 24 hrs    Results for orders placed during the hospital encounter of 08/15/21    Adult Transthoracic Echo Complete W/ Cont if Necessary Per Protocol (With Agitated  Saline)    Interpretation Summary  · Very technically hard study due to patient very tender per technician.  · LVEF 60%  · Estimated right ventricular systolic pressure from tricuspid regurgitation is normal (<35 mmHg).  · Mild mitral valve regurgitation is present.  · Mild to moderate aortic valve regurgitation is present.  · AV gradients off axis due to technical limitations.      I have reviewed the medications:  Scheduled Meds:acetaminophen, 500 mg, Oral, 4x Daily  amLODIPine, 2.5 mg, Oral, Daily  aspirin, 81 mg, Oral, Daily   Or  aspirin, 300 mg, Rectal, Daily  atorvastatin, 80 mg, Oral, Nightly  carbidopa-levodopa, 1 tablet, Oral, 4x Daily  cyanocobalamin, 1,000 mcg, Intramuscular, Daily  Diclofenac Sodium, 4 g, Topical, 4x Daily  donepezil, 10 mg, Oral, BID  escitalopram, 20 mg, Oral, Daily  ferrous sulfate, 325 mg, Oral, Daily With Breakfast  gabapentin, 100 mg, Oral, Nightly  haloperidol lactate, 0.5 mg, Intravenous, Once  HYDROcodone-acetaminophen, 0.5 tablet, Oral, TID  insulin lispro, 0-7 Units, Subcutaneous, TID AC  levothyroxine, 125 mcg, Oral, Q AM  lidocaine, 1 patch, Transdermal, Q24H  memantine, 10 mg, Oral, BID  oxybutynin XL, 5 mg, Oral, Daily  QUEtiapine, 50 mg, Oral, Nightly  senna-docusate sodium, 2 tablet, Oral, Nightly  sodium chloride, 10 mL, Intravenous, Q12H      Continuous Infusions:   PRN Meds:.•  acetaminophen **OR** acetaminophen **OR** acetaminophen  •  dextrose  •  dextrose  •  glucagon (human recombinant)  •  HYDROcodone-acetaminophen  •  ondansetron **OR** ondansetron  •  sodium chloride  •  sodium chloride    Assessment/Plan   Assessment & Plan     Active Hospital Problems    Diagnosis  POA   • **Multiple falls [R29.6]  Not Applicable   • Generalized weakness [R53.1]  Yes   • FTT (failure to thrive) in adult [R62.7]  Yes   • Anemia [D64.9]  Yes   • CKD (chronic kidney disease) stage 3, GFR 30-59 ml/min (CMS/HCC) [N18.30]  Yes   • Lewy body dementia with behavioral disturbance  (CMS/[facility]) [G31.83, F02.81]  Yes   • Type 2 diabetes mellitus (CMS/[facility]) [E11.9]  Yes   • Hypothyroidism, adult [E03.9]  Yes   • Essential hypertension [I10]  Yes   • Mixed hyperlipidemia [E78.2]  Yes      Resolved Hospital Problems   No resolved problems to display.        Brief Hospital Course to date:  Faye Rod is a 75 y.o. female  with a history of hyperlipidemia, hypertension, T2DM, hypothyroidism, Lewy body dementia, CKD, falls, SSS s/p PPM, presents to the ED with complaints of weakness and falls.          Adult FTT  Generalized weakness  Rib fractures  -approaching end stage Lewy body dementia  -PT/OT/placement  -ECHO reviewed, normal EF  -awaiting placement     Lewy body dementia  -continue sinement, aricept, namenda, and seroquel as ordered  -Dr. Gurrola evaluated, felt to be end stage dementia  -awaiting placement    L wrist pain  -no fracture on xray  -no complaints of pain today  -she does have some bruising of the left forearm-appears to be healing     Dysphagia  --SLP has seen, on soft/ground diet now      T2DM  --SSI, stable     HTN  HLD  --continue norvasc with hold parameters  --continue statin  --continue aspirin    Hypothyroidism  --tsh 0.830  --continue synthroid     CKD  --baseline creatinine 1.28-1.63  --creatinine stable/monitoring     Anemia  --no overt signs of bleeding  --on iron supplement, B12 injections for low B12    DVT prophylaxis:  Mechanical DVT prophylaxis orders are present.       AM-PAC 6 Clicks Score (PT): 11 (08/19/21 0730)    Disposition: I expect the patient to be discharged once placement obtained.      CODE STATUS:   Code Status and Medical Interventions:   Ordered at: 08/15/21 2051     Limited Support to NOT Include:    Intubation    Cardioversion/Defibrillation    Dialysis     Level Of Support Discussed With:    Next of Kin (If No Surrogate)     Code Status:    No CPR     Medical Interventions (Level of Support Prior to Arrest):    Zack Fischer  APRN  08/19/21

## 2021-08-19 NOTE — PLAN OF CARE
Problem: Fall Injury Risk  Goal: Absence of Fall and Fall-Related Injury  Outcome: Ongoing, Progressing  Intervention: Identify and Manage Contributors to Fall Injury Risk  Recent Flowsheet Documentation  Taken 8/19/2021 0400 by Suzan Lares RN  Self-Care Promotion: independence encouraged  Taken 8/19/2021 0200 by Suzan Lares RN  Self-Care Promotion: independence encouraged  Taken 8/19/2021 0000 by Suzan Lares RN  Self-Care Promotion: independence encouraged  Taken 8/18/2021 2200 by Suzan Lares RN  Self-Care Promotion: independence encouraged  Taken 8/18/2021 2000 by Suzan Lares RN  Medication Review/Management: medications reviewed  Self-Care Promotion: independence encouraged  Intervention: Promote Injury-Free Environment  Recent Flowsheet Documentation  Taken 8/19/2021 0400 by Suzan Lares RN  Safety Promotion/Fall Prevention:   activity supervised   assistive device/personal items within reach   safety round/check completed  Taken 8/19/2021 0200 by Suzan Lares RN  Safety Promotion/Fall Prevention:   activity supervised   assistive device/personal items within reach   safety round/check completed  Taken 8/19/2021 0000 by Suzan Lares RN  Safety Promotion/Fall Prevention:   activity supervised   assistive device/personal items within reach   safety round/check completed  Taken 8/18/2021 2200 by Suzan Lares RN  Safety Promotion/Fall Prevention:   activity supervised   assistive device/personal items within reach   safety round/check completed  Taken 8/18/2021 2000 by Suzan Lares RN  Safety Promotion/Fall Prevention:   activity supervised   assistive device/personal items within reach   clutter free environment maintained   fall prevention program maintained   lighting adjusted   muscle strengthening facilitated   nonskid shoes/slippers when out of bed   room organization consistent   safety round/check completed     Problem: Adult Inpatient Plan of Care  Goal: Plan  of Care Review  Outcome: Ongoing, Progressing  Flowsheets (Taken 8/19/2021 0415)  Progress: improving  Plan of Care Reviewed With: patient  Goal: Patient-Specific Goal (Individualized)  Outcome: Ongoing, Progressing  Goal: Absence of Hospital-Acquired Illness or Injury  Outcome: Ongoing, Progressing  Intervention: Identify and Manage Fall Risk  Recent Flowsheet Documentation  Taken 8/19/2021 0400 by Suzan Lares RN  Safety Promotion/Fall Prevention:   activity supervised   assistive device/personal items within reach   safety round/check completed  Taken 8/19/2021 0200 by Suzan Lares RN  Safety Promotion/Fall Prevention:   activity supervised   assistive device/personal items within reach   safety round/check completed  Taken 8/19/2021 0000 by Suzan Lares RN  Safety Promotion/Fall Prevention:   activity supervised   assistive device/personal items within reach   safety round/check completed  Taken 8/18/2021 2200 by Suzan Lares RN  Safety Promotion/Fall Prevention:   activity supervised   assistive device/personal items within reach   safety round/check completed  Taken 8/18/2021 2000 by Suzan Lares RN  Safety Promotion/Fall Prevention:   activity supervised   assistive device/personal items within reach   clutter free environment maintained   fall prevention program maintained   lighting adjusted   muscle strengthening facilitated   nonskid shoes/slippers when out of bed   room organization consistent   safety round/check completed  Intervention: Prevent Skin Injury  Recent Flowsheet Documentation  Taken 8/19/2021 0400 by Suzan Lares RN  Body Position:   side-lying, right   weight shift assistance provided  Taken 8/19/2021 0200 by Suzan Lares RN  Body Position: position maintained  Taken 8/19/2021 0000 by Suzan Lares RN  Body Position:   tilted, left   weight shift assistance provided  Taken 8/18/2021 2200 by Suzan Lares RN  Body Position: position maintained  Taken  8/18/2021 2000 by Suzan Lares RN  Body Position: position maintained  Intervention: Prevent and Manage VTE (venous thromboembolism) Risk  Recent Flowsheet Documentation  Taken 8/19/2021 0400 by Suzan Lares RN  VTE Prevention/Management:   bilateral   sequential compression devices off  Taken 8/19/2021 0200 by Suzan Lares RN  VTE Prevention/Management:   bilateral   sequential compression devices off  Taken 8/19/2021 0000 by Suzan Lares RN  VTE Prevention/Management:   bilateral   sequential compression devices off  Taken 8/18/2021 2200 by Suzan Lares RN  VTE Prevention/Management:   bilateral   sequential compression devices off  Taken 8/18/2021 2000 by Suzan Lares RN  VTE Prevention/Management:   bilateral   sequential compression devices off  Intervention: Prevent Infection  Recent Flowsheet Documentation  Taken 8/19/2021 0400 by Suzan Lares RN  Infection Prevention:   hand hygiene promoted   rest/sleep promoted  Taken 8/19/2021 0200 by Suzan Lares RN  Infection Prevention:   hand hygiene promoted   rest/sleep promoted  Taken 8/19/2021 0000 by Suzan Lares RN  Infection Prevention:   hand hygiene promoted   rest/sleep promoted  Taken 8/18/2021 2200 by Suzan Lares RN  Infection Prevention:   hand hygiene promoted   rest/sleep promoted  Taken 8/18/2021 2000 by Suzan Lares RN  Infection Prevention:   hand hygiene promoted   rest/sleep promoted  Goal: Optimal Comfort and Wellbeing  Outcome: Ongoing, Progressing  Intervention: Provide Person-Centered Care  Recent Flowsheet Documentation  Taken 8/19/2021 0400 by Suzan Lares RN  Trust Relationship/Rapport: care explained  Taken 8/19/2021 0200 by Suzan Lares RN  Trust Relationship/Rapport: care explained  Taken 8/19/2021 0000 by Suzan Lares RN  Trust Relationship/Rapport: care explained  Taken 8/18/2021 2200 by Suzan Lares RN  Trust Relationship/Rapport: care explained  Taken 8/18/2021 2000 by  Suzan Lares RN  Trust Relationship/Rapport:   care explained   choices provided   questions answered   questions encouraged   reassurance provided  Goal: Readiness for Transition of Care  Outcome: Ongoing, Progressing     Problem: Diabetes Comorbidity  Goal: Blood Glucose Level Within Desired Range  Outcome: Ongoing, Progressing     Problem: Skin Injury Risk Increased  Goal: Skin Health and Integrity  Outcome: Ongoing, Progressing  Intervention: Optimize Skin Protection  Recent Flowsheet Documentation  Taken 8/19/2021 0400 by Suzan Lares RN  Head of Bed (HOB): HOB at 15 degrees  Taken 8/19/2021 0200 by Suzan Lares RN  Head of Bed (HOB): HOB at 15 degrees  Taken 8/19/2021 0000 by Suzan Lares RN  Head of Bed (HOB): HOB at 20 degrees  Taken 8/18/2021 2200 by Suzan Lares RN  Head of Bed (HOB): HOB at 20-30 degrees  Taken 8/18/2021 2000 by Suzan Lares RN  Head of Bed (HOB): HOB at 20-30 degrees     Problem: Palliative Care  Goal: Enhanced Quality of Life  Outcome: Ongoing, Progressing  Intervention: Maximize Comfort  Recent Flowsheet Documentation  Taken 8/19/2021 0400 by Suzan Lares RN  Pain Management Interventions: quiet environment facilitated  Taken 8/19/2021 0200 by Suzan Lares RN  Pain Management Interventions: quiet environment facilitated  Taken 8/19/2021 0000 by Suzan Lares RN  Pain Management Interventions: quiet environment facilitated  Taken 8/18/2021 2200 by Suzan Lares RN  Pain Management Interventions: quiet environment facilitated  Taken 8/18/2021 2000 by Suzan Lares RN  Pain Management Interventions: quiet environment facilitated  Intervention: Optimize Function  Recent Flowsheet Documentation  Taken 8/18/2021 2000 by Suzan Lares RN  Sensory Stimulation Regulation: quiet environment promoted  Intervention: Optimize Psychosocial Wellbeing  Recent Flowsheet Documentation  Taken 8/18/2021 2000 by Suzan Lares RN  Family/Support System Care:    support provided   self-care encouraged   Goal Outcome Evaluation:  Plan of Care Reviewed With: patient      Pt currently in bed resting quietly. No complaints of pain or discomfort at this time. Pt bottom blanchable, barrier applied and pt turned q2. Vitals WNL on room air. No other observations at this time. Will continue to monitor, call bell in reach.   Progress: improving

## 2021-08-19 NOTE — THERAPY TREATMENT NOTE
Patient Name: Faye Rod  : 1945    MRN: [id number]                              Today's Date: 2021       Admit Date: 8/15/2021    Visit Dx:     ICD-10-CM ICD-9-CM   1. Multiple falls  R29.6 V15.88   2. Generalized weakness  R53.1 780.79   3. Closed fracture of multiple ribs of right side, initial encounter  S22.41XA 807.09   4. Dementia without behavioral disturbance, unspecified dementia type (CMS/HCC)  F03.90 294.20   5. Chronic renal impairment, unspecified CKD stage  N18.9 585.9   6. Failure to thrive in adult  R62.7 783.7   7. Dysphagia, unspecified type  R13.10 787.20     Patient Active Problem List   Diagnosis   • Mixed hyperlipidemia   • Essential hypertension   • Type 2 diabetes mellitus (CMS/HCC)   • Primary osteoarthritis involving multiple joints   • Non-rheumatic mitral regurgitation   • Hypothyroidism, adult   • Lewy body dementia with behavioral disturbance (CMS/[facility])   • Mild nonproliferative diabetic retinopathy of both eyes without macular edema associated with type 2 diabetes mellitus (CMS/[facility])   • Morbidly obese (CMS/[facility])   • Sick sinus syndrome (CMS/[facility])   • CKD (chronic kidney disease) stage 3, GFR 30-59 ml/min (CMS/[facility])   • Overactive bladder   • Osteoporosis   • Multiple falls   • Generalized weakness   • FTT (failure to thrive) in adult   • Anemia     Past Medical History:   Diagnosis Date   • Anemia    • Arthritis    • Colon polyp    • Diabetes mellitus (CMS/[facility])    • Disease of thyroid gland    • Heart disease    • Heart valve disease    • Hyperlipidemia    • Hypertension    • Hypothyroidism    • Memory loss    • Pacemaker    • Shortness of breath 2/3/2017   • Sick sinus syndrome (CMS/HCC)      Past Surgical History:   Procedure Laterality Date   • CATARACT EXTRACTION Bilateral    • CHOLECYSTECTOMY     • PACEMAKER IMPLANTATION       General Information     Row Name 21 1425          Physical Therapy Time and Intention    Document Type  therapy note (daily  note)  -MB     Mode of Treatment  physical therapy  -MB     Row Name 08/19/21 1425          General Information    Patient Profile Reviewed  yes  -MB     Existing Precautions/Restrictions  fall;other (see comments) Lewy Body dementia  -MB     Barriers to Rehab  previous functional deficit;cognitive status  -MB     Row Name 08/19/21 1425          Cognition    Orientation Status (Cognition)  oriented to;person  -MB     Row Name 08/19/21 1425          Safety Issues, Functional Mobility    Safety Issues Affecting Function (Mobility)  ability to follow commands;awareness of need for assistance;friction/shear risk;insight into deficits/self-awareness;judgment;positioning of assistive device;problem-solving;safety precaution awareness;safety precautions follow-through/compliance;sequencing abilities  -MB     Impairments Affecting Function (Mobility)  cognition;endurance/activity tolerance;pain;strength;balance  -MB     Cognitive Impairments, Mobility Safety/Performance  attention;awareness, need for assistance;insight into deficits/self-awareness;judgment;problem-solving/reasoning;safety precaution awareness;safety precaution follow-through;sequencing abilities  -MB       User Key  (r) = Recorded By, (t) = Taken By, (c) = Cosigned By    Initials Name Provider Type    Luann Lane, PT Physical Therapist        Mobility     Row Name 08/19/21 1425          Bed Mobility    Supine-Sit [place] (Bed Mobility)  maximum assist (25% patient effort);2 person assist;verbal cues;nonverbal cues (demo/gesture)  -MB     Assistive Device (Bed Mobility)  draw sheet;bed rails  -MB     Comment (Bed Mobility)  Pt. required assist to move BLEs towards EOB, raise trunk/shoulders, and scoot hips forward to EOB.  Pt. w/ posterior lean in sitting requiring consistent assist/cueing to correct.  -MB     Row Name 08/19/21 1425          Transfers    Comment (Transfers)  STS from EOB w/ consistent VCs/tactile cues for safe hand placement,  sequencing, and upright posture in standing.  Pt. stands ~75% upright.  -MB     Row Name 08/19/21 1425          Bed-Chair Transfer    Bed-Chair [place] (Transfers)  maximum assist (25% patient effort);2 person assist;verbal cues  -MB     Assistive Device (Bed-Chair Transfers)  other (see comments) BUE support  -MB     Row Name 08/19/21 1425          Sit-Stand Transfer    Sit-Stand [place] (Transfers)  moderate assist (50% patient effort);2 person assist;verbal cues;nonverbal cues (demo/gesture)  -MB     Assistive Device (Sit-Stand Transfers)  walker, front-wheeled  -MB     Row Name 08/19/21 1425          Gait/Stairs (Locomotion)    [place] Level (Gait)  moderate assist (50% patient effort);2 person assist  -MB     Assistive Device (Gait)  walker, front-wheeled  -MB     Distance in Feet (Gait)  2  -MB     Deviations/Abnormal Patterns (Gait)  base of support, narrow;michaela decreased;gait speed decreased;stride length decreased;weight shifting decreased;festinating/shuffling  -MB     Bilateral Gait Deviations  forward flexed posture;heel strike decreased  -MB     Comment (Gait/Stairs)  Pt. amb. to chair w/ step to, shuffling gait pattern.  She required assist for weight shift and max VCs/tactile cues for step sequencing and upright posture.  -MB       User Key  (r) = Recorded By, (t) = Taken By, (c) = Cosigned By    Initials Name Provider Type    MB Luann Tinoco, PT Physical Therapist        Obj/Interventions     Row Name 08/19/21 1425          Motor Skills    Therapeutic Exercise  shoulder;hip;knee;ankle  -MB     Row Name 08/19/21 1425          Shoulder (Therapeutic Exercise)    Shoulder AAROM (Therapeutic Exercise)  bilateral;flexion;aBduction;10 repetitions  -MB     Row Name 08/19/21 1425          Hip (Therapeutic Exercise)    Hip (Therapeutic Exercise)  AAROM (active assistive range of motion)  -MB     Hip AAROM (Therapeutic Exercise)  bilateral;flexion;aBduction;10 repetitions  -[facility]  "Name 08/19/21 1425          Knee (Therapeutic Exercise)    Knee AAROM (Therapeutic Exercise)  bilateral;flexion;extension;10 repetitions noted B hamstring tightness  -MB     Row Name 08/19/21 1425          Ankle (Therapeutic Exercise)    Ankle AAROM (Therapeutic Exercise)  bilateral;dorsiflexion;plantarflexion;10 repetitions  -MB     Row Name 08/19/21 1425          Balance    Static Sitting Balance  moderate impairment;unsupported;sitting, edge of bed  -MB     Static Standing Balance  moderate impairment;supported  -MB     Dynamic Standing Balance  moderate impairment;supported  -MB     Balance Interventions  standing;sit to stand;weight shifting activity  -MB       User Key  (r) = Recorded By, (t) = Taken By, (c) = Cosigned By    Initials Name Provider Type    Luann Lane, PT Physical Therapist        Goals/Plan    No documentation.       Clinical Impression     Row Name 08/19/21 1425          Pain    Additional Documentation  Pain Scale: FACES Pre/Post-Treatment (Group)  -MB     Row Name 08/19/21 1425          Pain Scale: FACES Pre/Post-Treatment    Pain: FACES Scale, Pretreatment  2-->hurts little bit  -MB     Posttreatment Pain Rating  2-->hurts little bit  -MB     Pain Location - Orientation  generalized  -MB     Pre/Posttreatment Pain Comment  Pt. reports she hurts \"all over.\"  -MB     Row Name 08/19/21 1425          Plan of Care Review    Plan of Care Reviewed With  patient;daughter  -MB     Progress  no change  -MB     Outcome Summary  Patient pleasant and agreeable to OOB mobility; however, limited by weakness.  She completed bed mobility w/ max A x 2, transfers w/ mod-max A x 2, and ambulated 2ft w/ RW and mod A x 2.  Pt. participated in BUE/BLE TherEx and reported decreased pain.  PT continues to recommend SNF rehab at D/C.  -MB     Row Name 08/19/21 1425          Vital Signs    Pre Systolic BP Rehab  -- VSS.  RN cleared for PT.  -MB     Pre Patient Position  Supine  -MB     Intra Patient " Position  Standing  -MB     Post Patient Position  Sitting  -MB     Row Name 08/19/21 1425          Positioning and Restraints    Pre-Treatment Position  in bed  -MB     Post Treatment Position  chair  -MB     In Chair  notified nsg;reclined;call light within reach;encouraged to call for assist;with family/caregiver;waffle cushion;on mechanical lift sling;legs elevated  -MB       User Key  (r) = Recorded By, (t) = Taken By, (c) = Cosigned By    Initials Name Provider Type    Luann Lane, PT Physical Therapist        Outcome Measures     Row Name 08/19/21 1425 08/19/21 0730       How much help from another person do you currently need...    Turning from your back to your side while in flat bed without using bedrails?  3  -MB  3  -YM    Moving from lying on back to sitting on the side of a flat bed without bedrails?  2  -MB  2  -YM    Moving to and from a bed to a chair (including a wheelchair)?  2  -MB  2  -YM    Standing up from a chair using your arms (e.g., wheelchair, bedside chair)?  2  -MB  2  -YM    Climbing 3-5 steps with a railing?  1  -MB  1  -YM    To walk in hospital room?  1  -MB  1  -YM    AM-PAC 6 Clicks Score (PT)  11  -MB  11  -YM      User Key  (r) = Recorded By, (t) = Taken By, (c) = Cosigned By    Initials Name Provider Type    Luann Lane, PT Physical Therapist    Reba Stubbs, RN Registered Nurse                       Physical Therapy Education                 Title: PT OT SLP Therapies (In Progress)     Topic: Physical Therapy (In Progress)     Point: Mobility training (In Progress)     Learning Progress Summary           Patient Acceptance, E,TB, NR by  at 8/16/2021 1007    Comment: Edu on PT services, POC, d/c planning, safety with mobility                   Point: Home exercise program (In Progress)     Learning Progress Summary           Patient Acceptance, E,TB, NR by  at 8/16/2021 1007    Comment: Edu on PT services, POC, d/c planning, safety with  mobility                   Point: Body mechanics (In Progress)     Learning Progress Summary           Patient Acceptance, E,TB, NR by  at 8/16/2021 1007    Comment: Edu on PT services, POC, d/c planning, safety with mobility                   Point: Precautions (In Progress)     Learning Progress Summary           Patient Acceptance, E,TB, NR by  at 8/16/2021 1007    Comment: Edu on PT services, POC, d/c planning, safety with mobility                               User Key     Initials Effective Dates Name Provider Type [facility] 09/22/20 -  Chapito Mckeon, PT Physical Therapist PT              PT Recommendation and Plan     Plan of Care Reviewed With: patient, daughter  Progress: no change  Outcome Summary: Patient pleasant and agreeable to OOB mobility; however, limited by weakness.  She completed bed mobility w/ max A x 2, transfers w/ mod-max A x 2, and ambulated 2ft w/ RW and mod A x 2.  Pt. participated in BUE/BLE TherEx and reported decreased pain.  PT continues to recommend SNF rehab at D/C.     Time Calculation:   PT Charges     Row Name 08/19/21 1523             Time Calculation    Start Time  1425  -MB      PT Received On  08/19/21  -MB      PT Goal Re-Cert Due Date  08/26/21  -MB         Time Calculation- PT    Total Timed Code Minutes- PT  35 minute(s)  -MB         Timed Charges    68423 - PT Therapeutic Exercise Minutes  35  -MB         Total Minutes    Timed Charges Total Minutes  35  -MB       Total Minutes  35  -MB        User Key  (r) = Recorded By, (t) = Taken By, (c) = Cosigned By    Initials Name Provider Type    Luann Lane, PT Physical Therapist        Therapy Charges for Today     Code Description Service Date Service Provider Modifiers Qty    87450441966 HC PT THER PROC EA 15 MIN 8/19/2021 Luann Tinoco, PT GP 2    18436424703 HC PT THER SUPP EA 15 MIN 8/19/2021 Luann Tinoco, PT GP 2          PT G-Codes  Outcome Measure Options: AM-PAC 6 Clicks Daily  Activity (OT)  AM-PAC 6 Clicks Score (PT): 11  AM-PAC 6 Clicks Score (OT): 9  Modified Mavis Scale: 4 - Moderately severe disability.  Unable to walk without assistance, and unable to attend to own bodily needs without assistance.    Luann Tinoco, PT  8/19/2021

## 2021-08-19 NOTE — PLAN OF CARE
Goal Outcome Evaluation:  Plan of Care Reviewed With: patient, daughter        Progress: no change  Outcome Summary: Patient pleasant and agreeable to OOB mobility; however, limited by weakness.  She completed bed mobility w/ max A x 2, transfers w/ mod-max A x 2, and ambulated 2ft w/ RW and mod A x 2.  Pt. participated in BUE/BLE TherEx and reported decreased pain.  PT continues to recommend SNF rehab at D/C.

## 2021-08-19 NOTE — PLAN OF CARE
Goal Outcome Evaluation:  Plan of Care Reviewed With: patient        Progress: no change  Outcome Summary: Pt is more alert today and communicating with some word finding difficulties. Pt states her pain is better controlled but still has some. Pt is more alert today. Planning for snf with palliative to follow.Palliative Team meeting 1300, Juan Jose Lua DO, Eryn LIZ, Marjorie Marti RN, CHPN, Karen Marr RN CHPN, Isi Olsen RN, HAILY,

## 2021-08-20 NOTE — CASE MANAGEMENT/SOCIAL WORK
Continued Stay Note  [facility]     Patient Name: Faye Rod  MRN: [id number]  Today's Date: 8/20/2021    Admit Date: 8/15/2021    Discharge Plan     Row Name 08/20/21 1102       Plan    Plan Comments  Updated clinical has been given to [facility] and referrals made to facilities in [facility].        Discharge Codes    No documentation.             Destini Ramos RN

## 2021-08-20 NOTE — PLAN OF CARE
Goal Outcome Evaluation:  Plan of Care Reviewed With: patient        Progress: no change  Outcome Summary: Pt is able to rest between care. Pt continues to have some neck and back pain , but comfortable.  Looking for snf placement.  continue current pain regimen. no bm since 8/17. started bowel regimen senna s. Pt is on opioids that will cause constipation. Palliative Team meeting 1300, Juan Jose Lua DO, Eryn LIZ, Marjorie Marti RN, CHPN, Karen Marr RN CHPN, Isi Olsen RN, HAILY,

## 2021-08-20 NOTE — PROGRESS NOTES
[facility]  PROGRESS NOTE    Patient Name: Faye Rod  : 1945  MRN: [id number]    Date of Admission: 8/15/2021  Primary Care Physician: Sarai Hawley PA-C    Subjective   Subjective     CC:  Multiple falls, dementia    HPI:  Upright in bed eating breakfast. No complaints. Chart reviewed. No overnight issues.      Review of Systems:     Difficult to obtain due to dementia.    Objective   Objective     Vital Signs:   Temp:  [96.7 °F (35.9 °C)-98.8 °F (37.1 °C)] 97.4 °F (36.3 °C)  Heart Rate:  [65-78] 72  Resp:  [16-18] 16  BP: (114-143)/(55-84) 128/65     Physical Exam:    Constitutional: No acute distress, awake, alert, resting comfortably in bed eating breakfast.   HENT: NCAT, mucous membranes moist  Respiratory: Clear to auscultation bilaterally, respiratory effort normal on room air  Cardiovascular: RRR, no murmurs, rubs, or gallops  Gastrointestinal: Positive bowel sounds, soft, nontender, nondistended  Musculoskeletal: No bilateral ankle edema  Psychiatric: Appropriate affect, cooperative  Neurologic: Oriented x 1-2, , speech clear  Skin: No rashes noted to exposed skin  No change in exam from              Results Reviewed:  LAB RESULTS:      Lab 21  0303 08/15/21  1705   WBC 5.66 6.69   HEMOGLOBIN 10.0* 10.4*   HEMATOCRIT 31.0* 33.1*   PLATELETS 142 146   NEUTROS ABS 3.52 4.35   IMMATURE GRANS (ABS) 0.02 0.02   LYMPHS ABS 1.50 1.66   MONOS ABS 0.57 0.63   EOS ABS 0.04 0.01   MCV 97.2* 100.0*         Lab 21  0303 08/15/21  1705   SODIUM 135* 139   POTASSIUM 4.2 4.5   CHLORIDE 101 102   CO2 23.0 28.0   ANION GAP 11.0 9.0   BUN 22 25*   CREATININE 1.19* 1.18*   GLUCOSE 104* 103*   CALCIUM 9.1 9.2   MAGNESIUM 1.8 2.0   HEMOGLOBIN A1C 6.40*  --    TSH  --  0.830         Lab 08/15/21  1705   TOTAL PROTEIN 6.5   ALBUMIN 4.10   GLOBULIN 2.4   ALT (SGPT) 10   AST (SGOT) 20   BILIRUBIN 0.4   ALK PHOS 69         Lab 08/15/21  1709    TROPONIN T 0.022         Lab 08/16/21  0303   CHOLESTEROL 118   LDL CHOL 51   HDL CHOL 48   TRIGLYCERIDES 104         Lab 08/15/21  1705   IRON 27*   IRON SATURATION 7*   TIBC 374   TRANSFERRIN 251   FERRITIN 26.98   FOLATE 19.20   VITAMIN B 12 153*         Brief Urine Lab Results  (Last result in the past 365 days)      Color   Clarity   Blood   Leuk Est   Nitrite   Protein   CREAT   Urine HCG        08/15/21 1705 Yellow Clear Negative Negative Negative Trace               Microbiology Results Abnormal     Procedure Component Value - Date/Time    COVID PRE-OP / PRE-PROCEDURE SCREENING ORDER (NO ISOLATION) - Swab, Nasopharynx [[id number]]  (Normal) Collected: 08/15/21 1954    Lab Status: Final result Specimen: Swab from Nasopharynx Updated: 08/15/21 2033    Narrative:      The following orders were created for panel order COVID PRE-OP / PRE-PROCEDURE SCREENING ORDER (NO ISOLATION) - Swab, Nasopharynx.  Procedure                               Abnormality         Status                     ---------                               -----------         ------                     COVID-19 and FLU A/B PCR...[[id number]]  Normal              Final result                 Please view results for these tests on the individual orders.    COVID-19 and FLU A/B PCR - Swab, Nasopharynx [[id number]]  (Normal) Collected: 08/15/21 1954    Lab Status: Final result Specimen: Swab from Nasopharynx Updated: 08/15/21 2033     COVID19 Not Detected     Influenza A PCR Not Detected     Influenza B PCR Not Detected    Narrative:      Fact sheet for providers: https://www.fda.gov/media/182854/download    Fact sheet for patients: https://www.fda.gov/media/610386/download    Test performed by PCR.          No radiology results from the last 24 hrs    Results for orders placed during the hospital encounter of 08/15/21    Adult Transthoracic Echo Complete W/ Cont if Necessary Per Protocol (With Agitated Saline)    Interpretation Summary  · Very  technically hard study due to patient very tender per technician.  · LVEF 60%  · Estimated right ventricular systolic pressure from tricuspid regurgitation is normal (<35 mmHg).  · Mild mitral valve regurgitation is present.  · Mild to moderate aortic valve regurgitation is present.  · AV gradients off axis due to technical limitations.      I have reviewed the medications:  Scheduled Meds:acetaminophen, 500 mg, Oral, 4x Daily  amLODIPine, 2.5 mg, Oral, Daily  aspirin, 81 mg, Oral, Daily   Or  aspirin, 300 mg, Rectal, Daily  atorvastatin, 80 mg, Oral, Nightly  carbidopa-levodopa, 1 tablet, Oral, 4x Daily  cyanocobalamin, 1,000 mcg, Intramuscular, Daily  Diclofenac Sodium, 4 g, Topical, 4x Daily  donepezil, 10 mg, Oral, BID  escitalopram, 20 mg, Oral, Daily  ferrous sulfate, 325 mg, Oral, Daily With Breakfast  gabapentin, 100 mg, Oral, Nightly  haloperidol lactate, 0.5 mg, Intravenous, Once  HYDROcodone-acetaminophen, 0.5 tablet, Oral, TID  insulin lispro, 0-7 Units, Subcutaneous, TID AC  levothyroxine, 125 mcg, Oral, Q AM  lidocaine, 1 patch, Transdermal, Q24H  memantine, 10 mg, Oral, BID  oxybutynin XL, 5 mg, Oral, Daily  QUEtiapine, 50 mg, Oral, Nightly  senna-docusate sodium, 2 tablet, Oral, Nightly  sodium chloride, 10 mL, Intravenous, Q12H      Continuous Infusions:   PRN Meds:.•  acetaminophen **OR** acetaminophen **OR** acetaminophen  •  dextrose  •  dextrose  •  glucagon (human recombinant)  •  HYDROcodone-acetaminophen  •  ondansetron **OR** ondansetron  •  sodium chloride  •  sodium chloride    Assessment/Plan   Assessment & Plan     Active Hospital Problems    Diagnosis  POA   • **Multiple falls [R29.6]  Not Applicable   • Generalized weakness [R53.1]  Yes   • FTT (failure to thrive) in adult [R62.7]  Yes   • Anemia [D64.9]  Yes   • CKD (chronic kidney disease) stage 3, GFR 30-59 ml/min (CMS/HCC) [N18.30]  Yes   • Lewy body dementia with behavioral disturbance (CMS/[facility]) [G31.83, F02.81]  Yes   • Type 2  diabetes mellitus (CMS/HCC) [E11.9]  Yes   • Hypothyroidism, adult [E03.9]  Yes   • Essential hypertension [I10]  Yes   • Mixed hyperlipidemia [E78.2]  Yes      Resolved Hospital Problems   No resolved problems to display.        Brief Hospital Course to date:  Faye Rod is a 75 y.o. female  with a history of hyperlipidemia, hypertension, T2DM, hypothyroidism, Lewy body dementia, CKD, falls, SSS s/p PPM, presents to the ED with complaints of weakness and falls.          Adult FTT  Generalized weakness  Rib fractures  -approaching end stage Lewy body dementia  -PT/OT/placement  -ECHO reviewed, normal EF  -awaiting placement     Lewy body dementia  -continue sinement, aricept, namenda, and seroquel as ordered  -Dr. Gurrola evaluated, felt to be end stage dementia  -awaiting placement. Case management is following.     L wrist pain  -no fracture on xray  -no complaints of pain today  -she does have some bruising of the left forearm-appears to be healing     Dysphagia  --SLP has seen, on soft/ground diet now      T2DM  --SSI, stable     HTN  HLD  --continue norvasc with hold parameters  --continue statin  --continue aspirin    Hypothyroidism  --tsh 0.830  --continue synthroid     CKD  --baseline creatinine 1.28-1.63  --creatinine stable/monitoring     Anemia  --no overt signs of bleeding  --on iron supplement, B12 injections for low B12    DVT prophylaxis:  Mechanical DVT prophylaxis orders are present.       AM-PAC 6 Clicks Score (PT): 9 (08/20/21 0850)    Disposition: I expect the patient to be discharged once placement obtained.      CODE STATUS:   Code Status and Medical Interventions:   Ordered at: 08/15/21 2051     Limited Support to NOT Include:    Intubation    Cardioversion/Defibrillation    Dialysis     Level Of Support Discussed With:    Next of Kin (If No Surrogate)     Code Status:    No CPR     Medical Interventions (Level of Support Prior to Arrest):    Zack Fischer  APRN  08/20/21

## 2021-08-20 NOTE — DISCHARGE PLACEMENT REQUEST
"RodJai hernandez (75 y.o. Female)     Destini Ramos RN   [phone]    Looking for short term rehab to transition to long term. Will be selfpay for at least a while         Date of Birth Social Security Number Address Home Phone MRN    1945  [address] [place] [address] [phone] [id number]    [employer] Mar[employer] Status          [employer]        Admission Date Admission Type Admitting Provider Attending Provider Department, Room/Bed    8/15/21 Emergency Marco A Huff MD Russell, Marc P, MD [facility] 3H, S368/1    Discharge Date Discharge Disposition Discharge Destination                       Attending Provider: Marco A Huff MD    Allergies: No Known Allergies    Isolation: None   Infection: None   Code Status: No CPR    Ht: 154.9 cm (61\")   Wt: 74.8 kg (165 lb)    Admission Cmt: None   Principal Problem: Multiple falls [R29.6]                 Active Insurance as of 8/15/2021     Primary Coverage     Payor Plan Insurance Group Employer/Plan Group    MEDICARE MEDICARE A & B      Payor Plan Address Payor Plan Phone Number Payor Plan Fax Number Effective Dates    [address] [phone]  9/1/2009 - None Entered    Formerly [facility] [address]       Subscriber Name Subscriber Birth Date Member ID       JAI ROD 1945 [id number]           Secondary Coverage     Payor Plan Insurance Group Employer/Plan Group    [employer] [place] [employer] [place]      Payor Plan Address Payor Plan Phone Number Payor Plan Fax Number Effective Dates    [address] [place] [address] [phone]  9/1/2009 - None Entered    [place] [address]       Subscriber Name Subscriber Birth Date Member ID       JIA ROD 1945 [id number]                 Emergency Contacts      (Rel.) Home Phone Work Phone Mobile Phone    Shila Floyd (Daughter) -- [phone]            Insurance Information                MEDICARE/MEDICARE A & B " Phone: [phone]    Subscriber: Faye Rod Subscriber#: [id number]    Group#:  Precert#:         [employer] [place] [place] Phone: [phone]    Subscriber: Faye Rod Subscriber#: [id number]    Group#:  Precert#:              History & Physical      Earlene Sharp MD at 08/15/21 2031              [facility] Medicine Services  HISTORY AND PHYSICAL    Patient Name: Faye Rod  : 1945  MRN: [id number]  Primary Care Physician: Sarai Hawley PA-C  Date of admission: 8/15/2021    Subjective   Subjective     Chief Complaint:  weakness    HPI:  Faye Rod is a 75 y.o. female with a history of hyperlipidemia, hypertension, T2DM, hypothyroidism, Lewy body dementia, CKD, falls, SSS s/p PPM, presents to the ED with complaints of weakness and falls.  Per the patient's daughter patient averages about 1 fall a month.  Over the last week she has had multiple falls including one on Friday where she fell in the bathroom, and then 1 yesterday where she fell backwards on her walker but was assisted to the floor by her daughter.  Couple of weeks ago the daughter was out of town and home instead stayed with the patient, and she was found sitting on the floor with an unwitnessed fall.  Daughter reports that the patient has had a loss of appetite today and some mild diarrhea.  She also has been complaining of some left upper extremity pain which prompted family to bring her to the ED for evaluation.  No recent fevers, chest pain, vomiting, or any other complaints per her daughter.  CT of chest/abdomen/pelvis shows small amount of right middle lobe and lingular atelectasis, multiple healed or healing right posterior rib fractures with probable subacute right posterior 10th rib fracture.  CT head shows senescent changes without acute abnormality.  Labs unremarkable.  MRI has been ordered by the ED to rule out stroke.  Daughter states that she  is having difficulty taking care of the patient at home and is interested in placement.  Patient is being admitted to the hospital medicine service for further evaluation and management.      COVID Details:        Symptoms: [x] NONE [] Fever []  Cough [] Shortness of breath [] Change in taste or smell  The patient has a COVID HM Topic on their chart, and they are fully vaccinated.    Review of Systems   Unable to perform ROS: Dementia      All other systems reviewed and are negative.     Personal History     Past Medical History:   Diagnosis Date   • Anemia    • Arthritis    • Colon polyp    • Diabetes mellitus (CMS/HCC)    • Disease of thyroid gland    • Heart disease    • Heart valve disease    • Hyperlipidemia    • Hypertension    • Hypothyroidism    • Memory loss    • Pacemaker    • Shortness of breath 2/3/2017   • Sick sinus syndrome (CMS/HCC)        Past Surgical History:   Procedure Laterality Date   • CATARACT EXTRACTION Bilateral    • CHOLECYSTECTOMY     • PACEMAKER IMPLANTATION         Family History:  family history includes Arthritis in her father; Diabetes in her father; Heart attack in her father; Hypertension in her daughter; Kidney disease in her brother; Mental illness in her mother; Obesity in her daughter; Stroke in her mother; Thyroid disease in her brother. Otherwise pertinent FHx was reviewed and unremarkable.     Social History:  reports that she has never smoked. She has never used smokeless tobacco. She reports that she does not drink alcohol and does not use drugs.  Social History     Social History Narrative   • Not on file       Medications:  Insulin Glargine, Insulin Pen Needle, Integra, Loratadine, Mirabegron ER, QUEtiapine, Vitamin D3, acetaminophen, acetaminophen-codeine, amLODIPine, aspirin, atorvastatin, carbidopa-levodopa, donepezil, escitalopram, glucose blood, ibandronate, levothyroxine, memantine, mupirocin, nystatin, ondansetron ODT, and traMADol    No Known  Allergies    Objective   Objective     Vital Signs:   Temp:  [98 °F (36.7 °C)] 98 °F (36.7 °C)  Heart Rate:  [61-79] 68  Resp:  [16-18] 18  BP: ()/(47-88) 132/88    Physical Exam   Constitutional: Awake, alert, resting in bed, daughter at bedside  Eyes: PERRLA, sclerae anicteric, no conjunctival injection  HENT: NCAT, mucous membranes moist  Neck: Supple, no thyromegaly, no lymphadenopathy, trachea midline  Respiratory: Clear to auscultation bilaterally, nonlabored respirations   Cardiovascular: RRR, no murmurs, rubs, or gallops, palpable pedal pulses bilaterally  Gastrointestinal: Positive bowel sounds, soft, nontender, nondistended  Musculoskeletal: No bilateral ankle edema, no clubbing or cyanosis to extremities  Psychiatric: Appropriate affect, cooperative  Neurologic: Oriented to self, strength symmetric in all extremities with weakness present, Cranial Nerves grossly intact to confrontation, speech clear  Skin: No rashes        Result Review:  I have personally reviewed the results from the time of this admission to 08/15/21 8:31 PM EDT and agree with these findings:  [x]  Laboratory  []  Microbiology  [x]  Radiology  [x]  EKG/Telemetry   []  Cardiology/Vascular   []  Pathology  []  Old records  []  Other:   Most notable findings include:       LAB RESULTS:      Lab 08/15/21  1705   WBC 6.69   HEMOGLOBIN 10.4*   HEMATOCRIT 33.1*   PLATELETS 146   NEUTROS ABS 4.35   IMMATURE GRANS (ABS) 0.02   LYMPHS ABS 1.66   MONOS ABS 0.63   EOS ABS 0.01   .0*         Lab 08/15/21  1705   SODIUM 139   POTASSIUM 4.5   CHLORIDE 102   CO2 28.0   ANION GAP 9.0   BUN 25*   CREATININE 1.18*   GLUCOSE 103*   CALCIUM 9.2   MAGNESIUM 2.0   TSH 0.830         Lab 08/15/21  1705   TOTAL PROTEIN 6.5   ALBUMIN 4.10   GLOBULIN 2.4   ALT (SGPT) 10   AST (SGOT) 20   BILIRUBIN 0.4   ALK PHOS 69         Lab 08/15/21  1705   TROPONIN T 0.022                 UA    Urinalysis 4/30/21 6/4/21 6/4/21 8/15/21     1518 1518    Squamous  Epithelial Cells, UA   0-2    Specific Gravity, UA  >=1.030  1.022   Ketones, UA 15 mg/dL (A) Trace (A)  Negative   Blood, UA  Trace (A)  Negative   Leukocytes, UA 25 Cheko/ul (A) Negative  Negative   Nitrite, UA  Negative  Negative   RBC, UA   None Seen    WBC, UA   3-5 (A)    Bacteria, UA   3+ (A)    (A) Abnormal value            Microbiology Results (last 10 days)     Procedure Component Value - Date/Time    COVID PRE-OP / PRE-PROCEDURE SCREENING ORDER (NO ISOLATION) - Swab, Nasopharynx [[id number]]  (Normal) Collected: 08/15/21 1954    Lab Status: Final result Specimen: Swab from Nasopharynx Updated: 08/15/21 2033    Narrative:      The following orders were created for panel order COVID PRE-OP / PRE-PROCEDURE SCREENING ORDER (NO ISOLATION) - Swab, Nasopharynx.  Procedure                               Abnormality         Status                     ---------                               -----------         ------                     COVID-19 and FLU A/B PCR...[[id number]]  Normal              Final result                 Please view results for these tests on the individual orders.    COVID-19 and FLU A/B PCR - Swab, Nasopharynx [[id number]]  (Normal) Collected: 08/15/21 1954    Lab Status: Final result Specimen: Swab from Nasopharynx Updated: 08/15/21 2033     COVID19 Not Detected     Influenza A PCR Not Detected     Influenza B PCR Not Detected    Narrative:      Fact sheet for providers: https://www.fda.gov/media/308008/download    Fact sheet for patients: https://www.fda.gov/media/964107/download    Test performed by PCR.          CT Abdomen Pelvis Without Contrast    Result Date: 8/15/2021  CT Chest WO, CT Abdomen Pelvis WO INDICATION:  Multiple falls with generalized weakness. TECHNIQUE: CT of the chest, abdomen and pelvis without IV contrast. Coronal and sagittal reconstructions were obtained.  Radiation dose reduction techniques included automated exposure control or exposure modulation based on body size.  Count of known CT and cardiac nuc med studies performed in previous 12 months: 0.  COMPARISON:  CT abdomen and pelvis 8/17/2020 FINDINGS: Chest: Right middle lobe and lingular atelectasis. No focal pneumonitis. No effusions. Cardiomegaly. Pacemaker leads noted. Aortic atherosclerotic changes without aneurysm. No adenopathy or central mass. ABDOMEN: Liver and spleen unremarkable. Gallbladder surgically absent. Pancreas, kidneys and adrenal glands are unremarkable. The visualized GI tract unremarkable. Diffuse aortic atherosclerotic change without aneurysm. Pelvis: Bladder minimally distended. Uterus and adnexa are unremarkable. Multiple healed and/or healing right posterior rib fractures with probable subacute right posterior 10th rib fracture. Diffuse osteopenia. Diffuse degenerative changes thoracic and lumbar spine. Grade 1 spondylolisthesis L4 on L5 likely on the basis of facet arthropathy. Moderate L4-L5 spinal stenosis.     Impression: Small amount right middle lobe and lingular atelectasis. Multiple healed or healing right posterior rib fractures with probable subacute right posterior 10th rib fracture. No acute abdominal or pelvic pathology. Generalized osteopenia with moderately advanced thoracic and lumbar degenerative disc disease and facet arthropathy with moderate L4-L5 spinal stenosis. Signer Name: HOMERO Echols MD  Signed: 8/15/2021 7:08 PM  Workstation Name: [id number]  [facility]    XR Humerus Left    Result Date: 8/15/2021  CR Humerus Min 2 Vws LT INDICATION: Acute left arm pain. COMPARISON: None available. FINDINGS: 2 views of the left humerus.  No fracture or dislocation.  No bone erosion or destruction.  Articulation at the shoulder and elbow is anatomic.  No foreign body.     Impression: No definite acute fracture or subluxation. Signer Name: Ana María White MD  Signed: 8/15/2021 7:22 PM  Workstation Name: [id number]  [facility]    CT Head  Without Contrast    Result Date: 8/15/2021  CT Head WO HISTORY: Multiple falls. Generalized weakness. TECHNIQUE: Axial unenhanced head CT. Radiation dose reduction techniques included automated exposure control or exposure modulation based on body size. Count of known CT and cardiac nuc med studies performed in previous 12 months: 0. Time of scan: 1841 hours COMPARISON: 3/31/2017 FINDINGS: No intracranial hemorrhage, mass, or infarct. No hydrocephalus or extra-axial fluid collection. Prominence of sulci and CSF spaces overlying both frontal lobes compatible with age related atrophy. Mild ventriculomegaly. The skull base, calvarium, and extracranial soft tissues are normal.     Impression: Senescent changes without acute abnormality. Signer Name: HOMERO Echols MD  Signed: 8/15/2021 7:00 PM  Workstation Name: [facility]  [facility]    CT Chest Without Contrast Diagnostic    Result Date: 8/15/2021  CT Chest WO, CT Abdomen Pelvis WO INDICATION:  Multiple falls with generalized weakness. TECHNIQUE: CT of the chest, abdomen and pelvis without IV contrast. Coronal and sagittal reconstructions were obtained.  Radiation dose reduction techniques included automated exposure control or exposure modulation based on body size. Count of known CT and cardiac nuc med studies performed in previous 12 months: 0.  COMPARISON:  CT abdomen and pelvis 8/17/2020 FINDINGS: Chest: Right middle lobe and lingular atelectasis. No focal pneumonitis. No effusions. Cardiomegaly. Pacemaker leads noted. Aortic atherosclerotic changes without aneurysm. No adenopathy or central mass. ABDOMEN: Liver and spleen unremarkable. Gallbladder surgically absent. Pancreas, kidneys and adrenal glands are unremarkable. The visualized GI tract unremarkable. Diffuse aortic atherosclerotic change without aneurysm. Pelvis: Bladder minimally distended. Uterus and adnexa are unremarkable. Multiple healed and/or healing right posterior  rib fractures with probable subacute right posterior 10th rib fracture. Diffuse osteopenia. Diffuse degenerative changes thoracic and lumbar spine. Grade 1 spondylolisthesis L4 on L5 likely on the basis of facet arthropathy. Moderate L4-L5 spinal stenosis.     Impression: Small amount right middle lobe and lingular atelectasis. Multiple healed or healing right posterior rib fractures with probable subacute right posterior 10th rib fracture. No acute abdominal or pelvic pathology. Generalized osteopenia with moderately advanced thoracic and lumbar degenerative disc disease and facet arthropathy with moderate L4-L5 spinal stenosis. Signer Name: HOMERO Echols MD  Signed: 8/15/2021 7:08 PM  Workstation Name: [id number]  [facility]    XR Chest 1 View    Result Date: 8/15/2021   EXAMINATION: XR CHEST 1 VW-  INDICATION: Weak/Dizzy/AMS triage protocol  COMPARISON: Chest x-ray 04/16/2016  FINDINGS: Cardiac size enlarged. No overt edema. No pneumothorax or pleural effusion. Degenerative changes of the spine.         Impression: Cardiomegaly without overt edema or effusion.         Results for orders placed in visit on 09/22/17    Adult Transthoracic Echo Complete W/ Cont if Necessary Per Protocol    Interpretation Summary  · Left ventricular systolic function is normal. Estimated EF = 60%.  · Moderate aortic valve regurgitation is present.  · Mild aortic valve stenosis is present.  · Ao mean PG 10 mmHg  · Mild-to-moderate mitral valve regurgitation is present  · Mild tricuspid valve regurgitation is present.  · Calculated right ventricular systolic pressure from tricuspid regurgitation is 28 mmHg.      Assessment/Plan   Assessment & Plan       Multiple falls    Mixed hyperlipidemia    Essential hypertension    Type 2 diabetes mellitus (CMS/HCC)    Hypothyroidism, adult    Lewy body dementia with behavioral disturbance (CMS/HCC)    CKD (chronic kidney disease) stage 3, GFR 30-59 ml/min  (CMS/[facility])    Generalized weakness    FTT (failure to thrive) in adult    Anemia    Faye Rod is a 75 y.o. female with a history of hyperlipidemia, hypertension, T2DM, hypothyroidism, Lewy body dementia, CKD, falls, SSS s/p PPM, presents to the ED with complaints of weakness and falls.      Assessment and Plan:    Adult FTT  Generalized weakness  Rib fractures  --CT chest/abdomen/pelvis shows multiple healed or healing right posterior rib fractures with probable subacute right posterior 10th rib fracture, loss osteopenia with moderately advanced thoracic and lumbar degenerative disc disease with facet arthropathy with moderate L4-L5 spinal stenosis  --fall precautions  --MRI of the brain pending  --pt/ot consult  --case management consult  --A.m. labs    Lewy body dementia  --continue sinemet, aricept, namenda, seroquel    Dysphagia  --failed RN dysphagia screen  --consult SLP in the am    T2DM  --a1c in the am  --fsbg with ssi    HTN  HLD  --continue norvasc with hold parameters  --continue statin  --continue aspirin    Hypothyroidism  --tsh 0.830  --continue synthroid    CKD  --baseline creatinine 1.28-1.63  --creatinine 1.18 today  --bmp in the am and continue to monitor    Anemia  --no overt signs of bleeding  --anemia profile  --stool for occult blood  --cbc in the am    DVT prophylaxis:  mechanical    CODE STATUS:    Limited Support to NOT Include: Intubation; Cardioversion/Defibrillation; Dialysis  Level Of Support Discussed With: Next of Kin (If No Surrogate)  Code Status: No CPR  Medical Interventions (Level of Support Prior to Arrest): Limited      This note has been completed as part of a split-shared workflow.   Signature: Electronically signed by SHALONDA Felipe, 08/15/21, 9:31 PM EDT.       Brief Attending Admission Attestation     I have seen and examined the patient, performing an independent face-to-face diagnostic evaluation with plan of care reviewed and developed with the  advanced practice clinician (APC).    Old records reviewed and summarized in PM hx.    Brief Summary Statement:  75-year-old female who had at least 1 fall every month likely secondary to poor gait from her Lewy body dementia.  For past few weeks has increasing gait issues, frequent fall, third fall this week.  Patient complained of left upper extremity pain since her fall yesterday.  Chronic aphasia, also noted to have L.facial droop, ? Old vs new, unknown.  History of frequent urinary tract infection  -No fever was noted, blood pressure borderline in ED-systolic 100s      Remainder of detailed HPI is as noted above and has been reviewed and/or edited by me for completeness.    PM HX:  Hyperlipidemia-20 mg  Lewy body dementia/resting tremor in left hand -Sinemet  every 6, Aricept 10 mg every 12, Namenda 10 mg every 12  DM 2-Lantus 12 units [id number]  Hypothyroid-Synthroid 25 mcg  Bladder dysfunction-#1025 daily  Mood disorder-Seroquel 50 mg nightly, Lexapro 20 mg daily  Hypertension-Norvasc 2.5 mg daily,   tramadol as needed for pain  Echo-3/2018-EF of 60%, moderate AR  CKD-stage III, baseline creatinine around 1.2  Vitals:    08/15/21 2056   BP:  99/74-1 42/62   Pulse: 74   Resp:  16-92% on room air   Temp:  Afebrile   SpO2: 93%       Attending Physical Exam:  RS- CTA-BL, ,  No wheezing , no crackles, good effort.  CVS- s1s2 regular, +murmur.  ABD- soft, non tender, not distended, no organomegaly.  EXT- no edema.  NEURO- AAO-confused, C/o of pain, moving all 4 ext symetrically, does appear to have L.facial droop.  , no focal defecit.      LABS:  Troponin-0.022  BUN/creatinine 25/1.2  Sodium-139, potassium 4.5, LFTs-WNL  TSH 0.8  Magnesium 2.0  BUN/creatinine 10/33, MCV of 100, platelets 146  UA-negative  -Negative  Chest x-ray-cardiomegaly without any overt edema or effusion  Left humerus no fracture or dislocation  CT chest-right posterior 10th rib fracture subacute, moderate L4-L5 spinal stenosis.  CT  head-no acute changes  EKG sinus rhythm 60 bpm, poor baseline, paced rhythm, , Q waves    Brief Assessment/Plan  Frequent falls/Pain control- may need Iv med's, since she failed her dysphagia screen.  Pt/ot consult may need placement.    L.facial droop- hard to say new, vs old- couldn't get mri, asa intiated,  -      See above for further detailed assessment and plan developed with APC which I have reviewed and/or edited for completeness.    Admission Status: I believe that this patient meets OBSERVATION status, however if further evaluation or treatment plans warrant, status may change.  Based upon current information, I predict patient's care encounter to be less than or equal to 2 midnights.                Electronically signed by Earlene Sharp MD at 08/15/21 3328         Current Facility-Administered Medications   Medication Dose Route Frequency Provider Last Rate Last Admin   • acetaminophen (TYLENOL) tablet 650 mg  650 mg Oral Q4H PRN Norah Patel APRN        Or   • acetaminophen (TYLENOL) 160 MG/5ML solution 650 mg  650 mg Oral Q4H PRN Norah Patel APRN        Or   • acetaminophen (TYLENOL) suppository 650 mg  650 mg Rectal Q4H PRN Norah Patel APRN       • acetaminophen (TYLENOL) tablet 500 mg  500 mg Oral 4x Daily Eryn Smith APRN   500 mg at 08/20/21 0850   • amLODIPine (NORVASC) tablet 2.5 mg  2.5 mg Oral Daily Norah Patel APRN   2.5 mg at 08/20/21 0850   • aspirin chewable tablet 81 mg  81 mg Oral Daily Ericka Elias APRN   81 mg at 08/20/21 0850    Or   • aspirin suppository 300 mg  300 mg Rectal Daily Ericka Elias, APRN       • atorvastatin (LIPITOR) tablet 80 mg  80 mg Oral Nightly Ericka Elias APRN   80 mg at 08/19/21 2006   • carbidopa-levodopa (SINEMET)  MG per tablet 1 tablet  1 tablet Oral 4x Daily Norah Patel APRN   1 tablet at 08/20/21 0850   • cyanocobalamin injection 1,000 mcg  1,000 mcg Intramuscular Daily  Yris Pope, DO   1,000 mcg at 08/20/21 0849   • dextrose (D50W) 25 g/ 50mL Intravenous Solution 25 g  25 g Intravenous Q15 Min PRN Norah Patel APRN       • dextrose (GLUTOSE) oral gel 15 g  15 g Oral Q15 Min PRN Norah Patel APRN       • Diclofenac Sodium (VOLTAREN) 1 % gel 4 g  4 g Topical 4x Daily Kalpesh Gurrola MD   4 g at 08/20/21 0850   • donepezil (ARICEPT) tablet 10 mg  10 mg Oral BID Norah Patel APRN   10 mg at 08/20/21 0851   • escitalopram (LEXAPRO) tablet 20 mg  20 mg Oral Daily Norah Patel APRN   20 mg at 08/20/21 0850   • ferrous sulfate tablet 325 mg  325 mg Oral Daily With Breakfast Yris Pope, DO   325 mg at 08/20/21 0851   • gabapentin (NEURONTIN) capsule 100 mg  100 mg Oral Nightly Eryn Smith APRN   100 mg at 08/19/21 2006   • glucagon (human recombinant) (GLUCAGEN DIAGNOSTIC) injection 1 mg  1 mg Subcutaneous Q15 Min PRN Norah Patel APRN       • haloperidol lactate (HALDOL) injection 0.5 mg  0.5 mg Intravenous Once Nancy Wilder APRN       • HYDROcodone-acetaminophen (NORCO) 5-325 MG per tablet 0.5 tablet  0.5 tablet Oral TID Eryn Smith APRN   0.5 tablet at 08/20/21 0850   • HYDROcodone-acetaminophen (NORCO) 5-325 MG per tablet 0.5 tablet  0.5 tablet Oral Q4H PRN Eryn Smith APRN       • insulin lispro (humaLOG) injection 0-7 Units  0-7 Units Subcutaneous TID AC Norah Patel APRN   2 Units at 08/19/21 0809   • levothyroxine (SYNTHROID, LEVOTHROID) tablet 125 mcg  125 mcg Oral Q AM Norah Patel APRN   125 mcg at 08/20/21 0518   • lidocaine (LIDODERM) 5 % 1 patch  1 patch Transdermal Q24H Eryn Smith APRN   1 patch at 08/20/21 0849   • memantine (NAMENDA) tablet 10 mg  10 mg Oral BID Norah Patel APRN   10 mg at 08/20/21 0850   • ondansetron (ZOFRAN) tablet 4 mg  4 mg Oral Q6H PRN Norah Patel APRN        Or   • ondansetron (ZOFRAN) injection 4 mg  4 mg Intravenous  Q6H PRN Norah Patel APRN       • oxybutynin XL (DITROPAN-XL) 24 hr tablet 5 mg  5 mg Oral Daily Norah Patel APRN   5 mg at 21 0850   • QUEtiapine (SEROquel) tablet 50 mg  50 mg Oral Nightly Norah Patel APRN   50 mg at 21   • sennosides-docusate (PERICOLACE) 8.6-50 MG per tablet 2 tablet  2 tablet Oral Nightly Eryn Smith APRN       • sodium chloride 0.9 % flush 10 mL  10 mL Intravenous PRN Danny Leija DO       • sodium chloride 0.9 % flush 10 mL  10 mL Intravenous Q12H Ericka Elias APRN   10 mL at 21 0817   • sodium chloride 0.9 % flush 10 mL  10 mL Intravenous PRN Ericka Elias APRN         Physician Progress Notes (last 24 hours) (Notes from 21 1101 through 21 1101)    No notes of this type exist for this encounter.            Physical Therapy Notes (last 24 hours) (Notes from 21 1101 through 21 1101)      Luann Tinoco PT at 21 1425  Version 1 of 1       Goal Outcome Evaluation:  Plan of Care Reviewed With: patient, daughter        Progress: no change  Outcome Summary: Patient pleasant and agreeable to OOB mobility; however, limited by weakness.  She completed bed mobility w/ max A x 2, transfers w/ mod-max A x 2, and ambulated 2ft w/ RW and mod A x 2.  Pt. participated in BUE/BLE TherEx and reported decreased pain.  PT continues to recommend SNF rehab at [facility].    Electronically signed by Luann Tinoco PT at 21 1523     Luann Tinoco PT at 21 1425  Version 1 of 1         Patient Name: Faye Rod  : 1945    MRN: [id number]                              Today's Date: 2021       Admit Date: 8/15/2021    Visit Dx:     ICD-10-CM ICD-9-CM   1. Multiple falls  R29.6 V15.88   2. Generalized weakness  R53.1 780.79   3. Closed fracture of multiple ribs of right side, initial encounter  S22.41XA 807.09   4. Dementia without behavioral disturbance, unspecified  dementia type (CMS/[facility])  F03.90 294.20   5. Chronic renal impairment, unspecified CKD stage  N18.9 585.9   6. Failure to thrive in adult  R62.7 783.7   7. Dysphagia, unspecified type  R13.10 787.20     Patient Active Problem List   Diagnosis   • Mixed hyperlipidemia   • Essential hypertension   • Type 2 diabetes mellitus (CMS/[facility])   • Primary osteoarthritis involving multiple joints   • Non-rheumatic mitral regurgitation   • Hypothyroidism, adult   • Lewy body dementia with behavioral disturbance (CMS/[facility])   • Mild nonproliferative diabetic retinopathy of both eyes without macular edema associated with type 2 diabetes mellitus (CMS/[facility])   • Morbidly obese (CMS/[facility])   • Sick sinus syndrome (CMS/[facility])   • CKD (chronic kidney disease) stage 3, GFR 30-59 ml/min (CMS/[facility])   • Overactive bladder   • Osteoporosis   • Multiple falls   • Generalized weakness   • FTT (failure to thrive) in adult   • Anemia     Past Medical History:   Diagnosis Date   • Anemia    • Arthritis    • Colon polyp    • Diabetes mellitus (CMS/[facility])    • Disease of thyroid gland    • Heart disease    • Heart valve disease    • Hyperlipidemia    • Hypertension    • Hypothyroidism    • Memory loss    • Pacemaker    • Shortness of breath 2/3/2017   • Sick sinus syndrome (CMS/[facility])      Past Surgical History:   Procedure Laterality Date   • CATARACT EXTRACTION Bilateral    • CHOLECYSTECTOMY     • PACEMAKER IMPLANTATION       General Information     Row Name 08/19/21 1425          Physical Therapy Time and Intention    Document Type  therapy note (daily note)  -MB     Mode of Treatment  physical therapy  -MB     Row Name 08/19/21 1425          General Information    Patient Profile Reviewed  yes  -MB     Existing Precautions/Restrictions  fall;other (see comments) Lewy Body dementia  -MB     Barriers to Rehab  previous functional deficit;cognitive status  -MB     Row Name 08/19/21 1425          Cognition    Orientation Status (Cognition)  oriented to;person   -MB     Row Name 08/19/21 1425          Safety Issues, Functional Mobility    Safety Issues Affecting Function (Mobility)  ability to follow commands;awareness of need for assistance;friction/shear risk;insight into deficits/self-awareness;judgment;positioning of assistive device;problem-solving;safety precaution awareness;safety precautions follow-through/compliance;sequencing abilities  -MB     Impairments Affecting Function (Mobility)  cognition;endurance/activity tolerance;pain;strength;balance  -MB     Cognitive Impairments, Mobility Safety/Performance  attention;awareness, need for assistance;insight into deficits/self-awareness;judgment;problem-solving/reasoning;safety precaution awareness;safety precaution follow-through;sequencing abilities  -MB       User Key  (r) = Recorded By, (t) = Taken By, (c) = Cosigned By    Initials Name Provider Type    Luann Lane, PT Physical Therapist        Mobility     Row Name 08/19/21 1425          Bed Mobility    Supine-Sit [place] (Bed Mobility)  maximum assist (25% patient effort);2 person assist;verbal cues;nonverbal cues (demo/gesture)  -MB     Assistive Device (Bed Mobility)  draw sheet;bed rails  -MB     Comment (Bed Mobility)  Pt. required assist to move BLEs towards EOB, raise trunk/shoulders, and scoot hips forward to EOB.  Pt. w/ posterior lean in sitting requiring consistent assist/cueing to correct.  -MB     Row Name 08/19/21 1425          Transfers    Comment (Transfers)  STS from EOB w/ consistent VCs/tactile cues for safe hand placement, sequencing, and upright posture in standing.  Pt. stands ~75% upright.  -MB     Row Name 08/19/21 1425          Bed-Chair Transfer    Bed-Chair [place] (Transfers)  maximum assist (25% patient effort);2 person assist;verbal cues  -MB     Assistive Device (Bed-Chair Transfers)  other (see comments) BUE support  -MB     Row Name 08/19/21 1425          Sit-Stand Transfer    Sit-Stand [place]  (Transfers)  moderate assist (50% patient effort);2 person assist;verbal cues;nonverbal cues (demo/gesture)  -MB     Assistive Device (Sit-Stand Transfers)  walker, front-wheeled  -MB     Row Name 08/19/21 1425          Gait/Stairs (Locomotion)    [place] Level (Gait)  moderate assist (50% patient effort);2 person assist  -MB     Assistive Device (Gait)  walker, front-wheeled  -MB     Distance in Feet (Gait)  2  -MB     Deviations/Abnormal Patterns (Gait)  base of support, narrow;michaela decreased;gait speed decreased;stride length decreased;weight shifting decreased;festinating/shuffling  -MB     Bilateral Gait Deviations  forward flexed posture;heel strike decreased  -MB     Comment (Gait/Stairs)  Pt. amb. to chair w/ step to, shuffling gait pattern.  She required assist for weight shift and max VCs/tactile cues for step sequencing and upright posture.  -MB       User Key  (r) = Recorded By, (t) = Taken By, (c) = Cosigned By    Initials Name Provider Type    Luann Lane, PT Physical Therapist        Obj/Interventions     Row Name 08/19/21 1425          Motor Skills    Therapeutic Exercise  shoulder;hip;knee;ankle  -MB     Row Name 08/19/21 1425          Shoulder (Therapeutic Exercise)    Shoulder AAROM (Therapeutic Exercise)  bilateral;flexion;aBduction;10 repetitions  -MB     Row Name 08/19/21 1425          Hip (Therapeutic Exercise)    Hip (Therapeutic Exercise)  AAROM (active assistive range of motion)  -MB     Hip AAROM (Therapeutic Exercise)  bilateral;flexion;aBduction;10 repetitions  -MB     Row Name 08/19/21 1425          Knee (Therapeutic Exercise)    Knee AAROM (Therapeutic Exercise)  bilateral;flexion;extension;10 repetitions noted B hamstring tightness  -MB     Row Name 08/19/21 1425          Ankle (Therapeutic Exercise)    Ankle AAROM (Therapeutic Exercise)  bilateral;dorsiflexion;plantarflexion;10 repetitions  -MB     Row Name 08/19/21 1425          Balance    Static Sitting Balance   "moderate impairment;unsupported;sitting, edge of bed  -MB     Static Standing Balance  moderate impairment;supported  -MB     Dynamic Standing Balance  moderate impairment;supported  -MB     Balance Interventions  standing;sit to stand;weight shifting activity  -MB       User Key  (r) = Recorded By, (t) = Taken By, (c) = Cosigned By    Initials Name Provider Type    Luann Lane, PT Physical Therapist        Goals/Plan    No documentation.       Clinical Impression     Row Name 08/19/21 1425          Pain    Additional Documentation  Pain Scale: FACES Pre/Post-Treatment (Group)  -MB     Row Name 08/19/21 1425          Pain Scale: FACES Pre/Post-Treatment    Pain: FACES Scale, Pretreatment  2-->hurts little bit  -MB     Posttreatment Pain Rating  2-->hurts little bit  -MB     Pain Location - Orientation  generalized  -MB     Pre/Posttreatment Pain Comment  Pt. reports she hurts \"all over.\"  -MB     Row Name 08/19/21 1425          Plan of Care Review    Plan of Care Reviewed With  patient;daughter  -MB     Progress  no change  -MB     Outcome Summary  Patient pleasant and agreeable to OOB mobility; however, limited by weakness.  She completed bed mobility w/ max A x 2, transfers w/ mod-max A x 2, and ambulated 2ft w/ RW and mod A x 2.  Pt. participated in BUE/BLE TherEx and reported decreased pain.  PT continues to recommend SNF rehab at [facility].  -MB     Row Name 08/19/21 1425          Vital Signs    Pre Systolic BP Rehab  -- VSS.  RN cleared for PT.  -MB     Pre Patient Position  Supine  -MB     Intra Patient Position  Standing  -MB     Post Patient Position  Sitting  -MB     Row Name 08/19/21 1425          Positioning and Restraints    Pre-Treatment Position  in bed  -MB     Post Treatment Position  chair  -MB     In Chair  notified nsg;reclined;call light within reach;encouraged to call for assist;with family/caregiver;waffle cushion;on mechanical lift sling;legs elevated  -MB       User Key  (r) = Recorded " By, (t) = Taken By, (c) = Cosigned By    Initials Name Provider Type    Luann Lane, PT Physical Therapist        Outcome Measures     Row Name 08/19/21 1425 08/19/21 0730       How much help from another person do you currently need...    Turning from your back to your side while in flat bed without using bedrails?  3  -MB  3  -YM    Moving from lying on back to sitting on the side of a flat bed without bedrails?  2  -MB  2  -YM    Moving to and from a bed to a chair (including a wheelchair)?  2  -MB  2  -YM    Standing up from a chair using your arms (e.g., wheelchair, bedside chair)?  2  -MB  2  -YM    Climbing 3-5 steps with a railing?  1  -MB  1  -YM    To walk in hospital room?  1  -MB  1  -YM    AM-PAC 6 Clicks Score (PT)  11  -MB  11  -YM      User Key  (r) = Recorded By, (t) = Taken By, (c) = Cosigned By    Initials Name Provider Type    Luann Lane, PT Physical Therapist    Reba Stubbs, RN Registered Nurse                       Physical Therapy Education                 Title: PT OT SLP Therapies (In Progress)     Topic: Physical Therapy (In Progress)     Point: Mobility training (In Progress)     Learning Progress Summary           Patient Acceptance, E,TB, NR by  at 8/16/2021 1007    Comment: Edu on PT services, POC, d/c planning, safety with mobility                   Point: Home exercise program (In Progress)     Learning Progress Summary           Patient Acceptance, E,TB, NR by  at 8/16/2021 1007    Comment: Edu on PT services, POC, d/c planning, safety with mobility                   Point: Body mechanics (In Progress)     Learning Progress Summary           Patient Acceptance, E,TB, NR by  at 8/16/2021 1007    Comment: Edu on PT services, POC, d/c planning, safety with mobility                   Point: Precautions (In Progress)     Learning Progress Summary           Patient Acceptance, E,TB, NR by  at 8/16/2021 1007    Comment: Edu on PT services, POC, d/c  planning, safety with mobility                               User Key     Initials Effective Dates Name Provider Type Discipline     09/22/20 -  Chapito Mckeon, PT Physical Therapist PT              PT Recommendation and Plan     Plan of Care Reviewed With: patient, daughter  Progress: no change  Outcome Summary: Patient pleasant and agreeable to OOB mobility; however, limited by weakness.  She completed bed mobility w/ max A x 2, transfers w/ mod-max A x 2, and ambulated 2ft w/ RW and mod A x 2.  Pt. participated in BUE/BLE TherEx and reported decreased pain.  PT continues to recommend SNF rehab at [facility].     Time Calculation:   PT Charges     Row Name 08/19/21 1523             Time Calculation    Start Time  1425  -MB      PT Received On  08/19/21  -MB      PT Goal Re-Cert Due Date  08/26/21  -MB         Time Calculation- PT    Total Timed Code Minutes- PT  35 minute(s)  -MB         Timed Charges    64592 - PT Therapeutic Exercise Minutes  35  -MB         Total Minutes    Timed Charges Total Minutes  35  -MB       Total Minutes  35  -MB        User Key  (r) = Recorded By, (t) = Taken By, (c) = Cosigned By    Initials Name Provider Type    Luann Lane, PT Physical Therapist        Therapy Charges for Today     Code Description Service Date Service Provider Modifiers Qty    50285695075 HC PT THER PROC EA 15 MIN 8/19/2021 Luann Tinoco, PT GP 2    47656944844 HC PT THER SUPP EA 15 MIN 8/19/2021 Luann Tinoco, PT GP 2          PT G-Codes  Outcome Measure Options: AM-PAC 6 Clicks Daily Activity (OT)  AM-PAC 6 Clicks Score (PT): 11  AM-PAC 6 Clicks Score (OT): 9  Modified [place] Scale: 4 - Moderately severe disability.  Unable to walk without assistance, and unable to attend to own bodily needs without assistance.    Luann Tinoco, JOSE  8/19/2021      Electronically signed by Luann Tinoco, PT at 08/19/21 1527       Occupational Therapy Notes (last 24 hours) (Notes from 08/19/21 1101  through 08/20/21 1104)    No notes exist for this encounter.

## 2021-08-21 NOTE — PLAN OF CARE
Goal Outcome Evaluation:  Plan of Care Reviewed With: patient           Outcome Summary: Assist x2 to chair. Assist with feeding of meals. Rested well during shift. VSS Will continue to monitor.

## 2021-08-21 NOTE — THERAPY TREATMENT NOTE
Patient Name: Faye Rod  : 1945    MRN: [id number]                              Today's Date: 2021       Admit Date: 8/15/2021    Visit Dx:     ICD-10-CM ICD-9-CM   1. Multiple falls  R29.6 V15.88   2. Generalized weakness  R53.1 780.79   3. Closed fracture of multiple ribs of right side, initial encounter  S22.41XA 807.09   4. Dementia without behavioral disturbance, unspecified dementia type (CMS/HCC)  F03.90 294.20   5. Chronic renal impairment, unspecified CKD stage  N18.9 585.9   6. Failure to thrive in adult  R62.7 783.7   7. Dysphagia, unspecified type  R13.10 787.20     Patient Active Problem List   Diagnosis   • Mixed hyperlipidemia   • Essential hypertension   • Type 2 diabetes mellitus (CMS/HCC)   • Primary osteoarthritis involving multiple joints   • Non-rheumatic mitral regurgitation   • Hypothyroidism, adult   • Lewy body dementia with behavioral disturbance (CMS/HCC)   • Mild nonproliferative diabetic retinopathy of both eyes without macular edema associated with type 2 diabetes mellitus (CMS/HCC)   • Morbidly obese (CMS/[facility])   • Sick sinus syndrome (CMS/HCC)   • CKD (chronic kidney disease) stage 3, GFR 30-59 ml/min (CMS/[facility])   • Overactive bladder   • Osteoporosis   • Multiple falls   • Generalized weakness   • FTT (failure to thrive) in adult   • Anemia     Past Medical History:   Diagnosis Date   • Anemia    • Arthritis    • Colon polyp    • Diabetes mellitus (CMS/HCC)    • Disease of thyroid gland    • Heart disease    • Heart valve disease    • Hyperlipidemia    • Hypertension    • Hypothyroidism    • Memory loss    • Pacemaker    • Shortness of breath 2/3/2017   • Sick sinus syndrome (CMS/HCC)      Past Surgical History:   Procedure Laterality Date   • CATARACT EXTRACTION Bilateral    • CHOLECYSTECTOMY     • PACEMAKER IMPLANTATION       General Information     Row Name 21 1533          Physical Therapy Time and Intention    Document Type  therapy note (daily  note)  -     Mode of Treatment  physical therapy  -     Row Name 08/21/21 1533          General Information    Patient Profile Reviewed  yes  -     Existing Precautions/Restrictions  fall;other (see comments) confusion/dementia  -[facility] Name 08/21/21 1533          Cognition    Orientation Status (Cognition)  oriented to;person  -[facility] Name 08/21/21 1533          Safety Issues, Functional Mobility    Safety Issues Affecting Function (Mobility)  ability to follow commands;awareness of need for assistance;insight into deficits/self-awareness;problem-solving;safety precaution awareness;safety precautions follow-through/compliance;sequencing abilities  -     Impairments Affecting Function (Mobility)  cognition;balance;range of motion (ROM);strength  -     Cognitive Impairments, Mobility Safety/Performance  awareness, need for assistance;insight into deficits/self-awareness;judgment;problem-solving/reasoning;safety precaution awareness;sequencing abilities;safety precaution follow-through  -       User Key  (r) = Recorded By, (t) = Taken By, (c) = Cosigned By    Initials Name Provider Type     Chapito Mckeon, JOSE Physical Therapist        Mobility     Row Name 08/21/21 1534          Bed Mobility    Bed Mobility  supine-sit;scooting/bridging  -     Scooting/Bridging [place] (Bed Mobility)  maximum assist (25% patient effort);2 person assist;verbal cues;nonverbal cues (demo/gesture)  -     Supine-Sit [place] (Bed Mobility)  maximum assist (25% patient effort);2 person assist;verbal cues;nonverbal cues (demo/gesture)  -     Assistive Device (Bed Mobility)  draw sheet;bed rails  -     Comment (Bed Mobility)  Cues for safe sequencing to EOB and assist required at posterior trunk and BLE.  -[facility] Name 08/21/21 1534          Transfers    Comment (Transfers)  Cues for proper foot placement in preparation for transfers and BUE support. Required max A x 2 for lateral weight shifting and  max A for LE advancement with physical assist from PT. Pt unable to weight or take steps on her own. Requires max cues for directioning as pt transferred from bed away from chair initially.  -JH     Row Name 08/21/21 1534          Bed-Chair Transfer    Bed-Chair [place] (Transfers)  maximum assist (25% patient effort);2 person assist;verbal cues;nonverbal cues (demo/gesture)  -     Assistive Device (Bed-Chair Transfers)  other (see comments) BUE support  -[facility] Name 08/21/21 1534          Sit-Stand Transfer    Sit-Stand [place] (Transfers)  maximum assist (25% patient effort);2 person assist;verbal cues;nonverbal cues (demo/gesture)  -     Assistive Device (Sit-Stand Transfers)  other (see comments) [facility] support  -       User Key  (r) = Recorded By, (t) = Taken By, (c) = Cosigned By    Initials Name Provider Type     Chapito Mckeon PT Physical Therapist        Obj/Interventions     Row Name 08/21/21 1537          Motor Skills    Therapeutic Exercise  knee;hip;ankle  -JH     Row Name 08/21/21 1537          Hip (Therapeutic Exercise)    Hip AAROM (Therapeutic Exercise)  bilateral;external rotation;internal rotation;10 repetitions  -JH     Row Name 08/21/21 1537          Knee (Therapeutic Exercise)    Knee (Therapeutic Exercise)  AAROM (active assistive range of motion)  -     Knee AAROM (Therapeutic Exercise)  bilateral;flexion;extension;10 repetitions  -JH     Row Name 08/21/21 1537          Ankle (Therapeutic Exercise)    Ankle (Therapeutic Exercise)  AAROM (active assistive range of motion)  -     Ankle AAROM (Therapeutic Exercise)  bilateral;dorsiflexion;plantarflexion;10 repetitions  -JH     Row Name 08/21/21 1537          Balance    Balance Assessment  sitting static balance;sitting dynamic balance;standing static balance;standing dynamic balance  -     Static Sitting Balance  mild impairment  -     Dynamic Sitting Balance  mild impairment  -     Static Standing Balance  moderate  impairment  -     Dynamic Standing Balance  moderate impairment  -     Balance Interventions  sitting;standing;sit to stand;supported;static;dynamic;moderate challenge  -     Comment, Balance  max A x 2 for dynamic balance during transfer  -       User Key  (r) = Recorded By, (t) = Taken By, (c) = Cosigned By    Initials Name Provider Type     Chapito Mckeon, PT Physical Therapist        Goals/Plan    No documentation.       Clinical Impression     [facility] Name 08/21/21 1538          Pain    Additional Documentation  Pain Scale: FACES Pre/Post-Treatment (Group)  -JH     Row Name 08/21/21 1538          Pain Scale: FACES Pre/Post-Treatment    Pain: FACES Scale, Pretreatment  0-->no hurt  -     Posttreatment Pain Rating  0-->no hurt  -     Pre/Posttreatment Pain Comment  tolerated  -JH     Row Name 08/21/21 1538          Plan of Care Review    Plan of Care Reviewed With  patient  -     Progress  no change  -     Outcome Summary  Pt required max A x 2 for sup > sit and bed > chair transfer with BUE suppot. Requires increased assist for weight shifting and LE advancement, unable to pivot this date. Edu to daughter on assisting pt with ROM HEP as pt is very stiff in LE joints.  -[facility] Name 08/21/21 1538          Therapy Assessment/Plan (PT)    Rehab Potential (PT)  fair, will monitor progress closely  -     Criteria for Skilled Interventions Met (PT)  yes;skilled treatment is necessary;meets criteria  -[facility] Name 08/21/21 1538          Vital Signs    Pre Systolic BP Rehab  102  -     Pre Treatment Diastolic BP  54  -     Pretreatment Heart Rate (beats/min)  80  -     Pre SpO2 (%)  94  -     O2 Delivery Pre Treatment  room air  -     O2 Delivery Intra Treatment  room air  -     O2 Delivery Post Treatment  room air  -     Pre Patient Position  Supine  -     Intra Patient Position  Standing  -     Post Patient Position  Sitting  -[facility] Name 08/21/21 1531          Positioning  and Restraints    Pre-Treatment Position  in bed  -     Post Treatment Position  chair  -     In Chair  notified nsg;reclined;call light within reach;encouraged to call for assist;exit alarm on;on mechanical lift sling;waffle cushion;legs elevated;with family/caregiver  -       User Key  (r) = Recorded By, (t) = Taken By, (c) = Cosigned By    Initials Name Provider Type     Chapito Mckeon, PT Physical Therapist        Outcome Measures     Row Name 08/21/21 1540 08/21/21 0800       How much help from another person do you currently need...    Turning from your back to your side while in flat bed without using bedrails?  2  -  2  -BC    Moving from lying on back to sitting on the side of a flat bed without bedrails?  2  -  2  -BC    Moving to and from a bed to a chair (including a wheelchair)?  2  -  2  -BC    Standing up from a chair using your arms (e.g., wheelchair, bedside chair)?  2  -  2  -BC    Climbing 3-5 steps with a railing?  1  -  1  -BC    To walk in hospital room?  1  -  1  -BC    AM-PAC 6 Clicks Score (PT)  10  -  10  -BC    Row Name 08/21/21 1540 08/21/21 1214       Functional Assessment    Outcome Measure Options  AM-PAC 6 Clicks Basic Mobility (PT)  -  AM-PAC 6 Clicks Daily Activity (OT)  -      User Key  (r) = Recorded By, (t) = Taken By, (c) = Cosigned By    Initials Name Provider Type     Cynthia Funes OT Occupational Therapist    Janet Potter, RN Registered Nurse    Chapito Harris, PT Physical Therapist                       Physical Therapy Education                 Title: PT OT SLP Therapies (In Progress)     Topic: Physical Therapy (In Progress)     Point: Mobility training (In Progress)     Learning Progress Summary           Patient Acceptance, E,TB, NR by  at 8/21/2021 1540    Comment: edu on HEP    Acceptance, E,TB, NR by  at 8/16/2021 1007    Comment: Edu on PT services, POC, d/c planning, safety with mobility   Family Acceptance, E,TB, NR by   at 8/21/2021 1540    Comment: edu on HEP                   Point: Home exercise program (In Progress)     Learning Progress Summary           Patient Acceptance, E,TB, NR by  at 8/21/2021 1540    Comment: edu on HEP    Acceptance, E,TB, NR by  at 8/16/2021 1007    Comment: Edu on PT services, POC, d/c planning, safety with mobility   Family Acceptance, E,TB, NR by  at 8/21/2021 1540    Comment: edu on HEP                   Point: Body mechanics (In Progress)     Learning Progress Summary           Patient Acceptance, E,TB, NR by  at 8/21/2021 1540    Comment: edu on HEP    Acceptance, E,TB, NR by  at 8/16/2021 1007    Comment: Edu on PT services, POC, d/c planning, safety with mobility   Family Acceptance, E,TB, NR by  at 8/21/2021 1540    Comment: edu on HEP                   Point: Precautions (In Progress)     Learning Progress Summary           Patient Acceptance, E,TB, NR by  at 8/21/2021 1540    Comment: edu on HEP    Acceptance, E,TB, NR by  at 8/16/2021 1007    Comment: Edu on PT services, POC, d/c planning, safety with mobility   Family Acceptance, E,TB, NR by  at 8/21/2021 1540    Comment: edu on HEP                               User Key     Initials Effective Dates Name Provider Type Discipline     09/22/20 -  Chapito Mckeon, PT Physical Therapist PT              PT Recommendation and Plan  Planned Therapy Interventions (PT): balance training, bed mobility training, gait training, home exercise program, patient/family education, postural re-education, ROM (range of motion), stair training, strengthening, stretching, transfer training  Plan of Care Reviewed With: patient  Progress: no change  Outcome Summary: Pt required max A x 2 for sup > sit and bed > chair transfer with BUE suppot. Requires increased assist for weight shifting and LE advancement, unable to pivot this date. Edu to daughter on assisting pt with ROM HEP as pt is very stiff in LE joints.     Time Calculation:   PT  Charges     Row Name 08/21/21 1540             Time Calculation    Start Time  1433  -JH      PT Received On  08/21/21  -      PT Goal Re-Cert Due Date  08/26/21  -         Time Calculation- PT    Total Timed Code Minutes- PT  23 minute(s)  -         Timed Charges    19821 - PT Therapeutic Exercise Minutes  10  -JH      81348 - PT Therapeutic Activity Minutes  13  -JH         Total Minutes    Timed Charges Total Minutes  23  -       Total Minutes  23  -        User Key  (r) = Recorded By, (t) = Taken By, (c) = Cosigned By    Initials Name Provider Type    Chapito Harris, PT Physical Therapist        Therapy Charges for Today     Code Description Service Date Service Provider Modifiers Qty    08409702596 HC PT THER PROC EA 15 MIN 8/21/2021 Chapito Mckeon, PT GP 1    67862581830 HC PT THERAPEUTIC ACT EA 15 MIN 8/21/2021 Chapito Mckeon, PT GP 1    15977257454 HC PT THER SUPP EA 15 MIN 8/21/2021 Chapito Mckeon, PT GP 2          PT G-Codes  Outcome Measure Options: AM-PAC 6 Clicks Basic Mobility (PT)  AM-PAC 6 Clicks Score (PT): 10  AM-PAC 6 Clicks Score (OT): 10  Modified Mavis Scale: 4 - Moderately severe disability.  Unable to walk without assistance, and unable to attend to own bodily needs without assistance.    Chapito Mckeon PT  8/21/2021

## 2021-08-21 NOTE — PLAN OF CARE
Problem: Fall Injury Risk  Goal: Absence of Fall and Fall-Related Injury  Intervention: Promote Injury-Free Environment  Recent Flowsheet Documentation  Taken 8/21/2021 0200 by Constantine Heath, RN  Safety Promotion/Fall Prevention:   activity supervised   assistive device/personal items within reach   clutter free environment maintained   fall prevention program maintained   gait belt   lighting adjusted   mobility aid in reach   elopement precautions   muscle strengthening facilitated   nonskid shoes/slippers when out of bed   room organization consistent   safety round/check completed  Taken 8/21/2021 0000 by Constantine Heath, RN  Safety Promotion/Fall Prevention:   activity supervised   assistive device/personal items within reach   clutter free environment maintained   elopement precautions   fall prevention program maintained   gait belt   lighting adjusted   mobility aid in reach   muscle strengthening facilitated   nonskid shoes/slippers when out of bed  Taken 8/20/2021 2200 by Constantine Heath, RN  Safety Promotion/Fall Prevention:   assistive device/personal items within reach   clutter free environment maintained   elopement precautions   gait belt   lighting adjusted   activity supervised   fall prevention program maintained   mobility aid in reach   nonskid shoes/slippers when out of bed   muscle strengthening facilitated   room organization consistent   safety round/check completed  Taken 8/20/2021 2000 by Constantine Heath, RN  Safety Promotion/Fall Prevention:   activity supervised   assistive device/personal items within reach   clutter free environment maintained   elopement precautions   fall prevention program maintained   gait belt   lighting adjusted   mobility aid in reach   muscle strengthening facilitated   nonskid shoes/slippers when out of bed   room organization consistent   safety round/check completed   Goal Outcome Evaluation:      VSS. Pt rested well throughout the night. Pt  alert only to self, but pt very friendly. Q2 turned. Will continue to monitor.

## 2021-08-21 NOTE — PLAN OF CARE
Goal Outcome Evaluation:  Plan of Care Reviewed With: patient        Progress: improving  Outcome Summary: Pt confused and incontinent of bowel during treatment session. Recommend continued skilled OT services and SNF at d/c.

## 2021-08-21 NOTE — THERAPY TREATMENT NOTE
Patient Name: Faye Rod  : 1945    MRN: [id number]                              Today's Date: 2021       Admit Date: 8/15/2021    Visit Dx:     ICD-10-CM ICD-9-CM   1. Multiple falls  R29.6 V15.88   2. Generalized weakness  R53.1 780.79   3. Closed fracture of multiple ribs of right side, initial encounter  S22.41XA 807.09   4. Dementia without behavioral disturbance, unspecified dementia type (CMS/HCC)  F03.90 294.20   5. Chronic renal impairment, unspecified CKD stage  N18.9 585.9   6. Failure to thrive in adult  R62.7 783.7   7. Dysphagia, unspecified type  R13.10 787.20     Patient Active Problem List   Diagnosis   • Mixed hyperlipidemia   • Essential hypertension   • Type 2 diabetes mellitus (CMS/HCC)   • Primary osteoarthritis involving multiple joints   • Non-rheumatic mitral regurgitation   • Hypothyroidism, adult   • Lewy body dementia with behavioral disturbance (CMS/HCC)   • Mild nonproliferative diabetic retinopathy of both eyes without macular edema associated with type 2 diabetes mellitus (CMS/HCC)   • Morbidly obese (CMS/HCC)   • Sick sinus syndrome (CMS/HCC)   • CKD (chronic kidney disease) stage 3, GFR 30-59 ml/min (CMS/HCC)   • Overactive bladder   • Osteoporosis   • Multiple falls   • Generalized weakness   • FTT (failure to thrive) in adult   • Anemia     Past Medical History:   Diagnosis Date   • Anemia    • Arthritis    • Colon polyp    • Diabetes mellitus (CMS/HCC)    • Disease of thyroid gland    • Heart disease    • Heart valve disease    • Hyperlipidemia    • Hypertension    • Hypothyroidism    • Memory loss    • Pacemaker    • Shortness of breath 2/3/2017   • Sick sinus syndrome (CMS/HCC)      Past Surgical History:   Procedure Laterality Date   • CATARACT EXTRACTION Bilateral    • CHOLECYSTECTOMY     • PACEMAKER IMPLANTATION       General Information     Row Name 21 1209          OT Time and Intention    Document Type  therapy note (daily note)  -      Mode of Treatment  occupational therapy  -     Row Name 08/21/21 1209          General Information    Patient Profile Reviewed  yes  -JR     Existing Precautions/Restrictions  fall;other (see comments) confusion/dementia  -JR     Barriers to Rehab  medically complex;previous functional deficit;cognitive status  -     Row Name 08/21/21 1209          Cognition    Orientation Status (Cognition)  oriented to;person  -     Row Name 08/21/21 1209          Safety Issues, Functional Mobility    Safety Issues Affecting Function (Mobility)  ability to follow commands;awareness of need for assistance;insight into deficits/self-awareness  -     Impairments Affecting Function (Mobility)  cognition;balance  -JR     Cognitive Impairments, Mobility Safety/Performance  awareness, need for assistance;safety precaution awareness;insight into deficits/self-awareness  -       User Key  (r) = Recorded By, (t) = Taken By, (c) = Cosigned By    Initials Name Provider Type    Cynthia Romero, OT Occupational Therapist          Mobility/ADL's     Row Name 08/21/21 1211          Grooming Assessment/Training    Vance Level (Grooming)  wash face, hands;dependent (less than 25% patient effort);hair care, combing/brushing;moderate assist (50% patient effort)  -     Position (Grooming)  supine  -       User Key  (r) = Recorded By, (t) = Taken By, (c) = Cosigned By    Initials Name Provider Type    Cynthia Romero OT Occupational Therapist        Obj/Interventions     Row Name 08/21/21 1211          Shoulder (Therapeutic Exercise)    Shoulder (Therapeutic Exercise)  AAROM (active assistive range of motion)  -     Shoulder AAROM (Therapeutic Exercise)  bilateral;flexion;extension;aBduction;aDduction;10 repetitions;supine  -     Row Name 08/21/21 1211          Elbow/Forearm (Therapeutic Exercise)    Elbow/Forearm (Therapeutic Exercise)  AAROM (active assistive range of motion)  -     Elbow/Forearm AAROM  (Therapeutic Exercise)  bilateral;flexion;extension;supination;pronation;10 repetitions  -[facility] Name 08/21/21 1211          Hand (Therapeutic Exercise)    Hand (Therapeutic Exercise)  AROM (active range of motion)  -     Hand AROM/AAROM (Therapeutic Exercise)  AAROM (active assistive range of motion);10 repetitions  -[facility] Name 08/21/21 1211          Therapeutic Exercise    Therapeutic Exercise  shoulder;elbow/forearm;hand  -JR     Row Name 08/21/21 1211          Knee (Therapeutic Exercise)    Knee (Therapeutic Exercise)  AAROM (active assistive range of motion)  -     Knee AAROM (Therapeutic Exercise)  bilateral;flexion;extension;supine;10 repetitions  -[facility] Name 08/21/21 1211          Ankle (Therapeutic Exercise)    Ankle (Therapeutic Exercise)  AAROM (active assistive range of motion)  -     Ankle AAROM (Therapeutic Exercise)  bilateral;dorsiflexion;plantarflexion;10 repetitions  -       User Key  (r) = Recorded By, (t) = Taken By, (c) = Cosigned By    Initials Name Provider Type     Cynthia Funes OT Occupational Therapist        Goals/Plan    No documentation.       Clinical Impression     Row Name 08/21/21 1212          Pain Assessment    Additional Documentation  Pain Scale: FACES Pre/Post-Treatment (Group)  -JR     Row Name 08/21/21 1212          Pain Scale: FACES Pre/Post-Treatment    Pain: FACES Scale, Pretreatment  0-->no hurt  -     Posttreatment Pain Rating  0-->no hurt  -JR     Row Name 08/21/21 1212          Plan of Care Review    Plan of Care Reviewed With  patient  -     Progress  improving  -     Outcome Summary  Pt confused and incontinent of bowel during treatment session. Recommend continued skilled OT services and SNF at d/c.  -[facility] 08/21/21 1212          Therapy Assessment/Plan (OT)    Rehab Potential (OT)  fair, will monitor progress closely  -JR     Row Name 08/21/21 1212          Therapy Plan Review/Discharge Plan (OT)    Anticipated Discharge  Disposition (OT)  skilled nursing facility  -JR     Row Name 08/21/21 1212          Vital Signs    Pre Systolic BP Rehab  146  -JR     Pre Treatment Diastolic BP  66  -JR     Post Systolic BP Rehab  121  -JR     Post Treatment Diastolic BP  72  -JR     Pretreatment Heart Rate (beats/min)  74  -JR     Posttreatment Heart Rate (beats/min)  68  -JR     Pre SpO2 (%)  96  -JR     O2 Delivery Pre Treatment  room air  -JR     Post SpO2 (%)  96  -JR     O2 Delivery Post Treatment  room air  -JR     Pre Patient Position  Supine  -JR     Intra Patient Position  Supine  -JR     Post Patient Position  Supine  -JR     Row Name 08/21/21 1212          Positioning and Restraints    Pre-Treatment Position  in bed  -JR     Post Treatment Position  bed  -JR     In Bed  notified nsg;supine;call light within reach;encouraged to call for assist;exit alarm on Notified NA pt incontinent of bowel  -JR       User Key  (r) = Recorded By, (t) = Taken By, (c) = Cosigned By    Initials Name Provider Type    Cynthia Romero OT Occupational Therapist        Outcome Measures     Row Name 08/21/21 1214          How much help from another is currently needed...    Putting on and taking off regular lower body clothing?  1  -JR     Bathing (including washing, rinsing, and drying)  2  -JR     Toileting (which includes using toilet bed pan or urinal)  1  -JR     Putting on and taking off regular upper body clothing  2  -JR     Taking care of personal grooming (such as brushing teeth)  2  -JR     Eating meals  2  -JR     AM-PAC 6 Clicks Score (OT)  10  -JR     Row Name 08/21/21 1214          Functional Assessment    Outcome Measure Options  AM-PAC 6 Clicks Daily Activity (OT)  -JR       User Key  (r) = Recorded By, (t) = Taken By, (c) = Cosigned By    Initials Name Provider Type    Cynthia Romero OT Occupational Therapist          Occupational Therapy Education                 Title: PT OT SLP Therapies (In Progress)     Topic: Occupational  Therapy (In Progress)     Point: ADL training (In Progress)     Description:   Instruct learner(s) on proper safety adaptation and remediation techniques during self care or transfers.   Instruct in proper use of assistive devices.              Learning Progress Summary           Patient Acceptance, E,D, NR by TB at 8/18/2021 1440    Acceptance, E,TB,D,H, VU,NR by HK at 8/16/2021 1015   Family Acceptance, E,D, NR by TB at 8/18/2021 1440                   Point: Home exercise program (In Progress)     Description:   Instruct learner(s) on appropriate technique for monitoring, assisting and/or progressing therapeutic exercises/activities.              Learning Progress Summary           Patient Acceptance, E, NR by  at 8/21/2021 1025    Acceptance, E,D, NR by TB at 8/18/2021 1440    Acceptance, E,TB,D,H, VU,NR by  at 8/16/2021 1015   Family Acceptance, E,D, NR by TB at 8/18/2021 1440                   Point: Precautions (Done)     Description:   Instruct learner(s) on prescribed precautions during self-care and functional transfers.              Learning Progress Summary           Patient Acceptance, E,TB,D,H, VU,NR by  at 8/16/2021 1015                   Point: Body mechanics (Done)     Description:   Instruct learner(s) on proper positioning and spine alignment during self-care, functional mobility activities and/or exercises.              Learning Progress Summary           Patient Acceptance, E,TB,D,H, VU,NR by  at 8/16/2021 1015                               User Key     Initials Effective Dates Name Provider Type Discipline     06/16/21 -  Ruth Sheffield, OT Occupational Therapist OT    JR 06/16/21 -  Cynthia Funes, OT Occupational Therapist OT     06/16/21 -  Albertina Campbell, OT Occupational Therapist OT              OT Recommendation and Plan     Plan of Care Review  Plan of Care Reviewed With: patient  Progress: improving  Outcome Summary: Pt confused and incontinent of bowel during  treatment session. Recommend continued skilled OT services and SNF at d/c.     Time Calculation:   Time Calculation- OT     Row Name 08/21/21 1215             Time Calculation- OT    OT Start Time  1025  -JR      OT Received On  08/21/21  -         Timed Charges    51442 - OT Therapeutic Activity Minutes  15  -JR         Total Minutes    Timed Charges Total Minutes  15  -JR       Total Minutes  15  -JR        User Key  (r) = Recorded By, (t) = Taken By, (c) = Cosigned By    Initials Name Provider Type     Cynthia Funes OT Occupational Therapist        Therapy Charges for Today     Code Description Service Date Service Provider Modifiers Qty    92283090428  OT THERAPEUTIC ACT EA 15 MIN 8/21/2021 Cynthia Funes OT GO 1               Cynthia Funes OT  8/21/2021

## 2021-08-21 NOTE — PROGRESS NOTES
[facility] Medicine Services  PROGRESS NOTE    Patient Name: Faye Rod  : 1945  MRN: [id number]    Date of Admission: 8/15/2021  Primary Care Physician: Sarai Hawley PA-C    Subjective   Subjective     CC:  Multiple falls, dementia    HPI:  In bed, on bed pan. No family at bedside. Chart reviewed, no overnight issues. Patient reports that she just wants to sleep.     Review of Systems:     Difficult to obtain due to dementia.    Objective   Objective     Vital Signs:   Temp:  [97.6 °F (36.4 °C)-98.8 °F (37.1 °C)] 97.8 °F (36.6 °C)  Heart Rate:  [60-72] 72  Resp:  [14-16] 16  BP: (102-146)/(54-78) 102/54     Physical Exam:    Constitutional: No acute distress, awake, alert, resting in bed, no family at bedside. .   HENT: NCAT, mucous membranes moist  Respiratory: Clear to auscultation bilaterally, respiratory effort normal on room air  Cardiovascular: RRR, no murmurs, rubs, or gallops  Gastrointestinal: Positive bowel sounds, soft, nontender, nondistended  Musculoskeletal: No bilateral ankle edema  Psychiatric: Appropriate affect, cooperative  Neurologic: Oriented x 1-2, , speech clear  Skin: No rashes noted to exposed skin  No change in exam from             Results Reviewed:  LAB RESULTS:      Lab 21  0303 08/15/21  1705   WBC 5.66 6.69   HEMOGLOBIN 10.0* 10.4*   HEMATOCRIT 31.0* 33.1*   PLATELETS 142 146   NEUTROS ABS 3.52 4.35   IMMATURE GRANS (ABS) 0.02 0.02   LYMPHS ABS 1.50 1.66   MONOS ABS 0.57 0.63   EOS ABS 0.04 0.01   MCV 97.2* 100.0*         Lab 21  0303 08/15/21  1705   SODIUM 135* 139   POTASSIUM 4.2 4.5   CHLORIDE 101 102   CO2 23.0 28.0   ANION GAP 11.0 9.0   BUN 22 25*   CREATININE 1.19* 1.18*   GLUCOSE 104* 103*   CALCIUM 9.1 9.2   MAGNESIUM 1.8 2.0   HEMOGLOBIN A1C 6.40*  --    TSH  --  0.830         Lab 08/15/21  1705   TOTAL PROTEIN 6.5   ALBUMIN 4.10   GLOBULIN 2.4   ALT (SGPT) 10   AST (SGOT) 20   BILIRUBIN 0.4   ALK PHOS 69          Lab 08/15/21  1705   TROPONIN T 0.022         Lab 08/16/21  0303   CHOLESTEROL 118   LDL CHOL 51   HDL CHOL 48   TRIGLYCERIDES 104         Lab 08/15/21  1705   IRON 27*   IRON SATURATION 7*   TIBC 374   TRANSFERRIN 251   FERRITIN 26.98   FOLATE 19.20   VITAMIN B 12 153*         Brief Urine Lab Results  (Last result in the past 365 days)      Color   Clarity   Blood   Leuk Est   Nitrite   Protein   CREAT   Urine HCG        08/15/21 1705 Yellow Clear Negative Negative Negative Trace               Microbiology Results Abnormal     Procedure Component Value - Date/Time    COVID PRE-OP / PRE-PROCEDURE SCREENING ORDER (NO ISOLATION) - Swab, Nasopharynx [[id number]]  (Normal) Collected: 08/15/21 1954    Lab Status: Final result Specimen: Swab from Nasopharynx Updated: 08/15/21 2033    Narrative:      The following orders were created for panel order COVID PRE-OP / PRE-PROCEDURE SCREENING ORDER (NO ISOLATION) - Swab, Nasopharynx.  Procedure                               Abnormality         Status                     ---------                               -----------         ------                     COVID-19 and FLU A/B PCR...[[id number]]  Normal              Final result                 Please view results for these tests on the individual orders.    COVID-19 and FLU A/B PCR - Swab, Nasopharynx [[id number]]  (Normal) Collected: 08/15/21 1954    Lab Status: Final result Specimen: Swab from Nasopharynx Updated: 08/15/21 2033     COVID19 Not Detected     Influenza A PCR Not Detected     Influenza B PCR Not Detected    Narrative:      Fact sheet for providers: https://www.fda.gov/media/883168/download    Fact sheet for patients: https://www.fda.gov/media/622190/download    Test performed by PCR.          No radiology results from the last 24 hrs    Results for orders placed during the hospital encounter of 08/15/21    Adult Transthoracic Echo Complete W/ Cont if Necessary Per Protocol (With Agitated  Saline)    Interpretation Summary  · Very technically hard study due to patient very tender per technician.  · LVEF 60%  · Estimated right ventricular systolic pressure from tricuspid regurgitation is normal (<35 mmHg).  · Mild mitral valve regurgitation is present.  · Mild to moderate aortic valve regurgitation is present.  · AV gradients off axis due to technical limitations.      I have reviewed the medications:  Scheduled Meds:acetaminophen, 500 mg, Oral, 4x Daily  amLODIPine, 2.5 mg, Oral, Daily  aspirin, 81 mg, Oral, Daily   Or  aspirin, 300 mg, Rectal, Daily  atorvastatin, 80 mg, Oral, Nightly  carbidopa-levodopa, 1 tablet, Oral, 4x Daily  cyanocobalamin, 1,000 mcg, Intramuscular, Daily  Diclofenac Sodium, 4 g, Topical, 4x Daily  donepezil, 10 mg, Oral, BID  escitalopram, 20 mg, Oral, Daily  ferrous sulfate, 325 mg, Oral, Daily With Breakfast  gabapentin, 100 mg, Oral, Nightly  haloperidol lactate, 0.5 mg, Intravenous, Once  HYDROcodone-acetaminophen, 0.5 tablet, Oral, TID  insulin lispro, 0-7 Units, Subcutaneous, TID AC  levothyroxine, 125 mcg, Oral, Q AM  lidocaine, 1 patch, Transdermal, Q24H  memantine, 10 mg, Oral, BID  oxybutynin XL, 5 mg, Oral, Daily  QUEtiapine, 50 mg, Oral, Nightly  senna-docusate sodium, 2 tablet, Oral, Nightly  sodium chloride, 10 mL, Intravenous, Q12H      Continuous Infusions:   PRN Meds:.•  acetaminophen **OR** acetaminophen **OR** acetaminophen  •  dextrose  •  dextrose  •  glucagon (human recombinant)  •  HYDROcodone-acetaminophen  •  ondansetron **OR** ondansetron  •  sodium chloride  •  sodium chloride    Assessment/Plan   Assessment & Plan     Active Hospital Problems    Diagnosis  POA   • **Multiple falls [R29.6]  Not Applicable   • Generalized weakness [R53.1]  Yes   • FTT (failure to thrive) in adult [R62.7]  Yes   • Anemia [D64.9]  Yes   • CKD (chronic kidney disease) stage 3, GFR 30-59 ml/min (CMS/HCC) [N18.30]  Yes   • Lewy body dementia with behavioral disturbance  (CMS/[facility]) [G31.83, F02.81]  Yes   • Type 2 diabetes mellitus (CMS/[facility]) [E11.9]  Yes   • Hypothyroidism, adult [E03.9]  Yes   • Essential hypertension [I10]  Yes   • Mixed hyperlipidemia [E78.2]  Yes      Resolved Hospital Problems   No resolved problems to display.        Brief Hospital Course to date:  Faye Rod is a 75 y.o. female  with a history of hyperlipidemia, hypertension, T2DM, hypothyroidism, Lewy body dementia, CKD, falls, SSS s/p PPM, presents to the ED with complaints of weakness and falls.          Adult FTT  Generalized weakness  Rib fractures  -approaching end stage Lewy body dementia  -PT/OT/placement  -ECHO reviewed, normal EF  -awaiting placement     Lewy body dementia  -continue sinement, aricept, namenda, and seroquel as ordered  -Dr. Gurrola evaluated, felt to be end stage dementia  -awaiting placement. Case management is following.     L wrist pain  -no fracture on xray  -no complaints of pain today  -she does have some bruising of the left forearm-appears to be healing     Dysphagia  --SLP has seen, on soft/ground diet now      T2DM  --SSI, stable     HTN  HLD  --continue norvasc with hold parameters  --continue statin  --continue aspirin    Hypothyroidism  --tsh 0.830  --continue synthroid     CKD  --baseline creatinine 1.28-1.63  --creatinine stable/monitoring     Anemia  --no overt signs of bleeding  --on iron supplement, B12 injections for low B12    DVT prophylaxis:  Mechanical DVT prophylaxis orders are present.       AM-PAC 6 Clicks Score (PT): 9 (08/20/21 0850)    Disposition: I expect the patient to be discharged once placement obtained.      CODE STATUS:   Code Status and Medical Interventions:   Ordered at: 08/15/21 2051     Limited Support to NOT Include:    Intubation    Cardioversion/Defibrillation    Dialysis     Level Of Support Discussed With:    Next of Kin (If No Surrogate)     Code Status:    No CPR     Medical Interventions (Level of Support Prior to Arrest):     Zack Fischer, APRN  08/21/21

## 2021-08-21 NOTE — PLAN OF CARE
Goal Outcome Evaluation:  Plan of Care Reviewed With: patient        Progress: no change  Outcome Summary: Pt required max A x 2 for sup > sit and bed > chair transfer with BUE suppot. Requires increased assist for weight shifting and LE advancement, unable to pivot this date. Edu to daughter on assisting pt with ROM HEP as pt is very stiff in LE joints.

## 2021-08-22 NOTE — PROGRESS NOTES
[facility] Medicine Services  PROGRESS NOTE    Patient Name: Faye Rod  : 1945  MRN: [id number]    Date of Admission: 8/15/2021  Primary Care Physician: Sarai Hawley PA-C    Subjective   Subjective     CC:  Multiple falls, dementia    HPI:  In bed. No complaints. No issues. Just sleepy.    Review of Systems:     Difficult to obtain due to dementia.    Objective   Objective     Vital Signs:   Temp:  [96.4 °F (35.8 °C)-98.5 °F (36.9 °C)] 96.6 °F (35.9 °C)  Heart Rate:  [62-72] 64  Resp:  [16-18] 18  BP: ()/(54-70) 127/59     Physical Exam:  NAD  RRR  No tachypnea/distress  Abd soft  Normal affect, just tired      Results Reviewed:  LAB RESULTS:      Lab 21  0303 08/15/21  1705   WBC 5.66 6.69   HEMOGLOBIN 10.0* 10.4*   HEMATOCRIT 31.0* 33.1*   PLATELETS 142 146   NEUTROS ABS 3.52 4.35   IMMATURE GRANS (ABS) 0.02 0.02   LYMPHS ABS 1.50 1.66   MONOS ABS 0.57 0.63   EOS ABS 0.04 0.01   MCV 97.2* 100.0*         Lab 21  0303 08/15/21  1705   SODIUM 135* 139   POTASSIUM 4.2 4.5   CHLORIDE 101 102   CO2 23.0 28.0   ANION GAP 11.0 9.0   BUN 22 25*   CREATININE 1.19* 1.18*   GLUCOSE 104* 103*   CALCIUM 9.1 9.2   MAGNESIUM 1.8 2.0   HEMOGLOBIN A1C 6.40*  --    TSH  --  0.830         Lab 08/15/21  1705   TOTAL PROTEIN 6.5   ALBUMIN 4.10   GLOBULIN 2.4   ALT (SGPT) 10   AST (SGOT) 20   BILIRUBIN 0.4   ALK PHOS 69         Lab 08/15/21  1705   TROPONIN T 0.022         Lab 21  0303   CHOLESTEROL 118   LDL CHOL 51   HDL CHOL 48   TRIGLYCERIDES 104         Lab 08/15/21  1705   IRON 27*   IRON SATURATION 7*   TIBC 374   TRANSFERRIN 251   FERRITIN 26.98   FOLATE 19.20   VITAMIN B 12 153*         Brief Urine Lab Results  (Last result in the past 365 days)      Color   Clarity   Blood   Leuk Est   Nitrite   Protein   CREAT   Urine HCG        08/15/21 1705 Yellow Clear Negative Negative Negative Trace               Microbiology Results Abnormal     Procedure  Component Value - Date/Time    COVID PRE-OP / PRE-PROCEDURE SCREENING ORDER (NO ISOLATION) - Swab, Nasopharynx [[id number]]  (Normal) Collected: 08/15/21 1954    Lab Status: Final result Specimen: Swab from Nasopharynx Updated: 08/15/21 2033    Narrative:      The following orders were created for panel order COVID PRE-OP / PRE-PROCEDURE SCREENING ORDER (NO ISOLATION) - Swab, Nasopharynx.  Procedure                               Abnormality         Status                     ---------                               -----------         ------                     COVID-19 and FLU A/B PCR...[[id number]]  Normal              Final result                 Please view results for these tests on the individual orders.    COVID-19 and FLU A/B PCR - Swab, Nasopharynx [[id number]]  (Normal) Collected: 08/15/21 1954    Lab Status: Final result Specimen: Swab from Nasopharynx Updated: 08/15/21 2033     COVID19 Not Detected     Influenza A PCR Not Detected     Influenza B PCR Not Detected    Narrative:      Fact sheet for providers: https://www.fda.gov/media/883523/download    Fact sheet for patients: https://www.fda.gov/media/621302/download    Test performed by PCR.          No radiology results from the last 24 hrs    Results for orders placed during the hospital encounter of 08/15/21    Adult Transthoracic Echo Complete W/ Cont if Necessary Per Protocol (With Agitated Saline)    Interpretation Summary  · Very technically hard study due to patient very tender per technician.  · LVEF 60%  · Estimated right ventricular systolic pressure from tricuspid regurgitation is normal (<35 mmHg).  · Mild mitral valve regurgitation is present.  · Mild to moderate aortic valve regurgitation is present.  · AV gradients off axis due to technical limitations.      I have reviewed the medications:  Scheduled Meds:acetaminophen, 500 mg, Oral, 4x Daily  amLODIPine, 2.5 mg, Oral, Daily  aspirin, 81 mg, Oral, Daily   Or  aspirin, 300 mg, Rectal,  Daily  atorvastatin, 80 mg, Oral, Nightly  carbidopa-levodopa, 1 tablet, Oral, 4x Daily  Diclofenac Sodium, 4 g, Topical, 4x Daily  donepezil, 10 mg, Oral, BID  escitalopram, 20 mg, Oral, Daily  ferrous sulfate, 325 mg, Oral, Daily With Breakfast  gabapentin, 100 mg, Oral, Nightly  haloperidol lactate, 0.5 mg, Intravenous, Once  HYDROcodone-acetaminophen, 0.5 tablet, Oral, TID  insulin lispro, 0-7 Units, Subcutaneous, TID AC  levothyroxine, 125 mcg, Oral, Q AM  lidocaine, 1 patch, Transdermal, Q24H  memantine, 10 mg, Oral, BID  oxybutynin XL, 5 mg, Oral, Daily  QUEtiapine, 50 mg, Oral, Nightly  senna-docusate sodium, 2 tablet, Oral, Nightly  sodium chloride, 10 mL, Intravenous, Q12H      Continuous Infusions:   PRN Meds:.•  acetaminophen **OR** acetaminophen **OR** acetaminophen  •  dextrose  •  dextrose  •  glucagon (human recombinant)  •  HYDROcodone-acetaminophen  •  ondansetron **OR** ondansetron  •  sodium chloride  •  sodium chloride    Assessment/Plan   Assessment & Plan     Active Hospital Problems    Diagnosis  POA   • **Multiple falls [R29.6]  Not Applicable   • Generalized weakness [R53.1]  Yes   • FTT (failure to thrive) in adult [R62.7]  Yes   • Anemia [D64.9]  Yes   • CKD (chronic kidney disease) stage 3, GFR 30-59 ml/min (CMS/[facility]) [N18.30]  Yes   • Lewy body dementia with behavioral disturbance (CMS/[facility]) [G31.83, F02.81]  Yes   • Type 2 diabetes mellitus (CMS/[facility]) [E11.9]  Yes   • Hypothyroidism, adult [E03.9]  Yes   • Essential hypertension [I10]  Yes   • Mixed hyperlipidemia [E78.2]  Yes      Resolved Hospital Problems   No resolved problems to display.        Brief Hospital Course to date:  Faye Rod is a 75 y.o. female  with a history of hyperlipidemia, hypertension, T2DM, hypothyroidism, Lewy body dementia, CKD, falls, SSS s/p PPM, presents to the ED with complaints of weakness and falls.          Adult FTT  Generalized weakness  Rib fractures  -approaching end stage Lewy body  dementia, per neurology  -PT/OT/placement  -ECHO reviewed, normal EF  -awaiting placement     Lewy body dementia  -continue sinement, aricept, namenda, and seroquel as ordered  -Dr. Gurrola evaluated, felt to be end stage dementia  -awaiting placement. Case management is following.     L wrist pain  -no fracture on xray  -no complaints of pain today  -she does have some bruising of the left forearm, that seems to be healing     Dysphagia  -SLP has seen, on soft/ground diet now      T2DM  -SSI, stable     HTN  HLD  -continue norvasc with hold parameters  -continue statin  -continue aspirin    Hypothyroidism  -tsh 0.830  -continue synthroid     CKD  -baseline creatinine 1.28-1.63  -creatinine stable/monitoring     Anemia  -no overt signs of bleeding  -on iron supplement, B12 injections for low B12    DVT prophylaxis:  Mechanical DVT prophylaxis orders are present.       AM-PAC 6 Clicks Score (PT): 10 (08/21/21 1540)    Disposition: I expect the patient to be discharged once placement obtained.      CODE STATUS:   Code Status and Medical Interventions:   Ordered at: 08/15/21 2051     Limited Support to NOT Include:    Intubation    Cardioversion/Defibrillation    Dialysis     Level Of Support Discussed With:    Next of Kin (If No Surrogate)     Code Status:    No CPR     Medical Interventions (Level of Support Prior to Arrest):    Limited       Marco A Huff MD  08/22/21

## 2021-08-22 NOTE — PLAN OF CARE
Goal Outcome Evaluation:  Plan of Care Reviewed With: spouse        Progress: no change  Outcome Summary: VSS. Patient is only alerted to self. Slept well throughout the night between nursing care. Has been turned and had barrier cream applied Q2. She is accepting and pleasant to all nursing care. All questions and concerns answered.

## 2021-08-23 NOTE — PROGRESS NOTES
[facility] Medicine Services  PROGRESS NOTE    Patient Name: Faye Rod  : 1945  MRN: [id number]    Date of Admission: 8/15/2021  Primary Care Physician: Sarai Hawley PA-C    Subjective     CC: f/u FTT     HPI:  In bed. She complained of back pain and feeling cold. No concerns from nursing staff. Awaiting placement.     ROS:  Unable to obtain complete or reliable ROS due to mental status    Objective     Vital Signs:   Temp:  [97.6 °F (36.4 °C)-98.4 °F (36.9 °C)] 97.6 °F (36.4 °C)  Heart Rate:  [60-77] 66  Resp:  [14-20] 20  BP: (111-148)/(55-72) 137/64     Physical Exam:  Constitutional: No acute distress, awake, alert and conversant. In bed.   HENT: NCAT, mucous membranes moist  Respiratory: Clear to auscultation bilaterally, respiratory effort normal   Cardiovascular: RRR, no murmurs, rubs, or gallops  Gastrointestinal: Positive bowel sounds, soft, nontender, nondistended  Musculoskeletal: No bilateral ankle edema  Psychiatric: Appropriate affect, cooperative  Neurologic: Pleasantly confused. Laying in bed. Moves all extremities spontaneously with no focal deficits.     Results Reviewed:  LAB RESULTS:                                 Brief Urine Lab Results  (Last result in the past 365 days)      Color   Clarity   Blood   Leuk Est   Nitrite   Protein   CREAT   Urine HCG        08/15/21 1705 Yellow Clear Negative Negative Negative Trace             Microbiology Results Abnormal     Procedure Component Value - Date/Time    COVID PRE-OP / PRE-PROCEDURE SCREENING ORDER (NO ISOLATION) - Swab, Nasopharynx [[id number]]  (Normal) Collected: 08/15/21 1954    Lab Status: Final result Specimen: Swab from Nasopharynx Updated: 08/15/21 2033    Narrative:      The following orders were created for panel order COVID PRE-OP / PRE-PROCEDURE SCREENING ORDER (NO ISOLATION) - Swab, Nasopharynx.  Procedure                               Abnormality         Status                      ---------                               -----------         ------                     COVID-19 and FLU A/B PCR...[[id number]]  Normal              Final result                 Please view results for these tests on the individual orders.    COVID-19 and FLU A/B PCR - Swab, Nasopharynx [[id number]]  (Normal) Collected: 08/15/21 1954    Lab Status: Final result Specimen: Swab from Nasopharynx Updated: 08/15/21 2033     COVID19 Not Detected     Influenza A PCR Not Detected     Influenza B PCR Not Detected    Narrative:      Fact sheet for providers: https://www.fda.gov/media/734954/download    Fact sheet for patients: https://www.fda.gov/media/678587/download    Test performed by PCR.        No radiology results from the last 24 hrs    Results for orders placed during the hospital encounter of 08/15/21    Adult Transthoracic Echo Complete W/ Cont if Necessary Per Protocol (With Agitated Saline)    Interpretation Summary  · Very technically hard study due to patient very tender per technician.  · LVEF 60%  · Estimated right ventricular systolic pressure from tricuspid regurgitation is normal (<35 mmHg).  · Mild mitral valve regurgitation is present.  · Mild to moderate aortic valve regurgitation is present.  · AV gradients off axis due to technical limitations.    I have reviewed the medications:  Scheduled Meds:acetaminophen, 500 mg, Oral, 4x Daily  amLODIPine, 2.5 mg, Oral, Daily  aspirin, 81 mg, Oral, Daily  atorvastatin, 80 mg, Oral, Nightly  carbidopa-levodopa, 1 tablet, Oral, 4x Daily  Diclofenac Sodium, 4 g, Topical, 4x Daily  donepezil, 10 mg, Oral, BID  escitalopram, 20 mg, Oral, Daily  ferrous sulfate, 325 mg, Oral, Daily With Breakfast  gabapentin, 100 mg, Oral, Nightly  levothyroxine, 125 mcg, Oral, Q AM  lidocaine, 1 patch, Transdermal, Q24H  memantine, 10 mg, Oral, BID  oxybutynin XL, 5 mg, Oral, Daily  QUEtiapine, 50 mg, Oral, Nightly  senna-docusate sodium, 2 tablet, Oral, Nightly  sodium chloride, 10  mL, Intravenous, Q12H      Continuous Infusions:   PRN Meds:.dextrose  •  dextrose  •  glucagon (human recombinant)  •  ondansetron **OR** [DISCONTINUED] ondansetron  •  sodium chloride  •  sodium chloride  •  traMADol    Assessment & Plan     Active Hospital Problems    Diagnosis  POA   • **Multiple falls [R29.6]  Not Applicable   • Generalized weakness [R53.1]  Yes   • FTT (failure to thrive) in adult [R62.7]  Yes   • Anemia [D64.9]  Yes   • CKD (chronic kidney disease) stage 3, GFR 30-59 ml/min (CMS/[facility]) [N18.30]  Yes   • Lewy body dementia with behavioral disturbance (CMS/[facility]) [G31.83, F02.81]  Yes   • Type 2 diabetes mellitus (CMS/[facility]) [E11.9]  Yes   • Hypothyroidism, adult [E03.9]  Yes   • Essential hypertension [I10]  Yes   • Mixed hyperlipidemia [E78.2]  Yes      Resolved Hospital Problems   No resolved problems to display.     Ms. Faye Rod is a 75yoF with PMH significant for HTN, HLD, SSS s/p PPM, DMII, CKD III, chronic anemia, hypothyroidism and Lewy body dementia. She was admitted to [facility] 8/15/2021 for evaluation of frequent falls and weakness. She is now awaiting placement.     Lewy body dementia  Adult FTT  Generalized weakness  Rib fractures  - Workup grossly unremarkable aside from multiple healed posterior R rib fractures and a subacute R posterior 10th rib fracture   - PT/OT following - planning for rehab placement (if skillable) with eventual transition to long-term care  - Neurology has evaluated and feels that patient is approaching end-stage Lewy body dementia   - Continue Sinemet, Aricept, Namenda and Seroquel     L wrist pain  - XR negative      Dysphagia  - SLP has seen, on soft/ground diet now      T2DM  - Requiring minimal SSI. Stop accuchecks and SSI    CKDIII  - Baseline 1.3-1.6  - Creatinine appears to be at baseline  - Periodic monitoring     HTN, cood BP control. Continue Amlodipine with hold parameters  HLD, continue statin  Hypothyroidism, TSH appropriate. Continue Synthroid    Anemia, continue PO iron supplement. B12 injections x 7 days, start PO supplement    DVT prophylaxis:Mechanical DVT prophylaxis orders are present.     AM-PAC 6 Clicks Score (PT): 11 (08/23/21 1100)    Disposition: I expect the patient to be discharged to SNF when all arranged     Will DC telemetry monitoring. OK to move off tele floor to non-tele floor if there is a bed available for her to move to     CODE STATUS:   Code Status and Medical Interventions:   Ordered at: 08/15/21 2051     Limited Support to NOT Include:    Intubation    Cardioversion/Defibrillation    Dialysis     Level Of Support Discussed With:    Next of Kin (If No Surrogate)     Code Status:    No CPR     Medical Interventions (Level of Support Prior to Arrest):    Limited     Alejandrina Becerra PA-C  08/23/21

## 2021-08-23 NOTE — PLAN OF CARE
Goal Outcome Evaluation:  Plan of Care Reviewed With: patient        Progress: improving  Outcome Summary: Pt. performed bed mobility with max assist of 2. She performed sit to stand transfer with mod assist of 2 and took several steps to head of bed w/rolling walker, max assist of 2. Good participation in ther-ex. Will continue to progress as tolerated. Recommend SNF upon discharge.

## 2021-08-23 NOTE — CASE MANAGEMENT/SOCIAL WORK
Continued Stay Note  [facility]     Patient Name: Faye Rod  MRN: [id number]  Today's Date: 8/23/2021    Admit Date: 8/15/2021    Discharge Plan     Row Name 08/23/21 1634       Plan    Plan  SNF    Plan Comments  case mgt f/u. still working on skilled placement with transition to long term care. we have expanded search to surrounding counties. Case mgt will con't to follow        Discharge Codes    No documentation.             Sonja C Kellerman, RN

## 2021-08-23 NOTE — THERAPY TREATMENT NOTE
Patient Name: Faye Rod  : 1945    MRN: [id number]                              Today's Date: 2021       Admit Date: 8/15/2021    Visit Dx:     ICD-10-CM ICD-9-CM   1. Multiple falls  R29.6 V15.88   2. Generalized weakness  R53.1 780.79   3. Closed fracture of multiple ribs of right side, initial encounter  S22.41XA 807.09   4. Dementia without behavioral disturbance, unspecified dementia type (CMS/HCC)  F03.90 294.20   5. Chronic renal impairment, unspecified CKD stage  N18.9 585.9   6. Failure to thrive in adult  R62.7 783.7   7. Dysphagia, unspecified type  R13.10 787.20     Patient Active Problem List   Diagnosis   • Mixed hyperlipidemia   • Essential hypertension   • Type 2 diabetes mellitus (CMS/HCC)   • Primary osteoarthritis involving multiple joints   • Non-rheumatic mitral regurgitation   • Hypothyroidism, adult   • Lewy body dementia with behavioral disturbance (CMS/[facility])   • Mild nonproliferative diabetic retinopathy of both eyes without macular edema associated with type 2 diabetes mellitus (CMS/[facility])   • Morbidly obese (CMS/[facility])   • Sick sinus syndrome (CMS/HCC)   • CKD (chronic kidney disease) stage 3, GFR 30-59 ml/min (CMS/[facility])   • Overactive bladder   • Osteoporosis   • Multiple falls   • Generalized weakness   • FTT (failure to thrive) in adult   • Anemia     Past Medical History:   Diagnosis Date   • Anemia    • Arthritis    • Colon polyp    • Diabetes mellitus (CMS/[facility])    • Disease of thyroid gland    • Heart disease    • Heart valve disease    • Hyperlipidemia    • Hypertension    • Hypothyroidism    • Memory loss    • Pacemaker    • Shortness of breath 2/3/2017   • Sick sinus syndrome (CMS/HCC)      Past Surgical History:   Procedure Laterality Date   • CATARACT EXTRACTION Bilateral    • CHOLECYSTECTOMY     • PACEMAKER IMPLANTATION       General Information     Row Name 21 1049          Physical Therapy Time and Intention    Document Type  therapy note (daily  note)  -     Mode of Treatment  physical therapy  -     Row Name 08/23/21 1049          General Information    Patient Profile Reviewed  yes  -     Existing Precautions/Restrictions  fall;other (see comments) confusion, dementia  -     Barriers to Rehab  medically complex;previous functional deficit;cognitive status  -     Row Name 08/23/21 1049          Cognition    Orientation Status (Cognition)  oriented to;person;disoriented to;place;situation;time  -     Row Name 08/23/21 1049          Safety Issues, Functional Mobility    Safety Issues Affecting Function (Mobility)  ability to follow commands;awareness of need for assistance;insight into deficits/self-awareness;judgment;positioning of assistive device;problem-solving;safety precaution awareness;safety precautions follow-through/compliance;sequencing abilities  -     Impairments Affecting Function (Mobility)  cognition;balance;range of motion (ROM);strength;endurance/activity tolerance;postural/trunk control  -     Cognitive Impairments, Mobility Safety/Performance  awareness, need for assistance;insight into deficits/self-awareness;judgment;problem-solving/reasoning;safety precaution awareness;safety precaution follow-through;sequencing abilities  -     Comment, Safety Issues/Impairments (Mobility)  improved participation with physical/tactile cueing compared to verbal cueing  -       User Key  (r) = Recorded By, (t) = Taken By, (c) = Cosigned By    Initials Name Provider Type     Pat Lucas PT Physical Therapist        Mobility     Row Name 08/23/21 1052          Bed Mobility    Bed Mobility  supine-sit;scooting/bridging;sit-supine  -     Scooting/Bridging [place] (Bed Mobility)  maximum assist (25% patient effort);2 person assist;verbal cues;nonverbal cues (demo/gesture)  -     Supine-Sit [place] (Bed Mobility)  maximum assist (25% patient effort);2 person assist;verbal cues;nonverbal cues (demo/gesture)  -      Sit-Supine [place] (Bed Mobility)  verbal cues;nonverbal cues (demo/gesture);maximum assist (25% patient effort);2 person assist  -     Assistive Device (Bed Mobility)  draw sheet;bed rails  -     Comment (Bed Mobility)  Cueing for hand placement, sequence of LE advancement, trunk control  -     Row Name 08/23/21 1052          Transfers    Comment (Transfers)  VC for hand placement, upright posture  -     Row Name 08/23/21 1052          Sit-Stand Transfer    Sit-Stand [place] (Transfers)  2 person assist;verbal cues;nonverbal cues (demo/gesture);moderate assist (50% patient effort)  -     Assistive Device (Sit-Stand Transfers)  walker, front-wheeled  -     Row Name 08/23/21 1052          Gait/Stairs (Locomotion)    [place] Level (Gait)  2 person assist;maximum assist (25% patient effort);verbal cues;nonverbal cues (demo/gesture)  -     Assistive Device (Gait)  walker, front-wheeled  -     Distance in Feet (Gait)  2' lateral to head of bed  -     Deviations/Abnormal Patterns (Gait)  base of support, narrow;michaela decreased;gait speed decreased;stride length decreased;weight shifting decreased;festinating/shuffling  -     Bilateral Gait Deviations  forward flexed posture;heel strike decreased  -     Comment (Gait/Stairs)  Pt. ambulated to head of bed w/RW. Cueing for weight shifting, LE advancement, upright posture. Pt unstable/shaky. Gait limited by fatigue, instability.  -       User Key  (r) = Recorded By, (t) = Taken By, (c) = Cosigned By    Initials Name Provider Type     Pat Lucas, PT Physical Therapist        Obj/Interventions     Row Name 08/23/21 1055          Motor Skills    Therapeutic Exercise  hip;knee;ankle  -     Row Name 08/23/21 1055          Hip (Therapeutic Exercise)    Hip (Therapeutic Exercise)  AAROM (active assistive range of motion);strengthening exercise  -     Hip AAROM (Therapeutic Exercise)   bilateral;flexion;extension;aBduction;aDduction;10 repetitions  -     Hip Strengthening (Therapeutic Exercise)  bilateral;marching while seated;10 repetitions min assist required due to fatigue  -     Row Name 08/23/21 1055          Knee (Therapeutic Exercise)    Knee (Therapeutic Exercise)  AAROM (active assistive range of motion);strengthening exercise  -     Knee AAROM (Therapeutic Exercise)  bilateral;flexion;extension;10 repetitions  -     Knee Strengthening (Therapeutic Exercise)  bilateral;LAQ (long arc quad);10 repetitions  -[facility] Name 08/23/21 1055          Ankle (Therapeutic Exercise)    Ankle (Therapeutic Exercise)  AAROM (active assistive range of motion)  -     Ankle AAROM (Therapeutic Exercise)  bilateral;dorsiflexion;plantarflexion;10 repetitions  -[facility] Name 08/23/21 1055          Balance    Balance Assessment  sitting static balance;sitting dynamic balance;standing static balance;standing dynamic balance  -     Static Sitting Balance  mild impairment;sitting, edge of bed;supported  -     Dynamic Sitting Balance  mild impairment;sitting, edge of bed;supported  -     Static Standing Balance  moderate impairment;supported  -     Dynamic Standing Balance  moderate impairment;supported  -     Balance Interventions  sitting;standing;sit to stand;supported;static;dynamic  -       User Key  (r) = Recorded By, (t) = Taken By, (c) = Cosigned By    Initials Name Provider Type     Pat Lucas PT Physical Therapist        Goals/Plan    No documentation.       Clinical Impression     Row Name 08/23/21 1057          Pain    Additional Documentation  Pain Scale: FACES Pre/Post-Treatment (Group)  -SS     Row Name 08/23/21 1057          Pain Scale: FACES Pre/Post-Treatment    Pain: FACES Scale, Pretreatment  0-->no hurt  -     Posttreatment Pain Rating  0-->no hurt  -SS     Row Name 08/23/21 1057          Plan of Care Review    Plan of Care Reviewed With  patient  -      Progress  improving  -     Outcome Summary  Pt. performed bed mobility with max assist of 2. She performed sit to stand transfer with mod assist of 2 and took several steps to head of bed w/rolling walker, max assist of 2. Good participation in ther-ex. Will continue to progress as tolerated. Recommend SNF upon discharge.  -     Row Name 08/23/21 1057          Therapy Assessment/Plan (PT)    Rehab Potential (PT)  fair, will monitor progress closely  -     Criteria for Skilled Interventions Met (PT)  yes;skilled treatment is necessary;meets criteria  -     Row Name 08/23/21 1057          Positioning and Restraints    Pre-Treatment Position  in bed  -SS     Post Treatment Position  bed  -SS     In Bed  notified nsg;supine;encouraged to call for assist;exit alarm on;waffle boots/both  -       User Key  (r) = Recorded By, (t) = Taken By, (c) = Cosigned By    Initials Name Provider Type    Pat Hardwick, PT Physical Therapist        Outcome Measures     Row Name 08/23/21 1100 08/23/21 0800       How much help from another person do you currently need...    Turning from your back to your side while in flat bed without using bedrails?  2  -SS  2  -AC    Moving from lying on back to sitting on the side of a flat bed without bedrails?  2  -SS  2  -AC    Moving to and from a bed to a chair (including a wheelchair)?  2  -SS  1  -AC    Standing up from a chair using your arms (e.g., wheelchair, bedside chair)?  2  -SS  1  -AC    Climbing 3-5 steps with a railing?  1  -SS  1  -AC    To walk in hospital room?  2  -SS  1  -AC    AM-PAC 6 Clicks Score (PT)  11  -SS  8  -AC    Row Name 08/23/21 1100          Functional Assessment    Outcome Measure Options  AM-PAC 6 Clicks Basic Mobility (PT)  -       User Key  (r) = Recorded By, (t) = Taken By, (c) = Cosigned By    Initials Name Provider Type    Ira Hanna, RN Registered Nurse    Pat Hardwick, PT Physical Therapist                       Physical Therapy  Education                 Title: PT OT SLP Therapies (In Progress)     Topic: Physical Therapy (In Progress)     Point: Mobility training (In Progress)     Learning Progress Summary           Patient Acceptance, E, NL,NR by  at 8/23/2021 1100    Comment: Reviewed safety/technique with bed mobility, transfers, ambulation, home exercise program    Acceptance, E,TB, NR by  at 8/21/2021 1540    Comment: edu on HEP    Acceptance, E,TB, NR by  at 8/16/2021 1007    Comment: Edu on PT services, POC, d/c planning, safety with mobility   Family Acceptance, E,TB, NR by  at 8/21/2021 1540    Comment: edu on HEP                   Point: Home exercise program (In Progress)     Learning Progress Summary           Patient Acceptance, E, NL,NR by  at 8/23/2021 1100    Comment: Reviewed safety/technique with bed mobility, transfers, ambulation, home exercise program    Acceptance, E,TB, NR by  at 8/21/2021 1540    Comment: edu on HEP    Acceptance, E,TB, NR by  at 8/16/2021 1007    Comment: Edu on PT services, POC, d/c planning, safety with mobility   Family Acceptance, E,TB, NR by  at 8/21/2021 1540    Comment: edu on HEP                   Point: Body mechanics (In Progress)     Learning Progress Summary           Patient Acceptance, E, NL,NR by  at 8/23/2021 1100    Comment: Reviewed safety/technique with bed mobility, transfers, ambulation, home exercise program    Acceptance, E,TB, NR by  at 8/21/2021 1540    Comment: edu on HEP    Acceptance, E,TB, NR by  at 8/16/2021 1007    Comment: Edu on PT services, POC, d/c planning, safety with mobility   Family Acceptance, E,TB, NR by  at 8/21/2021 1540    Comment: edu on HEP                   Point: Precautions (In Progress)     Learning Progress Summary           Patient Acceptance, E, NL,NR by  at 8/23/2021 1100    Comment: Reviewed safety/technique with bed mobility, transfers, ambulation, home exercise program    Acceptance, E,TB, NR by  at 8/21/2021 1540     Comment: edu on HEP    Acceptance, E,TB, NR by  at 8/16/2021 1007    Comment: Edu on PT services, POC, d/c planning, safety with mobility   Family Acceptance, E,TB, NR by  at 8/21/2021 1540    Comment: edu on HEP                               User Key     Initials Effective Dates Name Provider Type [facility] 09/22/20 -  Chapito Mckeon, PT Physical Therapist PT     06/01/21 -  Pat Lucas PT Physical Therapist PT              PT Recommendation and Plan     Plan of Care Reviewed With: patient  Progress: improving  Outcome Summary: Pt. performed bed mobility with max assist of 2. She performed sit to stand transfer with mod assist of 2 and took several steps to head of bed w/rolling walker, max assist of 2. Good participation in ther-ex. Will continue to progress as tolerated. Recommend SNF upon discharge.     Time Calculation:   PT Charges     Row Name 08/23/21 1101             Time Calculation    Start Time  1030  -SS      Stop Time  1045  -SS      Time Calculation (min)  15 min  -SS      PT Received On  08/23/21  -         Time Calculation- PT    Total Timed Code Minutes- PT  15 minute(s)  -SS         Timed Charges    32722 - PT Therapeutic Exercise Minutes  5  -SS      15407 - PT Therapeutic Activity Minutes  10  -SS         Total Minutes    Timed Charges Total Minutes  15  -SS       Total Minutes  15  -SS        User Key  (r) = Recorded By, (t) = Taken By, (c) = Cosigned By    Initials Name Provider Type     Pat Lucas, JOSE Physical Therapist        Therapy Charges for Today     Code Description Service Date Service Provider Modifiers Qty    08409613606 HC PT THERAPEUTIC ACT EA 15 MIN 8/23/2021 Pat Lucas, PT GP 1    34517086659 HC PT THER SUPP EA 15 MIN 8/23/2021 Pat Lucas PT GP 1          PT G-Codes  Outcome Measure Options: AM-PAC 6 Clicks Basic Mobility (PT)  AM-PAC 6 Clicks Score (PT): 11  AM-PAC 6 Clicks Score (OT): 10  Modified [place]ow Scale: 4 - Moderately severe  disability.  Unable to walk without assistance, and unable to attend to own bodily needs without assistance.    Pat Lucas, PT  8/23/2021

## 2021-08-23 NOTE — NURSING NOTE
WOCN follow up for: bilateral scabs/venous congestion    Assessment and Care provided: scabs have resolved and are not present at this time.  Her bottom is light pink, intact and blanchable.     JO mattress settings found to be not turned on during assessment.  Settings turned on and verified. Heel boots placed on patient.     Recommendation(s):    Continue with current plan of care. See WOCN orders.  *Continue to maintain good skin care, keep dry, turn regularly, keep heels elevated and offloaded with heel boots.    *Apply z-guard to bottom and bony prominences daily and as needed with incontinence episodes.  *Follow C.A.R.E protocol if medical devices (Bipap, slade, Ng tube, etc) are being used.     All skin interventions are in place. Heels and bottom are blanchable, intact and offloaded. Discussed with RN.    WOCN will sign off.  Please contact with questions or if needs arise.

## 2021-08-23 NOTE — PLAN OF CARE
Problem: Palliative Care  Goal: Enhanced Quality of Life  Intervention: Optimize Psychosocial Wellbeing  Recent Flowsheet Documentation  Taken 8/23/2021 1742 by Rosa Cheng, MSW  Supportive Measures: (symptom assessment)   active listening utilized   positive reinforcement provided   verbalization of feelings encouraged   other (see comments)  Family/Support System Care: support provided  Visit with patient and dtr Shila at bedside - patient awake and engaging, ate most of dinner, reports mild-moderate pain, tired, acknowledges some mild dyspnea, and is anxious (dtr also notes more fidgeting).  Dtr would like to touch base with  - she will be at hospital Tuesday 8/24 at lunch to feed her mother and then after work.  Patient and dtr receptive to supportive/empathic presence, active listening, symptom assessment.    Patient awaiting SNF placement.  1300 Palliative IDT-Palliative team members present:  HERNANDO Lua DO; ALYCIA Tapia APRN, DNP; ROSELYN Contreras RN, CHPN; [facility]; NELI Olsen RN, OCN, HAILY; LINDSAY Hauser MDiv, [facility]

## 2021-08-23 NOTE — PLAN OF CARE
Goal Outcome Evaluation:           Progress: no change  Outcome Summary: Pt oriented to self only.  Speech illogical at times, follows commands but is unable to track finger.  Incontinent of urine, purewick in place.  Q 2 hr turn.  Does not leave heel boots on (wiggles them off).  Awaiting NH placement.  VSS

## 2021-08-24 NOTE — THERAPY TREATMENT NOTE
Patient Name: Faye Rod  : 1945    MRN: [id number]                              Today's Date: 2021       Admit Date: 8/15/2021    Visit Dx:     ICD-10-CM ICD-9-CM   1. Multiple falls  R29.6 V15.88   2. Generalized weakness  R53.1 780.79   3. Closed fracture of multiple ribs of right side, initial encounter  S22.41XA 807.09   4. Dementia without behavioral disturbance, unspecified dementia type (CMS/HCC)  F03.90 294.20   5. Chronic renal impairment, unspecified CKD stage  N18.9 585.9   6. Failure to thrive in adult  R62.7 783.7   7. Dysphagia, unspecified type  R13.10 787.20     Patient Active Problem List   Diagnosis   • Mixed hyperlipidemia   • Essential hypertension   • Type 2 diabetes mellitus (CMS/HCC)   • Primary osteoarthritis involving multiple joints   • Non-rheumatic mitral regurgitation   • Hypothyroidism, adult   • Lewy body dementia with behavioral disturbance (CMS/HCC)   • Mild nonproliferative diabetic retinopathy of both eyes without macular edema associated with type 2 diabetes mellitus (CMS/HCC)   • Morbidly obese (CMS/HCC)   • Sick sinus syndrome (CMS/HCC)   • CKD (chronic kidney disease) stage 3, GFR 30-59 ml/min (CMS/HCC)   • Overactive bladder   • Osteoporosis   • Multiple falls   • Generalized weakness   • FTT (failure to thrive) in adult   • Anemia     Past Medical History:   Diagnosis Date   • Anemia    • Arthritis    • Colon polyp    • Diabetes mellitus (CMS/HCC)    • Disease of thyroid gland    • Heart disease    • Heart valve disease    • Hyperlipidemia    • Hypertension    • Hypothyroidism    • Memory loss    • Pacemaker    • Shortness of breath 2/3/2017   • Sick sinus syndrome (CMS/HCC)      Past Surgical History:   Procedure Laterality Date   • CATARACT EXTRACTION Bilateral    • CHOLECYSTECTOMY     • PACEMAKER IMPLANTATION       General Information     Row Name 21 1514          OT Time and Intention    Document Type  therapy note (daily note)  -[facility]      Mode of Treatment  occupational therapy  -     Row Name 08/24/21 1514          General Information    Existing Precautions/Restrictions  fall;other (see comments) dementia; confusion  -     Barriers to Rehab  medically complex;previous functional deficit;cognitive status  -     Row Name 08/24/21 1514          Cognition    Orientation Status (Cognition)  oriented to;person;place;situation;time  -     Row Name 08/24/21 1514          Safety Issues, Functional Mobility    Safety Issues Affecting Function (Mobility)  safety precautions follow-through/compliance;safety precaution awareness;ability to follow commands;at risk behavior observed;awareness of need for assistance;friction/shear risk;insight into deficits/self-awareness;judgment;problem-solving;sequencing abilities  -     Impairments Affecting Function (Mobility)  cognition;balance;range of motion (ROM);strength;endurance/activity tolerance;postural/trunk control  -     Cognitive Impairments, Mobility Safety/Performance  awareness, need for assistance;insight into deficits/self-awareness;attention;judgment;problem-solving/reasoning;safety precaution awareness;safety precaution follow-through;sequencing abilities  -       User Key  (r) = Recorded By, (t) = Taken By, (c) = Cosigned By    Initials Name Provider Type     Albertina Campbell, OT Occupational Therapist          Mobility/ADL's     Row Name 08/24/21 1517          Bed Mobility    Bed Mobility  scooting/bridging;supine-sit  -     Scooting/Bridging [place] (Bed Mobility)  maximum assist (25% patient effort);2 person assist;verbal cues;nonverbal cues (demo/gesture)  -     Supine-Sit [place] (Bed Mobility)  maximum assist (25% patient effort);verbal cues;nonverbal cues (demo/gesture);2 person assist  -     Bed Mobility, Safety Issues  decreased use of arms for pushing/pulling;decreased use of legs for bridging/pushing;impaired trunk control for bed mobility;cognitive deficits limit  understanding  -     Assistive Device (Bed Mobility)  draw sheet;bed rails;head of bed elevated  -[facility] Name 08/24/21 1517          Transfers    Transfers  bed-chair transfer;sit-stand transfer  -     Bed-Chair [place] (Transfers)  maximum assist (25% patient effort);2 person assist;verbal cues;nonverbal cues (demo/gesture)  -     Assistive Device (Bed-Chair Transfers)  other (see comments) UE support  -     Sit-Stand [place] (Transfers)  2 person assist;verbal cues;nonverbal cues (demo/gesture);moderate assist (50% patient effort)  -[facility] Name 08/24/21 1517          Sit-Stand Transfer    Assistive Device (Sit-Stand Transfers)  other (see comments) UE support  -[facility] Name 08/24/21 1517          Functional Mobility    Functional Mobility- Ind. Level  not appropriate to assess  -[facility] Name 08/24/21 1517          Activities of Daily Living    BADL Assessment/Intervention  grooming  -HK     Row Name 08/24/21 1517          Grooming Assessment/Training    [place] Level (Grooming)  hair care, combing/brushing;moderate assist (50% patient effort);oral care regimen;dependent (less than 25% patient effort);wash face, hands;set up  -HK     Position (Grooming)  supported sitting  -     Comment (Grooming)  Pt washed face with setup. Dependence for oral care and modA to comb hair due to decreased B UE ROM.  -       User Key  (r) = Recorded By, (t) = Taken By, (c) = Cosigned By    Initials Name Provider Type     Albertina Campbell, OT Occupational Therapist        Obj/Interventions     [facility] Name 08/24/21 1519          Sensory Assessment (Somatosensory)    Sensory Assessment (Somatosensory)  sensation intact  -HK     Row Name 08/24/21 1519          Balance    Balance Assessment  sitting static balance;sitting dynamic balance;standing static balance  -     Static Sitting Balance  mild impairment;unsupported;sitting, edge of bed  -     Dynamic Sitting Balance  moderate  impairment;unsupported;sitting, edge of bed  -HK     Static Standing Balance  moderate impairment;supported;standing  -HK     Dynamic Standing Balance  severe impairment;supported;standing  -HK       User Key  (r) = Recorded By, (t) = Taken By, (c) = Cosigned By    Initials Name Provider Type     Albertina Campbell, OT Occupational Therapist        Goals/Plan    No documentation.       Clinical Impression     Row Name 08/24/21 1521          Pain Assessment    Additional Documentation  Pain Scale: FACES Pre/Post-Treatment (Group)  -     Row Name 08/24/21 1521          Pain Scale: FACES Pre/Post-Treatment    Pain: FACES Scale, Pretreatment  0-->no hurt  -HK     Posttreatment Pain Rating  0-->no hurt  -HK     Row Name 08/24/21 1521          Plan of Care Review    Plan of Care Reviewed With  patient  -HK     Progress  no change  -HK     Outcome Summary  Pt continues to require maxAx2 for all bed mobility. Pt completes sit to stand with modAx2 and transfers bed to chair with maxAx2. Pt washed face with setup, combed hair with modA, and require dependence for oral care. Continue IP OT per POC.  -     Row Name 08/24/21 1521          Therapy Plan Review/Discharge Plan (OT)    Anticipated Discharge Disposition (OT)  skilled nursing facility  -     Row Name 08/24/21 1521          Vital Signs    Pre Systolic BP Rehab  -- RN cleared for tx; VSS  -HK     O2 Delivery Pre Treatment  room air  -HK     O2 Delivery Intra Treatment  room air  -HK     O2 Delivery Post Treatment  room air  -HK     Pre Patient Position  Supine  -HK     Intra Patient Position  Standing  -HK     Post Patient Position  Sitting  -HK     Row Name 08/24/21 1521          Positioning and Restraints    Pre-Treatment Position  in bed  -HK     Post Treatment Position  chair  -HK     In Chair  notified nsg;reclined;call light within reach;encouraged to call for assist;exit alarm on;waffle cushion;on mechanical lift sling  -HK       User Key  (r) = Recorded By,  (t) = Taken By, (c) = Cosigned By    Initials Name Provider Type    Albertina Singh, OT Occupational Therapist        Outcome Measures     Row Name 08/24/21 1524          How much help from another is currently needed...    Putting on and taking off regular lower body clothing?  1  -HK     Bathing (including washing, rinsing, and drying)  2  -HK     Toileting (which includes using toilet bed pan or urinal)  1  -HK     Putting on and taking off regular upper body clothing  2  -HK     Taking care of personal grooming (such as brushing teeth)  2  -HK     Eating meals  2  -HK     AM-PAC 6 Clicks Score (OT)  10  -HK     Row Name 08/24/21 0810          How much help from another person do you currently need...    Turning from your back to your side while in flat bed without using bedrails?  2  -KW     Moving from lying on back to sitting on the side of a flat bed without bedrails?  2  -KW     Moving to and from a bed to a chair (including a wheelchair)?  2  -KW     Standing up from a chair using your arms (e.g., wheelchair, bedside chair)?  2  -KW     Climbing 3-5 steps with a railing?  1  -KW     To walk in hospital room?  2  -KW     AM-PAC 6 Clicks Score (PT)  11  -KW     Row Name 08/24/21 1524          Functional Assessment    Outcome Measure Options  AM-PAC 6 Clicks Daily Activity (OT)  -       User Key  (r) = Recorded By, (t) = Taken By, (c) = Cosigned By    Initials Name Provider Type    Bianka Kennedy, RN Registered Nurse    Albertina Singh OT Occupational Therapist          Occupational Therapy Education                 Title: PT OT SLP Therapies (In Progress)     Topic: Occupational Therapy (In Progress)     Point: ADL training (In Progress)     Description:   Instruct learner(s) on proper safety adaptation and remediation techniques during self care or transfers.   Instruct in proper use of assistive devices.              Learning Progress Summary           Patient Acceptance, E,TB,D, NL by  at  8/24/2021 1525    Acceptance, E,D, NR by  at 8/18/2021 1440    Acceptance, E,TB,D,H, VU,NR by  at 8/16/2021 1015   Family Acceptance, E,D, NR by TB at 8/18/2021 1440                   Point: Home exercise program (In Progress)     Description:   Instruct learner(s) on appropriate technique for monitoring, assisting and/or progressing therapeutic exercises/activities.              Learning Progress Summary           Patient Acceptance, E, NR by  at 8/21/2021 1025    Acceptance, E,D, NR by  at 8/18/2021 1440    Acceptance, E,TB,D,H, VU,NR by  at 8/16/2021 1015   Family Acceptance, E,D, NR by TB at 8/18/2021 1440                   Point: Precautions (In Progress)     Description:   Instruct learner(s) on prescribed precautions during self-care and functional transfers.              Learning Progress Summary           Patient Acceptance, E,TB,D, NL by  at 8/24/2021 1525    Acceptance, E,TB,D,H, VU,NR by  at 8/16/2021 1015                   Point: Body mechanics (In Progress)     Description:   Instruct learner(s) on proper positioning and spine alignment during self-care, functional mobility activities and/or exercises.              Learning Progress Summary           Patient Acceptance, E,TB,D, NL by  at 8/24/2021 1525    Acceptance, E,TB,D,H, VU,NR by  at 8/16/2021 1015                               User Key     Initials Effective Dates Name Provider Type Discipline     06/16/21 -  Ruth Sheffield, OT Occupational Therapist OT     06/16/21 -  Cynthia Funes OT Occupational Therapist OT     06/16/21 -  Albertina Campbell, OT Occupational Therapist OT              OT Recommendation and Plan  Planned Therapy Interventions (OT): occupation/activity based interventions, adaptive equipment training, BADL retraining, functional balance retraining, transfer/mobility retraining, ROM/therapeutic exercise  Therapy Frequency (OT): daily  Plan of Care Review  Plan of Care Reviewed With:  patient  Progress: no change  Outcome Summary: Pt continues to require maxAx2 for all bed mobility. Pt completes sit to stand with modAx2 and transfers bed to chair with maxAx2. Pt washed face with setup, combed hair with modA, and require dependence for oral care. Continue IP OT per POC.     Time Calculation:   Time Calculation- OT     Row Name 08/24/21 1332             Time Calculation- OT    OT Start Time  1332  -HK      OT Received On  08/24/21  -HK         Timed Charges    12119 - OT Self Care/Mgmt Minutes  25  -HK         Total Minutes    Timed Charges Total Minutes  25  -HK       Total Minutes  25  -HK        User Key  (r) = Recorded By, (t) = Taken By, (c) = Cosigned By    Initials Name Provider Type     Albertina Campbell OT Occupational Therapist        Therapy Charges for Today     Code Description Service Date Service Provider Modifiers Qty    77236744742 HC OT SELF CARE/MGMT/TRAIN EA 15 MIN 8/24/2021 Albertina Campbell OT GO 2    [id number]  OT THER SUPP EA 15 MIN 8/24/2021 Albertina Campbell OT GO 1               Albertina Campbell OT  8/24/2021

## 2021-08-24 NOTE — DISCHARGE PLACEMENT REQUEST
"RodJai chavez (75 y.o. Female)     Date of Birth Social Security Number Address Home Phone MRN    1945  [facility] [place] [address] [phone] [id number]    Hinduism Marital Status          Episcopalian        Admission Date Admission Type Admitting Provider Attending Provider Department, Room/Bed    8/15/21 Emergency Marco A Huff MD Russell, Marc P, MD [address], S[address]/1    Discharge Date Discharge Disposition Discharge Destination                       Attending Provider: Marco A Huff MD    Allergies: No Known Allergies    Isolation: None   Infection: None   Code Status: No CPR    Ht: 154.9 cm (61\")   Wt: 74.8 kg (165 lb)    Admission Cmt: None   Principal Problem: Multiple falls [R29.6]                 Active Insurance as of 8/15/2021     Primary Coverage     Payor Plan Insurance Group Employer/Plan Group    MEDICARE MEDICARE A & B      Payor Plan Address Payor Plan Phone Number Payor Plan Fax Number Effective Dates     [id number] [phone]  9/1/2009 - None Entered    [facility] [address]       Subscriber Name Subscriber Birth Date Member ID       JAI ROD 1945 [id number]           Secondary Coverage     Payor Plan Insurance Group Employer/Plan Group    [employer] [place]      Payor Plan Address Payor Plan Phone Number Payor Plan Fax Number Effective Dates    [address] [employer] [place] [address] [phone]  9/1/2009 - None Entered    [place] [id number]       Subscriber Name Subscriber Birth Date Member ID       JAI ROD 1945 [id number]                 Emergency Contacts      (Rel.) Home Phone Work Phone Mobile Phone    Shila Floyd (Daughter) -- [phone]            Insurance Information                MEDICARE/MEDICARE A & B Phone: [phone]    Subscriber: Jai Rod Subscriber#: [id number]    Group#:  Precert#:         [employer] [place] " [place] Phone: [phone]    Subscriber: Faye Rod Subscriber#: [id number]    Group#:  Precert#:              History & Physical      Earlene Sharp MD at 08/15/21 2031              [facility] Medicine Services  HISTORY AND PHYSICAL    Patient Name: Faye Rod  : 1945  MRN: [id number]  Primary Care Physician: Sarai Hawley PA-C  Date of admission: 8/15/2021    Subjective   Subjective     Chief Complaint:  weakness    HPI:  Faye Rod is a 75 y.o. female with a history of hyperlipidemia, hypertension, T2DM, hypothyroidism, Lewy body dementia, CKD, falls, SSS s/p PPM, presents to the ED with complaints of weakness and falls.  Per the patient's daughter patient averages about 1 fall a month.  Over the last week she has had multiple falls including one on Friday where she fell in the bathroom, and then 1 yesterday where she fell backwards on her walker but was assisted to the floor by her daughter.  Couple of weeks ago the daughter was out of town and home instead stayed with the patient, and she was found sitting on the floor with an unwitnessed fall.  Daughter reports that the patient has had a loss of appetite today and some mild diarrhea.  She also has been complaining of some left upper extremity pain which prompted family to bring her to the ED for evaluation.  No recent fevers, chest pain, vomiting, or any other complaints per her daughter.  CT of chest/abdomen/pelvis shows small amount of right middle lobe and lingular atelectasis, multiple healed or healing right posterior rib fractures with probable subacute right posterior 10th rib fracture.  CT head shows senescent changes without acute abnormality.  Labs unremarkable.  MRI has been ordered by the ED to rule out stroke.  Daughter states that she is having difficulty taking care of the patient at home and is interested in placement.  Patient is being admitted to the [facility] medicine  service for further evaluation and management.      COVID Details:        Symptoms: [x] NONE [] Fever []  Cough [] Shortness of breath [] Change in taste or smell  The patient has a COVID HM Topic on their chart, and they are fully vaccinated.    Review of Systems   Unable to perform ROS: Dementia      All other systems reviewed and are negative.     Personal History     Past Medical History:   Diagnosis Date   • Anemia    • Arthritis    • Colon polyp    • Diabetes mellitus (CMS/HCC)    • Disease of thyroid gland    • Heart disease    • Heart valve disease    • Hyperlipidemia    • Hypertension    • Hypothyroidism    • Memory loss    • Pacemaker    • Shortness of breath 2/3/2017   • Sick sinus syndrome (CMS/HCC)        Past Surgical History:   Procedure Laterality Date   • CATARACT EXTRACTION Bilateral    • CHOLECYSTECTOMY     • PACEMAKER IMPLANTATION         Family History:  family history includes Arthritis in her father; Diabetes in her father; Heart attack in her father; Hypertension in her daughter; Kidney disease in her brother; Mental illness in her mother; Obesity in her daughter; Stroke in her mother; Thyroid disease in her brother. Otherwise pertinent FHx was reviewed and unremarkable.     Social History:  reports that she has never smoked. She has never used smokeless tobacco. She reports that she does not drink alcohol and does not use drugs.  Social History     Social History Narrative   • Not on file       Medications:  Insulin Glargine, Insulin Pen Needle, Integra, Loratadine, Mirabegron ER, QUEtiapine, Vitamin D3, acetaminophen, acetaminophen-codeine, amLODIPine, aspirin, atorvastatin, carbidopa-levodopa, donepezil, escitalopram, glucose blood, ibandronate, levothyroxine, memantine, mupirocin, nystatin, ondansetron ODT, and traMADol    No Known Allergies    Objective   Objective     Vital Signs:   Temp:  [98 °F (36.7 °C)] 98 °F (36.7 °C)  Heart Rate:  [61-79] 68  Resp:  [16-18] 18  BP:  ()/(47-88) 132/88    Physical Exam   Constitutional: Awake, alert, resting in bed, daughter at bedside  Eyes: PERRLA, sclerae anicteric, no conjunctival injection  HENT: NCAT, mucous membranes moist  Neck: Supple, no thyromegaly, no lymphadenopathy, trachea midline  Respiratory: Clear to auscultation bilaterally, nonlabored respirations   Cardiovascular: RRR, no murmurs, rubs, or gallops, palpable pedal pulses bilaterally  Gastrointestinal: Positive bowel sounds, soft, nontender, nondistended  Musculoskeletal: No bilateral ankle edema, no clubbing or cyanosis to extremities  Psychiatric: Appropriate affect, cooperative  Neurologic: Oriented to self, strength symmetric in all extremities with weakness present, Cranial Nerves grossly intact to confrontation, speech clear  Skin: No rashes        Result Review:  I have personally reviewed the results from the time of this admission to 08/15/21 8:31 PM EDT and agree with these findings:  [x]  Laboratory  []  Microbiology  [x]  Radiology  [x]  EKG/Telemetry   []  Cardiology/Vascular   []  Pathology  []  Old records  []  Other:   Most notable findings include:       LAB RESULTS:      Lab 08/15/21  1705   WBC 6.69   HEMOGLOBIN 10.4*   HEMATOCRIT 33.1*   PLATELETS 146   NEUTROS ABS 4.35   IMMATURE GRANS (ABS) 0.02   LYMPHS ABS 1.66   MONOS ABS 0.63   EOS ABS 0.01   .0*         Lab 08/15/21  1705   SODIUM 139   POTASSIUM 4.5   CHLORIDE 102   CO2 28.0   ANION GAP 9.0   BUN 25*   CREATININE 1.18*   GLUCOSE 103*   CALCIUM 9.2   MAGNESIUM 2.0   TSH 0.830         Lab 08/15/21  1705   TOTAL PROTEIN 6.5   ALBUMIN 4.10   GLOBULIN 2.4   ALT (SGPT) 10   AST (SGOT) 20   BILIRUBIN 0.4   ALK PHOS 69         Lab 08/15/21  1705   TROPONIN T 0.022                 UA    Urinalysis 4/30/21 6/4/21 6/4/21 8/15/21     1518 1518    Squamous Epithelial Cells, UA   0-2    Specific Gravity, UA  >=1.030  1.022   Ketones, UA 15 mg/dL (A) Trace (A)  Negative   Blood, UA  Trace (A)  Negative    Leukocytes, UA 25 Cheko/ul (A) Negative  Negative   Nitrite, UA  Negative  Negative   RBC, UA   None Seen    WBC, UA   3-5 (A)    Bacteria, UA   3+ (A)    (A) Abnormal value            Microbiology Results (last 10 days)     Procedure Component Value - Date/Time    COVID PRE-OP / PRE-PROCEDURE SCREENING ORDER (NO ISOLATION) - Swab, Nasopharynx [[id number]]  (Normal) Collected: 08/15/21 1954    Lab Status: Final result Specimen: Swab from Nasopharynx Updated: 08/15/21 2033    Narrative:      The following orders were created for panel order COVID PRE-OP / PRE-PROCEDURE SCREENING ORDER (NO ISOLATION) - Swab, Nasopharynx.  Procedure                               Abnormality         Status                     ---------                               -----------         ------                     COVID-19 and FLU A/B PCR...[[id number]]  Normal              Final result                 Please view results for these tests on the individual orders.    COVID-19 and FLU A/B PCR - Swab, Nasopharynx [[id number]]  (Normal) Collected: 08/15/21 1954    Lab Status: Final result Specimen: Swab from Nasopharynx Updated: 08/15/21 2033     COVID19 Not Detected     Influenza A PCR Not Detected     Influenza B PCR Not Detected    Narrative:      Fact sheet for providers: https://www.fda.gov/media/727148/download    Fact sheet for patients: https://www.fda.gov/media/070098/download    Test performed by PCR.          CT Abdomen Pelvis Without Contrast    Result Date: 8/15/2021  CT Chest WO, CT Abdomen Pelvis WO INDICATION:  Multiple falls with generalized weakness. TECHNIQUE: CT of the chest, abdomen and pelvis without IV contrast. Coronal and sagittal reconstructions were obtained.  Radiation dose reduction techniques included automated exposure control or exposure modulation based on body size. Count of known CT and cardiac nuc med studies performed in previous 12 months: 0.  COMPARISON:  CT abdomen and pelvis 8/17/2020 FINDINGS: Chest:  Right middle lobe and lingular atelectasis. No focal pneumonitis. No effusions. Cardiomegaly. Pacemaker leads noted. Aortic atherosclerotic changes without aneurysm. No adenopathy or central mass. ABDOMEN: Liver and spleen unremarkable. Gallbladder surgically absent. Pancreas, kidneys and adrenal glands are unremarkable. The visualized GI tract unremarkable. Diffuse aortic atherosclerotic change without aneurysm. Pelvis: Bladder minimally distended. Uterus and adnexa are unremarkable. Multiple healed and/or healing right posterior rib fractures with probable subacute right posterior 10th rib fracture. Diffuse osteopenia. Diffuse degenerative changes thoracic and lumbar spine. Grade 1 spondylolisthesis L4 on L5 likely on the basis of facet arthropathy. Moderate L4-L5 spinal stenosis.     Impression: Small amount right middle lobe and lingular atelectasis. Multiple healed or healing right posterior rib fractures with probable subacute right posterior 10th rib fracture. No acute abdominal or pelvic pathology. Generalized osteopenia with moderately advanced thoracic and lumbar degenerative disc disease and facet arthropathy with moderate L4-L5 spinal stenosis. Signer Name: HOMERO Echols MD  Signed: 8/15/2021 7:08 PM  Workstation Name: [id number]  [facility]    XR Humerus Left    Result Date: 8/15/2021  CR Humerus Min 2 Vws LT INDICATION: Acute left arm pain. COMPARISON: None available. FINDINGS: 2 views of the left humerus.  No fracture or dislocation.  No bone erosion or destruction.  Articulation at the shoulder and elbow is anatomic.  No foreign body.     Impression: No definite acute fracture or subluxation. Signer Name: Ana María White MD  Signed: 8/15/2021 7:22 PM  Workstation Name: [id number]  [facility]    CT Head Without Contrast    Result Date: 8/15/2021  CT Head WO HISTORY: Multiple falls. Generalized weakness. TECHNIQUE: Axial unenhanced head CT. Radiation  dose reduction techniques included automated exposure control or exposure modulation based on body size. Count of known CT and cardiac nuc med studies performed in previous 12 months: 0. Time of scan: 1841 hours COMPARISON: 3/31/2017 FINDINGS: No intracranial hemorrhage, mass, or infarct. No hydrocephalus or extra-axial fluid collection. Prominence of sulci and CSF spaces overlying both frontal lobes compatible with age related atrophy. Mild ventriculomegaly. The skull base, calvarium, and extracranial soft tissues are normal.     Impression: Senescent changes without acute abnormality. Signer Name: HOMERO Echols MD  Signed: 8/15/2021 7:00 PM  Workstation Name: [email or web address]  [facility]    CT Chest Without Contrast Diagnostic    Result Date: 8/15/2021  CT Chest WO, CT Abdomen Pelvis WO INDICATION:  Multiple falls with generalized weakness. TECHNIQUE: CT of the chest, abdomen and pelvis without IV contrast. Coronal and sagittal reconstructions were obtained.  Radiation dose reduction techniques included automated exposure control or exposure modulation based on body size. Count of known CT and cardiac nuc med studies performed in previous 12 months: 0.  COMPARISON:  CT abdomen and pelvis 8/17/2020 FINDINGS: Chest: Right middle lobe and lingular atelectasis. No focal pneumonitis. No effusions. Cardiomegaly. Pacemaker leads noted. Aortic atherosclerotic changes without aneurysm. No adenopathy or central mass. ABDOMEN: Liver and spleen unremarkable. Gallbladder surgically absent. Pancreas, kidneys and adrenal glands are unremarkable. The visualized GI tract unremarkable. Diffuse aortic atherosclerotic change without aneurysm. Pelvis: Bladder minimally distended. Uterus and adnexa are unremarkable. Multiple healed and/or healing right posterior rib fractures with probable subacute right posterior 10th rib fracture. Diffuse osteopenia. Diffuse degenerative changes thoracic and lumbar spine.  Grade 1 spondylolisthesis L4 on L5 likely on the basis of facet arthropathy. Moderate L4-L5 spinal stenosis.     Impression: Small amount right middle lobe and lingular atelectasis. Multiple healed or healing right posterior rib fractures with probable subacute right posterior 10th rib fracture. No acute abdominal or pelvic pathology. Generalized osteopenia with moderately advanced thoracic and lumbar degenerative disc disease and facet arthropathy with moderate L4-L5 spinal stenosis. Signer Name: HOMERO Echols MD  Signed: 8/15/2021 7:08 PM  Workstation Name: [facility]-  [facility]    XR Chest 1 View    Result Date: 8/15/2021   EXAMINATION: XR CHEST 1 VW-  INDICATION: Weak/Dizzy/AMS triage protocol  COMPARISON: Chest x-ray 04/16/2016  FINDINGS: Cardiac size enlarged. No overt edema. No pneumothorax or pleural effusion. Degenerative changes of the spine.         Impression: Cardiomegaly without overt edema or effusion.         Results for orders placed in visit on 09/22/17    Adult Transthoracic Echo Complete W/ Cont if Necessary Per Protocol    Interpretation Summary  · Left ventricular systolic function is normal. Estimated EF = 60%.  · Moderate aortic valve regurgitation is present.  · Mild aortic valve stenosis is present.  · Ao mean PG 10 mmHg  · Mild-to-moderate mitral valve regurgitation is present  · Mild tricuspid valve regurgitation is present.  · Calculated right ventricular systolic pressure from tricuspid regurgitation is 28 mmHg.      Assessment/Plan   Assessment & Plan       Multiple falls    Mixed hyperlipidemia    Essential hypertension    Type 2 diabetes mellitus (CMS/HCC)    Hypothyroidism, adult    Lewy body dementia with behavioral disturbance (CMS/HCC)    CKD (chronic kidney disease) stage 3, GFR 30-59 ml/min (CMS/HCC)    Generalized weakness    FTT (failure to thrive) in adult    Anemia    Faye Rod is a 75 y.o. female with a history of  hyperlipidemia, hypertension, T2DM, hypothyroidism, Lewy body dementia, CKD, falls, SSS s/p PPM, presents to the ED with complaints of weakness and falls.      Assessment and Plan:    Adult FTT  Generalized weakness  Rib fractures  --CT chest/abdomen/pelvis shows multiple healed or healing right posterior rib fractures with probable subacute right posterior 10th rib fracture, loss osteopenia with moderately advanced thoracic and lumbar degenerative disc disease with facet arthropathy with moderate L4-L5 spinal stenosis  --fall precautions  --MRI of the brain pending  --pt/ot consult  --case management consult  --A.m. labs    Lewy body dementia  --continue sinemet, aricept, namenda, seroquel    Dysphagia  --failed RN dysphagia screen  --consult SLP in the am    T2DM  --a1c in the am  --fsbg with ssi    HTN  HLD  --continue norvasc with hold parameters  --continue statin  --continue aspirin    Hypothyroidism  --tsh 0.830  --continue synthroid    CKD  --baseline creatinine 1.28-1.63  --creatinine 1.18 today  --bmp in the am and continue to monitor    Anemia  --no overt signs of bleeding  --anemia profile  --stool for occult blood  --cbc in the am    DVT prophylaxis:  mechanical    CODE STATUS:    Limited Support to NOT Include: Intubation; Cardioversion/Defibrillation; Dialysis  Level Of Support Discussed With: Next of Kin (If No Surrogate)  Code Status: No CPR  Medical Interventions (Level of Support Prior to Arrest): Limited      This note has been completed as part of a split-shared workflow.   Signature: Electronically signed by SHALONDA Felipe, 08/15/21, 9:31 PM EDT.       Brief Attending Admission Attestation     I have seen and examined the patient, performing an independent face-to-face diagnostic evaluation with plan of care reviewed and developed with the advanced practice clinician (APC).    Old records reviewed and summarized in PM hx.    Brief Summary Statement:  75-year-old female who had at  least 1 fall every month likely secondary to poor gait from her Lewy body dementia.  For past few weeks has increasing gait issues, frequent fall, third fall this week.  Patient complained of left upper extremity pain since her fall yesterday.  Chronic aphasia, also noted to have L.facial droop, ? Old vs new, unknown.  History of frequent urinary tract infection  -No fever was noted, blood pressure borderline in ED-systolic 100s      Remainder of detailed HPI is as noted above and has been reviewed and/or edited by me for completeness.    PM HX:  Hyperlipidemia-20 mg  Lewy body dementia/resting tremor in left hand -Sinemet  every 6, Aricept 10 mg every 12, Namenda 10 mg every 12  DM 2-Lantus 12 units [id number]  Hypothyroid-Synthroid 25 mcg  Bladder dysfunction-#1025 daily  Mood disorder-Seroquel 50 mg nightly, Lexapro 20 mg daily  Hypertension-Norvasc 2.5 mg daily,   tramadol as needed for pain  Echo-3/2018-EF of 60%, moderate AR  CKD-stage III, baseline creatinine around 1.2  Vitals:    08/15/21 2056   BP:  99/74-1 42/62   Pulse: 74   Resp:  16-92% on room air   Temp:  Afebrile   SpO2: 93%       Attending Physical Exam:  RS- CTA-BL, ,  No wheezing , no crackles, good effort.  CVS- s1s2 regular, +murmur.  ABD- soft, non tender, not distended, no organomegaly.  EXT- no edema.  NEURO- AAO-confused, C/o of pain, moving all 4 ext symetrically, does appear to have L.facial droop.  , no focal defecit.      LABS:  Troponin-0.022  BUN/creatinine 25/1.2  Sodium-139, potassium 4.5, LFTs-WNL  TSH 0.8  Magnesium 2.0  BUN/creatinine 10/33, MCV of 100, platelets 146  UA-negative  -Negative  Chest x-ray-cardiomegaly without any overt edema or effusion  Left humerus no fracture or dislocation  CT chest-right posterior 10th rib fracture subacute, moderate L4-L5 spinal stenosis.  CT head-no acute changes  EKG sinus rhythm 60 bpm, poor baseline, paced rhythm, , Q waves    Brief Assessment/Plan  Frequent falls/Pain  control- may need Iv med's, since she failed her dysphagia screen.  Pt/ot consult may need placement.    L.facial droop- hard to say new, vs old- couldn't get mri, asa intiated,  -      See above for further detailed assessment and plan developed with APC which I have reviewed and/or edited for completeness.    Admission Status: I believe that this patient meets OBSERVATION status, however if further evaluation or treatment plans warrant, status may change.  Based upon current information, I predict patient's care encounter to be less than or equal to 2 midnights.                Electronically signed by Earlene Sharp MD at 08/15/21 2211         Current Facility-Administered Medications   Medication Dose Route Frequency Provider Last Rate Last Admin   • acetaminophen (TYLENOL) tablet 500 mg  500 mg Oral 4x Daily Eryn Smith APRN   500 mg at 08/24/21 1204   • amLODIPine (NORVASC) tablet 2.5 mg  2.5 mg Oral Daily Norah Patel APRN   2.5 mg at 08/24/21 0814   • aspirin chewable tablet 81 mg  81 mg Oral Daily Ericka Elias APRN   81 mg at 08/24/21 0815   • atorvastatin (LIPITOR) tablet 80 mg  80 mg Oral Nightly Ericka Elias APRN   80 mg at 08/23/21 2008   • carbidopa-levodopa (SINEMET)  MG per tablet 1 tablet  1 tablet Oral 4x Daily Norah Patel APRN   1 tablet at 08/24/21 1204   • dextrose (D50W) 25 g/ 50mL Intravenous Solution 25 g  25 g Intravenous Q15 Min PRN Norah Patel APRN       • dextrose (GLUTOSE) oral gel 15 g  15 g Oral Q15 Min PRN Norah Patel, APRN       • Diclofenac Sodium (VOLTAREN) 1 % gel 4 g  4 g Topical 4x Daily Kalpesh Gurrola MD   4 g at 08/24/21 1204   • donepezil (ARICEPT) tablet 10 mg  10 mg Oral BID Norah Patel APRN   10 mg at 08/24/21 0814   • escitalopram (LEXAPRO) tablet 20 mg  20 mg Oral Daily Norah Patel APRN   20 mg at 08/24/21 0815   • ferrous sulfate tablet 325 mg  325 mg Oral Daily With Breakfast  Yris Pope DO   325 mg at 08/24/21 0815   • gabapentin (NEURONTIN) capsule 100 mg  100 mg Oral Nightly Eryn Smith APRN   100 mg at 08/23/21 2008   • glucagon (human recombinant) (GLUCAGEN DIAGNOSTIC) injection 1 mg  1 mg Subcutaneous Q15 Min PRN Norah Patel, APRN       • levothyroxine (SYNTHROID, LEVOTHROID) tablet 125 mcg  125 mcg Oral Q AM Norah Patel APRN   125 mcg at 08/24/21 0555   • lidocaine (LIDODERM) 5 % 1 patch  1 patch Transdermal Q24H Eryn Smith APRN   1 patch at 08/24/21 0810   • memantine (NAMENDA) tablet 10 mg  10 mg Oral BID Norah Patel APRN   10 mg at 08/24/21 0815   • ondansetron (ZOFRAN) tablet 4 mg  4 mg Oral Q6H PRN Norah Patel, APRN       • oxybutynin XL (DITROPAN-XL) 24 hr tablet 5 mg  5 mg Oral Daily Norah Patel APRN   5 mg at 08/24/21 0814   • QUEtiapine (SEROquel) tablet 50 mg  50 mg Oral Nightly Norah Patel APRN   50 mg at 08/23/21 2008   • sennosides-docusate (PERICOLACE) 8.6-50 MG per tablet 2 tablet  2 tablet Oral Nightly Eryn Simth APRN   2 tablet at 08/23/21 2008   • sodium chloride 0.9 % flush 10 mL  10 mL Intravenous PRN Danny Leija DO       • sodium chloride 0.9 % flush 10 mL  10 mL Intravenous Q12H Ericka Elias APRN   10 mL at 08/18/21 0817   • sodium chloride 0.9 % flush 10 mL  10 mL Intravenous PRN Ericka Elias APRN       • traMADol (ULTRAM) tablet 50 mg  50 mg Oral Q6H PRN Alejandrina Becerra PA-C   50 mg at 08/23/21 1552   • vitamin B-12 (CYANOCOBALAMIN) tablet 500 mcg  500 mcg Oral Daily Alejandrina Becerra PA-C   500 mcg at 08/24/21 0814        Physician Progress Notes (most recent note)      Alejandrina Becerra PA-C at 08/23/21 1420     Attestation signed by Marco A Huff MD at 08/23/21 1433    I have reviewed this documentation and agree.                        [facility] Medicine Services  PROGRESS NOTE    Patient Name: Faye  Manuel Rod  : 1945  MRN: [id number]    Date of Admission: 8/15/2021  Primary Care Physician: Sarai Hawley PA-C    Subjective     CC: f/u FTT     HPI:  In bed. She complained of back pain and feeling cold. No concerns from nursing staff. Awaiting placement.     ROS:  Unable to obtain complete or reliable ROS due to mental status    Objective     Vital Signs:   Temp:  [97.6 °F (36.4 °C)-98.4 °F (36.9 °C)] 97.6 °F (36.4 °C)  Heart Rate:  [60-77] 66  Resp:  [14-20] 20  BP: (111-148)/(55-72) 137/64     Physical Exam:  Constitutional: No acute distress, awake, alert and conversant. In bed.   HENT: NCAT, mucous membranes moist  Respiratory: Clear to auscultation bilaterally, respiratory effort normal   Cardiovascular: RRR, no murmurs, rubs, or gallops  Gastrointestinal: Positive bowel sounds, soft, nontender, nondistended  Musculoskeletal: No bilateral ankle edema  Psychiatric: Appropriate affect, cooperative  Neurologic: Pleasantly confused. Laying in bed. Moves all extremities spontaneously with no focal deficits.     Results Reviewed:  LAB RESULTS:                                 Brief Urine Lab Results  (Last result in the past 365 days)      Color   Clarity   Blood   Leuk Est   Nitrite   Protein   CREAT   Urine HCG        08/15/21 1705 Yellow Clear Negative Negative Negative Trace             Microbiology Results Abnormal     Procedure Component Value - Date/Time    COVID PRE-OP / PRE-PROCEDURE SCREENING ORDER (NO ISOLATION) - Swab, Nasopharynx [[id number]]  (Normal) Collected: 08/15/21 1954    Lab Status: Final result Specimen: Swab from Nasopharynx Updated: 08/15/21 2033    Narrative:      The following orders were created for panel order COVID PRE-OP / PRE-PROCEDURE SCREENING ORDER (NO ISOLATION) - Swab, Nasopharynx.  Procedure                               Abnormality         Status                     ---------                               -----------         ------                     COVID-19  and FLU A/B PCR...[[id number]]  Normal              Final result                 Please view results for these tests on the individual orders.    COVID-19 and FLU A/B PCR - Swab, Nasopharynx [[id number]]  (Normal) Collected: 08/15/21 1954    Lab Status: Final result Specimen: Swab from Nasopharynx Updated: 08/15/21 2033     COVID19 Not Detected     Influenza A PCR Not Detected     Influenza B PCR Not Detected    Narrative:      Fact sheet for providers: https://www.fda.gov/media/769018/download    Fact sheet for patients: https://www.fda.gov/media/821996/download    Test performed by PCR.        No radiology results from the last 24 hrs    Results for orders placed during the hospital encounter of 08/15/21    Adult Transthoracic Echo Complete W/ Cont if Necessary Per Protocol (With Agitated Saline)    Interpretation Summary  · Very technically hard study due to patient very tender per technician.  · LVEF 60%  · Estimated right ventricular systolic pressure from tricuspid regurgitation is normal (<35 mmHg).  · Mild mitral valve regurgitation is present.  · Mild to moderate aortic valve regurgitation is present.  · AV gradients off axis due to technical limitations.    I have reviewed the medications:  Scheduled Meds:acetaminophen, 500 mg, Oral, 4x Daily  amLODIPine, 2.5 mg, Oral, Daily  aspirin, 81 mg, Oral, Daily  atorvastatin, 80 mg, Oral, Nightly  carbidopa-levodopa, 1 tablet, Oral, 4x Daily  Diclofenac Sodium, 4 g, Topical, 4x Daily  donepezil, 10 mg, Oral, BID  escitalopram, 20 mg, Oral, Daily  ferrous sulfate, 325 mg, Oral, Daily With Breakfast  gabapentin, 100 mg, Oral, Nightly  levothyroxine, 125 mcg, Oral, Q AM  lidocaine, 1 patch, Transdermal, Q24H  memantine, 10 mg, Oral, BID  oxybutynin XL, 5 mg, Oral, Daily  QUEtiapine, 50 mg, Oral, Nightly  senna-docusate sodium, 2 tablet, Oral, Nightly  sodium chloride, 10 mL, Intravenous, Q12H      Continuous Infusions:   PRN Meds:.dextrose  •  dextrose  •  glucagon  (human recombinant)  •  ondansetron **OR** [DISCONTINUED] ondansetron  •  sodium chloride  •  sodium chloride  •  traMADol    Assessment & Plan     Active Hospital Problems    Diagnosis  POA   • **Multiple falls [R29.6]  Not Applicable   • Generalized weakness [R53.1]  Yes   • FTT (failure to thrive) in adult [R62.7]  Yes   • Anemia [D64.9]  Yes   • CKD (chronic kidney disease) stage 3, GFR 30-59 ml/min (CMS/[facility]) [N18.30]  Yes   • Lewy body dementia with behavioral disturbance (CMS/[facility]) [G31.83, F02.81]  Yes   • Type 2 diabetes mellitus (CMS/[facility]) [E11.9]  Yes   • Hypothyroidism, adult [E03.9]  Yes   • Essential hypertension [I10]  Yes   • Mixed hyperlipidemia [E78.2]  Yes      Resolved Hospital Problems   No resolved problems to display.     Ms. Faye Rod is a 75yoF with PMH significant for HTN, HLD, SSS s/p PPM, DMII, CKD III, chronic anemia, hypothyroidism and Lewy body dementia. She was admitted to [facility] 8/15/2021 for evaluation of frequent falls and weakness. She is now awaiting placement.     Lewy body dementia  Adult FTT  Generalized weakness  Rib fractures  - Workup grossly unremarkable aside from multiple healed posterior R rib fractures and a subacute R posterior 10th rib fracture   - PT/OT following - planning for rehab placement (if skillable) with eventual transition to long-term care  - Neurology has evaluated and feels that patient is approaching end-stage Lewy body dementia   - Continue Sinemet, Aricept, Namenda and Seroquel     L wrist pain  - XR negative      Dysphagia  - SLP has seen, on soft/ground diet now      T2DM  - Requiring minimal SSI. Stop accuchecks and SSI    CKDIII  - Baseline 1.3-1.6  - Creatinine appears to be at baseline  - Periodic monitoring     HTN, cood BP control. Continue Amlodipine with hold parameters  HLD, continue statin  Hypothyroidism, TSH appropriate. Continue Synthroid   Anemia, continue PO iron supplement. B12 injections x 7 days, start PO supplement    DVT  prophylaxis:Mechanical DVT prophylaxis orders are present.     AM-PAC 6 Clicks Score (PT): 11 (21 1100)    Disposition: I expect the patient to be discharged to SNF when all arranged     Will DC telemetry monitoring. OK to move off tele floor to non-tele floor if there is a bed available for her to move to     CODE STATUS:   Code Status and Medical Interventions:   Ordered at: 08/15/21 2051     Limited Support to NOT Include:    Intubation    Cardioversion/Defibrillation    Dialysis     Level Of Support Discussed With:    Next of Kin (If No Surrogate)     Code Status:    No CPR     Medical Interventions (Level of Support Prior to Arrest):    Limited     Alejandrina Becerra PA-C  21              Electronically signed by Marco A Huff MD at 21 1433          Physical Therapy Notes (most recent note)      Pat Lucas, PT at 21 1030  Version 1 of 1         Patient Name: Faye Rod  : 1945    MRN: [id number]                              Today's Date: 2021       Admit Date: 8/15/2021    Visit Dx:     ICD-10-CM ICD-9-CM   1. Multiple falls  R29.6 V15.88   2. Generalized weakness  R53.1 780.79   3. Closed fracture of multiple ribs of right side, initial encounter  S22.41XA 807.09   4. Dementia without behavioral disturbance, unspecified dementia type (CMS/HCC)  F03.90 294.20   5. Chronic renal impairment, unspecified CKD stage  N18.9 585.9   6. Failure to thrive in adult  R62.7 783.7   7. Dysphagia, unspecified type  R13.10 787.20     Patient Active Problem List   Diagnosis   • Mixed hyperlipidemia   • Essential hypertension   • Type 2 diabetes mellitus (CMS/HCC)   • Primary osteoarthritis involving multiple joints   • Non-rheumatic mitral regurgitation   • Hypothyroidism, adult   • Lewy body dementia with behavioral disturbance (CMS/HCC)   • Mild nonproliferative diabetic retinopathy of both eyes without macular edema associated with type 2 diabetes mellitus (CMS/HCC)    • Morbidly obese (CMS/HCC)   • Sick sinus syndrome (CMS/HCC)   • CKD (chronic kidney disease) stage 3, GFR 30-59 ml/min (CMS/HCC)   • Overactive bladder   • Osteoporosis   • Multiple falls   • Generalized weakness   • FTT (failure to thrive) in adult   • Anemia     Past Medical History:   Diagnosis Date   • Anemia    • Arthritis    • Colon polyp    • Diabetes mellitus (CMS/HCC)    • Disease of thyroid gland    • Heart disease    • Heart valve disease    • Hyperlipidemia    • Hypertension    • Hypothyroidism    • Memory loss    • Pacemaker    • Shortness of breath 2/3/2017   • Sick sinus syndrome (CMS/HCC)      Past Surgical History:   Procedure Laterality Date   • CATARACT EXTRACTION Bilateral    • CHOLECYSTECTOMY     • PACEMAKER IMPLANTATION       General Information     Row Name 08/23/21 1049          Physical Therapy Time and Intention    Document Type  therapy note (daily note)  -     Mode of Treatment  physical therapy  -     Row Name 08/23/21 1049          General Information    Patient Profile Reviewed  yes  -SS     Existing Precautions/Restrictions  fall;other (see comments) confusion, dementia  -     Barriers to Rehab  medically complex;previous functional deficit;cognitive status  -     Row Name 08/23/21 1049          Cognition    Orientation Status (Cognition)  oriented to;person;disoriented to;place;situation;time  -     Row Name 08/23/21 1049          Safety Issues, Functional Mobility    Safety Issues Affecting Function (Mobility)  ability to follow commands;awareness of need for assistance;insight into deficits/self-awareness;judgment;positioning of assistive device;problem-solving;safety precaution awareness;safety precautions follow-through/compliance;sequencing abilities  -     Impairments Affecting Function (Mobility)  cognition;balance;range of motion (ROM);strength;endurance/activity tolerance;postural/trunk control  -     Cognitive Impairments, Mobility Safety/Performance   awareness, need for assistance;insight into deficits/self-awareness;judgment;problem-solving/reasoning;safety precaution awareness;safety precaution follow-through;sequencing abilities  -     Comment, Safety Issues/Impairments (Mobility)  improved participation with physical/tactile cueing compared to verbal cueing  -       User Key  (r) = Recorded By, (t) = Taken By, (c) = Cosigned By    Initials Name Provider Type     Pat Lucas PT Physical Therapist        Mobility     Row Name 08/23/21 1052          Bed Mobility    Bed Mobility  supine-sit;scooting/bridging;sit-supine  -     Scooting/Bridging [place] (Bed Mobility)  maximum assist (25% patient effort);2 person assist;verbal cues;nonverbal cues (demo/gesture)  -     Supine-Sit [place] (Bed Mobility)  maximum assist (25% patient effort);2 person assist;verbal cues;nonverbal cues (demo/gesture)  -     Sit-Supine [place] (Bed Mobility)  verbal cues;nonverbal cues (demo/gesture);maximum assist (25% patient effort);2 person assist  -     Assistive Device (Bed Mobility)  draw sheet;bed rails  -     Comment (Bed Mobility)  Cueing for hand placement, sequence of LE advancement, trunk control  -     Row Name 08/23/21 1052          Transfers    Comment (Transfers)  VC for hand placement, upright posture  -     Row Name 08/23/21 1052          Sit-Stand Transfer    Sit-Stand [place] (Transfers)  2 person assist;verbal cues;nonverbal cues (demo/gesture);moderate assist (50% patient effort)  -     Assistive Device (Sit-Stand Transfers)  walker, front-wheeled  -     Row Name 08/23/21 1052          Gait/Stairs (Locomotion)    [place] Level (Gait)  2 person assist;maximum assist (25% patient effort);verbal cues;nonverbal cues (demo/gesture)  -     Assistive Device (Gait)  walker, front-wheeled  -     Distance in Feet (Gait)  2' lateral to head of bed  -     Deviations/Abnormal Patterns (Gait)  base of support,  narrow;michaela decreased;gait speed decreased;stride length decreased;weight shifting decreased;festinating/shuffling  -     Bilateral Gait Deviations  forward flexed posture;heel strike decreased  -     Comment (Gait/Stairs)  Pt. ambulated to head of bed w/RW. Cueing for weight shifting, LE advancement, upright posture. Pt unstable/shaky. Gait limited by fatigue, instability.  -       User Key  (r) = Recorded By, (t) = Taken By, (c) = Cosigned By    Initials Name Provider Type     Pat Lucas, PT Physical Therapist        Obj/Interventions     Row Name 08/23/21 1055          Motor Skills    Therapeutic Exercise  hip;knee;ankle  -     Row Name 08/23/21 1055          Hip (Therapeutic Exercise)    Hip (Therapeutic Exercise)  AAROM (active assistive range of motion);strengthening exercise  -     Hip AAROM (Therapeutic Exercise)  bilateral;flexion;extension;aBduction;aDduction;10 repetitions  -     Hip Strengthening (Therapeutic Exercise)  bilateral;marching while seated;10 repetitions min assist required due to fatigue  -     Row Name 08/23/21 1055          Knee (Therapeutic Exercise)    Knee (Therapeutic Exercise)  AAROM (active assistive range of motion);strengthening exercise  -     Knee AAROM (Therapeutic Exercise)  bilateral;flexion;extension;10 repetitions  -     Knee Strengthening (Therapeutic Exercise)  bilateral;LAQ (long arc quad);10 repetitions  -     Row Name 08/23/21 1055          Ankle (Therapeutic Exercise)    Ankle (Therapeutic Exercise)  AAROM (active assistive range of motion)  -     Ankle AAROM (Therapeutic Exercise)  bilateral;dorsiflexion;plantarflexion;10 repetitions  -     Row Name 08/23/21 1055          Balance    Balance Assessment  sitting static balance;sitting dynamic balance;standing static balance;standing dynamic balance  -     Static Sitting Balance  mild impairment;sitting, edge of bed;supported  -     Dynamic Sitting Balance  mild impairment;sitting,  edge of bed;supported  -SS     Static Standing Balance  moderate impairment;supported  -SS     Dynamic Standing Balance  moderate impairment;supported  -SS     Balance Interventions  sitting;standing;sit to stand;supported;static;dynamic  -       User Key  (r) = Recorded By, (t) = Taken By, (c) = Cosigned By    Initials Name Provider Type    SS Pat Lucas, PT Physical Therapist        Goals/Plan    No documentation.       Clinical Impression     Row Name 08/23/21 1057          Pain    Additional Documentation  Pain Scale: FACES Pre/Post-Treatment (Group)  -     Row Name 08/23/21 1057          Pain Scale: FACES Pre/Post-Treatment    Pain: FACES Scale, Pretreatment  0-->no hurt  -     Posttreatment Pain Rating  0-->no hurt  -SS     Row Name 08/23/21 1057          Plan of Care Review    Plan of Care Reviewed With  patient  -     Progress  improving  -     Outcome Summary  Pt. performed bed mobility with max assist of 2. She performed sit to stand transfer with mod assist of 2 and took several steps to head of bed w/rolling walker, max assist of 2. Good participation in ther-ex. Will continue to progress as tolerated. Recommend SNF upon discharge.  -     Row Name 08/23/21 1057          Therapy Assessment/Plan (PT)    Rehab Potential (PT)  fair, will monitor progress closely  -     Criteria for Skilled Interventions Met (PT)  yes;skilled treatment is necessary;meets criteria  -     Row Name 08/23/21 1057          Positioning and Restraints    Pre-Treatment Position  in bed  -SS     Post Treatment Position  bed  -SS     In Bed  notified nsg;supine;encouraged to call for assist;exit alarm on;waffle boots/both  -       User Key  (r) = Recorded By, (t) = Taken By, (c) = Cosigned By    Initials Name Provider Type    SS Pat Lucas, PT Physical Therapist        Outcome Measures     Row Name 08/23/21 1100 08/23/21 0800       How much help from another person do you currently need...    Turning from your  back to your side while in flat bed without using bedrails?  2  -SS  2  -AC    Moving from lying on back to sitting on the side of a flat bed without bedrails?  2  -SS  2  -AC    Moving to and from a bed to a chair (including a wheelchair)?  2  -SS  1  -AC    Standing up from a chair using your arms (e.g., wheelchair, bedside chair)?  2  -SS  1  -AC    Climbing 3-5 steps with a railing?  1  -SS  1  -AC    To walk in hospital room?  2  -SS  1  -AC    AM-PAC 6 Clicks Score (PT)  11  -  8  -AC    Row Name 08/23/21 1100          Functional Assessment    Outcome Measure Options  AM-PAC 6 Clicks Basic Mobility (PT)  -       User Key  (r) = Recorded By, (t) = Taken By, (c) = Cosigned By    Initials Name Provider Type    Ira Hanna, RN Registered Nurse    Pat Hardwick, JOSE Physical Therapist                       Physical Therapy Education                 Title: PT OT SLP Therapies (In Progress)     Topic: Physical Therapy (In Progress)     Point: Mobility training (In Progress)     Learning Progress Summary           Patient Acceptance, E, NL,NR by  at 8/23/2021 1100    Comment: Reviewed safety/technique with bed mobility, transfers, ambulation, home exercise program    Acceptance, E,TB, NR by  at 8/21/2021 1540    Comment: edu on HEP    Acceptance, E,TB, NR by  at 8/16/2021 1007    Comment: Edu on PT services, POC, d/c planning, safety with mobility   Family Acceptance, E,TB, NR by  at 8/21/2021 1540    Comment: edu on HEP                   Point: Home exercise program (In Progress)     Learning Progress Summary           Patient Acceptance, E, NL,NR by  at 8/23/2021 1100    Comment: Reviewed safety/technique with bed mobility, transfers, ambulation, home exercise program    Acceptance, E,TB, NR by  at 8/21/2021 1540    Comment: edu on HEP    Acceptance, E,TB, NR by  at 8/16/2021 1007    Comment: Edu on PT services, POC, d/c planning, safety with mobility   Family Acceptance, E,TB, NR by  at  8/21/2021 1540    Comment: edu on HEP                   Point: Body mechanics (In Progress)     Learning Progress Summary           Patient Acceptance, E, NL,NR by  at 8/23/2021 1100    Comment: Reviewed safety/technique with bed mobility, transfers, ambulation, home exercise program    Acceptance, E,TB, NR by  at 8/21/2021 1540    Comment: edu on HEP    Acceptance, E,TB, NR by  at 8/16/2021 1007    Comment: Edu on PT services, POC, d/c planning, safety with mobility   Family Acceptance, E,TB, NR by  at 8/21/2021 1540    Comment: edu on HEP                   Point: Precautions (In Progress)     Learning Progress Summary           Patient Acceptance, E, NL,NR by  at 8/23/2021 1100    Comment: Reviewed safety/technique with bed mobility, transfers, ambulation, home exercise program    Acceptance, E,TB, NR by  at 8/21/2021 1540    Comment: edu on HEP    Acceptance, E,TB, NR by  at 8/16/2021 1007    Comment: Edu on PT services, POC, d/c planning, safety with mobility   Family Acceptance, E,TB, NR by  at 8/21/2021 1540    Comment: edu on HEP                               User Key     Initials Effective Dates Name Provider Type Discipline     09/22/20 -  Chapito Mckeon, PT Physical Therapist PT     06/01/21 -  Pat Lucas, PT Physical Therapist PT              PT Recommendation and Plan     Plan of Care Reviewed With: patient  Progress: improving  Outcome Summary: Pt. performed bed mobility with max assist of 2. She performed sit to stand transfer with mod assist of 2 and took several steps to head of bed w/rolling walker, max assist of 2. Good participation in ther-ex. Will continue to progress as tolerated. Recommend SNF upon discharge.     Time Calculation:   PT Charges     Row Name 08/23/21 1101             Time Calculation    Start Time  1030  -SS      Stop Time  1045  -      Time Calculation (min)  15 min  -      PT Received On  08/23/21  -         Time Calculation- PT    Total Timed  Code Minutes- PT  15 minute(s)  -SS         Timed Charges    92617 - PT Therapeutic Exercise Minutes  5  -SS      69725 - PT Therapeutic Activity Minutes  10  -SS         Total Minutes    Timed Charges Total Minutes  15  -SS       Total Minutes  15  -SS        User Key  (r) = Recorded By, (t) = Taken By, (c) = Cosigned By    Initials Name Provider Type    SS Pat Lucas, PT Physical Therapist        Therapy Charges for Today     Code Description Service Date Service Provider Modifiers Qty    94013411634 HC PT THERAPEUTIC ACT EA 15 MIN 2021 Pat Lucas, PT GP 1    40538785210 HC PT THER SUPP EA 15 MIN 2021 Pat Lucas, PT GP 1          PT G-Codes  Outcome Measure Options: AM-PAC 6 Clicks Basic Mobility (PT)  AM-PAC 6 Clicks Score (PT): 11  AM-PAC 6 Clicks Score (OT): 10  Modified Mavis Scale: 4 - Moderately severe disability.  Unable to walk without assistance, and unable to attend to own bodily needs without assistance.    Pat Lucas PT  2021      Electronically signed by Pat Lucas PT at 21 1102          Occupational Therapy Notes (most recent note)      Cynthia Funes, OT at 21 1025          Patient Name: Faye Rod  : 1945    MRN: [id number]                              Today's Date: 2021       Admit Date: 8/15/2021    Visit Dx:     ICD-10-CM ICD-9-CM   1. Multiple falls  R29.6 V15.88   2. Generalized weakness  R53.1 780.79   3. Closed fracture of multiple ribs of right side, initial encounter  S22.41XA 807.09   4. Dementia without behavioral disturbance, unspecified dementia type (CMS/HCC)  F03.90 294.20   5. Chronic renal impairment, unspecified CKD stage  N18.9 585.9   6. Failure to thrive in adult  R62.7 783.7   7. Dysphagia, unspecified type  R13.10 787.20     Patient Active Problem List   Diagnosis   • Mixed hyperlipidemia   • Essential hypertension   • Type 2 diabetes mellitus (CMS/[facility])   • Primary osteoarthritis involving multiple  joints   • Non-rheumatic mitral regurgitation   • Hypothyroidism, adult   • Lewy body dementia with behavioral disturbance (CMS/HCC)   • Mild nonproliferative diabetic retinopathy of both eyes without macular edema associated with type 2 diabetes mellitus (CMS/HCC)   • Morbidly obese (CMS/HCC)   • Sick sinus syndrome (CMS/HCC)   • CKD (chronic kidney disease) stage 3, GFR 30-59 ml/min (CMS/HCC)   • Overactive bladder   • Osteoporosis   • Multiple falls   • Generalized weakness   • FTT (failure to thrive) in adult   • Anemia     Past Medical History:   Diagnosis Date   • Anemia    • Arthritis    • Colon polyp    • Diabetes mellitus (CMS/HCC)    • Disease of thyroid gland    • Heart disease    • Heart valve disease    • Hyperlipidemia    • Hypertension    • Hypothyroidism    • Memory loss    • Pacemaker    • Shortness of breath 2/3/2017   • Sick sinus syndrome (CMS/HCC)      Past Surgical History:   Procedure Laterality Date   • CATARACT EXTRACTION Bilateral    • CHOLECYSTECTOMY     • PACEMAKER IMPLANTATION       General Information     Row Name 08/21/21 1208          OT Time and Intention    Document Type  therapy note (daily note)  -JR     Mode of Treatment  occupational therapy  -JR     Row Name 08/21/21 120          General Information    Patient Profile Reviewed  yes  -JR     Existing Precautions/Restrictions  fall;other (see comments) confusion/dementia  -JR     Barriers to Rehab  medically complex;previous functional deficit;cognitive status  -JR     Row Name 08/21/21 1207          Cognition    Orientation Status (Cognition)  oriented to;person  -JR     Row Name 08/21/21 1203          Safety Issues, Functional Mobility    Safety Issues Affecting Function (Mobility)  ability to follow commands;awareness of need for assistance;insight into deficits/self-awareness  -JR     Impairments Affecting Function (Mobility)  cognition;balance  -JR     Cognitive Impairments, Mobility Safety/Performance  awareness, need for  assistance;safety precaution awareness;insight into deficits/self-awareness  -       User Key  (r) = Recorded By, (t) = Taken By, (c) = Cosigned By    Initials Name Provider Type    Cynthia Romero, OT Occupational Therapist          Mobility/ADL's     Row Name 08/21/21 1211          Grooming Assessment/Training    [place] Level (Grooming)  wash face, hands;dependent (less than 25% patient effort);hair care, combing/brushing;moderate assist (50% patient effort)  -     Position (Grooming)  supine  -       User Key  (r) = Recorded By, (t) = Taken By, (c) = Cosigned By    Initials Name Provider Type    Cynthia Romero, OT Occupational Therapist        Obj/Interventions     Row Name 08/21/21 1211          Shoulder (Therapeutic Exercise)    Shoulder (Therapeutic Exercise)  AAROM (active assistive range of motion)  -     Shoulder AAROM (Therapeutic Exercise)  bilateral;flexion;extension;aBduction;aDduction;10 repetitions;supine  -     Row Name 08/21/21 1211          Elbow/Forearm (Therapeutic Exercise)    Elbow/Forearm (Therapeutic Exercise)  AAROM (active assistive range of motion)  -     Elbow/Forearm AAROM (Therapeutic Exercise)  bilateral;flexion;extension;supination;pronation;10 repetitions  -     Row Name 08/21/21 1211          Hand (Therapeutic Exercise)    Hand (Therapeutic Exercise)  AROM (active range of motion)  -     Hand AROM/AAROM (Therapeutic Exercise)  AAROM (active assistive range of motion);10 repetitions  -     Row Name 08/21/21 1211          Therapeutic Exercise    Therapeutic Exercise  shoulder;elbow/forearm;hand  -     Row Name 08/21/21 1211          Knee (Therapeutic Exercise)    Knee (Therapeutic Exercise)  AAROM (active assistive range of motion)  -     Knee AAROM (Therapeutic Exercise)  bilateral;flexion;extension;supine;10 repetitions  -     Row Name 08/21/21 1211          Ankle (Therapeutic Exercise)    Ankle (Therapeutic Exercise)  AAROM (active assistive  range of motion)  -     Ankle AAROM (Therapeutic Exercise)  bilateral;dorsiflexion;plantarflexion;10 repetitions  -       User Key  (r) = Recorded By, (t) = Taken By, (c) = Cosigned By    Initials Name Provider Type     Cynthia Funes, OT Occupational Therapist        Goals/Plan    No documentation.       Clinical Impression     [facility] Name 08/21/21 1212          Pain Assessment    Additional Documentation  Pain Scale: FACES Pre/Post-Treatment (Group)  [facility] 08/21/21 1212          Pain Scale: FACES Pre/Post-Treatment    Pain: FACES Scale, Pretreatment  0-->no hurt  -     Posttreatment Pain Rating  0-->no hurt  -[facility] Name 08/21/21 1212          Plan of Care Review    Plan of Care Reviewed With  patient  -     Progress  improving  -     Outcome Summary  Pt confused and incontinent of bowel during treatment session. Recommend continued skilled OT services and SNF at d/c.  [facility] Name 08/21/21 1212          Therapy Assessment/Plan (OT)    Rehab Potential (OT)  fair, will monitor progress closely  -JR     Row Name 08/21/21 1212          Therapy Plan Review/Discharge Plan (OT)    Anticipated Discharge Disposition (OT)  skilled nursing facility  -[facility] Name 08/21/21 1212          Vital Signs    Pre Systolic BP Rehab  146  -     Pre Treatment Diastolic BP  66  -JR     Post Systolic BP Rehab  121  -JR     Post Treatment Diastolic BP  72  -JR     Pretreatment Heart Rate (beats/min)  74  -JR     Posttreatment Heart Rate (beats/min)  68  -JR     Pre SpO2 (%)  96  -JR     O2 Delivery Pre Treatment  room air  -     Post SpO2 (%)  96  -     O2 Delivery Post Treatment  room air  -     Pre Patient Position  Supine  -     Intra Patient Position  Supine  -     Post Patient Position  Supine  -[facility] Name 08/21/21 1212          Positioning and Restraints    Pre-Treatment Position  in bed  -     Post Treatment Position  bed  -JR     In Bed  notified nsg;supine;call light within  reach;encouraged to call for assist;exit alarm on Notified NA pt incontinent of bowel  -JR       User Key  (r) = Recorded By, (t) = Taken By, (c) = Cosigned By    Initials Name Provider Type    Cynthia Romero OT Occupational Therapist        Outcome Measures     Row Name 08/21/21 1214          How much help from another is currently needed...    Putting on and taking off regular lower body clothing?  1  -JR     Bathing (including washing, rinsing, and drying)  2  -JR     Toileting (which includes using toilet bed pan or urinal)  1  -JR     Putting on and taking off regular upper body clothing  2  -JR     Taking care of personal grooming (such as brushing teeth)  2  -JR     Eating meals  2  -JR     AM-PAC 6 Clicks Score (OT)  10  -JR     Row Name 08/21/21 1214          Functional Assessment    Outcome Measure Options  AM-PAC 6 Clicks Daily Activity (OT)  -       User Key  (r) = Recorded By, (t) = Taken By, (c) = Cosigned By    Initials Name Provider Type    Cynthia Romero OT Occupational Therapist          Occupational Therapy Education                 Title: PT OT SLP Therapies (In Progress)     Topic: Occupational Therapy (In Progress)     Point: ADL training (In Progress)     Description:   Instruct learner(s) on proper safety adaptation and remediation techniques during self care or transfers.   Instruct in proper use of assistive devices.              Learning Progress Summary           Patient Acceptance, E,D, NR by TB at 8/18/2021 1440    Acceptance, E,TB,D,H, VU,NR by  at 8/16/2021 1015   Family Acceptance, E,D, NR by TB at 8/18/2021 1440                   Point: Home exercise program (In Progress)     Description:   Instruct learner(s) on appropriate technique for monitoring, assisting and/or progressing therapeutic exercises/activities.              Learning Progress Summary           Patient Acceptance, E, NR by JR at 8/21/2021 1025    Acceptance, E,D, NR by TB at 8/18/2021 1440     Acceptance, E,TB,D,H, VU,NR by  at 8/16/2021 1015   Family Acceptance, E,D, NR by  at 8/18/2021 1440                   Point: Precautions (Done)     Description:   Instruct learner(s) on prescribed precautions during self-care and functional transfers.              Learning Progress Summary           Patient Acceptance, E,TB,D,H, VU,NR by  at 8/16/2021 1015                   Point: Body mechanics (Done)     Description:   Instruct learner(s) on proper positioning and spine alignment during self-care, functional mobility activities and/or exercises.              Learning Progress Summary           Patient Acceptance, E,TB,D,H, VU,NR by  at 8/16/2021 1015                               User Key     Initials Effective Dates Name Provider Type Discipline     06/16/21 -  Ruth Sheffield OT Occupational Therapist OT     06/16/21 -  Cynthia Funes OT Occupational Therapist OT     06/16/21 -  Albertina Campbell, OT Occupational Therapist OT              OT Recommendation and Plan     Plan of Care Review  Plan of Care Reviewed With: patient  Progress: improving  Outcome Summary: Pt confused and incontinent of bowel during treatment session. Recommend continued skilled OT services and SNF at d/c.     Time Calculation:   Time Calculation- OT     Row Name 08/21/21 1215             Time Calculation- OT    OT Start Time  1025  -JR      OT Received On  08/21/21  -JR         Timed Charges    00735 - OT Therapeutic Activity Minutes  15  -JR         Total Minutes    Timed Charges Total Minutes  15  -JR       Total Minutes  15  -JR        User Key  (r) = Recorded By, (t) = Taken By, (c) = Cosigned By    Initials Name Provider Type     Cynthia Funes OT Occupational Therapist        Therapy Charges for Today     Code Description Service Date Service Provider Modifiers Qty    [id number]  OT THERAPEUTIC ACT EA 15 MIN 8/21/2021 Cynthia Funes OT GO 1               Cynthia Funes  OT  8/21/2021    Electronically signed by Cynthia Funes OT at 08/21/21 1212

## 2021-08-24 NOTE — PROGRESS NOTES
"Palliative Care Consult Note    Patient Name: Faye Rod   : 1945  Sex: female    Date of Admission: 8/15/2021    Subjective:    75yoF with Lewy Body dementia, frequent falls admitted [facility] on 8/15.    Pt more alert midday - per Nursing, pt sleeps late and does not eat much breakfast - eats better for lunch and dinner - was able to feed herself a sandwich yesterday; is a feeder for all meals. Drank thin liquids with straw without incident. No family at bedside. Overall appears comfortable. Pt awaiting placement.    Working with PT/OT     LBM:     Prns: tramadol 50mg yesterday afternoon    ROS:  Review of Systems   Unable to perform ROS: Other   Constitutional:        Pt mumbled answers to a few questions but unable to understand       Reviewed current scheduled and prn medications for route, type, dose and frequency.     dextrose  •  dextrose  •  glucagon (human recombinant)  •  ondansetron **OR** [DISCONTINUED] ondansetron  •  sodium chloride  •  sodium chloride  •  traMADol    Objective:   /40 (BP Location: Left arm, Patient Position: Lying)   Pulse 61   Temp 97.7 °F (36.5 °C) (Axillary)   Resp 14   Ht 154.9 cm (61\")   Wt 74.8 kg (165 lb)   SpO2 95%   BMI 31.18 kg/m²    Intake & Output (last day)        0701 -  0700  07 -  0700    P.O. 480     Total Intake(mL/kg) 480 (6.4)     Urine (mL/kg/hr) 600 (0.3) 400 (0.6)    Stool 0     Total Output 600 400    Net -120 -400          Urine Unmeasured Occurrence 3 x     Stool Unmeasured Occurrence 1 x         Lab Results (last 24 hours)     Procedure Component Value Units Date/Time    POC Glucose Once [[id number]]  (Abnormal) Collected: 21 1120    Specimen: Blood Updated: 21 1141     Glucose 145 mg/dL      Comment: Meter: [id number] : [id number] Sandip Mccollum           Imaging Results (Last 24 Hours)     ** No results found for the last 24 hours. **          PPS: Palliative Performance Scale score as of " 8/24/2021, 15:54 EDT is 30% based on the following measures:   Ambulation: Totally bed bound  Activity and Evidence of Disease: Unable to do any work, extensive evidence of disease  Self-Care: Total care  Intake: Reduced   LOC: Full, drowsy or confusion      Physical Exam:  Constitutional: NAD. Mumbles - she is just waking up.    HENT: NCAT, mucous membranes moist  Respiratory: nonlabored; CTA  Cardiovascular: RRR, no edema  Gastrointestinal: BSx4; soft; ND/NT  Musculoskeletal: No bilateral ankle edema  Neurologic: Laying in bed. Able to move BUE in attempt to feed self.  Psychiatric: flat affect        Multiple falls    Mixed hyperlipidemia    Essential hypertension    Type 2 diabetes mellitus (CMS/[facility])    Hypothyroidism, adult    Lewy body dementia with behavioral disturbance (CMS/[facility])    CKD (chronic kidney disease) stage 3, GFR 30-59 ml/min (CMS/[facility])    Generalized weakness    FTT (failure to thrive) in adult    Anemia      Assessment/Plan:     75yoF with Lewy Body dementia, frequent falls admitted [facility] on 8/15.    Pain, chronic msk  Anxiety  Constipation  Goals of care    At this time, pt's symptoms are managed and goals of care established. Palliative Care will sign off; please re-consult if needed.     Total Visit Time: 20min  Face to Face Time: 15min      Sarai Tapia DNP, MHA, APRN  [facility]  Hospice and Palliative Care Nurse Practitioner  08/24/21  15:41 EDT

## 2021-08-24 NOTE — PLAN OF CARE
Goal Outcome Evaluation:  Plan of Care Reviewed With: patient, daughter        Progress: no change  Outcome Summary: Vitals WNL. Disoriented X 4. Pt denies numbness or tingling. Chief complaint this shift has been neck pain that radiates to bilateral shoulders and BLE pain. Pt is unable to rate pain numerically. Voltaren gel applied to bilateral neck and shoulders. Tylenol given as scheduled. Pt is able to sleep between care. Pt voids spontaneously via prima fit in place. Specialty bed and chair cushion in place. Pt turned Q 2 hrs. Pt up to chair this shift. Awaiting placement.

## 2021-08-24 NOTE — PLAN OF CARE
Goal Outcome Evaluation:  Plan of Care Reviewed With: patient, daughter        Progress: no change  Outcome Summary: Symptoms are managed, goals are established. Awaiting placement, Palliative will sign off per ALYCIA LIZ. Referral for palliative services at facility sent to [facility].    [facility] Palliative Team Conference: ROSELYN Contreras RN, [facility]; HERNANDO Lua DO; ALYCIA Tapia, SHALONDA; LINDSAY Cheng, [facility], [facility]-; NELI Wren, W

## 2021-08-24 NOTE — PROGRESS NOTES
[facility] Medicine Services  PROGRESS NOTE    Patient Name: Faye Rod  : 1945  MRN: [id number]    Date of Admission: 8/15/2021  Primary Care Physician: Sarai Hawley PA-C    Subjective   CC: f/u FTT     HPI:  Patient lying down in bed confused.  Patient does not know where she lives.  Patient has no complaints.  Awaiting placement.  No family in the room.    ROS:  SULY d/t mental status    Objective   Vital Signs:   Temp:  [97.9 °F (36.6 °C)-99 °F (37.2 °C)] 98.1 °F (36.7 °C)  Heart Rate:  [61-72] 65  Resp:  [14-18] 14  BP: (106-139)/(57-66) 106/66     Physical Exam:  Constitutional: Alert, confused elderly ill-appearing female sitting up in bed in NAD  Eyes:  EOMI, sclerae anicteric, no conjunctival injection  Head: NCAT  ENT: Combs, moist mucous membranes   Respiratory: Nonlabored, symmetrical chest expansion, CTAB, 96% RA  Cardiovascular: RRR, HR 70, no M/R/G, +DP pulses bilaterally  Gastrointestinal: Soft, NT, ND +BS  Musculoskeletal: CARNEY; no LE edema bilaterally  Neurologic: Confused, strength symmetric in all extremities, follows all commands, speech clear  Skin: No rashes on exposed skin  Psychiatric: Pleasant and cooperative; normal affect    Results Reviewed:  LAB RESULTS:                                 Brief Urine Lab Results  (Last result in the past 365 days)      Color   Clarity   Blood   Leuk Est   Nitrite   Protein   CREAT   Urine HCG        08/15/21 1705 Yellow Clear Negative Negative Negative Trace             Microbiology Results Abnormal     Procedure Component Value - Date/Time    COVID PRE-OP / PRE-PROCEDURE SCREENING ORDER (NO ISOLATION) - Swab, Nasopharynx [[id number]]  (Normal) Collected: 08/15/21 1954    Lab Status: Final result Specimen: Swab from Nasopharynx Updated: 08/15/21 2033    Narrative:      The following orders were created for panel order COVID PRE-OP / PRE-PROCEDURE SCREENING ORDER (NO ISOLATION) - Swab, Nasopharynx.  Procedure                                Abnormality         Status                     ---------                               -----------         ------                     COVID-19 and FLU A/B PCR...[[id number]]  Normal              Final result                 Please view results for these tests on the individual orders.    COVID-19 and FLU A/B PCR - Swab, Nasopharynx [[id number]]  (Normal) Collected: 08/15/21 1954    Lab Status: Final result Specimen: Swab from Nasopharynx Updated: 08/15/21 2033     COVID19 Not Detected     Influenza A PCR Not Detected     Influenza B PCR Not Detected    Narrative:      Fact sheet for providers: https://www.fda.gov/media/332065/download    Fact sheet for patients: https://www.fda.gov/media/331510/download    Test performed by PCR.        No radiology results from the last 24 hrs    Results for orders placed during the hospital encounter of 08/15/21    Adult Transthoracic Echo Complete W/ Cont if Necessary Per Protocol (With Agitated Saline)    Interpretation Summary  · Very technically hard study due to patient very tender per technician.  · LVEF 60%  · Estimated right ventricular systolic pressure from tricuspid regurgitation is normal (<35 mmHg).  · Mild mitral valve regurgitation is present.  · Mild to moderate aortic valve regurgitation is present.  · AV gradients off axis due to technical limitations.    I have reviewed the medications:  Scheduled Meds:acetaminophen, 500 mg, Oral, 4x Daily  amLODIPine, 2.5 mg, Oral, Daily  aspirin, 81 mg, Oral, Daily  atorvastatin, 80 mg, Oral, Nightly  carbidopa-levodopa, 1 tablet, Oral, 4x Daily  Diclofenac Sodium, 4 g, Topical, 4x Daily  donepezil, 10 mg, Oral, BID  escitalopram, 20 mg, Oral, Daily  ferrous sulfate, 325 mg, Oral, Daily With Breakfast  gabapentin, 100 mg, Oral, Nightly  levothyroxine, 125 mcg, Oral, Q AM  lidocaine, 1 patch, Transdermal, Q24H  memantine, 10 mg, Oral, BID  oxybutynin XL, 5 mg, Oral, Daily  QUEtiapine, 50 mg, Oral,  Nightly  senna-docusate sodium, 2 tablet, Oral, Nightly  sodium chloride, 10 mL, Intravenous, Q12H  vitamin B-12, 500 mcg, Oral, Daily      Continuous Infusions:   PRN Meds:.dextrose  •  dextrose  •  glucagon (human recombinant)  •  ondansetron **OR** [DISCONTINUED] ondansetron  •  sodium chloride  •  sodium chloride  •  traMADol    Assessment & Plan     Active Hospital Problems    Diagnosis  POA   • **Multiple falls [R29.6]  Not Applicable   • Generalized weakness [R53.1]  Yes   • FTT (failure to thrive) in adult [R62.7]  Yes   • Anemia [D64.9]  Yes   • CKD (chronic kidney disease) stage 3, GFR 30-59 ml/min (CMS/HCC) [N18.30]  Yes   • Lewy body dementia with behavioral disturbance (CMS/[facility]) [G31.83, F02.81]  Yes   • Type 2 diabetes mellitus (CMS/[facility]) [E11.9]  Yes   • Hypothyroidism, adult [E03.9]  Yes   • Essential hypertension [I10]  Yes   • Mixed hyperlipidemia [E78.2]  Yes      Resolved Hospital Problems   No resolved problems to display.     Ms. Faye Rod is a 75yoF with PMH significant for HTN, HLD, SSS s/p PPM, DMII, CKD III, chronic anemia, hypothyroidism and Lewy body dementia. She was admitted to [facility] 8/15/2021 for evaluation of frequent falls and weakness. She is now awaiting placement.     These problems are new to me today    This patient's problems and plans were partially entered by my partner and updated as appropriate by me 08/24/21.    Lewy body dementia  Adult FTT  Generalized weakness  Rib fractures  - Workup grossly unremarkable aside from multiple healed posterior R rib fractures and a subacute R posterior 10th rib fracture   - PT/OT following - planning for rehab placement (if skillable) with eventual transition to long-term care  - Neurology has evaluated and feels that patient is approaching end-stage Lewy body dementia   - Continue Sinemet, Aricept, Namenda and Seroquel   --Telemetry dc'd; Ok to move off floor    L wrist pain  - XR negative      Dysphagia  - SLP has seen, on  soft/ground diet now      T2DM  --24H glucose 134-187  --Stop accuchecks and SSI    CKDIII  --Cr 1.19  --Baseline 1.3-1.6  - Creatinine appears to be at baseline  - Periodic monitoring     HTN, cood BP control. Continue Amlodipine with hold parameters  HLD, continue statin  Hypothyroidism, TSH appropriate. Continue Synthroid   Anemia, continue PO iron supplement. B12 injections x 7 days, start PO supplement    DVT prophylaxis:Mechanical DVT prophylaxis orders are present.     AM-PAC 6 Clicks Score (PT): 11 (08/24/21 0810)    Disposition: I expect the patient to be discharged to SNF when all arranged     CODE STATUS:   Code Status and Medical Interventions:   Ordered at: 08/15/21 2051     Limited Support to NOT Include:    Intubation    Cardioversion/Defibrillation    Dialysis     Level Of Support Discussed With:    Next of Kin (If No Surrogate)     Code Status:    No CPR     Medical Interventions (Level of Support Prior to Arrest):    Limited     Sophie Mitchell, SHALONDA  08/24/21

## 2021-08-24 NOTE — PLAN OF CARE
Goal Outcome Evaluation:  Plan of Care Reviewed With: patient        Progress: no change  Outcome Summary: Pt continues to require maxAx2 for all bed mobility. Pt completes sit to stand with modAx2 and transfers bed to chair with maxAx2. Pt washed face with setup, combed hair with modA, and require dependence for oral care. Continue IP OT per POC.

## 2021-08-24 NOTE — CASE MANAGEMENT/SOCIAL WORK
Continued Stay Note  [facility]     Patient Name: Faye Rod  MRN: [id number]  Today's Date: 8/24/2021    Admit Date: 8/15/2021    Discharge Plan     Row Name 08/24/21 1611       Plan    Plan  SNF    Plan Comments  Case mgt f/u. I met with daughter at bedside. Explained that we have been unable to locate a SNF with a skilled bed available and the option of transitioning to long term care, will expand geographic area of referrals. Referrals sent to the two SNFs in [place], [place] [facility]        Discharge Codes    No documentation.             Sonja C Kellerman, RN

## 2021-08-25 NOTE — PROGRESS NOTES
[facility] Medicine Services  PROGRESS NOTE    Patient Name: Faye Rod  : 1945  MRN: [id number]    Date of Admission: 8/15/2021  Primary Care Physician: Sarai Hawley PA-C    Subjective   CC: f/u FTT     HPI:  Patient lying on her side getting cleaned up by nursing staff in NAD.  Patient has no complaints, but remains very confused.  No adverse events overnight.  No family in the room.    ROS:  SULY d/t mental status  Objective   Vital Signs:   Temp:  [97.5 °F (36.4 °C)-98.8 °F (37.1 °C)] 97.8 °F (36.6 °C)  Heart Rate:  [60-72] 60  Resp:  [14-18] 16  BP: (104-156)/(40-81) 156/80     Physical Exam:  Constitutional: Alert, confused elderly ill-appearing female sitting up in bed in NAD  Eyes:  EOMI, sclerae anicteric, no conjunctival injection  Head: NCAT  ENT: [facility], moist mucous membranes   Respiratory: Nonlabored, symmetrical chest expansion, CTAB, 94% RA  Cardiovascular: RRR, HR 70, no M/R/G, +DP pulses bilaterally  Gastrointestinal: Soft, NT, ND +BS  Musculoskeletal: CARNEY; no LE edema bilaterally  Neurologic: Confused, strength symmetric in all extremities, follows all commands, speech clear  Skin: No rashes on exposed skin  Psychiatric: Pleasant and cooperative; flat affect    Results Reviewed:  LAB RESULTS:                                 Brief Urine Lab Results  (Last result in the past 365 days)      Color   Clarity   Blood   Leuk Est   Nitrite   Protein   CREAT   Urine HCG        08/15/21 1705 Yellow Clear Negative Negative Negative Trace             Microbiology Results Abnormal     Procedure Component Value - Date/Time    COVID PRE-OP / PRE-PROCEDURE SCREENING ORDER (NO ISOLATION) - Swab, Nasopharynx [[id number]]  (Normal) Collected: 08/15/21 1954    Lab Status: Final result Specimen: Swab from Nasopharynx Updated: 08/15/21 2033    Narrative:      The following orders were created for panel order COVID PRE-OP / PRE-PROCEDURE SCREENING ORDER (NO ISOLATION) -  Swab, Nasopharynx.  Procedure                               Abnormality         Status                     ---------                               -----------         ------                     COVID-19 and FLU A/B PCR...[[id number]]  Normal              Final result                 Please view results for these tests on the individual orders.    COVID-19 and FLU A/B PCR - Swab, Nasopharynx [[id number]]  (Normal) Collected: 08/15/21 1954    Lab Status: Final result Specimen: Swab from Nasopharynx Updated: 08/15/21 2033     COVID19 Not Detected     Influenza A PCR Not Detected     Influenza B PCR Not Detected    Narrative:      Fact sheet for providers: https://www.fda.gov/media/004699/download    Fact sheet for patients: https://www.fda.gov/media/156506/download    Test performed by PCR.        No radiology results from the last 24 hrs    Results for orders placed during the hospital encounter of 08/15/21    Adult Transthoracic Echo Complete W/ Cont if Necessary Per Protocol (With Agitated Saline)    Interpretation Summary  · Very technically hard study due to patient very tender per technician.  · LVEF 60%  · Estimated right ventricular systolic pressure from tricuspid regurgitation is normal (<35 mmHg).  · Mild mitral valve regurgitation is present.  · Mild to moderate aortic valve regurgitation is present.  · AV gradients off axis due to technical limitations.    I have reviewed the medications:  Scheduled Meds:acetaminophen, 500 mg, Oral, 4x Daily  amLODIPine, 2.5 mg, Oral, Daily  aspirin, 81 mg, Oral, Daily  atorvastatin, 80 mg, Oral, Nightly  carbidopa-levodopa, 1 tablet, Oral, 4x Daily  Diclofenac Sodium, 4 g, Topical, 4x Daily  donepezil, 10 mg, Oral, BID  escitalopram, 20 mg, Oral, Daily  ferrous sulfate, 325 mg, Oral, Daily With Breakfast  gabapentin, 100 mg, Oral, Nightly  levothyroxine, 125 mcg, Oral, Q AM  lidocaine, 1 patch, Transdermal, Q24H  memantine, 10 mg, Oral, BID  oxybutynin XL, 5 mg, Oral,  Daily  QUEtiapine, 50 mg, Oral, Nightly  senna-docusate sodium, 2 tablet, Oral, Nightly  sodium chloride, 10 mL, Intravenous, Q12H  vitamin B-12, 500 mcg, Oral, Daily      Continuous Infusions:   PRN Meds:.dextrose  •  dextrose  •  glucagon (human recombinant)  •  ondansetron **OR** [DISCONTINUED] ondansetron  •  sodium chloride  •  sodium chloride  •  traMADol    Assessment & Plan     Active Hospital Problems    Diagnosis  POA   • **Multiple falls [R29.6]  Not Applicable   • Generalized weakness [R53.1]  Yes   • FTT (failure to thrive) in adult [R62.7]  Yes   • Anemia [D64.9]  Yes   • CKD (chronic kidney disease) stage 3, GFR 30-59 ml/min (CMS/HCC) [N18.30]  Yes   • Lewy body dementia with behavioral disturbance (CMS/[facility]) [G31.83, F02.81]  Yes   • Type 2 diabetes mellitus (CMS/[facility]) [E11.9]  Yes   • Hypothyroidism, adult [E03.9]  Yes   • Essential hypertension [I10]  Yes   • Mixed hyperlipidemia [E78.2]  Yes      Resolved Hospital Problems   No resolved problems to display.     Ms. Faye Rod is a 75yoF with PMH significant for HTN, HLD, SSS s/p PPM, DMII, CKD III, chronic anemia, hypothyroidism and Lewy body dementia. She was admitted to [facility] 8/15/2021 for evaluation of frequent falls and weakness. She is now awaiting placement.     Lewy body dementia  Adult FTT  Generalized weakness  Rib fractures  - Workup grossly unremarkable aside from multiple healed posterior R rib fractures and a subacute R posterior 10th rib fracture   - PT/OT following - planning for rehab placement (if skillable) with eventual transition to long-term care  - Neurology has evaluated and feels that patient is approaching end-stage Lewy body dementia   - Continue Sinemet, Aricept, Namenda and Seroquel   --Telemetry dc'd; Ok to move off floor  --AM labs since none have been done since 8/16    L wrist pain  - XR negative      Dysphagia  - SLP has seen, on soft/ground diet now      T2DM  --24H glucose 134-197  --Stop accuchecks and  SSI    CKDIII  --Cr 1.19  --Baseline 1.3-1.6  - Creatinine appears to be at baseline  --AM labs      HTN, cood BP control. Continue Amlodipine with hold parameters  HLD, continue statin  Hypothyroidism, TSH appropriate. Continue Synthroid   Anemia, continue PO iron supplement. B12 injections x 7 days, start PO supplement    DVT prophylaxis:Mechanical DVT prophylaxis orders are present.     AM-PAC 6 Clicks Score (PT): 11 (08/24/21 0810)    Disposition: I expect the patient to be discharged to SNF when all arranged; difficulty finding; CM tryign to get daughter to look at Long Term Care    CODE STATUS:   Code Status and Medical Interventions:   Ordered at: 08/15/21 2051     Limited Support to NOT Include:    Intubation    Cardioversion/Defibrillation    Dialysis     Level Of Support Discussed With:    Next of Kin (If No Surrogate)     Code Status:    No CPR     Medical Interventions (Level of Support Prior to Arrest):    Limited     Sophie Mitchell, SHALONDA  08/25/21

## 2021-08-25 NOTE — PROGRESS NOTES
Clinical Nutrition       Patient Name: Faye Rod  YOB: 1945  MRN: [id number]  Date of Encounter: 21 14:05 EDT  Admission date: 8/15/2021      Reason for Visit   Follow-up protocol      EMR  Reviewed     MD notes pt remains very confused     Current Nutrition Prescription     Diet Regular; Thin; Cardiac, Consistent Carbohydrate      Average Intake from [place]% x last 7 meals       Actions     Follow treatment progress, Care plan reviewed    Monitor Per Protocol      Kalyn Hernandes RDN, LD, CNSC  Time Spent: 15min

## 2021-08-25 NOTE — PLAN OF CARE
Problem: Adult Inpatient Plan of Care  Goal: Plan of Care Review  Flowsheets (Taken 8/25/2021 1450)  Progress: improving  Plan of Care Reviewed With: patient  Outcome Summary: Pt requiring mod Ax2 for sup-sit, STS, and stand-pivot transfer from bed to BSC using bilateral UE support. Pt unable to appropriately sequence LEs for gait training at this time. Reviewed HEP. Will continue to progress strength and mobility as able.   Goal Outcome Evaluation:  Plan of Care Reviewed With: patient        Progress: improving  Outcome Summary: Pt requiring mod Ax2 for sup-sit, STS, and stand-pivot transfer from bed to BSC using bilateral UE support. Pt unable to appropriately sequence LEs for gait training at this time. Reviewed HEP. Will continue to progress strength and mobility as able.

## 2021-08-25 NOTE — THERAPY TREATMENT NOTE
Patient Name: Faye Rod  : 1945    MRN: [id number]                              Today's Date: 2021       Admit Date: 8/15/2021    Visit Dx:     ICD-10-CM ICD-9-CM   1. Multiple falls  R29.6 V15.88   2. Generalized weakness  R53.1 780.79   3. Closed fracture of multiple ribs of right side, initial encounter  S22.41XA 807.09   4. Dementia without behavioral disturbance, unspecified dementia type (CMS/HCC)  F03.90 294.20   5. Chronic renal impairment, unspecified CKD stage  N18.9 585.9   6. Failure to thrive in adult  R62.7 783.7   7. Dysphagia, unspecified type  R13.10 787.20     Patient Active Problem List   Diagnosis   • Mixed hyperlipidemia   • Essential hypertension   • Type 2 diabetes mellitus (CMS/HCC)   • Primary osteoarthritis involving multiple joints   • Non-rheumatic mitral regurgitation   • Hypothyroidism, adult   • Lewy body dementia with behavioral disturbance (CMS/HCC)   • Mild nonproliferative diabetic retinopathy of both eyes without macular edema associated with type 2 diabetes mellitus (CMS/HCC)   • Morbidly obese (CMS/HCC)   • Sick sinus syndrome (CMS/HCC)   • CKD (chronic kidney disease) stage 3, GFR 30-59 ml/min (CMS/HCC)   • Overactive bladder   • Osteoporosis   • Multiple falls   • Generalized weakness   • FTT (failure to thrive) in adult   • Anemia     Past Medical History:   Diagnosis Date   • Anemia    • Arthritis    • Colon polyp    • Diabetes mellitus (CMS/HCC)    • Disease of thyroid gland    • Heart disease    • Heart valve disease    • Hyperlipidemia    • Hypertension    • Hypothyroidism    • Memory loss    • Pacemaker    • Shortness of breath 2/3/2017   • Sick sinus syndrome (CMS/HCC)      Past Surgical History:   Procedure Laterality Date   • CATARACT EXTRACTION Bilateral    • CHOLECYSTECTOMY     • PACEMAKER IMPLANTATION       General Information     Row Name 21 1538          OT Time and Intention    Document Type  therapy note (daily note)  -[facility]      Mode of Treatment  occupational therapy  -     Row Name 08/25/21 1538          General Information    Existing Precautions/Restrictions  fall;other (see comments) dementia; confusion  -     Barriers to Rehab  medically complex;previous functional deficit;cognitive status  -     Row Name 08/25/21 1538          Cognition    Orientation Status (Cognition)  oriented to;person;place;situation;time  -     Row Name 08/25/21 1538          Safety Issues, Functional Mobility    Safety Issues Affecting Function (Mobility)  safety precautions follow-through/compliance;safety precaution awareness;insight into deficits/self-awareness  -     Impairments Affecting Function (Mobility)  cognition;balance;range of motion (ROM);strength;endurance/activity tolerance;postural/trunk control  -     Cognitive Impairments, Mobility Safety/Performance  attention;awareness, need for assistance;insight into deficits/self-awareness;judgment;problem-solving/reasoning;safety precaution awareness;safety precaution follow-through;sequencing abilities  -       User Key  (r) = Recorded By, (t) = Taken By, (c) = Cosigned By    Initials Name Provider Type     Albertina Campbell, OT Occupational Therapist          Mobility/ADL's     Row Name 08/25/21 1541          Bed Mobility    Bed Mobility  scooting/bridging;supine-sit  -     Supine-Sit [place] (Bed Mobility)  2 person assist;verbal cues;moderate assist (50% patient effort)  -     Bed Mobility, Safety Issues  decreased use of arms for pushing/pulling;decreased use of legs for bridging/pushing;impaired trunk control for bed mobility;cognitive deficits limit understanding  -     Assistive Device (Bed Mobility)  draw sheet;bed rails;head of bed elevated  -     Comment (Bed Mobility)  Pt with difficulty using arms for bed mobility. modAx2 to advance to EOB.  -     Row Name 08/25/21 1541          Transfers    Transfers  sit-stand transfer;toilet transfer;other (see comments)  -      Comment (Transfers)  Pt transferred bed>BSC with modAx2 and BSC-chair with maxAx2.  -HK     Sit-Stand [place] (Transfers)  2 person assist;verbal cues;nonverbal cues (demo/gesture);moderate assist (50% patient effort)  -HK     [place] Level (Toilet Transfer)  maximum assist (25% patient effort);2 person assist;verbal cues  -HK     Assistive Device (Toilet Transfer)  walker, front-wheeled;raised toilet seat  -[facility] Name 08/25/21 1541          Sit-Stand Transfer    Assistive Device (Sit-Stand Transfers)  other (see comments) B UE support  -[facility] Name 08/25/21 1541          Toilet Transfer    Type (Toilet Transfer)  sit-stand;stand-sit  -HK     Row Name 08/25/21 1541          Functional Mobility    Functional Mobility- Ind. Level  not appropriate to assess  -[facility] Name 08/25/21 1541          Activities of Daily Living    BADL Assessment/Intervention  grooming  -HK     Row Name 08/25/21 1541          Toileting Assessment/Training    [place] Level (Toileting)  adjust/manage clothing;perform perineal hygiene;dependent (less than 25% patient effort)  -HK     Position (Toileting)  unsupported sitting;supported standing  -HK     Row Name 08/25/21 1541          Grooming Assessment/Training    [place] Level (Grooming)  hair care, combing/brushing;moderate assist (50% patient effort);oral care regimen;dependent (less than 25% patient effort)  -HK     Position (Grooming)  supported sitting  -       User Key  (r) = Recorded By, (t) = Taken By, (c) = Cosigned By    Initials Name Provider Type     Albertina Campbell, OT Occupational Therapist        Obj/Interventions     Row Name 08/25/21 1548          Sensory Assessment (Somatosensory)    Sensory Assessment (Somatosensory)  sensation intact  -HK     Row Name 08/25/21 1548          Balance    Balance Assessment  sitting static balance;sitting dynamic balance;standing static balance  -     Static Sitting Balance  mild  impairment;unsupported;sitting, edge of bed  -HK     Dynamic Sitting Balance  moderate impairment;unsupported;sitting, edge of bed  -HK     Static Standing Balance  moderate impairment;supported;standing  -HK     Dynamic Standing Balance  severe impairment;supported;standing  -HK     Balance Interventions  sitting;occupation based/functional task  -HK       User Key  (r) = Recorded By, (t) = Taken By, (c) = Cosigned By    Initials Name Provider Type     Albertina Campbell, OT Occupational Therapist        Goals/Plan    No documentation.       Clinical Impression     Row Name 08/25/21 1551          Pain Assessment    Additional Documentation  Pain Scale: FACES Pre/Post-Treatment (Group)  -     Row Name 08/25/21 1551          Pain Scale: FACES Pre/Post-Treatment    Pain: FACES Scale, Pretreatment  2-->hurts little bit  -HK     Posttreatment Pain Rating  2-->hurts little bit  -HK     Pain Location - Side  Bilateral  -HK     Pain Location - Orientation  lower  -HK     Pain Location  back  -HK     Pre/Posttreatment Pain Comment  chronic pain  -HK     Row Name 08/25/21 1551          Plan of Care Review    Plan of Care Reviewed With  patient  -HK     Progress  improving  -     Row Name 08/25/21 1551          Therapy Assessment/Plan (OT)    Patient/Family Therapy Goal Statement (OT)  Pt advanced to EOB with modAx2 and transfers with bed to BSC with modAx2. Pt maxAx2 for BSC to chair transfer. Pt depednent for all narda care and oral care. Pt combed hair with mod. Pt limited by decreaeed B UE ROM. Continue IP OT per POC.  -     Row Name 08/25/21 1551          Therapy Plan Review/Discharge Plan (OT)    Anticipated Discharge Disposition (OT)  skilled nursing facility  -     Row Name 08/25/21 1551          Vital Signs    Pre Systolic BP Rehab  -- RN cleared for tx; VSS  -HK     O2 Delivery Pre Treatment  room air  -HK     O2 Delivery Intra Treatment  room air  -HK     O2 Delivery Post Treatment  room air  -HK     Pre  Patient Position  Supine  -HK     Intra Patient Position  Standing  -HK     Post Patient Position  Sitting  -HK     Row Name 08/25/21 1551          Positioning and Restraints    Pre-Treatment Position  in bed  -HK     Post Treatment Position  chair  -HK     In Chair  notified nsg;reclined;encouraged to call for assist;call light within reach;exit alarm on;waffle cushion;on mechanical lift sling  -HK       User Key  (r) = Recorded By, (t) = Taken By, (c) = Cosigned By    Initials Name Provider Type    Albertina Singh OT Occupational Therapist        Outcome Measures     Row Name 08/25/21 1553          How much help from another is currently needed...    Putting on and taking off regular lower body clothing?  1  -HK     Bathing (including washing, rinsing, and drying)  2  -HK     Toileting (which includes using toilet bed pan or urinal)  1  -HK     Putting on and taking off regular upper body clothing  2  -HK     Taking care of personal grooming (such as brushing teeth)  2  -HK     Eating meals  2  -HK     AM-PAC 6 Clicks Score (OT)  10  -HK       User Key  (r) = Recorded By, (t) = Taken By, (c) = Cosigned By    Initials Name Provider Type    Albertina Singh OT Occupational Therapist          Occupational Therapy Education                 Title: PT OT SLP Therapies (In Progress)     Topic: Occupational Therapy (In Progress)     Point: ADL training (In Progress)     Description:   Instruct learner(s) on proper safety adaptation and remediation techniques during self care or transfers.   Instruct in proper use of assistive devices.              Learning Progress Summary           Patient Acceptance, E,TB,D, NL by  at 8/25/2021 1553    Acceptance, E,TB,D, NL by  at 8/24/2021 1525    Acceptance, E,D, NR by  at 8/18/2021 1440    Acceptance, E,TB,D,H, VU,NR by  at 8/16/2021 1015   Family Acceptance, E,D, NR by  at 8/18/2021 1440                   Point: Home exercise program (In Progress)     Description:    Instruct learner(s) on appropriate technique for monitoring, assisting and/or progressing therapeutic exercises/activities.              Learning Progress Summary           Patient Acceptance, E, NR by  at 8/21/2021 1025    Acceptance, E,D, NR by  at 8/18/2021 1440    Acceptance, E,TB,D,H, VU,NR by  at 8/16/2021 1015   Family Acceptance, E,D, NR by  at 8/18/2021 1440                   Point: Precautions (In Progress)     Description:   Instruct learner(s) on prescribed precautions during self-care and functional transfers.              Learning Progress Summary           Patient Acceptance, E,TB,D, NL by  at 8/25/2021 1553    Acceptance, E,TB,D, NL by  at 8/24/2021 1525    Acceptance, E,TB,D,H, VU,NR by  at 8/16/2021 1015                   Point: Body mechanics (In Progress)     Description:   Instruct learner(s) on proper positioning and spine alignment during self-care, functional mobility activities and/or exercises.              Learning Progress Summary           Patient Acceptance, E,TB,D, NL by  at 8/25/2021 1553    Acceptance, E,TB,D, NL by  at 8/24/2021 1525    Acceptance, E,TB,D,H, VU,NR by  at 8/16/2021 1015                               User Key     Initials Effective Dates Name Provider Type Discipline     06/16/21 -  Ruth Sheffield, OT Occupational Therapist OT     06/16/21 -  Cynthia Funes, OT Occupational Therapist OT     06/16/21 -  Albertina Campbell, OT Occupational Therapist OT              OT Recommendation and Plan  Planned Therapy Interventions (OT): occupation/activity based interventions, adaptive equipment training, BADL retraining, functional balance retraining, transfer/mobility retraining, ROM/therapeutic exercise  Therapy Frequency (OT): daily  Plan of Care Review  Plan of Care Reviewed With: patient  Progress: improving  Outcome Summary: Pt continues to require maxAx2 for all bed mobility. Pt completes sit to stand with modAx2 and transfers bed to  chair with maxAx2. Pt washed face with setup, combed hair with modA, and require dependence for oral care. Continue IP OT per POC.     Time Calculation:   Time Calculation- OT     Row Name 08/25/21 1455             Time Calculation- OT    OT Start Time  1455  -HK      OT Received On  08/25/21  -HK         Timed Charges    62834 - OT Self Care/Mgmt Minutes  12  -HK         Total Minutes    Timed Charges Total Minutes  12  -HK       Total Minutes  12  -HK        User Key  (r) = Recorded By, (t) = Taken By, (c) = Cosigned By    Initials Name Provider Type    HK Albertina Campbell OT Occupational Therapist        Therapy Charges for Today     Code Description Service Date Service Provider Modifiers Qty    10962883132 HC OT SELF CARE/MGMT/TRAIN EA 15 MIN 8/24/2021 Albertina Campbell OT GO 2    56571554676 HC OT THER SUPP EA 15 MIN 8/24/2021 Albertina Campbell OT GO 1    56776854882 HC OT SELF CARE/MGMT/TRAIN EA 15 MIN 8/25/2021 Albertina Campbell OT GO 1               Albertina Campbell OT  8/25/2021

## 2021-08-25 NOTE — THERAPY TREATMENT NOTE
Patient Name: Faye Rod  : 1945    MRN: [id number]                              Today's Date: 2021       Admit Date: 8/15/2021    Visit Dx:     ICD-10-CM ICD-9-CM   1. Multiple falls  R29.6 V15.88   2. Generalized weakness  R53.1 780.79   3. Closed fracture of multiple ribs of right side, initial encounter  S22.41XA 807.09   4. Dementia without behavioral disturbance, unspecified dementia type (CMS/HCC)  F03.90 294.20   5. Chronic renal impairment, unspecified CKD stage  N18.9 585.9   6. Failure to thrive in adult  R62.7 783.7   7. Dysphagia, unspecified type  R13.10 787.20     Patient Active Problem List   Diagnosis   • Mixed hyperlipidemia   • Essential hypertension   • Type 2 diabetes mellitus (CMS/HCC)   • Primary osteoarthritis involving multiple joints   • Non-rheumatic mitral regurgitation   • Hypothyroidism, adult   • Lewy body dementia with behavioral disturbance (CMS/[facility])   • Mild nonproliferative diabetic retinopathy of both eyes without macular edema associated with type 2 diabetes mellitus (CMS/[facility])   • Morbidly obese (CMS/[facility])   • Sick sinus syndrome (CMS/[facility])   • CKD (chronic kidney disease) stage 3, GFR 30-59 ml/min (CMS/[facility])   • Overactive bladder   • Osteoporosis   • Multiple falls   • Generalized weakness   • FTT (failure to thrive) in adult   • Anemia     Past Medical History:   Diagnosis Date   • Anemia    • Arthritis    • Colon polyp    • Diabetes mellitus (CMS/[facility])    • Disease of thyroid gland    • Heart disease    • Heart valve disease    • Hyperlipidemia    • Hypertension    • Hypothyroidism    • Memory loss    • Pacemaker    • Shortness of breath 2/3/2017   • Sick sinus syndrome (CMS/HCC)      Past Surgical History:   Procedure Laterality Date   • CATARACT EXTRACTION Bilateral    • CHOLECYSTECTOMY     • PACEMAKER IMPLANTATION       General Information     Row Name 21 1450          Physical Therapy Time and Intention    Document Type  therapy note (daily  note)  -     Mode of Treatment  physical therapy  -     Row Name 08/25/21 1450          General Information    Existing Precautions/Restrictions  fall;other (see comments) dementia; confusion  -     Row Name 08/25/21 1450          Cognition    Orientation Status (Cognition)  oriented x 3  -     Row Name 08/25/21 1450          Safety Issues, Functional Mobility    Safety Issues Affecting Function (Mobility)  safety precautions follow-through/compliance;safety precaution awareness;awareness of need for assistance;insight into deficits/self-awareness;sequencing abilities  -     Impairments Affecting Function (Mobility)  cognition;balance;range of motion (ROM);strength;endurance/activity tolerance;postural/trunk control  -     Cognitive Impairments, Mobility Safety/Performance  attention;awareness, need for assistance;insight into deficits/self-awareness;judgment;problem-solving/reasoning;safety precaution awareness  -       User Key  (r) = Recorded By, (t) = Taken By, (c) = Cosigned By    Initials Name Provider Type     Fausto Sharp PT Physical Therapist        Mobility     Row Name 08/25/21 1450          Bed Mobility    Bed Mobility  scooting/bridging;supine-sit  -     Scooting/Bridging [place] (Bed Mobility)  maximum assist (25% patient effort);2 person assist;verbal cues  -     Supine-Sit [place] (Bed Mobility)  verbal cues;moderate assist (50% patient effort);2 person assist  -     Assistive Device (Bed Mobility)  draw sheet;bed rails;head of bed elevated  -     Comment (Bed Mobility)  Mod Ax2 for LE management off of EOB and trunk control into sitting  -     Row Name 08/25/21 1450          Transfers    Comment (Transfers)  STS and stand-pivot transfer from bed to C performed with mod Ax2 using bilaeral UE support; Verbal cues for safe hand placement during standing/sitting  -     Row Name 08/25/21 1450          Bed-Chair Transfer    Bed-Chair [place] (Transfers)  verbal  cues;moderate assist (50% patient effort);2 person assist  -THELMA     Assistive Device (Bed-Chair Transfers)  other (see comments) bilateral UE support  -     Row Name 08/25/21 1450          Sit-Stand Transfer    Sit-Stand [place] (Transfers)  2 person assist;verbal cues;moderate assist (50% patient effort)  -THELMA     Assistive Device (Sit-Stand Transfers)  other (see comments) bilateral UE support  -     Row Name 08/25/21 1450          Gait/Stairs (Locomotion)    [place] Level (Gait)  unable to assess  -THELMA     Comment (Gait/Stairs)  Pt unable to appropriately sequence LEs for gait training at this time  -       User Key  (r) = Recorded By, (t) = Taken By, (c) = Cosigned By    Initials Name Provider Type    Fausto Monaco PT Physical Therapist        Obj/Interventions     Row Name 08/25/21 1450          Motor Skills    Therapeutic Exercise  hip;knee;ankle  -[facility] Name 08/25/21 1450          Hip (Therapeutic Exercise)    Hip (Therapeutic Exercise)  isometric exercises;strengthening exercise  -     Hip Isometrics (Therapeutic Exercise)  gluteal sets;10 repetitions  -     Hip Strengthening (Therapeutic Exercise)  bilateral;marching while seated;10 repetitions  -     Row Name 08/25/21 1450          Knee (Therapeutic Exercise)    Knee (Therapeutic Exercise)  isometric exercises;strengthening exercise  -     Knee Isometrics (Therapeutic Exercise)  quad sets;10 repetitions  -     Knee Strengthening (Therapeutic Exercise)  bilateral;heel slides;SLR (straight leg raise);LAQ (long arc quad);10 repetitions  -     Row Name 08/25/21 1450          Ankle (Therapeutic Exercise)    Ankle (Therapeutic Exercise)  AROM (active range of motion)  -     Ankle AROM (Therapeutic Exercise)  bilateral;dorsiflexion;plantarflexion;10 repetitions  -     Row Name 08/25/21 1450          Balance    Balance Assessment  sitting static balance;sitting dynamic balance;standing static balance;standing dynamic balance  -      Static Sitting Balance  mild impairment;unsupported;sitting, edge of bed  -THELMA     Dynamic Sitting Balance  moderate impairment;unsupported;sitting, edge of bed  -THELMA     Static Standing Balance  moderate impairment;supported;standing  -THELMA     Dynamic Standing Balance  severe impairment;supported;standing  -THELMA       User Key  (r) = Recorded By, (t) = Taken By, (c) = Cosigned By    Initials Name Provider Type    Fausto Monaco, PT Physical Therapist        Goals/Plan    No documentation.       Clinical Impression     Row Name 08/25/21 1450          Pain    Additional Documentation  Pain Scale: FACES Pre/Post-Treatment (Group)  -THELMA     Row Name 08/25/21 1450          Pain Scale: FACES Pre/Post-Treatment    Pain: FACES Scale, Pretreatment  2-->hurts little bit  -THELMA     Posttreatment Pain Rating  2-->hurts little bit  -THELMA     Pain Location - Side  Bilateral  -THELMA     Pain Location - Orientation  lower  -THELMA     Pain Location  back  -THELMA     Row Name 08/25/21 1450          Therapy Assessment/Plan (PT)    Rehab Potential (PT)  fair, will monitor progress closely  -THELMA     Criteria for Skilled Interventions Met (PT)  yes;skilled treatment is necessary;meets criteria  -THELMA     Row Name 08/25/21 1450          Positioning and Restraints    Pre-Treatment Position  in bed  -THELMA     Post Treatment Position  chair  -THELMA     In Chair  notified nsg;reclined;call light within reach;encouraged to call for assist;exit alarm on;legs elevated;waffle cushion;on mechanical lift sling  -THELMA       User Key  (r) = Recorded By, (t) = Taken By, (c) = Cosigned By    Initials Name Provider Type    Fausto Monaco, PT Physical Therapist        Outcome Measures     Row Name 08/25/21 1450          How much help from another person do you currently need...    Turning from your back to your side while in flat bed without using bedrails?  2  -THELMA     Moving from lying on back to sitting on the side of a flat bed without bedrails?  2  -THELMA     Moving to and from  a bed to a chair (including a wheelchair)?  2  -THELMA     Standing up from a chair using your arms (e.g., wheelchair, bedside chair)?  2  -THELMA     Climbing 3-5 steps with a railing?  1  -THELMA     To walk in hospital room?  1  -THELMA     AM-PAC 6 Clicks Score (PT)  10  -THELMA     Row Name 08/25/21 1450          Functional Assessment    Outcome Measure Options  AM-PAC 6 Clicks Basic Mobility (PT)  -       User Key  (r) = Recorded By, (t) = Taken By, (c) = Cosigned By    Initials Name Provider Type    Fausto Monaco, PT Physical Therapist                       Physical Therapy Education                 Title: PT OT SLP Therapies (In Progress)     Topic: Physical Therapy (In Progress)     Point: Mobility training (In Progress)     Learning Progress Summary           Patient Acceptance, E,D, NR by  at 8/25/2021 1450    Comment: Educated on safe sequencing with bed mobility, ambulatory transfers. Reviewed HEP.    Acceptance, E, NL,NR by  at 8/23/2021 1100    Comment: Reviewed safety/technique with bed mobility, transfers, ambulation, home exercise program    Acceptance, E,TB, NR by  at 8/21/2021 1540    Comment: edu on HEP    Acceptance, E,TB, NR by  at 8/16/2021 1007    Comment: Edu on PT services, POC, d/c planning, safety with mobility   Family Acceptance, E,TB, NR by  at 8/21/2021 1540    Comment: edu on HEP                   Point: Home exercise program (In Progress)     Learning Progress Summary           Patient Acceptance, E,D, NR by  at 8/25/2021 1450    Comment: Educated on safe sequencing with bed mobility, ambulatory transfers. Reviewed HEP.    Acceptance, E, NL,NR by  at 8/23/2021 1100    Comment: Reviewed safety/technique with bed mobility, transfers, ambulation, home exercise program    Acceptance, E,TB, NR by  at 8/21/2021 1540    Comment: edu on HEP    Acceptance, E,TB, NR by  at 8/16/2021 1007    Comment: Edu on PT services, POC, d/c planning, safety with mobility   Family Acceptance, E,TB, NR by   at 8/21/2021 1540    Comment: edu on HEP                   Point: Body mechanics (In Progress)     Learning Progress Summary           Patient Acceptance, E,D, NR by  at 8/25/2021 1450    Comment: Educated on safe sequencing with bed mobility, ambulatory transfers. Reviewed HEP.    Acceptance, E, NL,NR by  at 8/23/2021 1100    Comment: Reviewed safety/technique with bed mobility, transfers, ambulation, home exercise program    Acceptance, E,TB, NR by  at 8/21/2021 1540    Comment: edu on HEP    Acceptance, E,TB, NR by  at 8/16/2021 1007    Comment: Edu on PT services, POC, d/c planning, safety with mobility   Family Acceptance, E,TB, NR by  at 8/21/2021 1540    Comment: edu on HEP                   Point: Precautions (In Progress)     Learning Progress Summary           Patient Acceptance, E,D, NR by  at 8/25/2021 1450    Comment: Educated on safe sequencing with bed mobility, ambulatory transfers. Reviewed HEP.    Acceptance, E, NL,NR by  at 8/23/2021 1100    Comment: Reviewed safety/technique with bed mobility, transfers, ambulation, home exercise program    Acceptance, E,TB, NR by  at 8/21/2021 1540    Comment: edu on HEP    Acceptance, E,TB, NR by  at 8/16/2021 1007    Comment: Edu on PT services, POC, d/c planning, safety with mobility   Family Acceptance, E,TB, NR by  at 8/21/2021 1540    Comment: edu on HEP                               User Key     Initials Effective Dates Name Provider Type Discipline     06/16/21 -  Fausto Sharp, PT Physical Therapist PT     09/22/20 -  Chapito Mckeon, PT Physical Therapist PT     06/01/21 -  Pat Lucas PT Physical Therapist PT              PT Recommendation and Plan     Plan of Care Reviewed With: patient  Progress: improving  Outcome Summary: Pt requiring mod Ax2 for sup-sit, STS, and stand-pivot transfer from bed to BSC using bilateral UE support. Pt unable to appropriately sequence LEs for gait training at this time. Reviewed HEP. Will  continue to progress strength and mobility as able.     Time Calculation:   PT Charges     Row Name 08/25/21 1450             Time Calculation    Start Time  1450  -THELMA      PT Received On  08/25/21  -THELMA      PT Goal Re-Cert Due Date  08/26/21  -THELMA         Time Calculation- PT    Total Timed Code Minutes- PT  15 minute(s)  -THELMA         Timed Charges    92875 - PT Therapeutic Exercise Minutes  7  -THELMA      69631 - PT Therapeutic Activity Minutes  8  -THELMA         Total Minutes    Timed Charges Total Minutes  15  -THELMA       Total Minutes  15  -THELMA        User Key  (r) = Recorded By, (t) = Taken By, (c) = Cosigned By    Initials Name Provider Type    Fausto Monaco, PT Physical Therapist        Therapy Charges for Today     Code Description Service Date Service Provider Modifiers Qty    [id number]  PT THERAPEUTIC ACT EA 15 MIN 8/25/2021 Fausto Sharp, PT GP 1          PT G-Codes  Outcome Measure Options: AM-PAC 6 Clicks Basic Mobility (PT)  AM-PAC 6 Clicks Score (PT): 10  AM-PAC 6 Clicks Score (OT): 10  Modified Mavis Scale: 4 - Moderately severe disability.  Unable to walk without assistance, and unable to attend to own bodily needs without assistance.    Fausto Sharp, PT  8/25/2021

## 2021-08-25 NOTE — PLAN OF CARE
Patient turned q2hrs, denies pain, confused x4, VSS, room air. Assisted with feeding, adequate urine output via primafit this shift. Neurology and hospitalist following. CM working on placement.       Goal Outcome Evaluation:

## 2021-08-25 NOTE — PLAN OF CARE
Goal Outcome Evaluation:  Plan of Care Reviewed With: patient        Progress: improving  Outcome Summary: Pt continues to require maxAx2 for all bed mobility. Pt completes sit to stand with modAx2 and transfers bed to chair with maxAx2. Pt washed face with setup, combed hair with modA, and require dependence for oral care. Continue IP OT per POC.

## 2021-08-25 NOTE — CASE MANAGEMENT/SOCIAL WORK
Continued Stay Note  [facility]     Patient Name: Faye Rod  MRN: [id number]  Today's Date: 8/25/2021    Admit Date: 8/15/2021    Discharge Plan     Row Name 08/25/21 1640       Plan    Plan  SNF    Plan Comments  Per Juan Diego at [facility], they have a skilled bed and can accept Ms Rod PENDING a financial screening with daughter. Will tentatively plan on transfer on 8/26. Case mgt will f/u in am and arrange transport        Discharge Codes    No documentation.             Sonja C Kellerman, RN

## 2021-08-26 NOTE — NURSING NOTE
Daily Low Vishnu <=14    Vishnu score:14    At this time the patient's RN reports no new skin issues or needs.  Interventions in place. Continue to provide good general skin care. Apply barrier cream daily/ PRN. Keep patient dry and turn regularly.  Elevate and offload heels. The patient is on a  JO    mattress at this time.     Please contact WOC if new skin issues arise.

## 2021-08-26 NOTE — PROGRESS NOTES
[facility]  PROGRESS NOTE    Patient Name: Faye Rod  : 1945  MRN: [id number]    Date of Admission: 8/15/2021  Primary Care Physician: Sarai Hawley PA-C    Subjective   CC: f/u FTT     HPI:  Patient sitting in chair, alert to person only.  Patient reports to be in the hospital but cannot identify which hospital.  She states is  and its fall.  No issues overnight.    ROS:  Limited due to mental status  Denies chills, nausea, vomiting, chest pain, shortness of breath    Objective   Vital Signs:   Temp:  [97.4 °F (36.3 °C)-98.4 °F (36.9 °C)] 97.7 °F (36.5 °C)  Heart Rate:  [60-75] 75  Resp:  [15-16] 16  BP: (114-155)/(57-84) 114/84     Physical Exam:  Constitutional: Awake, alert, chronically ill appearing  Eyes: PERRLA, sclerae anicteric, no conjunctival injection  HENT: NCAT, mucous membranes moist  Neck: Supple, no thyromegaly, no lymphadenopathy, trachea midline  Respiratory: Clear to auscultation bilaterally, nonlabored respirations   Cardiovascular: RRR, no murmurs, rubs, or gallops, palpable pedal pulses bilaterally  Gastrointestinal: Positive bowel sounds, soft, nontender, nondistended  Musculoskeletal: No bilateral ankle edema, no clubbing or cyanosis to extremities  Psychiatric: Flat affect, cooperative  Neurologic: confused, strength symmetric in all extremities, Cranial Nerves grossly intact to confrontation, speech clear  Skin: No rashes    Results Reviewed:  LAB RESULTS:      Lab 21  0540   WBC 5.20   HEMOGLOBIN 10.8*   HEMATOCRIT 36.2   PLATELETS 159   .8*         Lab 21  0540   SODIUM 138   POTASSIUM 4.4   CHLORIDE 102   CO2 30.0*   ANION GAP 6.0   BUN 22   CREATININE 1.12*   GLUCOSE 153*   CALCIUM 9.4   MAGNESIUM 2.2                            Brief Urine Lab Results  (Last result in the past 365 days)      Color   Clarity   Blood   Leuk Est   Nitrite   Protein   CREAT   Urine HCG        08/15/21 1705 Yellow  Clear Negative Negative Negative Trace             Microbiology Results Abnormal     Procedure Component Value - Date/Time    COVID PRE-OP / PRE-PROCEDURE SCREENING ORDER (NO ISOLATION) - Swab, Nasopharynx [[id number]]  (Normal) Collected: 08/25/21 2243    Lab Status: Final result Specimen: Swab from Nasopharynx Updated: 08/25/21 2300    Narrative:      The following orders were created for panel order COVID PRE-OP / PRE-PROCEDURE SCREENING ORDER (NO ISOLATION) - Swab, Nasopharynx.  Procedure                               Abnormality         Status                     ---------                               -----------         ------                     COVID-19, ABBOTT IN-HOUS...[[id number]]  Normal              Final result                 Please view results for these tests on the individual orders.    COVID-19, ABBOTT IN-HOUSE,NASAL Swab (NO TRANSPORT MEDIA) 2 HR TAT - Swab, Nasopharynx [[id number]]  (Normal) Collected: 08/25/21 2243    Lab Status: Final result Specimen: Swab from Nasopharynx Updated: 08/25/21 2300     COVID19 Presumptive Negative    Narrative:      Fact sheet for providers: https://www.fda.gov/media/859850/download     Fact sheet for patients: https://www.fda.gov/media/099766/download    Test performed by PCR.  If inconsistent with clinical signs and symptoms patient should be tested with different authorized molecular test.    COVID PRE-OP / PRE-PROCEDURE SCREENING ORDER (NO ISOLATION) - Swab, Nasopharynx [[id number]]  (Normal) Collected: 08/15/21 1954    Lab Status: Final result Specimen: Swab from Nasopharynx Updated: 08/15/21 2033    Narrative:      The following orders were created for panel order COVID PRE-OP / PRE-PROCEDURE SCREENING ORDER (NO ISOLATION) - Swab, Nasopharynx.  Procedure                               Abnormality         Status                     ---------                               -----------         ------                     COVID-19 and FLU A/B PCR...[[id number]]   Normal              Final result                 Please view results for these tests on the individual orders.    COVID-19 and FLU A/B PCR - Swab, Nasopharynx [[id number]]  (Normal) Collected: 08/15/21 1954    Lab Status: Final result Specimen: Swab from Nasopharynx Updated: 08/15/21 2033     COVID19 Not Detected     Influenza A PCR Not Detected     Influenza B PCR Not Detected    Narrative:      Fact sheet for providers: https://www.fda.gov/media/453147/download    Fact sheet for patients: https://www.fda.gov/media/283128/download    Test performed by PCR.        No radiology results from the last 24 hrs    Results for orders placed during the hospital encounter of 08/15/21    Adult Transthoracic Echo Complete W/ Cont if Necessary Per Protocol (With Agitated Saline)    Interpretation Summary  · Very technically hard study due to patient very tender per technician.  · LVEF 60%  · Estimated right ventricular systolic pressure from tricuspid regurgitation is normal (<35 mmHg).  · Mild mitral valve regurgitation is present.  · Mild to moderate aortic valve regurgitation is present.  · AV gradients off axis due to technical limitations.    I have reviewed the medications:  Scheduled Meds:acetaminophen, 500 mg, Oral, 4x Daily  amLODIPine, 2.5 mg, Oral, Daily  aspirin, 81 mg, Oral, Daily  atorvastatin, 80 mg, Oral, Nightly  carbidopa-levodopa, 1 tablet, Oral, 4x Daily  Diclofenac Sodium, 4 g, Topical, 4x Daily  donepezil, 10 mg, Oral, BID  escitalopram, 20 mg, Oral, Daily  ferrous sulfate, 325 mg, Oral, Daily With Breakfast  gabapentin, 100 mg, Oral, Nightly  levothyroxine, 125 mcg, Oral, Q AM  lidocaine, 1 patch, Transdermal, Q24H  memantine, 10 mg, Oral, BID  oxybutynin XL, 5 mg, Oral, Daily  QUEtiapine, 50 mg, Oral, Nightly  senna-docusate sodium, 2 tablet, Oral, Nightly  sodium chloride, 10 mL, Intravenous, Q12H  vitamin B-12, 500 mcg, Oral, Daily      Continuous Infusions:   PRN Meds:.dextrose  •  dextrose  •   glucagon (human recombinant)  •  ondansetron **OR** [DISCONTINUED] ondansetron  •  sodium chloride  •  sodium chloride  •  traMADol    Assessment & Plan     Active Hospital Problems    Diagnosis  POA   • **Multiple falls [R29.6]  Not Applicable   • Generalized weakness [R53.1]  Yes   • FTT (failure to thrive) in adult [R62.7]  Yes   • Anemia [D64.9]  Yes   • CKD (chronic kidney disease) stage 3, GFR 30-59 ml/min (CMS/[facility]) [N18.30]  Yes   • Lewy body dementia with behavioral disturbance (CMS/[facility]) [G31.83, F02.81]  Yes   • Type 2 diabetes mellitus (CMS/[facility]) [E11.9]  Yes   • Hypothyroidism, adult [E03.9]  Yes   • Essential hypertension [I10]  Yes   • Mixed hyperlipidemia [E78.2]  Yes      Resolved Hospital Problems   No resolved problems to display.     Ms. Faye Rod is a 75yoF with PMH significant for HTN, HLD, SSS s/p PPM, DMII, CKD III, chronic anemia, hypothyroidism and Lewy body dementia. She was admitted to [facility] 8/15/2021 for evaluation of frequent falls and weakness. She is now awaiting placement.     Lewy body dementia  Adult FTT  Generalized weakness  Rib fractures  - Workup grossly unremarkable aside from multiple healed posterior R rib fractures and a subacute R posterior 10th rib fracture   - PT/OT following - planning for rehab placement (if skillable) with eventual transition to long-term care  - Neurology has evaluated and feels that patient is approaching end-stage Lewy body dementia   - Continue Sinemet, Aricept, Namenda and Seroquel   --Telemetry dc'd; Ok to move off floor    L wrist pain  - XR negative      Dysphagia  - SLP has seen, on soft/ground diet now      T2DM- stable   - A1C 6.40    CKDIII  --Cr 1.12  --Baseline 1.3-1.6  - Creatinine appears to be at baseline    HTN, cood BP control. Continue Amlodipine with hold parameters  HLD, continue statin  Hypothyroidism, TSH appropriate. Continue Synthroid   Anemia, continue PO iron supplement. B12 injections x 7 days, start PO supplement    DVT  prophylaxis:Mechanical DVT prophylaxis orders are present.     AM-PAC 6 Clicks Score (PT): 10 (08/25/21 1450)    Disposition: I expect the patient to be discharged to SNF when all arranged; difficulty finding; CM tryign to get daughter to look at Long Term Care    CODE STATUS:   Code Status and Medical Interventions:   Ordered at: 08/15/21 2051     Limited Support to NOT Include:    Intubation    Cardioversion/Defibrillation    Dialysis     Level Of Support Discussed With:    Next of Kin (If No Surrogate)     Code Status:    No CPR     Medical Interventions (Level of Support Prior to Arrest):    Limited     SHALONDA Pelaez  08/26/21

## 2021-08-26 NOTE — CASE MANAGEMENT/SOCIAL WORK
Continued Stay Note  [facility]     Patient Name: Faye Rod  MRN: [id number]  Today's Date: 8/26/2021    Admit Date: 8/15/2021    Discharge Plan     Row Name 08/26/21 1459       Plan    Plan  SNF    Plan Comments  Case mgt f/u. Arrangements made for transfer to a skilled bed at [facility] on 8/27.Their admissions coordinator has to meet with daughter in am to finalize financial infor.  Daughter in agreement with plan. [facility] ambulance scheduled for 4pm transport    Final Discharge Disposition Code  03 - skilled nursing facility (SNF)        Discharge Codes    No documentation.             Sonja C Kellerman, RN

## 2021-08-26 NOTE — PLAN OF CARE
Goal Outcome Evaluation:           Progress: no change   VSS, on RA. Voiding well. No c/o pain. Turned Q2. Slept well.

## 2021-08-27 NOTE — PLAN OF CARE
Goal Outcome Evaluation:           Progress: no change   VSS, on RA. No c/o pain. Voiding well via purewick. Turned Q2. Slept well.

## 2021-08-27 NOTE — DISCHARGE SUMMARY
[facility] Medicine Services  TRANSFER SUMMARY    Patient Name: Faye Rod  : 1945  MRN: [id number]    Date of Admission: 8/15/2021  Date of Discharge:  21  Length of Stay: 0  Primary Care Physician: Sarai Hawley PA-C    Consults     Date and Time Order Name Status Description    2021 11:16 AM Inpatient Palliative Care MD Consult Completed     2021 12:51 AM Inpatient Neurology Consult Stroke Completed     8/15/2021  8:41 PM Inpatient Neurology Consult Stroke Completed           Hospital Course     Presenting Problem:   Multiple falls [R29.6]    Active Hospital Problems    Diagnosis  POA   • **Multiple falls [R29.6]  Not Applicable   • Generalized weakness [R53.1]  Yes   • FTT (failure to thrive) in adult [R62.7]  Yes   • Anemia [D64.9]  Yes   • CKD (chronic kidney disease) stage 3, GFR 30-59 ml/min (CMS/[facility]) [N18.30]  Yes   • Lewy body dementia with behavioral disturbance (CMS/[facility]) [G31.83, F02.81]  Yes   • Type 2 diabetes mellitus (CMS/[facility]) [E11.9]  Yes   • Hypothyroidism, adult [E03.9]  Yes   • Essential hypertension [I10]  Yes   • Mixed hyperlipidemia [E78.2]  Yes      Resolved Hospital Problems   No resolved problems to display.          Hospital Course:  Faye Rod is a 75 y.o. female with PMH significant for HTN, HLD, SSS s/p PPM, DMII, CKD III, chronic anemia, hypothyroidism and Lewy body dementia. She was admitted to [facility] 8/15/2021 for evaluation of frequent falls and weakness. Workup grossly unremarkable aside from multiple healed posterior R rib fractures and a subacute R posterior 10th rib fracture. Neurology has evaluated and felt that patient is approaching end-stage Lewy body dementia.  Case management has arranged for skilled nursing [facility].  [facility] ambulance has been arranged to provide transportation.      Lewy body dementia  Adult FTT  Generalized weakness  Rib fractures  - Workup grossly unremarkable aside from  multiple healed posterior R rib fractures and a subacute R posterior 10th rib fracture   - PT/OT following   - Neurology has evaluated and feels that patient is approaching end-stage Lewy body dementia   - Continue Sinemet, Aricept, Namenda and Seroquel      L wrist pain  - XR negative      Dysphagia  - SLP has seen, on soft/ground diet now      T2DM- stable   - A1C 6.40     CKDIII  --Cr 1.12 on 8/26  --Baseline 1.3-1.6  - Creatinine appears to be at baseline     HTN, cood BP control. Continue Amlodipine with hold parameters  HLD, continue statin  Hypothyroidism, TSH appropriate. Continue Synthroid   Anemia, continue PO iron supplement. B12 injections x 7 days, start PO supplement    Discharge Follow Up Recommendations for outpatient labs/diagnostics:   Follow up with PCP in 1 week.     Day of Discharge     HPI:   Patient seen resting in no apparent distress.  No acute events overnight per nursing.  Patient remains pleasantly confused at this time.  Discussed plan to discharge to [facility] today.  She denies any new complaints at this time.    Review of Systems  Limited due to mental status.     Vital Signs:   Temp:  [97.3 °F (36.3 °C)-98 °F (36.7 °C)] 97.3 °F (36.3 °C)  Heart Rate:  [60-76] 74  Resp:  [16-18] 18  BP: ()/(52-77) 91/64     Physical Exam:  Constitutional: No acute distress, awake, alert, chronically ill-appearing  HENT: NCAT, mucous membranes moist  Respiratory: Clear to auscultation bilaterally, respiratory effort normal   Cardiovascular: RRR, no murmurs, cap refill brisk   Gastrointestinal: Positive bowel sounds, soft, nontender, nondistended  Musculoskeletal: No BLE edema   Psychiatric: flat affect, cooperative  Neurologic: Oriented to self, confused, moves all extremities, speech clear  Skin: warm, dry, no visible rash       Pertinent Results     Results from last 7 days   Lab Units 08/26/21  0540   WBC 10*3/mm3 5.20   HEMOGLOBIN g/dL 10.8*   HEMATOCRIT % 36.2   PLATELETS 10*3/mm3 159    SODIUM mmol/L 138   POTASSIUM mmol/L 4.4   CHLORIDE mmol/L 102   CO2 mmol/L 30.0*   BUN mg/dL 22   CREATININE mg/dL 1.12*   GLUCOSE mg/dL 153*   CALCIUM mg/dL 9.4           Invalid input(s): PROT, LABALBU        Invalid input(s): TG, LDLCALC, LDLREALC      Brief Urine Lab Results  (Last result in the past 365 days)      Color   Clarity   Blood   Leuk Est   Nitrite   Protein   CREAT   Urine HCG        08/15/21 1705 Yellow Clear Negative Negative Negative Trace               Microbiology Results Abnormal     Procedure Component Value - Date/Time    COVID PRE-OP / PRE-PROCEDURE SCREENING ORDER (NO ISOLATION) - Swab, Nasopharynx [[id number]]  (Normal) Collected: 08/25/21 2243    Lab Status: Final result Specimen: Swab from Nasopharynx Updated: 08/25/21 2300    Narrative:      The following orders were created for panel order COVID PRE-OP / PRE-PROCEDURE SCREENING ORDER (NO ISOLATION) - Swab, Nasopharynx.  Procedure                               Abnormality         Status                     ---------                               -----------         ------                     COVID-19, ABBOTT IN-HOUS...[[id number]]  Normal              Final result                 Please view results for these tests on the individual orders.    COVID-19, ABBOTT IN-HOUSE,NASAL Swab (NO TRANSPORT MEDIA) 2 HR TAT - Swab, Nasopharynx [[id number]]  (Normal) Collected: 08/25/21 2243    Lab Status: Final result Specimen: Swab from Nasopharynx Updated: 08/25/21 2300     COVID19 Presumptive Negative    Narrative:      Fact sheet for providers: https://www.fda.gov/media/974226/download     Fact sheet for patients: https://www.fda.gov/media/039584/download    Test performed by PCR.  If inconsistent with clinical signs and symptoms patient should be tested with different authorized molecular test.    COVID PRE-OP / PRE-PROCEDURE SCREENING ORDER (NO ISOLATION) - Swab, Nasopharynx [[id number]]  (Normal) Collected: 08/15/21 1954    Lab Status: Final  result Specimen: Swab from Nasopharynx Updated: 08/15/21 2033    Narrative:      The following orders were created for panel order COVID PRE-OP / PRE-PROCEDURE SCREENING ORDER (NO ISOLATION) - Swab, Nasopharynx.  Procedure                               Abnormality         Status                     ---------                               -----------         ------                     COVID-19 and FLU A/B PCR...[[id number]]  Normal              Final result                 Please view results for these tests on the individual orders.    COVID-19 and FLU A/B PCR - Swab, Nasopharynx [[id number]]  (Normal) Collected: 08/15/21 1954    Lab Status: Final result Specimen: Swab from Nasopharynx Updated: 08/15/21 2033     COVID19 Not Detected     Influenza A PCR Not Detected     Influenza B PCR Not Detected    Narrative:      Fact sheet for providers: https://www.fda.gov/media/807623/download    Fact sheet for patients: https://www.fda.gov/media/247576/download    Test performed by PCR.          Imaging Results (All)     Procedure Component Value Units Date/Time    XR Wrist 3+ View Left [[id number]] Collected: 08/16/21 1354     Updated: 08/16/21 1619    Narrative:      EXAMINATION: XR WRIST 3+ VW LEFT- 08/16/2021     INDICATION: Frequent falls, left wrist pain; R29.6-Repeated falls;  R53.1-Weakness; S22.41XA-Multiple fractures of ribs, right side, initial  encounter for closed fracture; F03.90-Unspecified dementia without  behavioral disturbance; N18.9-Chronic kidney disease, unspecified;  R62.7-Adult failure to thrive; R13.10-Dysphagia, unspecified      COMPARISON: NONE     FINDINGS: 3 views of the left wrist reveal severe osteopenia of the bony  structures. Degenerative changes seen of the first carpometacarpal  joint. Cortex is intact. Joint spaces are preserved. No joint effusion.           Impression:      Degenerative changes seen within the first carpometacarpal  joint with no evidence of acute bony abnormality.     D:   08/16/2021  E:  08/16/2021     This report was finalized on 8/16/2021 4:16 PM by Dr. Sima Bartholomew MD.       XR Chest 1 View [[id number]] Collected: 08/15/21 1739     Updated: 08/16/21 1557    Narrative:      EXAMINATION: XR CHEST 1 VW- 08/15/2021     INDICATION: Weak/Dizzy/AMS triage protocol      COMPARISON: Chest x-ray 04/16/2016     FINDINGS: Cardiac size enlarged. No overt edema. No pneumothorax or  pleural effusion. Degenerative changes of the spine.          Impression:      Cardiomegaly without overt edema or effusion.     D:  08/15/2021  E:  08/16/2021     This report was finalized on 8/16/2021 3:54 PM by Dr. Joao Cuevas.       XR Humerus Left [[id number]] Collected: 08/15/21 1922     Updated: 08/15/21 1924    Narrative:      CR Humerus Min 2 Vws LT    INDICATION:   Acute left arm pain.    COMPARISON:   None available.    FINDINGS:   2 views of the left humerus.  No fracture or dislocation.  No bone erosion or destruction.  Articulation at the shoulder and elbow is anatomic.  No foreign body.      Impression:      No definite acute fracture or subluxation.    Signer Name: Ana María White MD   Signed: 8/15/2021 7:22 PM   Workstation Name: [id number]    [facility]    CT Chest Without Contrast Diagnostic [[id number]] Collected: 08/15/21 1908     Updated: 08/15/21 1910    Narrative:      CT Chest WO, CT Abdomen Pelvis WO    INDICATION:    Multiple falls with generalized weakness.    TECHNIQUE:   CT of the chest, abdomen and pelvis without IV contrast. Coronal and sagittal reconstructions were obtained.  Radiation dose reduction techniques included automated exposure control or exposure modulation based on body size. Count of known CT and cardiac  nuc med studies performed in previous 12 months: 0.      COMPARISON:    CT abdomen and pelvis 8/17/2020    FINDINGS:  Chest: Right middle lobe and lingular atelectasis. No focal pneumonitis. No effusions. Cardiomegaly. Pacemaker leads noted.  Aortic atherosclerotic changes without aneurysm. No adenopathy or central mass.    ABDOMEN: Liver and spleen unremarkable. Gallbladder surgically absent. Pancreas, kidneys and adrenal glands are unremarkable. The visualized GI tract unremarkable. Diffuse aortic atherosclerotic change without aneurysm.    Pelvis: Bladder minimally distended. Uterus and adnexa are unremarkable. Multiple healed and/or healing right posterior rib fractures with probable subacute right posterior 10th rib fracture. Diffuse osteopenia. Diffuse degenerative changes thoracic and  lumbar spine. Grade 1 spondylolisthesis L4 on L5 likely on the basis of facet arthropathy. Moderate L4-L5 spinal stenosis.      Impression:      Small amount right middle lobe and lingular atelectasis.    Multiple healed or healing right posterior rib fractures with probable subacute right posterior 10th rib fracture.    No acute abdominal or pelvic pathology.    Generalized osteopenia with moderately advanced thoracic and lumbar degenerative disc disease and facet arthropathy with moderate L4-L5 spinal stenosis.    Signer Name: HOMERO Echols MD   Signed: 8/15/2021 7:08 PM   Workstation Name: [facility]    [facility]    CT Abdomen Pelvis Without Contrast [[id number]] Collected: 08/15/21 1908     Updated: 08/15/21 1910    Narrative:      CT Chest WO, CT Abdomen Pelvis WO    INDICATION:    Multiple falls with generalized weakness.    TECHNIQUE:   CT of the chest, abdomen and pelvis without IV contrast. Coronal and sagittal reconstructions were obtained.  Radiation dose reduction techniques included automated exposure control or exposure modulation based on body size. Count of known CT and cardiac  nuc med studies performed in previous 12 months: 0.      COMPARISON:    CT abdomen and pelvis 8/17/2020    FINDINGS:  Chest: Right middle lobe and lingular atelectasis. No focal pneumonitis. No effusions. Cardiomegaly. Pacemaker leads noted.  Aortic atherosclerotic changes without aneurysm. No adenopathy or central mass.    ABDOMEN: Liver and spleen unremarkable. Gallbladder surgically absent. Pancreas, kidneys and adrenal glands are unremarkable. The visualized GI tract unremarkable. Diffuse aortic atherosclerotic change without aneurysm.    Pelvis: Bladder minimally distended. Uterus and adnexa are unremarkable. Multiple healed and/or healing right posterior rib fractures with probable subacute right posterior 10th rib fracture. Diffuse osteopenia. Diffuse degenerative changes thoracic and  lumbar spine. Grade 1 spondylolisthesis L4 on L5 likely on the basis of facet arthropathy. Moderate L4-L5 spinal stenosis.      Impression:      Small amount right middle lobe and lingular atelectasis.    Multiple healed or healing right posterior rib fractures with probable subacute right posterior 10th rib fracture.    No acute abdominal or pelvic pathology.    Generalized osteopenia with moderately advanced thoracic and lumbar degenerative disc disease and facet arthropathy with moderate L4-L5 spinal stenosis.    Signer Name: HOMERO Echols MD   Signed: 8/15/2021 7:08 PM   Workstation Name: [facility]    [facility]    CT Head Without Contrast [[id number]] Collected: 08/15/21 1900     Updated: 08/15/21 1902    Narrative:      CT Head WO    HISTORY:   Multiple falls. Generalized weakness.    TECHNIQUE:   Axial unenhanced head CT. Radiation dose reduction techniques included automated exposure control or exposure modulation based on body size. Count of known CT and cardiac nuc med studies performed in previous 12 months: 0.     Time of scan: 1841 hours    COMPARISON:   3/31/2017    FINDINGS:   No intracranial hemorrhage, mass, or infarct. No hydrocephalus or extra-axial fluid collection. Prominence of sulci and CSF spaces overlying both frontal lobes compatible with age related atrophy. Mild ventriculomegaly. The skull base, calvarium,  and  extracranial soft tissues are normal.      Impression:      Senescent changes without acute abnormality.          Signer Name: HOMREO Echols MD   Signed: 8/15/2021 7:00 PM   Workstation Name: [facility]    [facility]          Results for orders placed during the hospital encounter of 08/15/21    Adult Transthoracic Echo Complete W/ Cont if Necessary Per Protocol (With Agitated Saline)    Interpretation Summary  · Very technically hard study due to patient very tender per technician.  · LVEF 60%  · Estimated right ventricular systolic pressure from tricuspid regurgitation is normal (<35 mmHg).  · Mild mitral valve regurgitation is present.  · Mild to moderate aortic valve regurgitation is present.  · AV gradients off axis due to technical limitations.          Discharge Details        Discharge Medications      New Medications      Instructions Start Date   acetaminophen 500 MG tablet  Commonly known as: TYLENOL  Replaces: acetaminophen 650 MG 8 hr tablet   500 mg, Oral, 4 Times Daily      cyanocobalamin 500 MCG tablet  Commonly known as: VITAMIN B-12   500 mcg, Oral, Daily   Start Date: August 28, 2021     Diclofenac Sodium 1 % gel gel  Commonly known as: VOLTAREN   4 g, Topical, 4 Times Daily      gabapentin 100 MG capsule  Commonly known as: NEURONTIN   100 mg, Oral, Nightly      lidocaine 5 %  Commonly known as: LIDODERM   1 patch, Transdermal, Every 24 Hours Scheduled, Remove & Discard patch within 12 hours or as directed by MD   Start Date: August 28, 2021     sennosides-docusate 8.6-50 MG per tablet  Commonly known as: PERICOLACE   2 tablets, Oral, Nightly         Changes to Medications      Instructions Start Date   atorvastatin 80 MG tablet  Commonly known as: LIPITOR  What changed:   · medication strength  · how much to take  · when to take this   80 mg, Oral, Nightly         Continue These Medications      Instructions Start Date   amLODIPine 2.5 MG tablet  Commonly  known as: NORVASC   2.5 mg, Oral, Daily      aspirin 81 MG EC tablet   81 mg, Oral, Daily      B-D UF III MINI PEN NEEDLES 31G X 5 MM misc  Generic drug: Insulin Pen Needle   USE WITH LANTUS INSULIN      carbidopa-levodopa  MG per tablet  Commonly known as: SINEMET   1 tablet, Oral, 4 Times Daily      donepezil 10 MG tablet  Commonly known as: ARICEPT   10 mg, Oral, 2 Times Daily      escitalopram 20 MG tablet  Commonly known as: LEXAPRO   20 mg, Oral, Daily      glucose blood test strip  Commonly known as: Ashley Contour Test   1-2 times daily Use as instructed      glucose blood test strip  Commonly known as: Contour Next Test   Use to test once or twice a day as directed      ibandronate 150 MG tablet  Commonly known as: Boniva   150 mg, Oral, Every 30 Days      Integra 62.5-62.5-40-3 MG capsule   TAKE ONE CAPSULE BY MOUTH DAILY      Lantus SoloStar 100 UNIT/ML injection pen  Generic drug: Insulin Glargine   Inject 12 units subcu nightly.      levothyroxine 125 MCG tablet  Commonly known as: SYNTHROID, LEVOTHROID   125 mcg, Oral, Daily      Loratadine 10 MG capsule   1 capsule, Oral, As Needed      memantine 10 MG tablet  Commonly known as: NAMENDA   TAKE 1 TABLET BY MOUTH  TWICE DAILY      mupirocin 2 % ointment  Commonly known as: Bactroban   Topical, 3 Times Daily      Myrbetriq 25 MG tablet sustained-release 24 hour 24 hr tablet  Generic drug: Mirabegron ER   TAKE 1 TABLET BY MOUTH  DAILY      nystatin 594091 UNIT/GM powder  Commonly known as: MYCOSTATIN   Topical, 3 Times Daily      ondansetron ODT 4 MG disintegrating tablet  Commonly known as: ZOFRAN-ODT   4 mg, Translingual, 4 Times Daily PRN      QUEtiapine 25 MG tablet  Commonly known as: SEROquel   50 mg, Oral, Nightly      Vitamin D3 1.25 MG (76200 UT) tablet   1 tablet, Oral, Weekly         Stop These Medications    acetaminophen 650 MG 8 hr tablet  Commonly known as: TYLENOL  Replaced by: acetaminophen 500 MG tablet     acetaminophen-codeine  300-30 MG per tablet  Commonly known as: TYLENOL #3     traMADol 50 MG tablet  Commonly known as: ULTRAM            No Known Allergies      Discharge Disposition:  Skilled Nursing Facility (DC - External)    Discharge Diet:  Diet Order   Procedures   • Diet Regular; Thin; Cardiac, Consistent Carbohydrate       Discharge Activity:  Activity Instructions     Activity as Tolerated              CODE STATUS:    Code Status and Medical Interventions:   Ordered at: 08/15/21 2051     Limited Support to NOT Include:    Intubation    Cardioversion/Defibrillation    Dialysis     Level Of Support Discussed With:    Next of Kin (If No Surrogate)     Code Status:    No CPR     Medical Interventions (Level of Support Prior to Arrest):    Limited         Future Appointments   Date Time Provider Department Center   9/24/2021  1:30 PM Sarai Hawley PA-C [facility]   1/7/2022  1:30 PM Faisal Vang MD [facility]       Additional Instructions for the Follow-ups that You Need to Schedule     Discharge Follow-up with PCP   As directed       Currently Documented PCP:    Sarai Hawley PA-C    PCP Phone Number:    [phone]     Follow Up Details: Follow up with PCP in 1 week.                   Time Spent on Discharge: I spent  37  minutes on this discharge activity which included: face-to-face encounter with the patient, reviewing the data in the system, coordination of the care with the nursing staff as well as consultants, documentation, and entering orders.

## 2021-08-27 NOTE — PLAN OF CARE
Goal Outcome Evaluation:  Plan of Care Reviewed With: patient        Progress: no change  Outcome Summary: DC to [facility]

## 2021-08-27 NOTE — CASE MANAGEMENT/SOCIAL WORK
Case Management Discharge Note      Final Note: Per anali at [facility], they can accept Ms Rod into a skilled bed per Covid 19 preparedness. Daughter has met with them today and is in agreement with plan. [facility] ambulace scheduled for 4pm transport. Call report to [phone]    Provided Post Acute Provider List?: Yes  Post Acute Provider List: Nursing Home  Delivered To: Support Person  Method of Delivery: In person    Selected Continued Care - Admitted Since 8/15/2021     Destination Coordination complete.    Service Provider Selected Services Address Phone Fax Patient Preferred    [facility]  Skilled Nursing [address] [place] [address] [facility] [address] [phone] --          Durable Medical Equipment    No services have been selected for the patient.              Dialysis/Infusion    No services have been selected for the patient.              Home Medical Care    No services have been selected for the patient.              Therapy    No services have been selected for the patient.              Community Resources    No services have been selected for the patient.              Community & DME    No services have been selected for the patient.                  Transportation Services  Ambulance: [facility]    Final Discharge Disposition Code: 03 - skilled nursing facility (SNF)

## 2021-12-16 ENCOUNTER — PATIENT OUTREACH (OUTPATIENT)
Dept: CASE MANAGEMENT | Facility: OTHER | Age: 76
End: 2021-12-16

## 2021-12-16 NOTE — OUTREACH NOTE
Ambulatory Case Management Note    SNF Follow-up    Questions/Answers      Responses   Acute Facility Discharged From [facility]   Acute Discharge Date 21   Name of the Skilled Nursing Facility? [facility]   Tier Level of the Skilled Nursing Facility 4   Purpose of SNF Admission PT,  OT   Estimated length of stay for the patient? Medicare days ended 21   Who is the insurance provider or payor of patient stay? Medicare   Progression of Patient? Patient  at the facility on 12-15-21   Skilled Nursing Discharge Date? 12/15/21   Where was the patient discharged to? Other (Comment)  [Patient ]        Talked with Dorcas in Billing Office at [facility].  She confirmed that patient's Medicare-covered days in their facility ended on 21, and patient  yesterday, 12-15-21.    Care Coordination    Called PCP, LENORA Chawla's office.  Spoke with Shanda, and informed them of patient's death yesterday, 12-15-21, at [facility].  She said she would make sure PCP is informed of this.        Karin Hernandez RN  Ambulatory Case Management    2021, 10:40 EST

## 2025-06-27 NOTE — PROGRESS NOTES
"Subjective     Faye Rod is seen today in consultation at the request of Dr. Martinez for cognitive impairment.    CC: memory impairment    History of Present Illness   Faye Rod is a 71 y.o. female who comes to clinic today for evaluation of memory impairment. Her family  has noted symptoms since approximately 2015 marked initially by forgetfulness. This has varied over time. Additional associated symptoms have included impairments in essentially all spheres of cognition. She has had associated  symptoms of hallucinations. No modifying factors or circumstances have been identified. She is currently residing at home with her daughter in [place].     Prior evaluation has included a CBC/CMP/TSH and head CT within the last year which were notable for only mild increases in BUN and creatinine. I reviewed her head CT personally from 4/16 and feel that this is normal for age.       The following portions of the patient's history were reviewed and updated as appropriate: allergies, current medications, past family history, past medical history, past social history, past surgical history and problem list.    Review of Systems   Constitutional: Negative.    Eyes: Negative.    Respiratory: Negative.    Cardiovascular: Negative.    Gastrointestinal: Negative.    Genitourinary: Negative.    Psychiatric/Behavioral: Negative for dysphoric mood.   All other systems reviewed and are negative.      Objective   General appearance today is normal.   Peripheral pulses were present and symmetric.   The ophthalmoscopic exam today is unremarkable. The discs and posterior elements are unremarkable.    Visit Vitals   • /75   • Ht 63\" (160 cm)   • Wt 186 lb (84.4 kg)   • BMI 32.95 kg/m2       Physical Exam   Neurological: She has normal strength. She has a normal Finger-Nose-Finger Test. Gait normal.   Reflex Scores:       Tricep reflexes are 2+ on the right side and 2+ on the left side.       Bicep reflexes are 2+ on " Lab Results   Component Value Date    EGFR 37 06/23/2025    EGFR 32 06/21/2025    EGFR 35 06/20/2025    CREATININE 1.20 06/23/2025    CREATININE 1.33 (H) 06/21/2025    CREATININE 1.25 06/20/2025     Baseline creatinine: 1.3-1.4  Continue to monitor kidney function  Monitor I/O's  Encourage PO hydration   Avoid hypotension  Avoid nephrotoxins, IV contrast  Received gentle IV fluids overnight   the right side and 2+ on the left side.       Brachioradialis reflexes are 2+ on the right side and 2+ on the left side.       Patellar reflexes are 2+ on the right side and 2+ on the left side.       Achilles reflexes are 2+ on the right side and 2+ on the left side.  Psychiatric: Her speech is normal.        Neurologic Exam     Mental Status   Oriented to person.   Disoriented to place.   Disoriented to time.   Registration: recalls 3 of 3 objects. Recall of objects at 5 minutes: 0/3. Follows 3 step commands.   Attention: normal. Concentration: normal.   Speech: speech is normal   Level of consciousness: alert  Knowledge: good.   Able to name object. Able to read. Able to repeat. Unable to write. Normal comprehension.     Cranial Nerves   Cranial nerves II through XII intact.     Motor Exam   Muscle bulk: normal  Overall muscle tone: normal    Strength   Strength 5/5 throughout.     Sensory Exam   Light touch normal.     Gait, Coordination, and Reflexes     Gait  Gait: normal    Coordination   Finger to nose coordination: normal    Reflexes   Right brachioradialis: 2+  Left brachioradialis: 2+  Right biceps: 2+  Left biceps: 2+  Right triceps: 2+  Left triceps: 2+  Right patellar: 2+  Left patellar: 2+  Right achilles: 2+  Left achilles: 2+      MMSE=19      Assessment/Plan   Faye was seen today for memory loss.    Diagnoses and all orders for this visit:    Dementia with behavioral disturbance, unspecified dementia type  -     Folate  -     Vitamin B12  -     CT Head Without Contrast    Other orders  -     donepezil (ARICEPT) 5 MG tablet; Take 1 tablet by mouth Daily.        DISCUSSION/SUMMARY    Faye Rod comes to clinic today for evaluation of cognitive impairment. Her history and examination, including bedside cognitive testing are most consistent with a mild-moderate dementia, though the underlying etiology is not yet clear, which was discussed. AD exacerbated by delirium during her hospitalization  with renal insufficiency in 9/16 vs DLB are considerations. For now it was elected to obtain screening blood work  and a head CT . After discussing potential treatment options, it was elected to add  donepezil. She will then follow up in 1 month , or sooner if needed.       As part of this visit I reviewed prior lab results, reviewed radiology images and obtained additional history from the family which is incorporated in the HPI. Please see above for additional details.